# Patient Record
Sex: FEMALE | Race: WHITE | NOT HISPANIC OR LATINO | Employment: OTHER | URBAN - NONMETROPOLITAN AREA
[De-identification: names, ages, dates, MRNs, and addresses within clinical notes are randomized per-mention and may not be internally consistent; named-entity substitution may affect disease eponyms.]

---

## 2017-02-06 ENCOUNTER — OFFICE VISIT (OUTPATIENT)
Dept: FAMILY MEDICINE CLINIC | Facility: CLINIC | Age: 47
End: 2017-02-06

## 2017-02-06 VITALS
TEMPERATURE: 99.6 F | DIASTOLIC BLOOD PRESSURE: 85 MMHG | HEIGHT: 71 IN | BODY MASS INDEX: 41.02 KG/M2 | HEART RATE: 93 BPM | OXYGEN SATURATION: 96 % | WEIGHT: 293 LBS | SYSTOLIC BLOOD PRESSURE: 140 MMHG

## 2017-02-06 DIAGNOSIS — R53.83 OTHER FATIGUE: ICD-10-CM

## 2017-02-06 DIAGNOSIS — R60.1 GENERALIZED EDEMA: ICD-10-CM

## 2017-02-06 DIAGNOSIS — M19.90 ARTHRITIS: ICD-10-CM

## 2017-02-06 DIAGNOSIS — Z72.0 TOBACCO ABUSE DISORDER: Primary | ICD-10-CM

## 2017-02-06 DIAGNOSIS — E53.8 VITAMIN B 12 DEFICIENCY: ICD-10-CM

## 2017-02-06 DIAGNOSIS — J06.9 ACUTE URI: ICD-10-CM

## 2017-02-06 DIAGNOSIS — J30.9 CHRONIC ALLERGIC RHINITIS: ICD-10-CM

## 2017-02-06 DIAGNOSIS — E55.9 VITAMIN D DEFICIENCY: ICD-10-CM

## 2017-02-06 DIAGNOSIS — IMO0002 DM (DIABETES MELLITUS) TYPE II UNCONTROLLED, PERIPH VASCULAR DISORDER: ICD-10-CM

## 2017-02-06 DIAGNOSIS — E78.2 MIXED HYPERLIPIDEMIA: ICD-10-CM

## 2017-02-06 DIAGNOSIS — I10 ESSENTIAL HYPERTENSION: ICD-10-CM

## 2017-02-06 DIAGNOSIS — K21.9 GASTROESOPHAGEAL REFLUX DISEASE WITHOUT ESOPHAGITIS: ICD-10-CM

## 2017-02-06 LAB
25(OH)D3 SERPL-MCNC: 6 NG/ML
ALBUMIN SERPL-MCNC: 4.1 G/DL (ref 3.5–5)
ALBUMIN/GLOB SERPL: 1.3 G/DL (ref 1.5–2.5)
ALP SERPL-CCNC: 72 U/L (ref 46–116)
ALT SERPL W P-5'-P-CCNC: 12 U/L (ref 10–36)
ANION GAP SERPL CALCULATED.3IONS-SCNC: 1.7 MMOL/L (ref 3.6–11.2)
AST SERPL-CCNC: 14 U/L (ref 10–30)
BASOPHILS # BLD AUTO: 0.03 10*3/MM3 (ref 0–0.3)
BASOPHILS NFR BLD AUTO: 0.3 % (ref 0–2)
BILIRUB SERPL-MCNC: 0.3 MG/DL (ref 0.2–1.8)
BUN BLD-MCNC: 9 MG/DL (ref 7–21)
BUN/CREAT SERPL: 14.3 (ref 7–25)
CALCIUM SPEC-SCNC: 9.7 MG/DL (ref 7.7–10)
CHLORIDE SERPL-SCNC: 109 MMOL/L (ref 99–112)
CHOLEST SERPL-MCNC: 164 MG/DL (ref 0–200)
CO2 SERPL-SCNC: 28.3 MMOL/L (ref 24.3–31.9)
CREAT BLD-MCNC: 0.63 MG/DL (ref 0.43–1.29)
DEPRECATED RDW RBC AUTO: 43.5 FL (ref 37–54)
EOSINOPHIL # BLD AUTO: 0.45 10*3/MM3 (ref 0–0.7)
EOSINOPHIL NFR BLD AUTO: 4.8 % (ref 0–5)
ERYTHROCYTE [DISTWIDTH] IN BLOOD BY AUTOMATED COUNT: 14.3 % (ref 11.5–14.5)
GFR SERPL CREATININE-BSD FRML MDRD: 102 ML/MIN/1.73
GLOBULIN UR ELPH-MCNC: 3.1 GM/DL
GLUCOSE BLD-MCNC: 196 MG/DL (ref 70–110)
HBA1C MFR BLD: 9.5 % (ref 4.5–5.7)
HCT VFR BLD AUTO: 40.6 % (ref 37–47)
HDLC SERPL-MCNC: 42 MG/DL (ref 60–100)
HGB BLD-MCNC: 13.1 G/DL (ref 12–16)
IMM GRANULOCYTES # BLD: 0.01 10*3/MM3 (ref 0–0.03)
IMM GRANULOCYTES NFR BLD: 0.1 % (ref 0–0.5)
LDLC SERPL CALC-MCNC: 50 MG/DL (ref 0–100)
LDLC/HDLC SERPL: 1.2 {RATIO}
LYMPHOCYTES # BLD AUTO: 2.81 10*3/MM3 (ref 1–3)
LYMPHOCYTES NFR BLD AUTO: 30.2 % (ref 21–51)
MCH RBC QN AUTO: 27.4 PG (ref 27–33)
MCHC RBC AUTO-ENTMCNC: 32.3 G/DL (ref 33–37)
MCV RBC AUTO: 84.9 FL (ref 80–94)
MONOCYTES # BLD AUTO: 0.55 10*3/MM3 (ref 0.1–0.9)
MONOCYTES NFR BLD AUTO: 5.9 % (ref 0–10)
NEUTROPHILS # BLD AUTO: 5.44 10*3/MM3 (ref 1.4–6.5)
NEUTROPHILS NFR BLD AUTO: 58.7 % (ref 30–70)
OSMOLALITY SERPL CALC.SUM OF ELEC: 281.6 MOSM/KG (ref 273–305)
PLATELET # BLD AUTO: 171 10*3/MM3 (ref 130–400)
PMV BLD AUTO: 11.9 FL (ref 6–10)
POTASSIUM BLD-SCNC: 4.3 MMOL/L (ref 3.5–5.3)
PROT SERPL-MCNC: 7.2 G/DL (ref 6–8)
RBC # BLD AUTO: 4.78 10*6/MM3 (ref 4.2–5.4)
SODIUM BLD-SCNC: 139 MMOL/L (ref 135–153)
T4 FREE SERPL-MCNC: 1.13 NG/DL (ref 0.89–1.76)
TRIGL SERPL-MCNC: 358 MG/DL (ref 0–150)
TSH SERPL DL<=0.05 MIU/L-ACNC: 0.4 MIU/ML (ref 0.55–4.78)
VIT B12 BLD-MCNC: 237 PG/ML (ref 211–911)
VLDLC SERPL-MCNC: 71.6 MG/DL
WBC NRBC COR # BLD: 9.29 10*3/MM3 (ref 4.5–12.5)

## 2017-02-06 PROCEDURE — 80053 COMPREHEN METABOLIC PANEL: CPT | Performed by: NURSE PRACTITIONER

## 2017-02-06 PROCEDURE — 82306 VITAMIN D 25 HYDROXY: CPT | Performed by: NURSE PRACTITIONER

## 2017-02-06 PROCEDURE — 84439 ASSAY OF FREE THYROXINE: CPT | Performed by: NURSE PRACTITIONER

## 2017-02-06 PROCEDURE — 82607 VITAMIN B-12: CPT | Performed by: NURSE PRACTITIONER

## 2017-02-06 PROCEDURE — 84443 ASSAY THYROID STIM HORMONE: CPT | Performed by: NURSE PRACTITIONER

## 2017-02-06 PROCEDURE — 80061 LIPID PANEL: CPT | Performed by: NURSE PRACTITIONER

## 2017-02-06 PROCEDURE — 83036 HEMOGLOBIN GLYCOSYLATED A1C: CPT | Performed by: NURSE PRACTITIONER

## 2017-02-06 PROCEDURE — 99205 OFFICE O/P NEW HI 60 MIN: CPT | Performed by: NURSE PRACTITIONER

## 2017-02-06 PROCEDURE — 85025 COMPLETE CBC W/AUTO DIFF WBC: CPT | Performed by: NURSE PRACTITIONER

## 2017-02-06 PROCEDURE — 99406 BEHAV CHNG SMOKING 3-10 MIN: CPT | Performed by: NURSE PRACTITIONER

## 2017-02-06 PROCEDURE — 36415 COLL VENOUS BLD VENIPUNCTURE: CPT

## 2017-02-06 RX ORDER — ESOMEPRAZOLE MAGNESIUM 40 MG/1
40 CAPSULE, DELAYED RELEASE ORAL
COMMUNITY
End: 2017-02-06 | Stop reason: SDUPTHER

## 2017-02-06 RX ORDER — FUROSEMIDE 40 MG/1
40 TABLET ORAL DAILY
COMMUNITY
End: 2017-02-06

## 2017-02-06 RX ORDER — BUMETANIDE 2 MG/1
2 TABLET ORAL DAILY
Qty: 30 TABLET | Refills: 5 | Status: SHIPPED | OUTPATIENT
Start: 2017-02-06 | End: 2017-06-05 | Stop reason: SDUPTHER

## 2017-02-06 RX ORDER — GUAIFENESIN DEXTROMETHORPHAN HYDROBROMIDE ORAL SOLUTION 10; 100 MG/5ML; MG/5ML
5 SOLUTION ORAL 4 TIMES DAILY PRN
Qty: 180 ML | Refills: 1 | Status: SHIPPED | OUTPATIENT
Start: 2017-02-06 | End: 2017-06-05

## 2017-02-06 RX ORDER — GABAPENTIN 600 MG/1
600 TABLET ORAL 3 TIMES DAILY
COMMUNITY
End: 2017-02-06 | Stop reason: SDUPTHER

## 2017-02-06 RX ORDER — GLIPIZIDE AND METFORMIN HCL 2.5; 25 MG/1; MG/1
1 TABLET, FILM COATED ORAL
Qty: 60 TABLET | Refills: 0 | Status: SHIPPED | OUTPATIENT
Start: 2017-02-06 | End: 2017-03-06 | Stop reason: SDUPTHER

## 2017-02-06 RX ORDER — ENALAPRIL MALEATE 5 MG/1
5 TABLET ORAL DAILY
Qty: 30 TABLET | Refills: 5 | Status: SHIPPED | OUTPATIENT
Start: 2017-02-06 | End: 2017-08-07 | Stop reason: SDUPTHER

## 2017-02-06 RX ORDER — EZETIMIBE 10 MG/1
10 TABLET ORAL NIGHTLY
COMMUNITY
End: 2017-02-06 | Stop reason: SDUPTHER

## 2017-02-06 RX ORDER — GABAPENTIN 600 MG/1
600 TABLET ORAL 3 TIMES DAILY
Qty: 90 TABLET | Refills: 3 | Status: SHIPPED | OUTPATIENT
Start: 2017-02-06 | End: 2017-06-05 | Stop reason: SDUPTHER

## 2017-02-06 RX ORDER — FENOFIBRATE 145 MG/1
145 TABLET, COATED ORAL DAILY
Qty: 30 TABLET | Refills: 5 | Status: SHIPPED | OUTPATIENT
Start: 2017-02-06 | End: 2017-08-07 | Stop reason: SDUPTHER

## 2017-02-06 RX ORDER — LEVOCETIRIZINE DIHYDROCHLORIDE 5 MG/1
5 TABLET, FILM COATED ORAL EVERY EVENING
Qty: 30 TABLET | Refills: 5 | Status: SHIPPED | OUTPATIENT
Start: 2017-02-06 | End: 2017-08-07 | Stop reason: SDUPTHER

## 2017-02-06 RX ORDER — ESOMEPRAZOLE MAGNESIUM 40 MG/1
40 CAPSULE, DELAYED RELEASE ORAL
Qty: 30 CAPSULE | Refills: 5 | Status: SHIPPED | OUTPATIENT
Start: 2017-02-06 | End: 2017-08-07 | Stop reason: SDUPTHER

## 2017-02-06 RX ORDER — FENOFIBRATE 145 MG/1
145 TABLET, COATED ORAL DAILY
COMMUNITY
End: 2017-02-06 | Stop reason: SDUPTHER

## 2017-02-06 RX ORDER — POTASSIUM CHLORIDE 750 MG/1
10 TABLET, EXTENDED RELEASE ORAL 2 TIMES DAILY
COMMUNITY
End: 2017-02-06 | Stop reason: SDUPTHER

## 2017-02-06 RX ORDER — MONTELUKAST SODIUM 10 MG/1
10 TABLET ORAL NIGHTLY
Qty: 30 TABLET | Refills: 5 | Status: SHIPPED | OUTPATIENT
Start: 2017-02-06 | End: 2017-08-05 | Stop reason: SDUPTHER

## 2017-02-06 RX ORDER — ROSUVASTATIN CALCIUM 40 MG/1
40 TABLET, COATED ORAL DAILY
COMMUNITY
End: 2017-02-06 | Stop reason: SDUPTHER

## 2017-02-06 RX ORDER — DOXYCYCLINE 100 MG/1
100 CAPSULE ORAL 2 TIMES DAILY
Qty: 20 CAPSULE | Refills: 0 | Status: SHIPPED | OUTPATIENT
Start: 2017-02-06 | End: 2017-03-06

## 2017-02-06 RX ORDER — MONTELUKAST SODIUM 10 MG/1
10 TABLET ORAL NIGHTLY
COMMUNITY
End: 2017-02-06 | Stop reason: SDUPTHER

## 2017-02-06 RX ORDER — LEVOCETIRIZINE DIHYDROCHLORIDE 5 MG/1
5 TABLET, FILM COATED ORAL EVERY EVENING
COMMUNITY
End: 2017-02-06 | Stop reason: SDUPTHER

## 2017-02-06 RX ORDER — ENALAPRIL MALEATE 5 MG/1
5 TABLET ORAL DAILY
COMMUNITY
End: 2017-02-06 | Stop reason: SDUPTHER

## 2017-02-06 RX ORDER — EZETIMIBE 10 MG/1
10 TABLET ORAL NIGHTLY
Qty: 30 TABLET | Refills: 5 | Status: SHIPPED | OUTPATIENT
Start: 2017-02-06 | End: 2017-08-07 | Stop reason: SDUPTHER

## 2017-02-06 RX ORDER — POTASSIUM CHLORIDE 750 MG/1
10 TABLET, EXTENDED RELEASE ORAL 2 TIMES DAILY
Qty: 60 TABLET | Refills: 5 | Status: SHIPPED | OUTPATIENT
Start: 2017-02-06 | End: 2017-08-07 | Stop reason: SDUPTHER

## 2017-02-06 RX ORDER — ROSUVASTATIN CALCIUM 40 MG/1
40 TABLET, COATED ORAL DAILY
Qty: 30 TABLET | Refills: 5 | Status: SHIPPED | OUTPATIENT
Start: 2017-02-06 | End: 2017-06-30 | Stop reason: SDUPTHER

## 2017-02-06 NOTE — PROGRESS NOTES
"Subjective   Soheila Brewster is a 46 y.o. female.     Chief Complaint   Patient presents with   • Establish Care       History of Present Illness     Establish care-Here today to establish care.  Former patient of JOAQUÍN Gibson.  Last seen at that office approx Nov/Dec.    HTN-Reports she has been on BP meds approx 10 years.  She reports she is out of her meds currently.  She reports some elevations at times on her medication. Not at goal.   DM II-Has been on Metformin for approx 2 years and does not take it when she goes anywhere due to cramping and diarrhea.  She reports she does not take it often.  She reports elevations due to inadequate metformin use.  Could not tolerated Invokanna due to persistent yeast infection.  Not at goal.   Arthritis-Normally on Humaria and Celebrex.  Has not had Humaria in approx 8 months due to intermittent infections.  She reports history of arthritis approx 20 years.  Has been seen by Rubén Edward in [place] since that time.  She reports Dr Edward feels she has some Fibromyalgia symptoms and some possible chron's disease but she has not had positive diagnosis to date.  Sinus complaint-approx 1 week.  Sore throat with irritation and PND.  Some low grade fever.  Sinus pressure in face.  Ear pain with fullness.  Eyes watery.  Has taken tylenol sinus that does help her HA but has not changed any of her other symptoms.  Not at goal.   BLE edema-has been on Lasix approx 10 years.  Reports she feels the lasix is not as effective as previous.  She reports edema when she awakens in the AM.  She feels she urinates \"2 good times\" after taking the lasix.  She reports more coffee intake daily than water except for the last week and she has been \"craving water\".  Not at goal.   Sleep apnea-on BiPap with O2 bleed at 2 LPM nightly.  Has had for approx 1 year.  Reports she does sleep better since has adjusted to her machine.  She reports she wears a full face mask.  She does not use O2 at any " "other time.   Tobacco use-for approx 10 years this time.  Quit for approx 13 years.  Generally smokes 1PPD.  She reports she would be interested in stopping and \"to loose weight\".  She reports she did \"Cold turkey\" last time.  Not at goal.     The following portions of the patient's history were reviewed and updated as appropriate: allergies, current medications, past family history, past medical history, past social history, past surgical history and problem list.    Review of Systems   Constitutional: Positive for fever. Negative for appetite change and unexpected weight change.   HENT: Positive for congestion, ear pain, postnasal drip, rhinorrhea (with blood tinge) and sore throat. Negative for nosebleeds and trouble swallowing.    Eyes: Positive for pain (occasional.  has been evaluated by Dr Mireles) and discharge. Negative for visual disturbance.        Last eye exam is past due.  She reports she was due in November.  Usually sees Dr Mireles.  Does not wear corrective lens   Respiratory: Positive for cough (with yellow production), chest tightness, shortness of breath and wheezing.    Cardiovascular: Positive for chest pain (at times but \"none in the last month\".  Is not followed by Cardio) and leg swelling. Negative for palpitations.   Gastrointestinal: Positive for abdominal pain (generally periumbilical.  Reports hernia in her abd area) and diarrhea (\"a lot\"). Negative for blood in stool, constipation, nausea and vomiting.   Endocrine: Positive for cold intolerance (increases her joint pain) and polydipsia. Negative for heat intolerance, polyphagia and polyuria.   Genitourinary: Negative for difficulty urinating, dysuria, hematuria, vaginal discharge and vaginal pain.        Reports painful menses.  She reports they are regular and at times are heavy as she has gotten older.  Last pap approx 9 months ago at Ceci's office.  No history of abnormal pap.    Due for Mammo.  Usually has done in Feb.  Does have " "history of abnormal.   Musculoskeletal: Positive for arthralgias (mulitple joints), back pain (history of pinched nerve high in back and in neck.  Disc bulge in low back.  last MRI was approx 2 years ago.), joint swelling, myalgias, neck pain and neck stiffness.   Skin: Negative for color change.        Reports redness at times around her knuckles \"from arthritis\"   Allergic/Immunologic: Negative for environmental allergies and food allergies.   Neurological: Positive for dizziness, numbness (BLE in feet and ankles from DM and some in bilateral legs related to back) and headaches (due to acute illness). Negative for seizures and syncope.   Hematological: Positive for adenopathy (neck due to acute illness). Does not bruise/bleed easily.   Psychiatric/Behavioral: Positive for sleep disturbance (problem going to sleep and staying asleep.  Reports average of 4 hours per night.  She has not been on any meds for sleep). Negative for dysphoric mood and suicidal ideas. The patient is not nervous/anxious.    All other systems reviewed and are negative.      Objective     Visit Vitals   • /85 (BP Location: Left arm, Patient Position: Sitting)   • Pulse 93   • Temp 99.6 °F (37.6 °C) (Tympanic)   • Ht 71\" (180.3 cm)   • Wt 298 lb (135 kg)   • LMP 01/22/2017   • SpO2 96%   • BMI 41.56 kg/m2       Physical Exam   Constitutional: She is oriented to person, place, and time. She appears well-developed and well-nourished. No distress.   Friendly, talkative adult female.  obese   HENT:   Head: Normocephalic and atraumatic.   Right Ear: External ear normal.   Left Ear: External ear normal.   Nose: Nose normal.   Mouth/Throat: Oropharynx is clear and moist.   Bilateral TM's dull, poor cone of light.  Injected and retracted.  EAC clear.   Bilateral nares with erythematous turbinates.  Poor patency.  Rhinorrhea present with boggy mucosa.   Bilateral sinus areas tender, boggy.  Tender to palpation.   Throat moderately injected with " thick, white PND present      Eyes: Conjunctivae, EOM and lids are normal. Pupils are equal, round, and reactive to light. No scleral icterus.   Neck: Normal range of motion. Neck supple. No JVD present. No tracheal tenderness present. No thyromegaly present.   Cardiovascular: Normal rate, regular rhythm, normal heart sounds and intact distal pulses.    No murmur heard.  Pulmonary/Chest: Effort normal and breath sounds normal. She exhibits no tenderness.   Abdominal: Soft. Bowel sounds are normal. She exhibits no mass. There is no hepatosplenomegaly. There is no tenderness.   Musculoskeletal: She exhibits no edema.        Cervical back: She exhibits decreased range of motion, tenderness, pain and spasm.   Gait slow, deliberate.   equal bilaterally. No muscular atrophy or flaccidity.  Generalized joint stiffness and tenderness due to RA.  BLE edema Mild, non pitting   Lymphadenopathy:     She has cervical adenopathy.   Firm, mobile, shotty anterior cervical nodes.  Non tender to palpation.     Neurological: She is alert and oriented to person, place, and time. She has normal strength and normal reflexes. No cranial nerve deficit. She exhibits normal muscle tone. Coordination normal.   Skin: Skin is warm and dry.   Psychiatric: She has a normal mood and affect. Her behavior is normal. Judgment and thought content normal.   Vitals reviewed.      Assessment/Plan     Soheila was seen today for establish care.    Diagnoses and all orders for this visit:    Tobacco abuse disorder  -     varenicline (CHANTIX IESHA) 0.5 MG X 11 & 1 MG X 42 tablet; Take 0.5 mg one daily on days 1-2 and 0.5 mg twice daily on days 4-7.    DM (diabetes mellitus) type II uncontrolled, periph vascular disorder  -     Albiglutide (TANZEUM) 50 MG pen-injector; Inject 1 each under the skin 1 (One) Time Per Week.  -     glipiZIDE-metFORMIN (METAGLIP) 2.5-250 MG per tablet; Take 1 tablet by mouth 2 (Two) Times a Day Before Meals.  -     Insulin  Glargine 300 UNIT/ML solution pen-injector; Inject 68 Units under the skin every night at bedtime.  -     Insulin Pen Needle (EASY TOUCH PEN NEEDLES) 31G X 8 MM misc; 1 each Daily.  -     CBC & Differential; Future  -     Comprehensive Metabolic Panel; Future  -     Hemoglobin A1c; Future  -     CBC & Differential  -     Comprehensive Metabolic Panel  -     Hemoglobin A1c  -     CBC Auto Differential  -     Osmolality, Calculated; Future  -     Osmolality, Calculated    Essential hypertension  -     enalapril (VASOTEC) 5 MG tablet; Take 1 tablet by mouth Daily.  -     CBC & Differential; Future  -     Comprehensive Metabolic Panel; Future  -     CBC & Differential  -     Comprehensive Metabolic Panel  -     CBC Auto Differential  -     Osmolality, Calculated; Future  -     Osmolality, Calculated    Arthritis  -     gabapentin (NEURONTIN) 600 MG tablet; Take 1 tablet by mouth 3 (Three) Times a Day.    Mixed hyperlipidemia  -     ezetimibe (ZETIA) 10 MG tablet; Take 1 tablet by mouth Every Night.  -     fenofibrate (TRICOR) 145 MG tablet; Take 1 tablet by mouth Daily.  -     rosuvastatin (CRESTOR) 40 MG tablet; Take 1 tablet by mouth Daily.  -     Lipid Panel; Future  -     Lipid Panel    Chronic allergic rhinitis  -     levocetirizine (XYZAL) 5 MG tablet; Take 1 tablet by mouth Every Evening.  -     montelukast (SINGULAIR) 10 MG tablet; Take 1 tablet by mouth Every Night.    Gastroesophageal reflux disease without esophagitis  -     esomeprazole (nexIUM) 40 MG capsule; Take 1 capsule by mouth Every Morning Before Breakfast.    Generalized edema  -     potassium chloride (K-DUR,KLOR-CON) 10 MEQ CR tablet; Take 1 tablet by mouth 2 (Two) Times a Day.  -     bumetanide (BUMEX) 2 MG tablet; Take 1 tablet by mouth Daily.    Acute URI  -     doxycycline (MONODOX) 100 MG capsule; Take 1 capsule by mouth 2 (Two) Times a Day.  -     dextromethorphan-guaifenesin (ROBITUSSIN-DM)  MG/5ML liquid; Take 5 mL by mouth 4 (Four)  Times a Day As Needed for cough.    Vitamin D deficiency  -     Vitamin D 25 Hydroxy; Future  -     Vitamin D 25 Hydroxy    Vitamin B 12 deficiency  -     Vitamin B12; Future  -     Vitamin B12    Other fatigue  -     TSH; Future  -     T4, Free; Future  -     TSH  -     T4, Free    60 minutes spent face to face with patient  At least 30 minutes spent counseling patient on PREMA of medications, tobacco abuse, acute illness prevention and treatement  Refill on routine maintenance meds today.   Fasting labs ordered.  Will call patient with results and make any meds changes PRN  Instructed to complete all of antibiotics for acute illness.  Increase PO fluids, avoid caffeine.  Do not save meds for later use.  Rest PRN  3-5 minutes counseling on smoking cessation. Keep self occupied. Find alternative activities when desire to smoke occurs. Understands risk of continued use and benefits of cessation.  Risk of chantix use discussed.  Patient understands reasons to discontinue and call provider.  Understands disease processes and need for medications.  Understands reasons for urgent and emergent care.  Patient (& family) verbalized agreement for treatment plan.   RTC 1 month, sooner if needed.

## 2017-03-01 RX ORDER — ERGOCALCIFEROL 1.25 MG/1
50000 CAPSULE ORAL WEEKLY
Qty: 4 CAPSULE | Refills: 5 | Status: SHIPPED | OUTPATIENT
Start: 2017-03-01 | End: 2017-09-11 | Stop reason: SDUPTHER

## 2017-03-06 ENCOUNTER — OFFICE VISIT (OUTPATIENT)
Dept: FAMILY MEDICINE CLINIC | Facility: CLINIC | Age: 47
End: 2017-03-06

## 2017-03-06 VITALS
OXYGEN SATURATION: 97 % | DIASTOLIC BLOOD PRESSURE: 82 MMHG | HEART RATE: 89 BPM | SYSTOLIC BLOOD PRESSURE: 120 MMHG | HEIGHT: 71 IN | WEIGHT: 293 LBS | BODY MASS INDEX: 41.02 KG/M2 | TEMPERATURE: 99.2 F

## 2017-03-06 DIAGNOSIS — G89.29 CHRONIC MIDLINE THORACIC BACK PAIN: ICD-10-CM

## 2017-03-06 DIAGNOSIS — IMO0002 DM (DIABETES MELLITUS) TYPE II UNCONTROLLED, PERIPH VASCULAR DISORDER: Primary | ICD-10-CM

## 2017-03-06 DIAGNOSIS — M54.6 CHRONIC MIDLINE THORACIC BACK PAIN: ICD-10-CM

## 2017-03-06 DIAGNOSIS — M54.2 CERVICALGIA: ICD-10-CM

## 2017-03-06 DIAGNOSIS — N61.1 LEFT BREAST ABSCESS: ICD-10-CM

## 2017-03-06 PROCEDURE — 99214 OFFICE O/P EST MOD 30 MIN: CPT | Performed by: NURSE PRACTITIONER

## 2017-03-06 RX ORDER — GLIPIZIDE AND METFORMIN HCL 2.5; 25 MG/1; MG/1
1 TABLET, FILM COATED ORAL
Qty: 60 TABLET | Refills: 2 | Status: SHIPPED | OUTPATIENT
Start: 2017-03-06 | End: 2017-06-05 | Stop reason: SDUPTHER

## 2017-03-06 RX ORDER — SULFAMETHOXAZOLE AND TRIMETHOPRIM 800; 160 MG/1; MG/1
1 TABLET ORAL 2 TIMES DAILY
Qty: 20 TABLET | Refills: 0 | Status: SHIPPED | OUTPATIENT
Start: 2017-03-06 | End: 2017-03-16

## 2017-03-06 NOTE — PROGRESS NOTES
Subjective   Soheila Brewster is a 46 y.o. female.     Chief Complaint   Patient presents with   • Follow up on medication       History of Present Illness     Smoking cessation-prescribed chantix at last visit for smoking cessation.  She did not begin chantix due to multiple stressors.  She does continue to smoke.   Diabetes-reports her Tanzeum is not covered instead of Toujeo.  She previously reports it was her Toujeo and that medication had been discontinued.  Will resume today.    Back pain-reports increase in her back pain since her last visit at area of bulging disc in her back.  She reports she has been unable to stand more than 5 minutes at a time due to the numbness she is having in her legs with tingling sensation.  She reports some sensation of weakness.  She reports last work up was approx 2 years ago with an MRI.  She was followed by Dr armenta in the past.   Not at goal.   Area under left breast-draining and red.  Recurrent in nature.  She reports this area began approx 5 days ago. She reports she has done warm compresses at home due to some sharp shooting pain.  Not at goal.     The following portions of the patient's history were reviewed and updated as appropriate: allergies, current medications, past family history, past medical history, past social history, past surgical history and problem list.    Review of Systems   Constitutional: Positive for fever. Negative for appetite change and unexpected weight change.   HENT: Positive for congestion, ear pain (left ear is worse), postnasal drip, rhinorrhea (with blood tinge) and sore throat. Negative for nosebleeds and trouble swallowing.    Eyes: Positive for pain (occasional.  has been evaluated by Dr Mireles) and discharge. Negative for visual disturbance.        Last eye exam is past due.  She reports she was due in November.  Usually sees Dr Mireles.  Does not wear corrective lens   Respiratory: Positive for cough (with yellow production), chest  "tightness, shortness of breath and wheezing.    Cardiovascular: Positive for leg swelling. Negative for chest pain (at times but \"none in the last month\".  Is not followed by Cardio) and palpitations.   Gastrointestinal: Positive for abdominal pain (generally periumbilical.  Reports hernia in her abd area) and diarrhea (\"a lot\"). Negative for blood in stool, constipation, nausea and vomiting.   Endocrine: Positive for cold intolerance (increases her joint pain) and polydipsia. Negative for heat intolerance, polyphagia and polyuria.   Genitourinary: Negative for difficulty urinating, dysuria, hematuria, vaginal discharge and vaginal pain.        Reports painful menses.  She reports they are regular and at times are heavy as she has gotten older.  Last pap approx 9 months ago at Ceci's office.  No history of abnormal pap.    Due for Mammo.  Usually has done in Feb.  Does have history of abnormal.   Musculoskeletal: Positive for arthralgias (mulitple joints), back pain (history of pinched nerve high in back and in neck.  Disc bulge in low back.  last MRI was approx 2 years ago.), joint swelling, myalgias (muscle spasm in back), neck pain and neck stiffness.   Skin: Negative for color change.        Reports redness at times around her knuckles \"from arthritis\"   Allergic/Immunologic: Negative for environmental allergies and food allergies.   Neurological: Positive for dizziness, numbness (BLE in feet and ankles from DM and some in bilateral legs related to back) and headaches (due to acute illness). Negative for seizures and syncope.   Hematological: Positive for adenopathy (neck due to acute illness). Does not bruise/bleed easily.   Psychiatric/Behavioral: Positive for sleep disturbance (problem going to sleep and staying asleep.  Reports average of 4 hours per night.  She has not been on any meds for sleep). Negative for dysphoric mood and suicidal ideas. The patient is not nervous/anxious.    All other systems " "reviewed and are negative.      Objective     Visit Vitals   • /82 (BP Location: Left arm, Patient Position: Sitting)   • Pulse 89   • Temp 99.2 °F (37.3 °C) (Tympanic)   • Ht 71\" (180.3 cm)   • Wt 294 lb (133 kg)   • LMP 02/22/2017   • SpO2 97%   • BMI 41 kg/m2       Physical Exam   Constitutional: She is oriented to person, place, and time. She appears well-developed and well-nourished. No distress.   Friendly, talkative adult female.  obese   HENT:   Head: Normocephalic and atraumatic.   Right Ear: External ear normal.   Left Ear: External ear normal.   Nose: Nose normal.   Mouth/Throat: Oropharynx is clear and moist.   Bilateral TM's dull, poor cone of light.  Injected and retracted.  EAC clear.   Bilateral nares with erythematous turbinates.  Poor patency.  Rhinorrhea present with boggy mucosa.   Bilateral sinus areas tender, boggy.  Tender to palpation.   Throat moderately injected with thick, white PND present      Eyes: Conjunctivae, EOM and lids are normal. Pupils are equal, round, and reactive to light. No scleral icterus.   Neck: Normal range of motion. Neck supple. No JVD present. No tracheal tenderness present. No thyromegaly present.   Cardiovascular: Normal rate, regular rhythm, normal heart sounds and intact distal pulses.    No murmur heard.  Pulmonary/Chest: Effort normal and breath sounds normal. She exhibits no tenderness.       Abdominal: Soft. Bowel sounds are normal. She exhibits no mass. There is no hepatosplenomegaly. There is no tenderness.   Musculoskeletal: She exhibits no edema.        Cervical back: She exhibits decreased range of motion, tenderness, pain and spasm.   Gait slow, deliberate.   equal bilaterally. No muscular atrophy or flaccidity.  Generalized joint stiffness and tenderness due to RA.  BLE edema Mild, non pitting   Lymphadenopathy:     She has cervical adenopathy.   Firm, mobile, shotty anterior cervical nodes.  Non tender to palpation.     Neurological: She is " alert and oriented to person, place, and time. She has normal strength and normal reflexes. No cranial nerve deficit. She exhibits normal muscle tone. Coordination normal.   Skin: Skin is warm and dry.   Psychiatric: She has a normal mood and affect. Her behavior is normal. Judgment and thought content normal.   Vitals reviewed.      Assessment/Plan     Soheila was seen today for follow up on medication.    Diagnoses and all orders for this visit:    DM (diabetes mellitus) type II uncontrolled, periph vascular disorder  -     Insulin Glargine (TOUJEO SOLOSTAR) 300 UNIT/ML solution pen-injector; Inject 68 Units under the skin Daily.  -     Exenatide ER 2 MG pen-injector; Inject 2 mg under the skin 1 (One) Time Per Week.  -     glipiZIDE-metFORMIN (METAGLIP) 2.5-250 MG per tablet; Take 1 tablet by mouth 2 (Two) Times a Day Before Meals.    Chronic midline thoracic back pain  -     XR Spine Cervical 2 or 3 View; Future  -     XR spine thoracic 2 vw; Future  -     XR Spine Lumbar 2 or 3 View; Future    Cervicalgia  -     XR Spine Cervical 2 or 3 View; Future  -     XR spine thoracic 2 vw; Future    Left breast abscess  -     sulfamethoxazole-trimethoprim (BACTRIM DS,SEPTRA DS) 800-160 MG per tablet; Take 1 tablet by mouth 2 (Two) Times a Day for 10 days.  -     mupirocin (BACTROBAN) 2 % ointment; Apply  topically 3 (Three) Times a Day.      Reviewed labs with patients  Patient provided lab order for [facility] for work up of back.   Advised warm soaks Q 3-4 hours until area begins to drain.  If increase in warmth, edema, fever develops report to office.  IF unchanged in 2 days, report to office for referral for I & D.    Understands disease processes and need for medications.  Understands reasons for urgent and emergent care.  Patient (& family) verbalized agreement for treatment plan.   Instructed to complete all of antibiotics for acute illness.  Increase PO fluids, avoid caffeine.  Do not save meds for later use.  Rest  PRN  Correction of insulin ordered due to miscommunication with patient and office.   Trial of Bydureon.  Patient provided 2 sample pens with demonstration of use per provider and return demonstration x's 2 per patient.  RTC 3 months, sooner if needed.

## 2017-05-30 DIAGNOSIS — M54.6 CHRONIC MIDLINE THORACIC BACK PAIN: Primary | ICD-10-CM

## 2017-05-30 DIAGNOSIS — M51.36 NARROWING OF LUMBAR INTERVERTEBRAL DISC SPACE: ICD-10-CM

## 2017-05-30 DIAGNOSIS — G89.29 CHRONIC MIDLINE THORACIC BACK PAIN: Primary | ICD-10-CM

## 2017-06-05 ENCOUNTER — OFFICE VISIT (OUTPATIENT)
Dept: FAMILY MEDICINE CLINIC | Facility: CLINIC | Age: 47
End: 2017-06-05

## 2017-06-05 VITALS
TEMPERATURE: 98.6 F | OXYGEN SATURATION: 97 % | SYSTOLIC BLOOD PRESSURE: 115 MMHG | HEIGHT: 71 IN | HEART RATE: 93 BPM | WEIGHT: 293 LBS | DIASTOLIC BLOOD PRESSURE: 70 MMHG | BODY MASS INDEX: 41.02 KG/M2

## 2017-06-05 DIAGNOSIS — M19.90 ARTHRITIS: ICD-10-CM

## 2017-06-05 DIAGNOSIS — R60.1 GENERALIZED EDEMA: ICD-10-CM

## 2017-06-05 DIAGNOSIS — IMO0002 DM (DIABETES MELLITUS) TYPE II UNCONTROLLED, PERIPH VASCULAR DISORDER: Primary | ICD-10-CM

## 2017-06-05 DIAGNOSIS — M51.36 NARROWING OF LUMBAR INTERVERTEBRAL DISC SPACE: ICD-10-CM

## 2017-06-05 DIAGNOSIS — J06.9 ACUTE URI: ICD-10-CM

## 2017-06-05 DIAGNOSIS — Z12.31 ENCOUNTER FOR SCREENING MAMMOGRAM FOR MALIGNANT NEOPLASM OF BREAST: ICD-10-CM

## 2017-06-05 DIAGNOSIS — G89.29 CHRONIC MIDLINE THORACIC BACK PAIN: ICD-10-CM

## 2017-06-05 DIAGNOSIS — M54.6 CHRONIC MIDLINE THORACIC BACK PAIN: ICD-10-CM

## 2017-06-05 PROCEDURE — 96372 THER/PROPH/DIAG INJ SC/IM: CPT | Performed by: NURSE PRACTITIONER

## 2017-06-05 PROCEDURE — 99214 OFFICE O/P EST MOD 30 MIN: CPT | Performed by: NURSE PRACTITIONER

## 2017-06-05 RX ORDER — LEVOFLOXACIN 500 MG/1
500 TABLET, FILM COATED ORAL DAILY
Qty: 10 TABLET | Refills: 0 | Status: SHIPPED | OUTPATIENT
Start: 2017-06-05 | End: 2017-06-15

## 2017-06-05 RX ORDER — KETOROLAC TROMETHAMINE 30 MG/ML
60 INJECTION, SOLUTION INTRAMUSCULAR; INTRAVENOUS ONCE
Status: COMPLETED | OUTPATIENT
Start: 2017-06-05 | End: 2017-06-05

## 2017-06-05 RX ORDER — OFLOXACIN 3 MG/ML
5 SOLUTION AURICULAR (OTIC) 2 TIMES DAILY
Qty: 10 ML | Refills: 0 | Status: SHIPPED | OUTPATIENT
Start: 2017-06-05 | End: 2017-06-12

## 2017-06-05 RX ORDER — METHOCARBAMOL 750 MG/1
750 TABLET, FILM COATED ORAL 3 TIMES DAILY
Qty: 90 TABLET | Refills: 5 | Status: SHIPPED | OUTPATIENT
Start: 2017-06-05 | End: 2017-12-08 | Stop reason: SDUPTHER

## 2017-06-05 RX ORDER — BUMETANIDE 2 MG/1
2 TABLET ORAL DAILY
Qty: 45 TABLET | Refills: 5 | Status: SHIPPED | OUTPATIENT
Start: 2017-06-05 | End: 2017-12-11 | Stop reason: SDUPTHER

## 2017-06-05 RX ORDER — GABAPENTIN 600 MG/1
600 TABLET ORAL 3 TIMES DAILY
Qty: 90 TABLET | Refills: 3 | Status: SHIPPED | OUTPATIENT
Start: 2017-06-05 | End: 2017-09-11 | Stop reason: SDUPTHER

## 2017-06-05 RX ORDER — GLIPIZIDE AND METFORMIN HCL 2.5; 25 MG/1; MG/1
1 TABLET, FILM COATED ORAL
Qty: 60 TABLET | Refills: 2 | Status: SHIPPED | OUTPATIENT
Start: 2017-06-05 | End: 2017-12-11 | Stop reason: SDUPTHER

## 2017-06-05 RX ADMIN — KETOROLAC TROMETHAMINE 60 MG: 30 INJECTION, SOLUTION INTRAMUSCULAR; INTRAVENOUS at 11:27

## 2017-06-05 NOTE — PROGRESS NOTES
"Subjective   Soheila Brewster is a 47 y.o. female.     Chief Complaint   Patient presents with   • Diabetes   • Follow up Radiology   • Osteoarthritis       History of Present Illness     Diabetes-reports she is using approx 4 pens per month.  At times it is 5 pens.  She reports blood sugars are doing 120's up to 150's. She is taking her meds as ordered.  Stable.   Back pain-reports increase in her back pain since her last visit at area of bulging disc in her back. She reports she has been unable to stand more than 5 minutes at a time due to the numbness she is having in her legs with tingling sensation. She reports some sensation of weakness. She reports last work up was approx 2 years ago with an MRI. She was followed by Dr armenta in the past. Most recent imaging with noted degenerative changes, arthritis, and bone spurs.  She reports problems in her left leg that is numbness if she has to stand for any period of time.  She reports the longer she is weight bearing \"the worse it gets\".  She reports this sensation does occur in her right leg at times but not as frequently.  She reports the numbness is present on her lateral and anterior thigh.  Not at goal.   Left breast abscess and pain-has improved but reports she is having some left breast pain near the nipple.  She reports the pain is of newer onset.  She reports she had been having pain due to the abscess but this is a different type of pain.  No discharge or unusual warmth.  No skin changes.  Not at goal.   Abd hernia-She reports some generalized soreness and difficulty leaning over due to pressure and tenderness.  She has never had an eval for repair as it had not bothered her in the past.  Not at goal.     The following portions of the patient's history were reviewed and updated as appropriate: allergies, current medications, past family history, past medical history, past social history, past surgical history and problem list.    Review of Systems " "  Constitutional: Negative for appetite change, fever and unexpected weight change.   HENT: Positive for congestion, ear pain (left ear is worse), postnasal drip, rhinorrhea (with blood tinge) and sore throat. Negative for nosebleeds and trouble swallowing.    Eyes: Positive for pain (occasional.  has been evaluated by Dr Mireles). Negative for discharge and visual disturbance.        Last eye exam is past due.  She reports she was due in November.  Usually sees Dr Mireles.  Does not wear corrective lens   Respiratory: Positive for cough (with yellow production), chest tightness, shortness of breath and wheezing.    Cardiovascular: Positive for leg swelling. Negative for chest pain and palpitations.   Gastrointestinal: Positive for abdominal pain (generally periumbilical.  Reports hernia in her abd area) and diarrhea (at times but is improving). Negative for blood in stool, constipation, nausea and vomiting.   Endocrine: Positive for cold intolerance (increases her joint pain) and polydipsia. Negative for heat intolerance, polyphagia and polyuria.   Genitourinary: Negative for difficulty urinating, dysuria, hematuria, vaginal discharge and vaginal pain.        Reports painful menses.  She reports they are regular and at times are heavy as she has gotten older.  Last pap approx 9 months ago at Ceci's office.  No history of abnormal pap.    Due for Mammo.  Usually has done in Feb.  Does have history of abnormal.   Musculoskeletal: Positive for arthralgias (mulitple joints), back pain (history of pinched nerve high in back and in neck.  Disc bulge in low back.  last MRI was approx 2 years ago.), joint swelling, myalgias (muscle spasm in back), neck pain and neck stiffness.   Skin: Negative for color change.        Reports redness at times around her knuckles \"from arthritis\"   Allergic/Immunologic: Negative for environmental allergies and food allergies.   Neurological: Positive for dizziness, numbness (BLE in feet " "and ankles from DM and some in bilateral legs related to back) and headaches (due to acute illness with sinus). Negative for seizures and syncope.   Hematological: Positive for adenopathy. Does not bruise/bleed easily.   Psychiatric/Behavioral: Positive for sleep disturbance (problem going to sleep and staying asleep.  Reports average of 4 hours per night.  She has not been on any meds for sleep). Negative for dysphoric mood and suicidal ideas. The patient is not nervous/anxious.    All other systems reviewed and are negative.      Objective     /70 (BP Location: Left arm, Patient Position: Sitting)  Pulse 93  Temp 98.6 °F (37 °C) (Oral)   Ht 71\" (180.3 cm)  Wt 294 lb (133 kg)  LMP 05/15/2017  SpO2 97%  BMI 41 kg/m2    Physical Exam   Constitutional: She is oriented to person, place, and time. She appears well-developed and well-nourished. No distress.   Friendly, talkative adult female.  obese   HENT:   Head: Normocephalic and atraumatic.   Right Ear: External ear normal. Tympanic membrane is injected.   Left Ear: External ear normal. Tympanic membrane is injected.   Nose: Mucosal edema and rhinorrhea present. Right sinus exhibits no maxillary sinus tenderness and no frontal sinus tenderness. Left sinus exhibits no maxillary sinus tenderness and no frontal sinus tenderness.   Mouth/Throat: Oropharyngeal exudate and posterior oropharyngeal erythema present.   Bilateral TM's dull, poor cone of light.  Injected and retracted.  EAC clear.   Bilateral nares with erythematous turbinates.  Poor patency.  Rhinorrhea present with boggy mucosa.   Bilateral sinus areas tender, boggy.  Tender to palpation.   Throat moderately injected with thick, white PND present      Eyes: Conjunctivae, EOM and lids are normal. Pupils are equal, round, and reactive to light. No scleral icterus.   Neck: Normal range of motion. Neck supple. No JVD present. No tracheal tenderness present. No thyromegaly present.   Cardiovascular: " Normal rate, regular rhythm, normal heart sounds and intact distal pulses.    No murmur heard.  Pulmonary/Chest: Effort normal and breath sounds normal. She exhibits no tenderness.   Abdominal: Soft. Bowel sounds are normal. She exhibits no mass. There is no hepatosplenomegaly. There is no tenderness.   Musculoskeletal: She exhibits no edema.        Cervical back: She exhibits decreased range of motion, tenderness, pain and spasm.   Gait slow, deliberate.   equal bilaterally. No muscular atrophy or flaccidity.  Generalized joint stiffness and tenderness due to RA.  BLE edema Mild, non pitting   Lymphadenopathy:     She has cervical adenopathy.   Firm, mobile, shotty anterior cervical nodes.  Non tender to palpation.     Neurological: She is alert and oriented to person, place, and time. She has normal strength and normal reflexes. No cranial nerve deficit. She exhibits normal muscle tone. Coordination normal.   Skin: Skin is warm and dry.   Psychiatric: She has a normal mood and affect. Her behavior is normal. Judgment and thought content normal.   Vitals reviewed.        Assessment/Plan     Problem List Items Addressed This Visit        Cardiovascular and Mediastinum    DM (diabetes mellitus) type II uncontrolled, periph vascular disorder - Primary    Current Assessment & Plan     Samples of Toujeo provided today x's 3         Relevant Medications    glipiZIDE-metFORMIN (METAGLIP) 2.5-250 MG per tablet       Musculoskeletal and Integument    Arthritis    Relevant Medications    gabapentin (NEURONTIN) 600 MG tablet    methocarbamol (ROBAXIN) 750 MG tablet       Other    Chronic midline thoracic back pain    Relevant Medications    methocarbamol (ROBAXIN) 750 MG tablet    ketorolac (TORADOL) injection 60 mg (Completed)      Other Visit Diagnoses     Generalized edema        Relevant Medications    bumetanide (BUMEX) 2 MG tablet    Acute URI        Relevant Medications    levoFLOXacin (LEVAQUIN) 500 MG tablet     ofloxacin (FLOXIN OTIC) 0.3 % otic solution    Encounter for screening mammogram for malignant neoplasm of breast         Relevant Orders    Mammo Screening Bilateral With CAD      Refill on routine maintenance meds today.   Instructed to complete all of antibiotics for acute illness.  Increase PO fluids, avoid caffeine.  Do not save meds for later use.  Rest PRN  Injections today for acute symptom relief.   Trial of Robaxin for muscular spasm in back.  Understands disease processes and need for medications.  Understands reasons for urgent and emergent care.  Patient (& family) verbalized agreement for treatment plan.   Advised glycemic control.  Understands risk of prolonged elevated levels including vision changes, kidney damage, and small vessel disease including extremities.  Reviewed most recent labs and imaging of back with patient.  Questions answered.   RTC 3 months, sooner if needed.   Further imaging ordered for back and referral to pain management made.        This document has been electronically signed by:  JOAQUÍN Ross, EMY-C

## 2017-06-30 DIAGNOSIS — E78.2 MIXED HYPERLIPIDEMIA: ICD-10-CM

## 2017-06-30 RX ORDER — ROSUVASTATIN CALCIUM 40 MG/1
40 TABLET, COATED ORAL DAILY
Qty: 90 TABLET | Refills: 3 | Status: SHIPPED | OUTPATIENT
Start: 2017-06-30 | End: 2017-09-11 | Stop reason: SDUPTHER

## 2017-07-05 ENCOUNTER — TELEPHONE (OUTPATIENT)
Dept: FAMILY MEDICINE CLINIC | Facility: CLINIC | Age: 47
End: 2017-07-05

## 2017-07-05 RX ORDER — SULFAMETHOXAZOLE AND TRIMETHOPRIM 800; 160 MG/1; MG/1
1 TABLET ORAL 2 TIMES DAILY
Qty: 20 TABLET | Refills: 0 | Status: SHIPPED | OUTPATIENT
Start: 2017-07-05 | End: 2017-07-15

## 2017-07-05 NOTE — TELEPHONE ENCOUNTER
Sharon called wanting to be seen by Donna Mcknight.  Donna agreed to send medications to [facility]

## 2017-08-05 DIAGNOSIS — IMO0002 DM (DIABETES MELLITUS) TYPE II UNCONTROLLED, PERIPH VASCULAR DISORDER: ICD-10-CM

## 2017-08-05 DIAGNOSIS — J30.9 CHRONIC ALLERGIC RHINITIS: ICD-10-CM

## 2017-08-07 DIAGNOSIS — J30.9 CHRONIC ALLERGIC RHINITIS: ICD-10-CM

## 2017-08-07 DIAGNOSIS — K21.9 GASTROESOPHAGEAL REFLUX DISEASE WITHOUT ESOPHAGITIS: ICD-10-CM

## 2017-08-07 DIAGNOSIS — R60.1 GENERALIZED EDEMA: ICD-10-CM

## 2017-08-07 DIAGNOSIS — I10 ESSENTIAL HYPERTENSION: ICD-10-CM

## 2017-08-07 DIAGNOSIS — E78.2 MIXED HYPERLIPIDEMIA: ICD-10-CM

## 2017-08-07 RX ORDER — LEVOCETIRIZINE DIHYDROCHLORIDE 5 MG/1
TABLET, FILM COATED ORAL
Qty: 30 TABLET | Refills: 4 | Status: SHIPPED | OUTPATIENT
Start: 2017-08-07 | End: 2017-12-11 | Stop reason: SDUPTHER

## 2017-08-07 RX ORDER — PEN NEEDLE, DIABETIC 32GX 5/32"
NEEDLE, DISPOSABLE MISCELLANEOUS
Refills: 4 | OUTPATIENT
Start: 2017-08-07

## 2017-08-07 RX ORDER — ESOMEPRAZOLE MAGNESIUM 40 MG/1
CAPSULE, DELAYED RELEASE ORAL
Qty: 30 CAPSULE | Refills: 4 | Status: SHIPPED | OUTPATIENT
Start: 2017-08-07 | End: 2017-12-11

## 2017-08-07 RX ORDER — POTASSIUM CHLORIDE 750 MG/1
TABLET, FILM COATED, EXTENDED RELEASE ORAL
Qty: 60 TABLET | Refills: 4 | Status: SHIPPED | OUTPATIENT
Start: 2017-08-07 | End: 2017-12-11 | Stop reason: SDUPTHER

## 2017-08-07 RX ORDER — MONTELUKAST SODIUM 10 MG/1
TABLET ORAL
Qty: 30 TABLET | Refills: 4 | Status: SHIPPED | OUTPATIENT
Start: 2017-08-07 | End: 2017-12-11 | Stop reason: SDUPTHER

## 2017-08-07 RX ORDER — EZETIMIBE 10 MG/1
TABLET ORAL
Qty: 30 TABLET | Refills: 4 | Status: SHIPPED | OUTPATIENT
Start: 2017-08-07 | End: 2017-12-11 | Stop reason: SDUPTHER

## 2017-08-07 RX ORDER — ENALAPRIL MALEATE 5 MG/1
TABLET ORAL
Qty: 30 TABLET | Refills: 4 | Status: SHIPPED | OUTPATIENT
Start: 2017-08-07 | End: 2017-12-11 | Stop reason: SDUPTHER

## 2017-08-07 RX ORDER — FENOFIBRATE 145 MG/1
TABLET, COATED ORAL
Qty: 30 TABLET | Refills: 4 | Status: SHIPPED | OUTPATIENT
Start: 2017-08-07 | End: 2017-12-11 | Stop reason: SDUPTHER

## 2017-09-07 DIAGNOSIS — IMO0002 DM (DIABETES MELLITUS) TYPE II UNCONTROLLED, PERIPH VASCULAR DISORDER: ICD-10-CM

## 2017-09-08 RX ORDER — EXENATIDE 2 MG/.65ML
INJECTION, SUSPENSION, EXTENDED RELEASE SUBCUTANEOUS
Qty: 4 EACH | Refills: 4 | Status: SHIPPED | OUTPATIENT
Start: 2017-09-08 | End: 2017-12-11 | Stop reason: SDUPTHER

## 2017-09-08 RX ORDER — INSULIN GLARGINE 300 U/ML
INJECTION, SOLUTION SUBCUTANEOUS
Qty: 4.5 PEN | Refills: 4 | Status: SHIPPED | OUTPATIENT
Start: 2017-09-08 | End: 2017-12-11 | Stop reason: SDUPTHER

## 2017-09-08 RX ORDER — PEN NEEDLE, DIABETIC 32GX 5/32"
NEEDLE, DISPOSABLE MISCELLANEOUS
Qty: 100 EACH | Refills: 11 | Status: SHIPPED | OUTPATIENT
Start: 2017-09-08 | End: 2018-10-08 | Stop reason: SDUPTHER

## 2017-09-11 ENCOUNTER — OFFICE VISIT (OUTPATIENT)
Dept: FAMILY MEDICINE CLINIC | Facility: CLINIC | Age: 47
End: 2017-09-11

## 2017-09-11 VITALS
OXYGEN SATURATION: 97 % | TEMPERATURE: 98.6 F | BODY MASS INDEX: 41.02 KG/M2 | SYSTOLIC BLOOD PRESSURE: 130 MMHG | WEIGHT: 293 LBS | DIASTOLIC BLOOD PRESSURE: 72 MMHG | HEART RATE: 84 BPM | HEIGHT: 71 IN

## 2017-09-11 DIAGNOSIS — E53.8 VITAMIN B 12 DEFICIENCY: ICD-10-CM

## 2017-09-11 DIAGNOSIS — R16.0 HEPATOMEGALY: ICD-10-CM

## 2017-09-11 DIAGNOSIS — E78.2 MIXED HYPERLIPIDEMIA: ICD-10-CM

## 2017-09-11 DIAGNOSIS — E55.9 VITAMIN D DEFICIENCY: Primary | ICD-10-CM

## 2017-09-11 DIAGNOSIS — IMO0002 DM (DIABETES MELLITUS) TYPE II UNCONTROLLED, PERIPH VASCULAR DISORDER: ICD-10-CM

## 2017-09-11 DIAGNOSIS — M54.50 CHRONIC BILATERAL LOW BACK PAIN WITHOUT SCIATICA: ICD-10-CM

## 2017-09-11 DIAGNOSIS — I10 ESSENTIAL HYPERTENSION: ICD-10-CM

## 2017-09-11 DIAGNOSIS — M51.27 PROTRUSION OF INTERVERTEBRAL DISC OF LUMBOSACRAL REGION: ICD-10-CM

## 2017-09-11 DIAGNOSIS — M19.90 ARTHRITIS: ICD-10-CM

## 2017-09-11 DIAGNOSIS — M54.6 CHRONIC MIDLINE THORACIC BACK PAIN: ICD-10-CM

## 2017-09-11 DIAGNOSIS — G89.29 CHRONIC MIDLINE THORACIC BACK PAIN: ICD-10-CM

## 2017-09-11 DIAGNOSIS — N61.1 LEFT BREAST ABSCESS: ICD-10-CM

## 2017-09-11 DIAGNOSIS — G89.29 CHRONIC BILATERAL LOW BACK PAIN WITHOUT SCIATICA: ICD-10-CM

## 2017-09-11 DIAGNOSIS — M79.672 LEFT FOOT PAIN: ICD-10-CM

## 2017-09-11 PROCEDURE — 99214 OFFICE O/P EST MOD 30 MIN: CPT | Performed by: NURSE PRACTITIONER

## 2017-09-11 RX ORDER — ROSUVASTATIN CALCIUM 40 MG/1
40 TABLET, COATED ORAL DAILY
Qty: 90 TABLET | Refills: 3 | Status: SHIPPED | OUTPATIENT
Start: 2017-09-11 | End: 2018-07-09 | Stop reason: SDUPTHER

## 2017-09-11 RX ORDER — GABAPENTIN 600 MG/1
600 TABLET ORAL 3 TIMES DAILY
Qty: 90 TABLET | Refills: 2 | Status: SHIPPED | OUTPATIENT
Start: 2017-09-11 | End: 2017-10-11 | Stop reason: SDUPTHER

## 2017-09-11 RX ORDER — FLUCONAZOLE 150 MG/1
150 TABLET ORAL DAILY
Qty: 7 TABLET | Refills: 0 | Status: SHIPPED | OUTPATIENT
Start: 2017-09-11 | End: 2017-09-18

## 2017-09-11 RX ORDER — SULFAMETHOXAZOLE AND TRIMETHOPRIM 800; 160 MG/1; MG/1
1 TABLET ORAL 2 TIMES DAILY
Qty: 60 TABLET | Refills: 0 | Status: SHIPPED | OUTPATIENT
Start: 2017-09-11 | End: 2017-10-11

## 2017-09-11 RX ORDER — ERGOCALCIFEROL 1.25 MG/1
50000 CAPSULE ORAL WEEKLY
Qty: 4 CAPSULE | Refills: 5 | Status: SHIPPED | OUTPATIENT
Start: 2017-09-11 | End: 2017-12-11 | Stop reason: SDUPTHER

## 2017-09-11 NOTE — PROGRESS NOTES
Subjective   Soheila Brewster is a 47 y.o. female.     Chief Complaint   Patient presents with   • Diabetes   • Cough       History of Present Illness     Left breast cyst-has been draining approx 4 days ago.  She was unable to have her mammogram due to a cyst when her appt was due.  She reports the area is tender and swollen.  She has been using Bactroban ointment and does need a refill on that today.    Diabetes-ongoing.  She reports she has been out of meds due to no refills but does plan to pick them up today.    Left foot complaint-some soreness and mild edema around the inner ankle.  She reports tenderness radiates around her heel area.  She denies any injury.  She reports the pain has been present for approx1 month and varies in symptoms and duration.  She reports at times by the end of the day she has trouble with weight bearing.  She reports on the days when pain is increased she has more ankle edema.  Not at goal.   URI-sore throat that has increased since Saturday.  Some productive cough.  Nasal rhinorrhea.  Some watering of eyes.  SOA more than her usual.  She reports sinus pressure and has been using Tylenol sinus without much relief.  Not at goal.      The following portions of the patient's history were reviewed and updated as appropriate: allergies, current medications, past family history, past medical history, past social history, past surgical history and problem list.    Review of Systems   Constitutional: Negative for appetite change, fever and unexpected weight change.   HENT: Positive for ear pain (left ear is worse), postnasal drip, rhinorrhea, sinus pressure and sore throat. Negative for congestion, nosebleeds and trouble swallowing.    Eyes: Positive for pain (occasional.  has been evaluated by Dr Mireles). Negative for discharge and visual disturbance.        Last eye exam is past due.  She reports she was due in November.  Usually sees Dr Mirlees.  Does not wear corrective lens  "  Respiratory: Positive for cough (with yellow production), chest tightness, shortness of breath and wheezing.    Cardiovascular: Positive for leg swelling. Negative for chest pain and palpitations.   Gastrointestinal: Positive for abdominal pain (generally periumbilical.  Reports hernia in her abd area) and diarrhea (at times but is improving). Negative for blood in stool, constipation, nausea and vomiting.   Endocrine: Positive for cold intolerance (increases her joint pain) and polydipsia. Negative for heat intolerance, polyphagia and polyuria.   Genitourinary: Negative for difficulty urinating, dysuria, hematuria, vaginal discharge and vaginal pain.        Reports painful menses.  She reports they are regular and at times are heavy as she has gotten older.  Last pap approx 9 months ago at Ceci's office.  No history of abnormal pap.    Due for Mammo.  Usually has done in Feb.  Does have history of abnormal.   Musculoskeletal: Positive for arthralgias (mulitple joints), back pain (history of pinched nerve high in back and in neck.  Disc bulge in low back.  last MRI was approx 2 years ago.), joint swelling, myalgias (muscle spasm in back), neck pain and neck stiffness.   Skin: Negative for color change.        Reports redness at times around her knuckles \"from arthritis\"   Allergic/Immunologic: Negative for environmental allergies and food allergies.   Neurological: Positive for dizziness, numbness (BLE in feet and ankles from DM and some in bilateral legs related to back) and headaches (due to acute illness with sinus). Negative for seizures and syncope.   Hematological: Positive for adenopathy. Does not bruise/bleed easily.   Psychiatric/Behavioral: Positive for sleep disturbance (problem going to sleep and staying asleep.  Reports average of 4 hours per night.  She has not been on any meds for sleep). Negative for dysphoric mood and suicidal ideas. The patient is not nervous/anxious.    All other systems " "reviewed and are negative.      Objective     /72 (BP Location: Left arm, Patient Position: Sitting)  Pulse 84  Temp 98.6 °F (37 °C) (Tympanic)   Ht 71\" (180.3 cm)  Wt 295 lb (134 kg)  LMP 09/05/2017  SpO2 97%  BMI 41.14 kg/m2    Physical Exam   Constitutional: She is oriented to person, place, and time. She appears well-developed and well-nourished. No distress.   Friendly, talkative adult female.  obese   HENT:   Head: Normocephalic and atraumatic.   Right Ear: External ear normal. Tympanic membrane is injected.   Left Ear: External ear normal. Tympanic membrane is injected.   Nose: Mucosal edema and rhinorrhea present. Right sinus exhibits no maxillary sinus tenderness and no frontal sinus tenderness. Left sinus exhibits no maxillary sinus tenderness and no frontal sinus tenderness.   Mouth/Throat: Oropharyngeal exudate and posterior oropharyngeal erythema present.   Bilateral TM's dull, poor cone of light.  Injected and retracted.  EAC clear.   Bilateral nares with erythematous turbinates.  Poor patency.  Rhinorrhea present with boggy mucosa.   Bilateral sinus areas tender, boggy.  Tender to palpation.   Throat moderately injected with thick, white PND present      Eyes: Conjunctivae, EOM and lids are normal. Pupils are equal, round, and reactive to light. No scleral icterus.   Neck: Normal range of motion. Neck supple. No JVD present. No tracheal tenderness present. No thyromegaly present.   Cardiovascular: Normal rate, regular rhythm, normal heart sounds and intact distal pulses.    No murmur heard.  Pulmonary/Chest: Effort normal and breath sounds normal. She exhibits no tenderness.   Abdominal: Soft. Bowel sounds are normal. She exhibits no mass. There is no hepatosplenomegaly. There is no tenderness.   Musculoskeletal: She exhibits no edema.        Cervical back: She exhibits decreased range of motion, tenderness, pain and spasm.   Gait slow, deliberate.   equal bilaterally. No muscular " atrophy or flaccidity.  Generalized joint stiffness and tenderness due to RA.  BLE edema Mild, non pitting   Lymphadenopathy:     She has cervical adenopathy.   Firm, mobile, shotty anterior cervical nodes.  Non tender to palpation.     Neurological: She is alert and oriented to person, place, and time. She has normal strength and normal reflexes. No cranial nerve deficit. She exhibits normal muscle tone. Coordination normal.   Skin: Skin is warm and dry.   Psychiatric: She has a normal mood and affect. Her behavior is normal. Judgment and thought content normal.   Vitals reviewed.    Assessment/Plan     Problem List Items Addressed This Visit        Cardiovascular and Mediastinum    DM (diabetes mellitus) type II uncontrolled, periph vascular disorder    Relevant Orders    Ambulatory Referral to Ophthalmology (Completed)    Hemoglobin A1c    Essential hypertension    Relevant Orders    CBC & Differential    Comprehensive Metabolic Panel    Lipid Panel    TSH    T4, Free    Mixed hyperlipidemia    Relevant Medications    rosuvastatin (CRESTOR) 40 MG tablet    Other Relevant Orders    Lipid Panel       Digestive    Vitamin D deficiency - Primary    Relevant Medications    vitamin D (ERGOCALCIFEROL) 65433 units capsule capsule    Other Relevant Orders    Vitamin D 25 Hydroxy       Musculoskeletal and Integument    Arthritis    Relevant Medications    gabapentin (NEURONTIN) 600 MG tablet       Other    Chronic midline thoracic back pain    Left breast abscess    Relevant Medications    mupirocin (BACTROBAN) 2 % ointment    fluconazole (DIFLUCAN) 150 MG tablet    sulfamethoxazole-trimethoprim (BACTRIM DS,SEPTRA DS) 800-160 MG per tablet    Protrusion of intervertebral disc of lumbosacral region      Other Visit Diagnoses     Left foot pain        Relevant Orders    XR Foot 3+ View Left    XR Ankle 3+ View Left    Chronic bilateral low back pain without sciatica        Relevant Orders    Ambulatory Referral to  Neurosurgery    Vitamin B 12 deficiency        Relevant Orders    Vitamin B12        Fasting labs ordered.  Patient to RTC fasting to have done.    Understands disease processes and need for medications.  Understands reasons for urgent and emergent care.  Patient (& family) verbalized agreement for treatment plan.   Refill on routine maintenance meds today.   Referral as above to speciality.  Reviewed most recent MRI's with patient.  Discussed any abnormal findings.  Patient's questions answered.  Encouraged to check with office after October 1 for flu vaccine availability.   RTC 3 months, sooner if needed.          This document has been electronically signed by:  JOAQUÍN Ross, FNP-C

## 2017-09-25 ENCOUNTER — LAB (OUTPATIENT)
Dept: FAMILY MEDICINE CLINIC | Facility: CLINIC | Age: 47
End: 2017-09-25

## 2017-09-25 DIAGNOSIS — I10 ESSENTIAL HYPERTENSION: ICD-10-CM

## 2017-09-25 DIAGNOSIS — G89.29 CHRONIC MIDLINE THORACIC BACK PAIN: ICD-10-CM

## 2017-09-25 DIAGNOSIS — M19.90 ARTHRITIS: ICD-10-CM

## 2017-09-25 DIAGNOSIS — IMO0002 DM (DIABETES MELLITUS) TYPE II UNCONTROLLED, PERIPH VASCULAR DISORDER: ICD-10-CM

## 2017-09-25 DIAGNOSIS — J30.9 CHRONIC ALLERGIC RHINITIS: ICD-10-CM

## 2017-09-25 DIAGNOSIS — M54.6 CHRONIC MIDLINE THORACIC BACK PAIN: ICD-10-CM

## 2017-09-25 LAB
25(OH)D3 SERPL-MCNC: 56 NG/ML
ALBUMIN SERPL-MCNC: 4 G/DL (ref 3.5–5)
ALBUMIN/GLOB SERPL: 1.3 G/DL (ref 1.5–2.5)
ALP SERPL-CCNC: 57 U/L (ref 35–104)
ALT SERPL W P-5'-P-CCNC: 8 U/L (ref 10–36)
ANION GAP SERPL CALCULATED.3IONS-SCNC: 3 MMOL/L (ref 3.6–11.2)
AST SERPL-CCNC: 13 U/L (ref 10–30)
BASOPHILS # BLD AUTO: 0.04 10*3/MM3 (ref 0–0.3)
BASOPHILS NFR BLD AUTO: 0.4 % (ref 0–2)
BILIRUB SERPL-MCNC: 0.3 MG/DL (ref 0.2–1.8)
BUN BLD-MCNC: 12 MG/DL (ref 7–21)
BUN/CREAT SERPL: 15.8 (ref 7–25)
CALCIUM SPEC-SCNC: 9.7 MG/DL (ref 7.7–10)
CHLORIDE SERPL-SCNC: 108 MMOL/L (ref 99–112)
CHOLEST SERPL-MCNC: 126 MG/DL (ref 0–200)
CO2 SERPL-SCNC: 27 MMOL/L (ref 24.3–31.9)
CREAT BLD-MCNC: 0.76 MG/DL (ref 0.43–1.29)
DEPRECATED RDW RBC AUTO: 42.1 FL (ref 37–54)
EOSINOPHIL # BLD AUTO: 0.61 10*3/MM3 (ref 0–0.7)
EOSINOPHIL NFR BLD AUTO: 6.5 % (ref 0–5)
ERYTHROCYTE [DISTWIDTH] IN BLOOD BY AUTOMATED COUNT: 13.6 % (ref 11.5–14.5)
GFR SERPL CREATININE-BSD FRML MDRD: 82 ML/MIN/1.73
GLOBULIN UR ELPH-MCNC: 3 GM/DL
GLUCOSE BLD-MCNC: 212 MG/DL (ref 70–110)
HBA1C MFR BLD: 8.3 % (ref 4.5–5.7)
HCT VFR BLD AUTO: 38.2 % (ref 37–47)
HDLC SERPL-MCNC: 32 MG/DL (ref 60–100)
HGB BLD-MCNC: 12.3 G/DL (ref 12–16)
IMM GRANULOCYTES # BLD: 0.02 10*3/MM3 (ref 0–0.03)
IMM GRANULOCYTES NFR BLD: 0.2 % (ref 0–0.5)
LDLC SERPL CALC-MCNC: 56 MG/DL (ref 0–100)
LDLC/HDLC SERPL: 1.76 {RATIO}
LYMPHOCYTES # BLD AUTO: 2.26 10*3/MM3 (ref 1–3)
LYMPHOCYTES NFR BLD AUTO: 23.9 % (ref 21–51)
MCH RBC QN AUTO: 27.6 PG (ref 27–33)
MCHC RBC AUTO-ENTMCNC: 32.2 G/DL (ref 33–37)
MCV RBC AUTO: 85.8 FL (ref 80–94)
MONOCYTES # BLD AUTO: 0.65 10*3/MM3 (ref 0.1–0.9)
MONOCYTES NFR BLD AUTO: 6.9 % (ref 0–10)
NEUTROPHILS # BLD AUTO: 5.86 10*3/MM3 (ref 1.4–6.5)
NEUTROPHILS NFR BLD AUTO: 62.1 % (ref 30–70)
OSMOLALITY SERPL CALC.SUM OF ELEC: 281.7 MOSM/KG (ref 273–305)
PLATELET # BLD AUTO: 183 10*3/MM3 (ref 130–400)
PMV BLD AUTO: 12 FL (ref 6–10)
POTASSIUM BLD-SCNC: 4.1 MMOL/L (ref 3.5–5.3)
PROT SERPL-MCNC: 7 G/DL (ref 6–8)
RBC # BLD AUTO: 4.45 10*6/MM3 (ref 4.2–5.4)
SODIUM BLD-SCNC: 138 MMOL/L (ref 135–153)
T4 FREE SERPL-MCNC: 1.31 NG/DL (ref 0.89–1.76)
TRIGL SERPL-MCNC: 189 MG/DL (ref 0–150)
TSH SERPL DL<=0.05 MIU/L-ACNC: 0.23 MIU/ML (ref 0.55–4.78)
VIT B12 BLD-MCNC: 835 PG/ML (ref 211–911)
VLDLC SERPL-MCNC: 37.8 MG/DL
WBC NRBC COR # BLD: 9.44 10*3/MM3 (ref 4.5–12.5)

## 2017-09-25 PROCEDURE — 80053 COMPREHEN METABOLIC PANEL: CPT | Performed by: NURSE PRACTITIONER

## 2017-09-25 PROCEDURE — 83036 HEMOGLOBIN GLYCOSYLATED A1C: CPT | Performed by: NURSE PRACTITIONER

## 2017-09-25 PROCEDURE — 82607 VITAMIN B-12: CPT | Performed by: NURSE PRACTITIONER

## 2017-09-25 PROCEDURE — 85025 COMPLETE CBC W/AUTO DIFF WBC: CPT | Performed by: NURSE PRACTITIONER

## 2017-09-25 PROCEDURE — 82306 VITAMIN D 25 HYDROXY: CPT | Performed by: NURSE PRACTITIONER

## 2017-09-25 PROCEDURE — 84439 ASSAY OF FREE THYROXINE: CPT | Performed by: NURSE PRACTITIONER

## 2017-09-25 PROCEDURE — 84443 ASSAY THYROID STIM HORMONE: CPT | Performed by: NURSE PRACTITIONER

## 2017-09-25 PROCEDURE — 80061 LIPID PANEL: CPT | Performed by: NURSE PRACTITIONER

## 2017-09-25 PROCEDURE — 36415 COLL VENOUS BLD VENIPUNCTURE: CPT | Performed by: NURSE PRACTITIONER

## 2017-10-11 DIAGNOSIS — M19.90 ARTHRITIS: ICD-10-CM

## 2017-10-11 RX ORDER — GABAPENTIN 600 MG/1
600 TABLET ORAL 3 TIMES DAILY
Qty: 90 TABLET | Refills: 2 | Status: SHIPPED | OUTPATIENT
Start: 2017-10-11 | End: 2017-12-11 | Stop reason: SDUPTHER

## 2017-10-31 ENCOUNTER — OFFICE VISIT (OUTPATIENT)
Dept: FAMILY MEDICINE CLINIC | Facility: CLINIC | Age: 47
End: 2017-10-31

## 2017-10-31 VITALS
TEMPERATURE: 99.7 F | OXYGEN SATURATION: 98 % | DIASTOLIC BLOOD PRESSURE: 85 MMHG | HEART RATE: 113 BPM | HEIGHT: 71 IN | WEIGHT: 285 LBS | SYSTOLIC BLOOD PRESSURE: 135 MMHG | BODY MASS INDEX: 39.9 KG/M2

## 2017-10-31 DIAGNOSIS — J30.89 CHRONIC NONSEASONAL ALLERGIC RHINITIS DUE TO POLLEN: Primary | ICD-10-CM

## 2017-10-31 DIAGNOSIS — M54.42 ACUTE LEFT-SIDED LOW BACK PAIN WITH LEFT-SIDED SCIATICA: ICD-10-CM

## 2017-10-31 DIAGNOSIS — IMO0002 DM (DIABETES MELLITUS) TYPE II UNCONTROLLED, PERIPH VASCULAR DISORDER: ICD-10-CM

## 2017-10-31 PROCEDURE — 99214 OFFICE O/P EST MOD 30 MIN: CPT | Performed by: NURSE PRACTITIONER

## 2017-10-31 PROCEDURE — 96372 THER/PROPH/DIAG INJ SC/IM: CPT | Performed by: NURSE PRACTITIONER

## 2017-10-31 RX ORDER — HYDROCODONE BITARTRATE AND ACETAMINOPHEN 5; 325 MG/1; MG/1
1 TABLET ORAL EVERY 8 HOURS PRN
Qty: 9 TABLET | Refills: 0 | Status: SHIPPED | OUTPATIENT
Start: 2017-10-31 | End: 2017-12-11

## 2017-10-31 RX ORDER — KETOROLAC TROMETHAMINE 30 MG/ML
60 INJECTION, SOLUTION INTRAMUSCULAR; INTRAVENOUS DAILY
Status: COMPLETED | OUTPATIENT
Start: 2017-10-31 | End: 2017-10-31

## 2017-10-31 RX ORDER — AZELASTINE 1 MG/ML
SPRAY, METERED NASAL
Qty: 1 EACH | Refills: 5 | Status: SHIPPED | OUTPATIENT
Start: 2017-10-31 | End: 2018-01-30 | Stop reason: SDUPTHER

## 2017-10-31 RX ORDER — METHYLPREDNISOLONE ACETATE 80 MG/ML
80 INJECTION, SUSPENSION INTRA-ARTICULAR; INTRALESIONAL; INTRAMUSCULAR; SOFT TISSUE ONCE
Status: COMPLETED | OUTPATIENT
Start: 2017-10-31 | End: 2017-10-31

## 2017-10-31 RX ADMIN — KETOROLAC TROMETHAMINE 60 MG: 30 INJECTION, SOLUTION INTRAMUSCULAR; INTRAVENOUS at 17:01

## 2017-10-31 RX ADMIN — KETOROLAC TROMETHAMINE 60 MG: 30 INJECTION, SOLUTION INTRAMUSCULAR; INTRAVENOUS at 16:59

## 2017-10-31 RX ADMIN — METHYLPREDNISOLONE ACETATE 80 MG: 80 INJECTION, SUSPENSION INTRA-ARTICULAR; INTRALESIONAL; INTRAMUSCULAR; SOFT TISSUE at 17:04

## 2017-11-01 NOTE — ASSESSMENT & PLAN NOTE
Stop Flonase, Trial of Astelin.  Avoid known allergy and respiratory triggers.  May use Saline spray PRN as desired  Understands smoking may exacerbate allergy symptoms.

## 2017-11-02 ENCOUNTER — CLINICAL SUPPORT (OUTPATIENT)
Dept: FAMILY MEDICINE CLINIC | Facility: CLINIC | Age: 47
End: 2017-11-02

## 2017-11-02 DIAGNOSIS — M54.6 CHRONIC MIDLINE THORACIC BACK PAIN: Primary | ICD-10-CM

## 2017-11-02 DIAGNOSIS — G89.29 CHRONIC MIDLINE THORACIC BACK PAIN: Primary | ICD-10-CM

## 2017-11-02 PROCEDURE — 96372 THER/PROPH/DIAG INJ SC/IM: CPT | Performed by: NURSE PRACTITIONER

## 2017-11-02 RX ORDER — KETOROLAC TROMETHAMINE 30 MG/ML
60 INJECTION, SOLUTION INTRAMUSCULAR; INTRAVENOUS ONCE
Status: COMPLETED | OUTPATIENT
Start: 2017-11-02 | End: 2017-11-02

## 2017-11-02 RX ADMIN — KETOROLAC TROMETHAMINE 60 MG: 30 INJECTION, SOLUTION INTRAMUSCULAR; INTRAVENOUS at 12:41

## 2017-11-03 RX ORDER — PEN NEEDLE, DIABETIC 32GX 5/32"
NEEDLE, DISPOSABLE MISCELLANEOUS
Refills: 5 | OUTPATIENT
Start: 2017-11-03

## 2017-11-03 RX ORDER — LANOLIN ALCOHOL/MO/W.PET/CERES
CREAM (GRAM) TOPICAL
Qty: 30 TABLET | Refills: 5 | Status: SHIPPED | OUTPATIENT
Start: 2017-11-03 | End: 2018-07-09 | Stop reason: SDUPTHER

## 2017-11-14 DIAGNOSIS — M51.27 PROTRUSION OF INTERVERTEBRAL DISC OF LUMBOSACRAL REGION: Primary | ICD-10-CM

## 2017-12-08 DIAGNOSIS — M19.90 ARTHRITIS: ICD-10-CM

## 2017-12-08 DIAGNOSIS — G89.29 CHRONIC MIDLINE THORACIC BACK PAIN: ICD-10-CM

## 2017-12-08 DIAGNOSIS — M54.6 CHRONIC MIDLINE THORACIC BACK PAIN: ICD-10-CM

## 2017-12-08 RX ORDER — METHOCARBAMOL 750 MG/1
TABLET, FILM COATED ORAL
Qty: 90 TABLET | Refills: 5 | Status: SHIPPED | OUTPATIENT
Start: 2017-12-08 | End: 2018-07-06 | Stop reason: SDUPTHER

## 2017-12-11 ENCOUNTER — OFFICE VISIT (OUTPATIENT)
Dept: FAMILY MEDICINE CLINIC | Facility: CLINIC | Age: 47
End: 2017-12-11

## 2017-12-11 VITALS
TEMPERATURE: 98.8 F | DIASTOLIC BLOOD PRESSURE: 80 MMHG | BODY MASS INDEX: 41.02 KG/M2 | HEART RATE: 100 BPM | SYSTOLIC BLOOD PRESSURE: 120 MMHG | OXYGEN SATURATION: 97 % | HEIGHT: 71 IN | WEIGHT: 293 LBS

## 2017-12-11 DIAGNOSIS — K21.9 GASTROESOPHAGEAL REFLUX DISEASE WITHOUT ESOPHAGITIS: Primary | ICD-10-CM

## 2017-12-11 DIAGNOSIS — J32.9 CHRONIC SINUSITIS, UNSPECIFIED LOCATION: ICD-10-CM

## 2017-12-11 DIAGNOSIS — J30.1 CHRONIC SEASONAL ALLERGIC RHINITIS DUE TO POLLEN: ICD-10-CM

## 2017-12-11 DIAGNOSIS — E78.2 MIXED HYPERLIPIDEMIA: ICD-10-CM

## 2017-12-11 DIAGNOSIS — I10 ESSENTIAL HYPERTENSION: ICD-10-CM

## 2017-12-11 DIAGNOSIS — R60.1 GENERALIZED EDEMA: ICD-10-CM

## 2017-12-11 DIAGNOSIS — IMO0002 DM (DIABETES MELLITUS) TYPE II UNCONTROLLED, PERIPH VASCULAR DISORDER: ICD-10-CM

## 2017-12-11 DIAGNOSIS — M19.90 ARTHRITIS: ICD-10-CM

## 2017-12-11 DIAGNOSIS — E55.9 VITAMIN D DEFICIENCY: ICD-10-CM

## 2017-12-11 PROCEDURE — 99214 OFFICE O/P EST MOD 30 MIN: CPT | Performed by: NURSE PRACTITIONER

## 2017-12-11 RX ORDER — OFLOXACIN 3 MG/ML
5 SOLUTION AURICULAR (OTIC) 2 TIMES DAILY
Qty: 5 ML | Refills: 0 | Status: SHIPPED | OUTPATIENT
Start: 2017-12-11 | End: 2017-12-18

## 2017-12-11 RX ORDER — EZETIMIBE 10 MG/1
10 TABLET ORAL DAILY
Qty: 30 TABLET | Refills: 5 | Status: SHIPPED | OUTPATIENT
Start: 2017-12-11 | End: 2018-02-08 | Stop reason: SDUPTHER

## 2017-12-11 RX ORDER — CLARITHROMYCIN 500 MG/1
500 TABLET, COATED ORAL 2 TIMES DAILY
Qty: 20 TABLET | Refills: 0 | Status: SHIPPED | OUTPATIENT
Start: 2017-12-11 | End: 2017-12-21

## 2017-12-11 RX ORDER — LEVOCETIRIZINE DIHYDROCHLORIDE 5 MG/1
5 TABLET, FILM COATED ORAL EVERY EVENING
Qty: 30 TABLET | Refills: 5 | Status: SHIPPED | OUTPATIENT
Start: 2017-12-11 | End: 2018-06-08

## 2017-12-11 RX ORDER — ERGOCALCIFEROL 1.25 MG/1
50000 CAPSULE ORAL WEEKLY
Qty: 4 CAPSULE | Refills: 5 | Status: SHIPPED | OUTPATIENT
Start: 2017-12-11 | End: 2018-03-23 | Stop reason: SDUPTHER

## 2017-12-11 RX ORDER — POTASSIUM CHLORIDE 750 MG/1
10 TABLET, FILM COATED, EXTENDED RELEASE ORAL 2 TIMES DAILY
Qty: 60 TABLET | Refills: 5 | Status: SHIPPED | OUTPATIENT
Start: 2017-12-11 | End: 2018-02-08 | Stop reason: SDUPTHER

## 2017-12-11 RX ORDER — BUMETANIDE 2 MG/1
2 TABLET ORAL DAILY
Qty: 45 TABLET | Refills: 5 | Status: SHIPPED | OUTPATIENT
Start: 2017-12-11 | End: 2018-07-09 | Stop reason: SDUPTHER

## 2017-12-11 RX ORDER — FENOFIBRATE 145 MG/1
145 TABLET, COATED ORAL DAILY
Qty: 30 TABLET | Refills: 5 | Status: SHIPPED | OUTPATIENT
Start: 2017-12-11 | End: 2018-02-08 | Stop reason: SDUPTHER

## 2017-12-11 RX ORDER — GLIPIZIDE AND METFORMIN HCL 2.5; 25 MG/1; MG/1
1 TABLET, FILM COATED ORAL
Qty: 60 TABLET | Refills: 2 | Status: SHIPPED | OUTPATIENT
Start: 2017-12-11 | End: 2018-04-09 | Stop reason: SDUPTHER

## 2017-12-11 RX ORDER — GABAPENTIN 600 MG/1
600 TABLET ORAL 3 TIMES DAILY
Qty: 90 TABLET | Refills: 2 | Status: SHIPPED | OUTPATIENT
Start: 2017-12-11 | End: 2018-03-09 | Stop reason: SDUPTHER

## 2017-12-11 RX ORDER — ENALAPRIL MALEATE 5 MG/1
5 TABLET ORAL DAILY
Qty: 30 TABLET | Refills: 5 | Status: SHIPPED | OUTPATIENT
Start: 2017-12-11 | End: 2018-02-08 | Stop reason: SDUPTHER

## 2017-12-11 RX ORDER — MONTELUKAST SODIUM 10 MG/1
10 TABLET ORAL NIGHTLY
Qty: 30 TABLET | Refills: 5 | Status: SHIPPED | OUTPATIENT
Start: 2017-12-11 | End: 2018-07-09 | Stop reason: SDUPTHER

## 2017-12-11 NOTE — PROGRESS NOTES
Subjective   Soheila Brewster is a 47 y.o. female.     Chief Complaint   Patient presents with   • Back Pain   • Diabetes       History of Present Illness     Back pain with left sided numbness and tingling.  Has been present since Friday morning.  She reports progressed over the course of the day.  She reports lying down is difficult as well as sit to stand positions.  She reports pain is across her mid back radiating into her buttock.  Burning and stinging sensation in her leg.  No falls or injuries.  Numbness is present in her lateral thigh and anterior thigh.  It does not occur below her knee.  No urine or bowel incontinence.  She reports burning and stinging in her buttock when she makes sit to stand changes.  No precipitating factors.  She does have a pain management appt until January.  Not at goal.   Sinus complaint-continued and ongoing.  Sinus pressure and headache.  Eyes watering.  Some blood tinged drainage when she blows.  She has had a couple rounds of antibiotics but cannot tell any difference in her symptoms.  She reports PND is present.  She does report dampness near her home from a nearby [place].  She has not checked her home for mold.  Significant other has similar symptoms as she does on daily basis.  Not at goal.   DM-reports blood sugars are doing good.  Is seeing 140's on routine checks.  Last A1C was improving.    GERD-reports her current stomach medication is not helping.  She is getting generic Nexium and it is not helping her symptoms.  She request a order for the brand.  Not at goal    The following portions of the patient's history were reviewed and updated as appropriate: allergies, current medications, past family history, past medical history, past social history, past surgical history and problem list.    Review of Systems   Constitutional: Negative for appetite change, fever and unexpected weight change.   HENT: Positive for ear pain (left ear is worse), postnasal drip, rhinorrhea,  sinus pressure and sore throat. Negative for congestion, nosebleeds and trouble swallowing.    Eyes: Positive for pain (occasional.  has been evaluated by Dr Mireles). Negative for discharge and visual disturbance.        Last eye exam is past due.  She reports she was due in November.  Usually sees Dr Mireles.  Does not wear corrective lens   Respiratory: Positive for cough (with yellow production ), chest tightness, shortness of breath and wheezing.    Cardiovascular: Positive for leg swelling. Negative for chest pain and palpitations.   Gastrointestinal: Positive for abdominal pain (generally periumbilical.  Reports hernia in her abd area) and diarrhea (at times but is improving). Negative for blood in stool, constipation, nausea and vomiting.   Endocrine: Positive for cold intolerance (increases her joint pain) and polydipsia. Negative for heat intolerance, polyphagia and polyuria.   Genitourinary: Positive for frequency. Negative for difficulty urinating, dysuria, hematuria, vaginal discharge and vaginal pain.        Reports painful menses.  She reports they are regular and at times are heavy as she has gotten older.    Musculoskeletal: Positive for arthralgias (mulitple joints), back pain (history of pinched nerve high in back and in neck.  Disc bulge in low back.  last MRI was approx 2 years ago.), joint swelling, myalgias (muscle spasm in back), neck pain and neck stiffness.   Skin: Negative for color change.   Allergic/Immunologic: Negative for environmental allergies and food allergies.   Neurological: Positive for dizziness, numbness (BLE in feet and ankles from DM and some in bilateral legs related to back) and headaches (due to acute illness with sinus). Negative for tremors, seizures, syncope and weakness.   Hematological: Positive for adenopathy. Does not bruise/bleed easily.   Psychiatric/Behavioral: Positive for sleep disturbance (problem going to sleep and staying asleep.  Reports average of 4  "hours per night.  She has not been on any meds for sleep). Negative for dysphoric mood and suicidal ideas. The patient is not nervous/anxious.    All other systems reviewed and are negative.    Objective     /80 (BP Location: Right arm, Patient Position: Sitting, Cuff Size: Adult)  Pulse 100  Temp 98.8 °F (37.1 °C) (Tympanic)   Ht 180.3 cm (70.98\")  Wt 133 kg (293 lb)  LMP 11/28/2017  SpO2 97%  BMI 40.88 kg/m2    Physical Exam   Constitutional: She is oriented to person, place, and time. She appears well-developed and well-nourished. No distress.   Friendly, talkative adult female.  obese   HENT:   Head: Normocephalic and atraumatic.   Right Ear: External ear normal. Tympanic membrane is injected.   Left Ear: External ear normal. Tympanic membrane is injected.   Nose: Mucosal edema and rhinorrhea present. Right sinus exhibits no maxillary sinus tenderness and no frontal sinus tenderness. Left sinus exhibits no maxillary sinus tenderness and no frontal sinus tenderness.   Mouth/Throat: Oropharyngeal exudate and posterior oropharyngeal erythema present.   Bilateral TM's dull, poor cone of light.  Injected and retracted.  EAC clear.   Bilateral nares with erythematous turbinates.  Poor patency.  Rhinorrhea present with boggy mucosa.   Bilateral sinus areas tender, boggy.  Tender to palpation.   Throat moderately injected with thick, white PND present      Eyes: Conjunctivae, EOM and lids are normal. Pupils are equal, round, and reactive to light. No scleral icterus.   Neck: Normal range of motion. Neck supple. No JVD present. No tracheal tenderness present. No thyromegaly present.   Cardiovascular: Normal rate, regular rhythm, normal heart sounds and intact distal pulses.    No murmur heard.  Pulmonary/Chest: Effort normal and breath sounds normal. She exhibits no tenderness.   Abdominal: Soft. Bowel sounds are normal. She exhibits no mass. There is no hepatosplenomegaly. There is no tenderness. "   Musculoskeletal: She exhibits no edema.        Cervical back: She exhibits decreased range of motion, tenderness, pain and spasm.   Gait slow, deliberate.   equal bilaterally. No muscular atrophy or flaccidity.  Generalized joint stiffness and tenderness due to RA.  BLE edema Mild, non pitting   Lymphadenopathy:     She has cervical adenopathy.   Firm, mobile, shotty anterior cervical nodes.  Non tender to palpation.     Neurological: She is alert and oriented to person, place, and time. She has normal strength and normal reflexes. No cranial nerve deficit. She exhibits normal muscle tone. Coordination normal.   Skin: Skin is warm and dry.   Psychiatric: She has a normal mood and affect. Her behavior is normal. Judgment and thought content normal.   Vitals reviewed.    Assessment/Plan     Problem List Items Addressed This Visit        Cardiovascular and Mediastinum    DM (diabetes mellitus) type II uncontrolled, periph vascular disorder    Relevant Medications    Insulin Glargine (TOUJEO SOLOSTAR) 300 UNIT/ML solution pen-injector    Exenatide ER (BYDUREON) 2 MG pen-injector    glipiZIDE-metFORMIN (METAGLIP) 2.5-250 MG per tablet    Other Relevant Orders    Urine Drug Screen - Urine, Clean Catch    Essential hypertension    Relevant Medications    enalapril (VASOTEC) 5 MG tablet    bumetanide (BUMEX) 2 MG tablet    Mixed hyperlipidemia    Relevant Medications    fenofibrate (TRICOR) 145 MG tablet    ezetimibe (ZETIA) 10 MG tablet       Respiratory    Chronic allergic rhinitis    Relevant Medications    montelukast (SINGULAIR) 10 MG tablet    levocetirizine (XYZAL) 5 MG tablet    Other Relevant Orders    Ambulatory Referral to Allergy       Digestive    Vitamin D deficiency    Relevant Medications    vitamin D (ERGOCALCIFEROL) 39092 units capsule capsule       Musculoskeletal and Integument    Arthritis    Relevant Medications    gabapentin (NEURONTIN) 600 MG tablet      Other Visit Diagnoses      Gastroesophageal reflux disease without esophagitis    -  Primary    Relevant Medications    NEXIUM 40 MG capsule    Generalized edema        Relevant Medications    bumetanide (BUMEX) 2 MG tablet    potassium chloride (K-DUR) 10 MEQ CR tablet    Chronic sinusitis, unspecified location        Relevant Medications    clarithromycin (BIAXIN) 500 MG tablet    ofloxacin (FLOXIN OTIC) 0.3 % otic solution        IVETTE reviewed today and consistent.  Will refill prescribed controlled medication today.  Patient is aware they cannot receive narcotics from any other provider.  Risk and benefits of medication use has been reviewed.  History and physical exam exhibit continued safe and appropriate use of controlled substances.  Referral as above to speciality.  UDS requested per [facility] while patient in office today.  Refill on routine maintenance meds today.   Instructed to complete all of antibiotics for acute illness.  Increase PO fluids, avoid caffeine.  Do not save meds for later use.  Rest PRN  RTC 3 months, sooner if needed.         This document has been electronically signed by:  JOAQUÍN Ross, EMY-C

## 2018-01-19 DIAGNOSIS — IMO0002 DM (DIABETES MELLITUS) TYPE II UNCONTROLLED, PERIPH VASCULAR DISORDER: ICD-10-CM

## 2018-01-19 RX ORDER — INSULIN GLARGINE 300 U/ML
INJECTION, SOLUTION SUBCUTANEOUS
Qty: 4.5 ML | Refills: 5 | Status: SHIPPED | OUTPATIENT
Start: 2018-01-19 | End: 2018-02-19 | Stop reason: CLARIF

## 2018-01-30 ENCOUNTER — OFFICE VISIT (OUTPATIENT)
Dept: FAMILY MEDICINE CLINIC | Facility: CLINIC | Age: 48
End: 2018-01-30

## 2018-01-30 VITALS
HEIGHT: 71 IN | HEART RATE: 94 BPM | SYSTOLIC BLOOD PRESSURE: 143 MMHG | BODY MASS INDEX: 41.02 KG/M2 | DIASTOLIC BLOOD PRESSURE: 74 MMHG | OXYGEN SATURATION: 97 % | WEIGHT: 293 LBS | TEMPERATURE: 98.3 F

## 2018-01-30 DIAGNOSIS — J32.0 CHRONIC MAXILLARY SINUSITIS: ICD-10-CM

## 2018-01-30 DIAGNOSIS — R68.89 FLU-LIKE SYMPTOMS: Primary | ICD-10-CM

## 2018-01-30 DIAGNOSIS — J30.89 CHRONIC NONSEASONAL ALLERGIC RHINITIS DUE TO POLLEN: ICD-10-CM

## 2018-01-30 LAB
EXPIRATION DATE: NORMAL
EXPIRATION DATE: NORMAL
FLUAV AG NPH QL: NORMAL
FLUBV AG NPH QL: NORMAL
INTERNAL CONTROL: NORMAL
INTERNAL CONTROL: NORMAL
Lab: NORMAL
Lab: NORMAL
S PYO AG THROAT QL: NEGATIVE

## 2018-01-30 PROCEDURE — 99213 OFFICE O/P EST LOW 20 MIN: CPT | Performed by: NURSE PRACTITIONER

## 2018-01-30 PROCEDURE — 87804 INFLUENZA ASSAY W/OPTIC: CPT | Performed by: NURSE PRACTITIONER

## 2018-01-30 PROCEDURE — 87880 STREP A ASSAY W/OPTIC: CPT | Performed by: NURSE PRACTITIONER

## 2018-01-30 RX ORDER — LEVOFLOXACIN 750 MG/1
750 TABLET ORAL DAILY
Qty: 10 TABLET | Refills: 0 | Status: SHIPPED | OUTPATIENT
Start: 2018-01-30 | End: 2018-02-09

## 2018-01-30 RX ORDER — HYDROCODONE BITARTRATE AND ACETAMINOPHEN 7.5; 325 MG/1; MG/1
1 TABLET ORAL EVERY 8 HOURS PRN
Refills: 0 | COMMUNITY
Start: 2018-01-08 | End: 2019-05-21 | Stop reason: ALTCHOICE

## 2018-01-30 RX ORDER — PREDNISONE 20 MG/1
TABLET ORAL
Qty: 20 TABLET | Refills: 0 | Status: SHIPPED | OUTPATIENT
Start: 2018-01-30 | End: 2018-02-09

## 2018-01-30 RX ORDER — OFLOXACIN 3 MG/ML
SOLUTION/ DROPS OPHTHALMIC
Refills: 0 | COMMUNITY
Start: 2017-12-11 | End: 2018-06-08

## 2018-01-30 RX ORDER — AZELASTINE 1 MG/ML
SPRAY, METERED NASAL
Qty: 1 EACH | Refills: 5 | Status: SHIPPED | OUTPATIENT
Start: 2018-01-30 | End: 2018-07-09 | Stop reason: SDUPTHER

## 2018-01-30 RX ORDER — ALBUTEROL SULFATE 90 UG/1
2 AEROSOL, METERED RESPIRATORY (INHALATION) EVERY 4 HOURS PRN
Qty: 1 INHALER | Refills: 5 | Status: SHIPPED | OUTPATIENT
Start: 2018-01-30 | End: 2018-08-07 | Stop reason: SDUPTHER

## 2018-02-08 DIAGNOSIS — I10 ESSENTIAL HYPERTENSION: ICD-10-CM

## 2018-02-08 DIAGNOSIS — E78.2 MIXED HYPERLIPIDEMIA: ICD-10-CM

## 2018-02-08 DIAGNOSIS — K21.9 GASTROESOPHAGEAL REFLUX DISEASE WITHOUT ESOPHAGITIS: ICD-10-CM

## 2018-02-08 DIAGNOSIS — R60.1 GENERALIZED EDEMA: ICD-10-CM

## 2018-02-09 RX ORDER — ESOMEPRAZOLE MAGNESIUM 40 MG/1
CAPSULE, DELAYED RELEASE ORAL
Qty: 30 CAPSULE | Refills: 5 | Status: SHIPPED | OUTPATIENT
Start: 2018-02-09 | End: 2018-08-07 | Stop reason: SDUPTHER

## 2018-02-09 RX ORDER — FENOFIBRATE 145 MG/1
TABLET, COATED ORAL
Qty: 30 TABLET | Refills: 5 | Status: SHIPPED | OUTPATIENT
Start: 2018-02-09 | End: 2018-04-10

## 2018-02-09 RX ORDER — ENALAPRIL MALEATE 5 MG/1
TABLET ORAL
Qty: 30 TABLET | Refills: 5 | Status: SHIPPED | OUTPATIENT
Start: 2018-02-09 | End: 2018-08-07 | Stop reason: SDUPTHER

## 2018-02-09 RX ORDER — EZETIMIBE 10 MG/1
TABLET ORAL
Qty: 30 TABLET | Refills: 5 | Status: SHIPPED | OUTPATIENT
Start: 2018-02-09 | End: 2018-08-07 | Stop reason: SDUPTHER

## 2018-02-09 RX ORDER — POTASSIUM CHLORIDE 750 MG/1
TABLET, FILM COATED, EXTENDED RELEASE ORAL
Qty: 60 TABLET | Refills: 5 | Status: SHIPPED | OUTPATIENT
Start: 2018-02-09 | End: 2018-08-07 | Stop reason: SDUPTHER

## 2018-02-19 ENCOUNTER — TELEPHONE (OUTPATIENT)
Dept: FAMILY MEDICINE CLINIC | Facility: CLINIC | Age: 48
End: 2018-02-19

## 2018-02-19 DIAGNOSIS — IMO0002 DM (DIABETES MELLITUS) TYPE II UNCONTROLLED, PERIPH VASCULAR DISORDER: Primary | ICD-10-CM

## 2018-02-19 NOTE — TELEPHONE ENCOUNTER
----- Message from JOAQUÍN oRss sent at 2/19/2018  1:48 PM EST -----  Changed  New RX sent  ----- Message -----     From: Radha Estrada MA     Sent: 2/19/2018  11:46 AM       To: JOAQUÍN Ross    tresiba is covered on patient insurance.   ----- Message -----     From: JOAQUÍN Ross     Sent: 2/19/2018  11:34 AM       To: Radha Estrada MA    Will you call the pharmacy and see what is preferred?   ----- Message -----     From: Radha Estrada MA     Sent: 2/19/2018  10:48 AM       To: JOAQUÍN Ross    Patient stated that her insurance company informed her that they will no longer be covering her TOUJEO SOLOSTAR pen. She wants to know if you can send her in something different.

## 2018-03-09 ENCOUNTER — OFFICE VISIT (OUTPATIENT)
Dept: FAMILY MEDICINE CLINIC | Facility: CLINIC | Age: 48
End: 2018-03-09

## 2018-03-09 VITALS
DIASTOLIC BLOOD PRESSURE: 82 MMHG | HEART RATE: 84 BPM | HEIGHT: 71 IN | SYSTOLIC BLOOD PRESSURE: 130 MMHG | BODY MASS INDEX: 41.02 KG/M2 | OXYGEN SATURATION: 97 % | WEIGHT: 293 LBS | TEMPERATURE: 99.1 F

## 2018-03-09 DIAGNOSIS — R60.0 BILATERAL EDEMA OF LOWER EXTREMITY: Primary | ICD-10-CM

## 2018-03-09 DIAGNOSIS — IMO0001 CLASS 3 OBESITY DUE TO EXCESS CALORIES WITH SERIOUS COMORBIDITY AND BODY MASS INDEX (BMI) OF 40.0 TO 44.9 IN ADULT: ICD-10-CM

## 2018-03-09 DIAGNOSIS — IMO0002 DM (DIABETES MELLITUS) TYPE II UNCONTROLLED, PERIPH VASCULAR DISORDER: ICD-10-CM

## 2018-03-09 DIAGNOSIS — M54.6 CHRONIC MIDLINE THORACIC BACK PAIN: ICD-10-CM

## 2018-03-09 DIAGNOSIS — R79.89 LOW VITAMIN D LEVEL: ICD-10-CM

## 2018-03-09 DIAGNOSIS — M19.90 ARTHRITIS: ICD-10-CM

## 2018-03-09 DIAGNOSIS — E53.8 LOW SERUM VITAMIN B12: ICD-10-CM

## 2018-03-09 DIAGNOSIS — G89.29 CHRONIC MIDLINE THORACIC BACK PAIN: ICD-10-CM

## 2018-03-09 PROCEDURE — 96372 THER/PROPH/DIAG INJ SC/IM: CPT | Performed by: NURSE PRACTITIONER

## 2018-03-09 PROCEDURE — 99214 OFFICE O/P EST MOD 30 MIN: CPT | Performed by: NURSE PRACTITIONER

## 2018-03-09 RX ORDER — KETOROLAC TROMETHAMINE 30 MG/ML
60 INJECTION, SOLUTION INTRAMUSCULAR; INTRAVENOUS ONCE
Status: COMPLETED | OUTPATIENT
Start: 2018-03-09 | End: 2018-03-09

## 2018-03-09 RX ORDER — GABAPENTIN 600 MG/1
600 TABLET ORAL 3 TIMES DAILY
Qty: 90 TABLET | Refills: 2 | Status: SHIPPED | OUTPATIENT
Start: 2018-03-09 | End: 2018-04-10 | Stop reason: SDUPTHER

## 2018-03-09 RX ORDER — FUROSEMIDE 40 MG/1
40 TABLET ORAL EVERY MORNING
Qty: 30 TABLET | Refills: 5 | Status: SHIPPED | OUTPATIENT
Start: 2018-03-09 | End: 2018-09-07 | Stop reason: SDUPTHER

## 2018-03-09 RX ORDER — DEXAMETHASONE SODIUM PHOSPHATE 4 MG/ML
8 INJECTION, SOLUTION INTRA-ARTICULAR; INTRALESIONAL; INTRAMUSCULAR; INTRAVENOUS; SOFT TISSUE ONCE
Status: COMPLETED | OUTPATIENT
Start: 2018-03-09 | End: 2018-03-09

## 2018-03-09 RX ADMIN — KETOROLAC TROMETHAMINE 60 MG: 30 INJECTION, SOLUTION INTRAMUSCULAR; INTRAVENOUS at 12:15

## 2018-03-09 RX ADMIN — DEXAMETHASONE SODIUM PHOSPHATE 8 MG: 4 INJECTION, SOLUTION INTRA-ARTICULAR; INTRALESIONAL; INTRAMUSCULAR; INTRAVENOUS; SOFT TISSUE at 12:15

## 2018-03-09 NOTE — PROGRESS NOTES
Subjective   Soheila Brewster is a 47 y.o. female.     Chief Complaint   Patient presents with   • Diabetes   • Heartburn       History of Present Illness     Sinus complaint-chronic and ongoing.  She has not had any allergy testing as she missed it due to illness.  She does plan to resched that appt.  She has continued complaints of rhinorrhea, cough, pressure.  Not at goal.   Back pain-acute on chronic today with left sided numbness and tingling.  She reports she has been having increase in symptoms since multiple weather changes.  She reports lying down is difficult as well as sit to stand positions.  She reports pain is across her mid back radiating into her buttock.  Burning and stinging sensation in her leg.  No falls or injuries.  Numbness is present in her lateral thigh and anterior thigh.  It does not occur below her knee.  No urine or bowel incontinence.  She reports burning and stinging in her buttock when she makes sit to stand changes.  No precipitating factors.  She is under care of pain management and is receiving Norco 7.5 mg.  Not at goal.   Diabetes-mild elevations since start Tresiba.  She reports 240's.  She has been on Less than 2 weeks.  Tolerating well.  No negative side effects.    BLE edema-despite Bumex use.  Reports both feet are swollen today.  She reports she is not urinating as frequently as she did with Bumex initially.  Not at goal.  Obesity-is interested in bariatric surgery due to her diabetes and back pain.      The following portions of the patient's history were reviewed and updated as appropriate: allergies, current medications, past family history, past medical history, past social history, past surgical history and problem list.    Review of Systems   Constitutional: Negative for appetite change, fever and unexpected weight change.   HENT: Positive for ear pain (left ear is worse), postnasal drip, rhinorrhea and sinus pressure. Negative for congestion, nosebleeds, sore throat and  trouble swallowing.    Eyes: Positive for pain (chronic.  followed by Dr Mireles). Negative for discharge and visual disturbance.        Does not wear corrective lens.  Recent exam with some early stage cataract   Respiratory: Positive for cough (with yellow production ), shortness of breath and wheezing. Negative for chest tightness.    Cardiovascular: Positive for leg swelling. Negative for chest pain and palpitations.   Gastrointestinal: Positive for abdominal pain (generally periumbilical.  Reports hernia in her abd area) and constipation. Negative for blood in stool, diarrhea (at times but is improving), nausea and vomiting.   Endocrine: Positive for cold intolerance (increases her joint pain) and polydipsia. Negative for heat intolerance, polyphagia and polyuria.   Genitourinary: Positive for frequency. Negative for difficulty urinating, dysuria, hematuria, vaginal discharge and vaginal pain.        Reports painful menses.  She reports they are regular and at times are heavy as she has gotten older.    Musculoskeletal: Positive for arthralgias (mulitple joints), back pain (history of pinched nerve high in back and in neck.  Disc bulge in low back.  last MRI was approx 2 years ago.), joint swelling, myalgias (muscle spasm in back), neck pain and neck stiffness.   Skin: Negative for color change.   Allergic/Immunologic: Negative for environmental allergies and food allergies.   Neurological: Positive for numbness (BLE in feet and ankles from DM and some in bilateral legs related to back) and headaches (due to acute illness with sinus). Negative for dizziness, tremors, seizures, syncope and weakness.   Hematological: Positive for adenopathy. Does not bruise/bleed easily.   Psychiatric/Behavioral: Positive for sleep disturbance (problem going to sleep and staying asleep.  Reports average of 4 hours per night.  She has not been on any meds for sleep). Negative for dysphoric mood and suicidal ideas. The patient is  "not nervous/anxious.    All other systems reviewed and are negative.      Objective     /82   Pulse 84   Temp 99.1 °F (37.3 °C) (Tympanic)   Ht 180.3 cm (71\")   Wt 135 kg (298 lb)   LMP 02/22/2018   SpO2 97%   BMI 41.56 kg/m²     Physical Exam   Constitutional: She appears well-developed and well-nourished. No distress.   HENT:   Head: Normocephalic and atraumatic.   Right Ear: Ear canal normal. Tympanic membrane is injected. Tympanic membrane is not scarred, not retracted and not bulging.   Left Ear: Ear canal normal. Tympanic membrane is injected. Tympanic membrane is not scarred, not retracted and not bulging.   Nose: Mucosal edema and rhinorrhea present. No epistaxis. Right sinus exhibits no maxillary sinus tenderness and no frontal sinus tenderness. Left sinus exhibits no maxillary sinus tenderness and no frontal sinus tenderness.   Mouth/Throat: Uvula is midline and mucous membranes are normal. No uvula swelling. Oropharyngeal exudate and posterior oropharyngeal erythema (mild to moderately injected) present. No posterior oropharyngeal edema.   Eyes: EOM are normal. Pupils are equal, round, and reactive to light. No scleral icterus.   Neck: Normal range of motion. No thyromegaly present.   Cardiovascular: Normal rate, regular rhythm, normal heart sounds and intact distal pulses.    Pulmonary/Chest: Effort normal. She has decreased breath sounds (mildly over bronchial region of anterior chest). She has wheezes (scattered mild).   Abdominal: Soft. Bowel sounds are normal. There is no tenderness.   Musculoskeletal:        Thoracic back: She exhibits tenderness and spasm.        Lumbar back: She exhibits decreased range of motion, tenderness, pain and spasm.   Lymphadenopathy:     She has cervical adenopathy.        Right cervical: Superficial cervical adenopathy present.        Left cervical: Superficial cervical adenopathy present.   Neurological: She is alert. She displays normal reflexes. No " cranial nerve deficit.   Skin: Skin is warm and dry.   Psychiatric: She has a normal mood and affect. Her behavior is normal. Judgment and thought content normal.   Vitals reviewed.        Assessment/Plan     Problem List Items Addressed This Visit        Cardiovascular and Mediastinum    DM (diabetes mellitus) type II uncontrolled, periph vascular disorder    Relevant Medications    Insulin Degludec (TRESIBA FLEXTOUCH) 200 UNIT/ML solution pen-injector    Other Relevant Orders    CBC & Differential    Comprehensive Metabolic Panel    TSH    Hemoglobin A1c       Nervous and Auditory    Chronic midline thoracic back pain    Relevant Medications    dexamethasone (DECADRON) injection 8 mg (Completed)    ketorolac (TORADOL) injection 60 mg (Completed)       Musculoskeletal and Integument    Arthritis    Relevant Medications    gabapentin (NEURONTIN) 600 MG tablet       Other    Class 3 obesity due to excess calories with serious comorbidity and body mass index (BMI) of 40.0 to 44.9 in adult    Relevant Orders    Lipid Panel    TSH      Other Visit Diagnoses     Bilateral edema of lower extremity    -  Primary    Relevant Medications    furosemide (LASIX) 40 MG tablet    Low vitamin D level        Relevant Orders    Vitamin D 25 Hydroxy    Low serum vitamin B12        Relevant Orders    Vitamin B12        Injections today while in the office for acute symptom relief.   Understands disease processes and need for medications.  Understands reasons for urgent and emergent care.  Patient (& family) verbalized agreement for treatment plan.   Continue  Under care of pain management  IVETTE reviewed today and consistent.  Will refill prescribed controlled medication today.  Patient is aware they cannot receive narcotics from any other provider except if under care of pain management or speciality clinic.  Risk and benefits of medication use has been reviewed.  History and physical exam exhibit continued safe and appropriate use  of controlled substances.  RTC 3 months, sooner if needed.   Will plan for fasting labs prior to next appt.              This document has been electronically signed by:  JOAQUÍN Ross, ELIDA

## 2018-03-23 DIAGNOSIS — E55.9 VITAMIN D DEFICIENCY: ICD-10-CM

## 2018-03-23 RX ORDER — ERGOCALCIFEROL 1.25 MG/1
CAPSULE ORAL
Qty: 4 CAPSULE | Refills: 5 | Status: SHIPPED | OUTPATIENT
Start: 2018-03-23 | End: 2018-04-10 | Stop reason: SDUPTHER

## 2018-04-06 LAB
25(OH)D3 SERPL-MCNC: 54 NG/ML
ALBUMIN SERPL-MCNC: 4 G/DL (ref 3.5–5)
ALBUMIN/GLOB SERPL: 1.4 G/DL (ref 1.5–2.5)
ALP SERPL-CCNC: 52 U/L (ref 35–104)
ALT SERPL W P-5'-P-CCNC: 16 U/L (ref 10–36)
ANION GAP SERPL CALCULATED.3IONS-SCNC: 4.2 MMOL/L (ref 3.6–11.2)
AST SERPL-CCNC: 15 U/L (ref 10–30)
BASOPHILS # BLD AUTO: 0.04 10*3/MM3 (ref 0–0.3)
BASOPHILS NFR BLD AUTO: 0.4 % (ref 0–2)
BILIRUB SERPL-MCNC: 0.3 MG/DL (ref 0.2–1.8)
BUN BLD-MCNC: 14 MG/DL (ref 7–21)
BUN/CREAT SERPL: 19.2 (ref 7–25)
CALCIUM SPEC-SCNC: 9.9 MG/DL (ref 7.7–10)
CHLORIDE SERPL-SCNC: 109 MMOL/L (ref 99–112)
CHOLEST SERPL-MCNC: 124 MG/DL (ref 0–200)
CO2 SERPL-SCNC: 27.8 MMOL/L (ref 24.3–31.9)
CREAT BLD-MCNC: 0.73 MG/DL (ref 0.43–1.29)
DEPRECATED RDW RBC AUTO: 41.1 FL (ref 37–54)
EOSINOPHIL # BLD AUTO: 0.42 10*3/MM3 (ref 0–0.7)
EOSINOPHIL NFR BLD AUTO: 4.7 % (ref 0–5)
ERYTHROCYTE [DISTWIDTH] IN BLOOD BY AUTOMATED COUNT: 13.6 % (ref 11.5–14.5)
GFR SERPL CREATININE-BSD FRML MDRD: 86 ML/MIN/1.73
GLOBULIN UR ELPH-MCNC: 2.8 GM/DL
GLUCOSE BLD-MCNC: 136 MG/DL (ref 70–110)
HBA1C MFR BLD: 9 % (ref 4.5–5.7)
HCT VFR BLD AUTO: 38 % (ref 37–47)
HDLC SERPL-MCNC: 31 MG/DL (ref 60–100)
HGB BLD-MCNC: 12.7 G/DL (ref 12–16)
IMM GRANULOCYTES # BLD: 0.02 10*3/MM3 (ref 0–0.03)
IMM GRANULOCYTES NFR BLD: 0.2 % (ref 0–0.5)
LDLC SERPL CALC-MCNC: 60 MG/DL (ref 0–100)
LDLC/HDLC SERPL: 1.94 {RATIO}
LYMPHOCYTES # BLD AUTO: 2.54 10*3/MM3 (ref 1–3)
LYMPHOCYTES NFR BLD AUTO: 28.2 % (ref 21–51)
MCH RBC QN AUTO: 28.2 PG (ref 27–33)
MCHC RBC AUTO-ENTMCNC: 33.4 G/DL (ref 33–37)
MCV RBC AUTO: 84.3 FL (ref 80–94)
MONOCYTES # BLD AUTO: 0.78 10*3/MM3 (ref 0.1–0.9)
MONOCYTES NFR BLD AUTO: 8.7 % (ref 0–10)
NEUTROPHILS # BLD AUTO: 5.21 10*3/MM3 (ref 1.4–6.5)
NEUTROPHILS NFR BLD AUTO: 57.8 % (ref 30–70)
OSMOLALITY SERPL CALC.SUM OF ELEC: 283.8 MOSM/KG (ref 273–305)
PLATELET # BLD AUTO: 212 10*3/MM3 (ref 130–400)
PMV BLD AUTO: 12.1 FL (ref 6–10)
POTASSIUM BLD-SCNC: 3.8 MMOL/L (ref 3.5–5.3)
PROT SERPL-MCNC: 6.8 G/DL (ref 6–8)
RBC # BLD AUTO: 4.51 10*6/MM3 (ref 4.2–5.4)
SODIUM BLD-SCNC: 141 MMOL/L (ref 135–153)
TRIGL SERPL-MCNC: 164 MG/DL (ref 0–150)
TSH SERPL DL<=0.05 MIU/L-ACNC: 0.11 MIU/ML (ref 0.55–4.78)
VIT B12 BLD-MCNC: 1331 PG/ML (ref 211–911)
VLDLC SERPL-MCNC: 32.8 MG/DL
WBC NRBC COR # BLD: 9.01 10*3/MM3 (ref 4.5–12.5)

## 2018-04-06 PROCEDURE — 80061 LIPID PANEL: CPT | Performed by: NURSE PRACTITIONER

## 2018-04-06 PROCEDURE — 85025 COMPLETE CBC W/AUTO DIFF WBC: CPT | Performed by: NURSE PRACTITIONER

## 2018-04-06 PROCEDURE — 84443 ASSAY THYROID STIM HORMONE: CPT | Performed by: NURSE PRACTITIONER

## 2018-04-06 PROCEDURE — 82306 VITAMIN D 25 HYDROXY: CPT | Performed by: NURSE PRACTITIONER

## 2018-04-06 PROCEDURE — 82607 VITAMIN B-12: CPT | Performed by: NURSE PRACTITIONER

## 2018-04-06 PROCEDURE — 80053 COMPREHEN METABOLIC PANEL: CPT | Performed by: NURSE PRACTITIONER

## 2018-04-06 PROCEDURE — 83036 HEMOGLOBIN GLYCOSYLATED A1C: CPT | Performed by: NURSE PRACTITIONER

## 2018-04-06 PROCEDURE — 36415 COLL VENOUS BLD VENIPUNCTURE: CPT | Performed by: NURSE PRACTITIONER

## 2018-04-09 DIAGNOSIS — IMO0002 DM (DIABETES MELLITUS) TYPE II UNCONTROLLED, PERIPH VASCULAR DISORDER: ICD-10-CM

## 2018-04-09 DIAGNOSIS — M19.90 ARTHRITIS: ICD-10-CM

## 2018-04-09 RX ORDER — GABAPENTIN 600 MG/1
TABLET ORAL
Qty: 90 TABLET | Refills: 2 | Status: CANCELLED | OUTPATIENT
Start: 2018-04-09

## 2018-04-09 RX ORDER — GLIPIZIDE AND METFORMIN HCL 2.5; 25 MG/1; MG/1
TABLET, FILM COATED ORAL
Qty: 60 TABLET | Refills: 2 | Status: SHIPPED | OUTPATIENT
Start: 2018-04-09 | End: 2018-07-06 | Stop reason: SDUPTHER

## 2018-04-09 NOTE — TELEPHONE ENCOUNTER
----- Message from JOAQUÍN Ross sent at 4/8/2018  3:50 PM EDT -----  A1C is  back up to 9 from 8.3.  Advise to watch her diet, limit CHO.  Cholesterol is good.  TRigs are improved.  Vit D and b12 are good.  She is on Zetia and Fenofibrate.  She may choose one and stop the other.  She does not need to be on both now.    I spoke with adile and she has appt  tomorrow  With narda and she said she will let you decide which med she needs to continue

## 2018-04-10 ENCOUNTER — OFFICE VISIT (OUTPATIENT)
Dept: FAMILY MEDICINE CLINIC | Facility: CLINIC | Age: 48
End: 2018-04-10

## 2018-04-10 VITALS
OXYGEN SATURATION: 97 % | HEART RATE: 103 BPM | TEMPERATURE: 99.1 F | HEIGHT: 71 IN | BODY MASS INDEX: 41.02 KG/M2 | DIASTOLIC BLOOD PRESSURE: 82 MMHG | SYSTOLIC BLOOD PRESSURE: 120 MMHG | WEIGHT: 293 LBS

## 2018-04-10 DIAGNOSIS — M19.90 ARTHRITIS: ICD-10-CM

## 2018-04-10 DIAGNOSIS — IMO0002 DM (DIABETES MELLITUS) TYPE II UNCONTROLLED, PERIPH VASCULAR DISORDER: ICD-10-CM

## 2018-04-10 DIAGNOSIS — R21 RASH: Primary | ICD-10-CM

## 2018-04-10 DIAGNOSIS — E55.9 VITAMIN D DEFICIENCY: ICD-10-CM

## 2018-04-10 PROCEDURE — 99214 OFFICE O/P EST MOD 30 MIN: CPT | Performed by: NURSE PRACTITIONER

## 2018-04-10 RX ORDER — TRIAMCINOLONE ACETONIDE 5 MG/G
OINTMENT TOPICAL 3 TIMES DAILY
Qty: 30 G | Refills: 1 | Status: SHIPPED | OUTPATIENT
Start: 2018-04-10 | End: 2019-03-19

## 2018-04-10 RX ORDER — ERGOCALCIFEROL 1.25 MG/1
50000 CAPSULE ORAL
Qty: 4 CAPSULE | Refills: 5 | Status: SHIPPED | OUTPATIENT
Start: 2018-04-10 | End: 2018-07-09 | Stop reason: SDUPTHER

## 2018-04-10 RX ORDER — GABAPENTIN 600 MG/1
600 TABLET ORAL 3 TIMES DAILY
Qty: 90 TABLET | Refills: 2 | Status: SHIPPED | OUTPATIENT
Start: 2018-04-10 | End: 2018-06-08 | Stop reason: SDUPTHER

## 2018-04-10 NOTE — PROGRESS NOTES
Subjective   Soheila Brewster is a 47 y.o. female.     Chief Complaint   Patient presents with   • Diabetes   • Follow up lab results        History of Present Illness     Rash-on arm.  Reports she was working outside.  Present on her arms, waistline, and legs.  Reports she has used OTC hydrocortisone and calomine but it has not helped.  She reports has been present since Sunday.  She is unsure what she had exposure to.   She reports itching.  No drainage. Ongoing.    Diabetes-ongoing.  Reports continued elevations since changing her basal insulin to Tresiba.  She would like a different med trial in the same class.  Is not checking daily and not always PP.  Not at goal.   Labs-here to follow up on labs.      The following portions of the patient's history were reviewed and updated as appropriate: allergies, current medications, past family history, past medical history, past social history, past surgical history and problem list.    Review of Systems   Constitutional: Negative for appetite change, fever and unexpected weight change.   HENT: Negative for congestion, ear pain, nosebleeds, sinus pressure, sore throat and trouble swallowing.    Eyes: Positive for pain (chronic.  followed by Dr Mireles). Negative for discharge and visual disturbance.        Does not wear corrective lens.  Recent exam with some early stage cataract   Respiratory: Positive for cough (with yellow production ), shortness of breath (but is improving) and wheezing. Negative for chest tightness.    Cardiovascular: Positive for leg swelling. Negative for chest pain and palpitations.   Gastrointestinal: Positive for abdominal pain (generally periumbilical.  Reports hernia in her abd area) and constipation. Negative for blood in stool, diarrhea (at times but is improving), nausea and vomiting.   Endocrine: Positive for cold intolerance (increases her joint pain) and polydipsia. Negative for heat intolerance, polyphagia and polyuria.   Genitourinary:  "Positive for frequency. Negative for difficulty urinating, dysuria, hematuria, vaginal discharge and vaginal pain.        Reports painful menses.  She reports they are regular and at times are heavy as she has gotten older.    Musculoskeletal: Positive for arthralgias (mulitple joints), back pain (history of pinched nerve high in back and in neck.  Disc bulge in low back.  last MRI was approx 2 years ago.), joint swelling, myalgias (muscle spasm in back), neck pain and neck stiffness.   Skin: Negative for color change.   Allergic/Immunologic: Negative for environmental allergies and food allergies.   Neurological: Positive for numbness (BLE in feet and ankles from DM and some in bilateral legs related to back) and headaches (intermittently). Negative for dizziness, tremors, seizures, syncope and weakness.   Hematological: Positive for adenopathy. Does not bruise/bleed easily.   Psychiatric/Behavioral: Positive for sleep disturbance (problem going to sleep and staying asleep.  Reports average of 4 hours per night.  She has not been on any meds for sleep). Negative for dysphoric mood and suicidal ideas. The patient is not nervous/anxious.    All other systems reviewed and are negative.      Objective     /82   Pulse 103   Temp 99.1 °F (37.3 °C) (Tympanic)   Ht 180.3 cm (71\")   Wt 133 kg (294 lb)   LMP 04/08/2018   SpO2 97%   BMI 41.00 kg/m²     Physical Exam   Constitutional: She appears well-developed and well-nourished. No distress.   HENT:   Head: Normocephalic and atraumatic.   Right Ear: Ear canal normal. Tympanic membrane is injected. Tympanic membrane is not scarred, not retracted and not bulging.   Left Ear: Ear canal normal. Tympanic membrane is injected. Tympanic membrane is not scarred, not retracted and not bulging.   Nose: Mucosal edema and rhinorrhea present. No epistaxis. Right sinus exhibits no maxillary sinus tenderness and no frontal sinus tenderness. Left sinus exhibits no maxillary " sinus tenderness and no frontal sinus tenderness.   Mouth/Throat: Uvula is midline and mucous membranes are normal. No uvula swelling. No oropharyngeal exudate or posterior oropharyngeal erythema.   Eyes: EOM are normal. Pupils are equal, round, and reactive to light. No scleral icterus.   Neck: Normal range of motion. No thyromegaly present.   Cardiovascular: Normal rate, regular rhythm, normal heart sounds and intact distal pulses.    Pulmonary/Chest: Effort normal. She has decreased breath sounds (mildly over bronchial region of anterior chest). She has wheezes (scattered mild).   Abdominal: Soft. Bowel sounds are normal. There is no tenderness.   Musculoskeletal:        Thoracic back: She exhibits tenderness and spasm.        Lumbar back: She exhibits decreased range of motion, tenderness, pain and spasm.   Lymphadenopathy:     She has cervical adenopathy.        Right cervical: Superficial cervical adenopathy present.        Left cervical: Superficial cervical adenopathy present.   Neurological: She is alert. She displays normal reflexes. No cranial nerve deficit.   Skin: Skin is warm and dry.   Psychiatric: She has a normal mood and affect. Her behavior is normal. Judgment and thought content normal.   Vitals reviewed.    Assessment/Plan     Problem List Items Addressed This Visit        Cardiovascular and Mediastinum    DM (diabetes mellitus) type II uncontrolled, periph vascular disorder    Relevant Orders    Footwear Diabetic       Digestive    Vitamin D deficiency    Relevant Medications    vitamin D (ERGOCALCIFEROL) 27668 units capsule capsule       Musculoskeletal and Integument    Arthritis    Relevant Medications    gabapentin (NEURONTIN) 600 MG tablet      Other Visit Diagnoses     Rash    -  Primary    Relevant Medications    triamcinolone (KENALOG) 0.5 % ointment      Reviewed most recent labs with patient.  Discussed any abnormal findings.  Patient's questions answered.  IVETTE reviewed today and  consistent.  Will refill prescribed controlled medication today.  Patient is aware they cannot receive narcotics from any other provider except if under care of pain management or speciality clinic.  Risk and benefits of medication use has been reviewed.  History and physical exam exhibit continued safe and appropriate use of controlled substances.  Understands disease processes and need for medications.  Understands reasons for urgent and emergent care.  Patient (& family) verbalized agreement for treatment plan.   Refill on routine maintenance meds today.   accuchecks TID to QID For next 3-4 days.  Sample of Baslagar given for patient to try.  Continue Bydureon.  RTC 2 months, sooner if needed.           This document has been electronically signed by:  JOAQUÍN Ross, FNP-C

## 2018-04-25 DIAGNOSIS — IMO0002 DM (DIABETES MELLITUS) TYPE II UNCONTROLLED, PERIPH VASCULAR DISORDER: Primary | ICD-10-CM

## 2018-04-25 DIAGNOSIS — R79.89 LOW TSH LEVEL: ICD-10-CM

## 2018-06-08 ENCOUNTER — OFFICE VISIT (OUTPATIENT)
Dept: FAMILY MEDICINE CLINIC | Facility: CLINIC | Age: 48
End: 2018-06-08

## 2018-06-08 VITALS
BODY MASS INDEX: 41.02 KG/M2 | WEIGHT: 293 LBS | SYSTOLIC BLOOD PRESSURE: 120 MMHG | HEIGHT: 71 IN | HEART RATE: 74 BPM | TEMPERATURE: 97.4 F | OXYGEN SATURATION: 97 % | DIASTOLIC BLOOD PRESSURE: 82 MMHG

## 2018-06-08 DIAGNOSIS — IMO0002 DM (DIABETES MELLITUS) TYPE II UNCONTROLLED, PERIPH VASCULAR DISORDER: ICD-10-CM

## 2018-06-08 DIAGNOSIS — M54.6 CHRONIC MIDLINE THORACIC BACK PAIN: ICD-10-CM

## 2018-06-08 DIAGNOSIS — M19.90 ARTHRITIS: ICD-10-CM

## 2018-06-08 DIAGNOSIS — J06.9 ACUTE URI: ICD-10-CM

## 2018-06-08 DIAGNOSIS — G89.29 CHRONIC MIDLINE THORACIC BACK PAIN: ICD-10-CM

## 2018-06-08 DIAGNOSIS — J30.89 CHRONIC NONSEASONAL ALLERGIC RHINITIS DUE TO POLLEN: Primary | ICD-10-CM

## 2018-06-08 PROCEDURE — 96372 THER/PROPH/DIAG INJ SC/IM: CPT | Performed by: NURSE PRACTITIONER

## 2018-06-08 PROCEDURE — 99214 OFFICE O/P EST MOD 30 MIN: CPT | Performed by: NURSE PRACTITIONER

## 2018-06-08 RX ORDER — GABAPENTIN 600 MG/1
600 TABLET ORAL 3 TIMES DAILY
Qty: 90 TABLET | Refills: 2 | Status: SHIPPED | OUTPATIENT
Start: 2018-06-08 | End: 2018-07-06 | Stop reason: SDUPTHER

## 2018-06-08 RX ORDER — LEVOFLOXACIN 500 MG/1
500 TABLET, FILM COATED ORAL DAILY
Qty: 10 TABLET | Refills: 0 | Status: SHIPPED | OUTPATIENT
Start: 2018-06-08 | End: 2018-07-09

## 2018-06-08 RX ORDER — FEXOFENADINE HCL 180 MG/1
180 TABLET ORAL DAILY
Qty: 30 TABLET | Refills: 5 | Status: SHIPPED | OUTPATIENT
Start: 2018-06-08 | End: 2018-12-10

## 2018-06-08 RX ORDER — METAXALONE 800 MG/1
800 TABLET ORAL 3 TIMES DAILY
Qty: 90 TABLET | Refills: 5 | Status: SHIPPED | OUTPATIENT
Start: 2018-06-08 | End: 2018-06-18 | Stop reason: SDUPTHER

## 2018-06-08 RX ORDER — KETOROLAC TROMETHAMINE 30 MG/ML
60 INJECTION, SOLUTION INTRAMUSCULAR; INTRAVENOUS ONCE
Status: COMPLETED | OUTPATIENT
Start: 2018-06-08 | End: 2018-06-08

## 2018-06-08 RX ADMIN — KETOROLAC TROMETHAMINE 60 MG: 30 INJECTION, SOLUTION INTRAMUSCULAR; INTRAVENOUS at 12:58

## 2018-06-08 NOTE — PROGRESS NOTES
"Subjective   Soheila Brewster is a 48 y.o. female.     Chief Complaint   Patient presents with   • Diabetes   • Arthritis       History of Present Illness     Allergic rhinitis-continues to be present.  Is presently on Xyzal and Astelin spray.  Is also taking Singulair.  Increase in PND and throat irritation.  Cough with \"milkly colored\" production.  Questionable Low grade Temp on Friday.  Soma itching and watering of eyes.  Not at goal.   Back Pain-under care of pain management.  Did receive 4 injections at her last appt.  Reports some sciatic nerve discomfort today.  Ongoing.    DM-continues to be elevated.  Reports continued elevations despite byduren and Tresiba use.    The following portions of the patient's history were reviewed and updated as appropriate: allergies, current medications, past family history, past medical history, past social history, past surgical history and problem list.    Review of Systems   Constitutional: Negative for appetite change, fever and unexpected weight change.   HENT: Positive for congestion, postnasal drip, rhinorrhea, sinus pressure and sore throat. Negative for ear pain (left ear is worse), nosebleeds and trouble swallowing.    Eyes: Positive for pain (chronic.  followed by Dr Mireles) and discharge. Negative for visual disturbance.        Does not wear corrective lens.  Recent exam with some early stage cataract   Respiratory: Positive for cough, shortness of breath and wheezing. Negative for chest tightness.    Cardiovascular: Positive for leg swelling. Negative for chest pain and palpitations.   Gastrointestinal: Positive for abdominal pain (generally periumbilical.  Reports hernia in her abd area), constipation, diarrhea (at times but is improving) and nausea. Negative for blood in stool and vomiting.   Endocrine: Positive for cold intolerance (increases her joint pain) and polydipsia. Negative for heat intolerance, polyphagia and polyuria.   Genitourinary: Positive for " "frequency. Negative for difficulty urinating, dysuria, hematuria, vaginal discharge and vaginal pain.        Reports painful menses.  She reports they are regular and at times are heavy as she has gotten older.    Musculoskeletal: Positive for arthralgias (mulitple joints), back pain (history of pinched nerve high in back and in neck.  Disc bulge in low back.  last MRI was approx 2 years ago.), joint swelling, myalgias (muscle spasm in back), neck pain and neck stiffness.   Skin: Negative for color change.   Allergic/Immunologic: Negative for environmental allergies and food allergies.   Neurological: Positive for dizziness, weakness, numbness (BLE in feet and ankles from DM and some in bilateral legs related to back) and headaches (due to acute illness with sinus). Negative for tremors, seizures and syncope.   Hematological: Positive for adenopathy. Does not bruise/bleed easily.   Psychiatric/Behavioral: Positive for sleep disturbance (problem going to sleep and staying asleep.  Reports average of 4 hours per night.  She has not been on any meds for sleep). Negative for dysphoric mood and suicidal ideas. The patient is not nervous/anxious.    All other systems reviewed and are negative.      Objective     /82   Pulse 74   Temp 97.4 °F (36.3 °C) (Temporal Artery )   Ht 180.3 cm (71\")   Wt 136 kg (300 lb)   LMP 05/30/2018   SpO2 97%   BMI 41.84 kg/m²     Physical Exam   Constitutional: She appears well-developed and well-nourished. No distress.   HENT:   Head: Normocephalic and atraumatic.   Right Ear: Ear canal normal. Tympanic membrane is injected. Tympanic membrane is not scarred, not retracted and not bulging.   Left Ear: Ear canal normal. Tympanic membrane is injected. Tympanic membrane is not scarred, not retracted and not bulging.   Nose: Mucosal edema and rhinorrhea present. No epistaxis. Right sinus exhibits no maxillary sinus tenderness and no frontal sinus tenderness. Left sinus exhibits no " maxillary sinus tenderness and no frontal sinus tenderness.   Mouth/Throat: Uvula is midline and mucous membranes are normal. No uvula swelling. No oropharyngeal exudate or posterior oropharyngeal erythema.   Eyes: EOM are normal. Pupils are equal, round, and reactive to light. No scleral icterus.   Neck: Normal range of motion. No thyromegaly present.   Cardiovascular: Normal rate, regular rhythm, normal heart sounds and intact distal pulses.    Pulmonary/Chest: Effort normal. She has decreased breath sounds (mildly over bronchial region of anterior chest). She has wheezes (scattered mild).   Abdominal: Soft. Bowel sounds are normal. There is no tenderness.   Musculoskeletal:        Thoracic back: She exhibits tenderness and spasm.        Lumbar back: She exhibits decreased range of motion, tenderness, pain and spasm.   Lymphadenopathy:     She has cervical adenopathy.        Right cervical: Superficial cervical adenopathy present.        Left cervical: Superficial cervical adenopathy present.   Neurological: She is alert. She displays normal reflexes. No cranial nerve deficit.   Skin: Skin is warm and dry.   Psychiatric: She has a normal mood and affect. Her behavior is normal. Judgment and thought content normal.   Vitals reviewed.    Assessment/Plan     Problem List Items Addressed This Visit        Cardiovascular and Mediastinum    DM (diabetes mellitus) type II uncontrolled, periph vascular disorder    Relevant Medications    gabapentin (NEURONTIN) 600 MG tablet    ketorolac (TORADOL) injection 60 mg (Completed)       Respiratory    Chronic allergic rhinitis - Primary    Relevant Medications    fexofenadine (ALLEGRA) 180 MG tablet       Nervous and Auditory    Chronic midline thoracic back pain    Relevant Medications    metaxalone (SKELAXIN) 800 MG tablet       Musculoskeletal and Integument    Arthritis    Relevant Medications    gabapentin (NEURONTIN) 600 MG tablet      Other Visit Diagnoses     Acute URI         Relevant Medications    levoFLOXacin (LEVAQUIN) 500 MG tablet        Hold Tresiba.  Sample of Soliqua provided with dosing instructions.  Discussed SE of Nausea  Change in allergy meds today.   Change in Muscle relaxant today.    IVETTE reviewed today and consistent.  Will refill prescribed controlled medication today.  Patient is aware they cannot receive narcotics from any other provider except if under care of pain management or speciality clinic.  Risk and benefits of medication use has been reviewed.  History and physical exam exhibit continued safe and appropriate use of controlled substances.  Instructed to complete all of antibiotics for acute illness.  Increase PO fluids, avoid/limit caffeine.  Do not save any of the meds for later use.  Rest PRN  Avoid known allergy and respiratory triggers.  May use Saline spray PRN as desired  RTC 2-3 weeks for re-eval, sooner for worsening of symptoms.         This document has been electronically signed by:  JOAQUÍN Ross, FNP-C

## 2018-06-18 ENCOUNTER — OFFICE VISIT (OUTPATIENT)
Dept: FAMILY MEDICINE CLINIC | Facility: CLINIC | Age: 48
End: 2018-06-18

## 2018-06-18 VITALS
TEMPERATURE: 98.9 F | HEART RATE: 90 BPM | BODY MASS INDEX: 41.02 KG/M2 | SYSTOLIC BLOOD PRESSURE: 112 MMHG | WEIGHT: 293 LBS | HEIGHT: 71 IN | OXYGEN SATURATION: 97 % | DIASTOLIC BLOOD PRESSURE: 70 MMHG

## 2018-06-18 DIAGNOSIS — M54.6 CHRONIC MIDLINE THORACIC BACK PAIN: ICD-10-CM

## 2018-06-18 DIAGNOSIS — R05.9 COUGH: Primary | ICD-10-CM

## 2018-06-18 DIAGNOSIS — M79.672 LEFT FOOT PAIN: ICD-10-CM

## 2018-06-18 DIAGNOSIS — K59.03 DRUG-INDUCED CONSTIPATION: ICD-10-CM

## 2018-06-18 DIAGNOSIS — G89.29 CHRONIC MIDLINE THORACIC BACK PAIN: ICD-10-CM

## 2018-06-18 PROCEDURE — 99214 OFFICE O/P EST MOD 30 MIN: CPT | Performed by: NURSE PRACTITIONER

## 2018-06-18 RX ORDER — METAXALONE 800 MG/1
800 TABLET ORAL 3 TIMES DAILY
Qty: 90 TABLET | Refills: 5 | Status: SHIPPED | OUTPATIENT
Start: 2018-06-18 | End: 2018-06-18 | Stop reason: SDUPTHER

## 2018-06-18 RX ORDER — METAXALONE 800 MG/1
800 TABLET ORAL 3 TIMES DAILY
Qty: 90 TABLET | Refills: 5 | Status: SHIPPED | OUTPATIENT
Start: 2018-06-18 | End: 2018-07-09 | Stop reason: RX

## 2018-06-18 NOTE — PROGRESS NOTES
"Subjective   Soheila Brewster is a 48 y.o. female.     Chief Complaint   Patient presents with   • Diabetes       History of Present Illness     Diabetes follow up-Sample of Soliqua.  She did start on 15 units then titrated up by 4 units as directed.  Her dose of Soliqua this morning was 41 units.  She reports increase in nausea but is uncertain if it is antibiotics, new med, or PND.   She has had up to 301 on Tresiba and bydureon.  Tresiba has been on hold.    URI symptoms continue despite antibiotics and meds.  She reports some \"pain in right lung\" and continued productive cough.    Constipation-reports she has not had good BM for several days.  She reports some blood this morning with first BM and she did have to strain.  Blood was present in commode.  She reports other BM's today did not produce any blood.  She has had increase in symptoms since being consistent on pain meds.  She reports years of alternating bowel pattern.  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  Her last scope was per Dr Brewer.  Ongoing.        The following portions of the patient's history were reviewed and updated as appropriate: allergies, current medications, past family history, past medical history, past social history, past surgical history and problem list.    Review of Systems   Constitutional: Positive for appetite change. Negative for fever and unexpected weight change.   HENT: Positive for congestion, postnasal drip, rhinorrhea, sinus pressure and sore throat. Negative for ear pain (left ear is worse), nosebleeds and trouble swallowing.    Eyes: Positive for pain (chronic.  followed by Dr Mireles) and discharge. Negative for visual disturbance.        Does not wear corrective lens.  Recent exam with some early stage cataract   Respiratory: Positive for cough, shortness of breath and wheezing. Negative for chest tightness.    Cardiovascular: Positive for " "leg swelling. Negative for chest pain and palpitations.   Gastrointestinal: Positive for abdominal pain (generally periumbilical.  Reports hernia in her abd area), blood in stool, constipation and nausea. Negative for diarrhea (at times but is improving) and vomiting.   Endocrine: Positive for cold intolerance (increases her joint pain) and polydipsia. Negative for heat intolerance, polyphagia and polyuria.   Genitourinary: Positive for frequency. Negative for difficulty urinating, dysuria, hematuria, vaginal discharge and vaginal pain.        Reports painful menses.  She reports they are regular and at times are heavy as she has gotten older.    Musculoskeletal: Positive for arthralgias (mulitple joints), back pain (history of pinched nerve high in back and in neck.  Disc bulge in low back.  last MRI was approx 2 years ago.), joint swelling, myalgias (muscle spasm in back), neck pain and neck stiffness.   Skin: Negative for color change.   Allergic/Immunologic: Negative for environmental allergies and food allergies.   Neurological: Positive for dizziness, weakness, numbness (BLE in feet and ankles from DM and some in bilateral legs related to back) and headaches (due to acute illness with sinus). Negative for tremors, seizures and syncope.   Hematological: Positive for adenopathy. Does not bruise/bleed easily.   Psychiatric/Behavioral: Positive for sleep disturbance (problem going to sleep and staying asleep.  Reports average of 4 hours per night.  She has not been on any meds for sleep). Negative for dysphoric mood and suicidal ideas. The patient is not nervous/anxious.    All other systems reviewed and are negative.      Objective     /70   Pulse 90   Temp 98.9 °F (37.2 °C) (Oral)   Ht 180.3 cm (70.98\")   Wt 134 kg (296 lb)   LMP 05/30/2018   SpO2 97%   BMI 41.30 kg/m²     Physical Exam   Constitutional: She appears well-developed and well-nourished. No distress.   HENT:   Head: Normocephalic and " atraumatic.   Right Ear: Ear canal normal. Tympanic membrane is injected. Tympanic membrane is not scarred, not retracted and not bulging.   Left Ear: Ear canal normal. Tympanic membrane is injected. Tympanic membrane is not scarred, not retracted and not bulging.   Nose: Mucosal edema and rhinorrhea present. No epistaxis. Right sinus exhibits no maxillary sinus tenderness and no frontal sinus tenderness. Left sinus exhibits no maxillary sinus tenderness and no frontal sinus tenderness.   Mouth/Throat: Uvula is midline and mucous membranes are normal. No uvula swelling. No oropharyngeal exudate or posterior oropharyngeal erythema.   Eyes: EOM are normal. Pupils are equal, round, and reactive to light. No scleral icterus.   Neck: Normal range of motion. No thyromegaly present.   Cardiovascular: Normal rate, regular rhythm, normal heart sounds and intact distal pulses.    Pulmonary/Chest: Effort normal. She has decreased breath sounds (mildly over bronchial region of anterior chest). She has wheezes (scattered mild).   Abdominal: Soft. Bowel sounds are normal. There is no tenderness.   Musculoskeletal:        Thoracic back: She exhibits tenderness and spasm.        Lumbar back: She exhibits decreased range of motion, tenderness, pain and spasm.   Lymphadenopathy:     She has cervical adenopathy.        Right cervical: Superficial cervical adenopathy present.        Left cervical: Superficial cervical adenopathy present.   Neurological: She is alert. She displays normal reflexes. No cranial nerve deficit.   Skin: Skin is warm and dry.   Psychiatric: She has a normal mood and affect. Her behavior is normal. Judgment and thought content normal.   Vitals reviewed.    Assessment/Plan     Problem List Items Addressed This Visit        Nervous and Auditory    Chronic midline thoracic back pain    Relevant Medications    metaxalone (SKELAXIN) 800 MG tablet      Other Visit Diagnoses     Cough    -  Primary    Relevant Orders     XR Chest 2 View    Drug-induced constipation        Relevant Medications    linaclotide (LINZESS) 145 MCG capsule capsule        Resend of muscle relaxant.  Understands disease processes and need for medications.  Understands reasons for urgent and emergent care.  Patient (& family) verbalized agreement for treatment plan.   CXR to rule out pneumonia.  Bowel care advised:  Increase PO fluids for hydration, exercise regularly, Increase fiber.  Stool softeners PRN  Continue antibiotics.  Dietary control of CHO and protein intake to regulate blood sugars.  Avoid known allergy and respiratory triggers.  May use Saline spray PRN as desired  RTC 1 month, sooner if needed.           This document has been electronically signed by:  JOAQUÍN Ross, LORYP-C

## 2018-06-21 ENCOUNTER — TELEPHONE (OUTPATIENT)
Dept: FAMILY MEDICINE CLINIC | Facility: CLINIC | Age: 48
End: 2018-06-21

## 2018-07-06 DIAGNOSIS — M54.6 CHRONIC MIDLINE THORACIC BACK PAIN: ICD-10-CM

## 2018-07-06 DIAGNOSIS — M19.90 ARTHRITIS: ICD-10-CM

## 2018-07-06 DIAGNOSIS — IMO0002 DM (DIABETES MELLITUS) TYPE II UNCONTROLLED, PERIPH VASCULAR DISORDER: ICD-10-CM

## 2018-07-06 DIAGNOSIS — J30.1 CHRONIC SEASONAL ALLERGIC RHINITIS DUE TO POLLEN: ICD-10-CM

## 2018-07-06 DIAGNOSIS — G89.29 CHRONIC MIDLINE THORACIC BACK PAIN: ICD-10-CM

## 2018-07-08 RX ORDER — GABAPENTIN 600 MG/1
TABLET ORAL
Qty: 90 TABLET | Refills: 2 | Status: SHIPPED | OUTPATIENT
Start: 2018-07-08 | End: 2018-09-12 | Stop reason: SDUPTHER

## 2018-07-08 RX ORDER — METHOCARBAMOL 750 MG/1
TABLET, FILM COATED ORAL
Qty: 90 TABLET | Refills: 5 | Status: SHIPPED | OUTPATIENT
Start: 2018-07-08 | End: 2018-09-12 | Stop reason: SDUPTHER

## 2018-07-08 RX ORDER — LEVOCETIRIZINE DIHYDROCHLORIDE 5 MG/1
5 TABLET, FILM COATED ORAL EVERY EVENING
Qty: 30 TABLET | Refills: 5 | Status: SHIPPED | OUTPATIENT
Start: 2018-07-08 | End: 2018-08-07

## 2018-07-08 RX ORDER — GLIPIZIDE AND METFORMIN HCL 2.5; 25 MG/1; MG/1
TABLET, FILM COATED ORAL
Qty: 60 TABLET | Refills: 2 | Status: SHIPPED | OUTPATIENT
Start: 2018-07-08 | End: 2018-10-08 | Stop reason: SDUPTHER

## 2018-07-09 ENCOUNTER — OFFICE VISIT (OUTPATIENT)
Dept: FAMILY MEDICINE CLINIC | Facility: CLINIC | Age: 48
End: 2018-07-09

## 2018-07-09 VITALS
DIASTOLIC BLOOD PRESSURE: 80 MMHG | WEIGHT: 293 LBS | OXYGEN SATURATION: 98 % | HEIGHT: 71 IN | BODY MASS INDEX: 41.02 KG/M2 | TEMPERATURE: 97.8 F | SYSTOLIC BLOOD PRESSURE: 130 MMHG | HEART RATE: 116 BPM

## 2018-07-09 DIAGNOSIS — J30.89 CHRONIC NONSEASONAL ALLERGIC RHINITIS DUE TO POLLEN: ICD-10-CM

## 2018-07-09 DIAGNOSIS — J30.1 CHRONIC SEASONAL ALLERGIC RHINITIS DUE TO POLLEN: ICD-10-CM

## 2018-07-09 DIAGNOSIS — IMO0002 DM (DIABETES MELLITUS) TYPE II UNCONTROLLED, PERIPH VASCULAR DISORDER: ICD-10-CM

## 2018-07-09 DIAGNOSIS — E53.8 VITAMIN B12 DEFICIENCY: Primary | ICD-10-CM

## 2018-07-09 DIAGNOSIS — E55.9 VITAMIN D DEFICIENCY: ICD-10-CM

## 2018-07-09 DIAGNOSIS — R60.1 GENERALIZED EDEMA: ICD-10-CM

## 2018-07-09 DIAGNOSIS — E78.2 MIXED HYPERLIPIDEMIA: ICD-10-CM

## 2018-07-09 PROCEDURE — 99214 OFFICE O/P EST MOD 30 MIN: CPT | Performed by: NURSE PRACTITIONER

## 2018-07-09 RX ORDER — BACLOFEN 20 MG/1
20 TABLET ORAL 2 TIMES DAILY
Qty: 60 TABLET | Refills: 5 | Status: SHIPPED | OUTPATIENT
Start: 2018-07-09 | End: 2018-08-07 | Stop reason: SDUPTHER

## 2018-07-09 RX ORDER — MONTELUKAST SODIUM 10 MG/1
10 TABLET ORAL NIGHTLY
Qty: 30 TABLET | Refills: 5 | Status: SHIPPED | OUTPATIENT
Start: 2018-07-09 | End: 2019-01-18 | Stop reason: SDUPTHER

## 2018-07-09 RX ORDER — BUMETANIDE 2 MG/1
2 TABLET ORAL DAILY
Qty: 45 TABLET | Refills: 5 | Status: SHIPPED | OUTPATIENT
Start: 2018-07-09 | End: 2019-02-18 | Stop reason: SDUPTHER

## 2018-07-09 RX ORDER — ERGOCALCIFEROL 1.25 MG/1
50000 CAPSULE ORAL
Qty: 4 CAPSULE | Refills: 5 | Status: SHIPPED | OUTPATIENT
Start: 2018-07-09 | End: 2019-01-07 | Stop reason: SDUPTHER

## 2018-07-09 RX ORDER — LANOLIN ALCOHOL/MO/W.PET/CERES
1000 CREAM (GRAM) TOPICAL DAILY
Qty: 30 TABLET | Refills: 5 | Status: SHIPPED | OUTPATIENT
Start: 2018-07-09 | End: 2019-01-07 | Stop reason: SDUPTHER

## 2018-07-09 RX ORDER — AZELASTINE 1 MG/ML
SPRAY, METERED NASAL
Qty: 1 EACH | Refills: 5 | Status: SHIPPED | OUTPATIENT
Start: 2018-07-09 | End: 2018-11-12 | Stop reason: SDUPTHER

## 2018-07-09 RX ORDER — ROSUVASTATIN CALCIUM 40 MG/1
40 TABLET, COATED ORAL DAILY
Qty: 90 TABLET | Refills: 5 | Status: SHIPPED | OUTPATIENT
Start: 2018-07-09 | End: 2018-10-08 | Stop reason: SDUPTHER

## 2018-07-09 NOTE — PROGRESS NOTES
Subjective   Soheila Brewster is a 48 y.o. female.     Chief Complaint   Patient presents with   • Diabetes   • Cough       History of Present Illness     Diabetes-here for follow up of DM.  She has been on titration of Soliqua.  She is at 60 units at present.  Reports 187 this morning.   Seeing up to 207 but reports was >250 at times on Tresiba.  She reports nausea has improved gradually.  Has not been using Bydureon since starting new medication.  Has been on Tanzeum in the past but it did not help her blood sugars.  Ongoing.   Cough-ongoing.  Reports symptoms continue.  She reports continued thick congestion and cough.  PND is present with Sinus pressure and headache.  She would like a referral for allergy testing.  She reports she is using her albuterol as directed but is having to use more in the afternoons.  She is not on a maintenance inhaler.  Ongoing.   Refills-needs some refills on her routine meds.  She reports she did not receive some of her meds when she phoned in her refills.      The following portions of the patient's history were reviewed and updated as appropriate: allergies, current medications, past family history, past medical history, past social history, past surgical history and problem list.    Review of Systems   Constitutional: Positive for appetite change. Negative for fever and unexpected weight change.   HENT: Positive for congestion, postnasal drip, rhinorrhea, sinus pressure and sore throat. Negative for ear pain (left ear is worse), nosebleeds and trouble swallowing.    Eyes: Positive for pain (chronic.  followed by Dr Mireles) and discharge. Negative for visual disturbance.        Does not wear corrective lens.  Recent exam with some early stage cataract   Respiratory: Positive for cough, shortness of breath and wheezing. Negative for chest tightness.    Cardiovascular: Positive for leg swelling. Negative for chest pain and palpitations.   Gastrointestinal: Positive for abdominal pain  "(generally periumbilical.  Reports hernia in her abd area), blood in stool, constipation and nausea. Negative for diarrhea (at times but is improving) and vomiting.   Endocrine: Positive for cold intolerance (increases her joint pain) and polydipsia. Negative for heat intolerance, polyphagia and polyuria.   Genitourinary: Positive for frequency. Negative for difficulty urinating, dysuria, hematuria, vaginal discharge and vaginal pain.        Reports painful menses.  She reports they are regular and at times are heavy as she has gotten older.    Musculoskeletal: Positive for arthralgias (mulitple joints), back pain (history of pinched nerve high in back and in neck.  Disc bulge in low back.  last MRI was approx 2 years ago.), joint swelling, myalgias (muscle spasm in back), neck pain and neck stiffness.   Skin: Negative for color change.   Allergic/Immunologic: Negative for environmental allergies and food allergies.   Neurological: Positive for dizziness, weakness, numbness (BLE in feet and ankles from DM and some in bilateral legs related to back) and headaches (due to acute illness with sinus). Negative for tremors, seizures and syncope.   Hematological: Positive for adenopathy. Does not bruise/bleed easily.   Psychiatric/Behavioral: Positive for sleep disturbance (problem going to sleep and staying asleep.  Reports average of 4 hours per night.  She has not been on any meds for sleep). Negative for dysphoric mood and suicidal ideas. The patient is not nervous/anxious.    All other systems reviewed and are negative.      Objective     /80   Pulse 116   Temp 97.8 °F (36.6 °C) (Temporal Artery )   Ht 180.3 cm (71\")   Wt 133 kg (293 lb)   LMP 05/30/2018   SpO2 98%   BMI 40.87 kg/m²     Physical Exam   Constitutional: She appears well-developed and well-nourished. No distress.   HENT:   Head: Normocephalic and atraumatic.   Right Ear: Ear canal normal. Tympanic membrane is injected. Tympanic membrane is " not scarred, not retracted and not bulging.   Left Ear: Ear canal normal. Tympanic membrane is injected. Tympanic membrane is not scarred, not retracted and not bulging.   Nose: Mucosal edema and rhinorrhea present. No epistaxis. Right sinus exhibits no maxillary sinus tenderness and no frontal sinus tenderness. Left sinus exhibits no maxillary sinus tenderness and no frontal sinus tenderness.   Mouth/Throat: Uvula is midline and mucous membranes are normal. No uvula swelling. No oropharyngeal exudate or posterior oropharyngeal erythema.   Eyes: EOM are normal. Pupils are equal, round, and reactive to light. No scleral icterus.   Neck: Normal range of motion. No thyromegaly present.   Cardiovascular: Normal rate, regular rhythm, normal heart sounds and intact distal pulses.    Pulmonary/Chest: Effort normal. She has decreased breath sounds (mildly over bronchial region of anterior chest). She has wheezes (scattered mild).   Abdominal: Soft. Bowel sounds are normal. There is no tenderness.   Musculoskeletal:        Thoracic back: She exhibits tenderness and spasm.        Lumbar back: She exhibits decreased range of motion, tenderness, pain and spasm.   Lymphadenopathy:     She has cervical adenopathy.        Right cervical: Superficial cervical adenopathy present.        Left cervical: Superficial cervical adenopathy present.   Neurological: She is alert. She displays normal reflexes. No cranial nerve deficit.   Skin: Skin is warm and dry.   Psychiatric: She has a normal mood and affect. Her behavior is normal. Judgment and thought content normal.   Vitals reviewed.      Assessment/Plan     Problem List Items Addressed This Visit        Cardiovascular and Mediastinum    DM (diabetes mellitus) type II uncontrolled, periph vascular disorder (CMS/HCC)    Relevant Medications    Insulin Glargine-Lixisenatide (SOLIQUA) 100-33 UNT-MCG/ML solution pen-injector    baclofen (LIORESAL) 20 MG tablet    Mixed hyperlipidemia     Relevant Medications    rosuvastatin (CRESTOR) 40 MG tablet       Respiratory    Chronic allergic rhinitis    Relevant Medications    montelukast (SINGULAIR) 10 MG tablet    azelastine (ASTELIN) 0.1 % nasal spray    Other Relevant Orders    Ambulatory Referral to Allergy (Completed)       Digestive    Vitamin D deficiency    Relevant Medications    vitamin D (ERGOCALCIFEROL) 26298 units capsule capsule      Other Visit Diagnoses     Vitamin B12 deficiency    -  Primary    Relevant Medications    vitamin B-12 (CYANOCOBALAMIN) 1000 MCG tablet    Generalized edema        Relevant Medications    bumetanide (BUMEX) 2 MG tablet        Sample of Trulicity 0.75 mg given for trial.  Will adjust as needed.   Understands disease processes and need for medications.  Understands reasons for urgent and emergent care.  Patient (& family) verbalized agreement for treatment plan.   Emotional support and active listening provided.  Patient provided time to verbalize feelings.  Refill on routine maintenance meds today.   Continue to work on diet and blood sugar management.    Trial of baclofen for muscle spasm.  RTC 1 month, sooner if needed.           This document has been electronically signed by:  JOAQUÍN Ross, LORYP-C

## 2018-08-07 ENCOUNTER — OFFICE VISIT (OUTPATIENT)
Dept: FAMILY MEDICINE CLINIC | Facility: CLINIC | Age: 48
End: 2018-08-07

## 2018-08-07 VITALS
DIASTOLIC BLOOD PRESSURE: 82 MMHG | SYSTOLIC BLOOD PRESSURE: 130 MMHG | OXYGEN SATURATION: 97 % | BODY MASS INDEX: 41.02 KG/M2 | HEART RATE: 93 BPM | TEMPERATURE: 99.2 F | WEIGHT: 293 LBS | HEIGHT: 71 IN

## 2018-08-07 DIAGNOSIS — R60.1 GENERALIZED EDEMA: ICD-10-CM

## 2018-08-07 DIAGNOSIS — K21.9 GASTROESOPHAGEAL REFLUX DISEASE WITHOUT ESOPHAGITIS: ICD-10-CM

## 2018-08-07 DIAGNOSIS — IMO0002 DM (DIABETES MELLITUS) TYPE II UNCONTROLLED, PERIPH VASCULAR DISORDER: ICD-10-CM

## 2018-08-07 DIAGNOSIS — K45.8 OTHER SPECIFIED ABDOMINAL HERNIA WITHOUT OBSTRUCTION OR GANGRENE: ICD-10-CM

## 2018-08-07 DIAGNOSIS — E78.2 MIXED HYPERLIPIDEMIA: ICD-10-CM

## 2018-08-07 DIAGNOSIS — J32.0 CHRONIC MAXILLARY SINUSITIS: ICD-10-CM

## 2018-08-07 DIAGNOSIS — I10 ESSENTIAL HYPERTENSION: ICD-10-CM

## 2018-08-07 DIAGNOSIS — Z87.448 HISTORY OF CHANGES URINE OUTPUT CHANGES: Primary | ICD-10-CM

## 2018-08-07 PROCEDURE — 99214 OFFICE O/P EST MOD 30 MIN: CPT | Performed by: NURSE PRACTITIONER

## 2018-08-07 RX ORDER — BACLOFEN 20 MG/1
20 TABLET ORAL 2 TIMES DAILY
Qty: 60 TABLET | Refills: 5 | Status: SHIPPED | OUTPATIENT
Start: 2018-08-07 | End: 2018-09-12

## 2018-08-07 RX ORDER — BACLOFEN 20 MG/1
20 TABLET ORAL 2 TIMES DAILY
Qty: 60 TABLET | Refills: 5 | Status: SHIPPED | OUTPATIENT
Start: 2018-08-07 | End: 2018-08-07 | Stop reason: SDUPTHER

## 2018-08-07 RX ORDER — ENALAPRIL MALEATE 5 MG/1
5 TABLET ORAL DAILY
Qty: 30 TABLET | Refills: 5 | Status: ON HOLD | OUTPATIENT
Start: 2018-08-07 | End: 2019-04-01

## 2018-08-07 RX ORDER — POTASSIUM CHLORIDE 750 MG/1
10 TABLET, FILM COATED, EXTENDED RELEASE ORAL 2 TIMES DAILY
Qty: 60 TABLET | Refills: 5 | Status: SHIPPED | OUTPATIENT
Start: 2018-08-07 | End: 2019-03-19 | Stop reason: SDUPTHER

## 2018-08-07 RX ORDER — EZETIMIBE 10 MG/1
10 TABLET ORAL NIGHTLY
Qty: 30 TABLET | Refills: 5 | Status: SHIPPED | OUTPATIENT
Start: 2018-08-07 | End: 2019-02-06 | Stop reason: SDUPTHER

## 2018-08-07 RX ORDER — ESOMEPRAZOLE MAGNESIUM 40 MG/1
40 CAPSULE, DELAYED RELEASE ORAL
Qty: 30 CAPSULE | Refills: 5 | Status: SHIPPED | OUTPATIENT
Start: 2018-08-07 | End: 2019-02-06 | Stop reason: SDUPTHER

## 2018-08-07 RX ORDER — ALBUTEROL SULFATE 90 UG/1
2 AEROSOL, METERED RESPIRATORY (INHALATION) EVERY 4 HOURS PRN
Qty: 1 INHALER | Refills: 5 | Status: SHIPPED | OUTPATIENT
Start: 2018-08-07 | End: 2019-02-06 | Stop reason: SDUPTHER

## 2018-08-07 NOTE — PROGRESS NOTES
"Subjective   Soheila Brewster is a 48 y.o. female.     Chief Complaint   Patient presents with   • Diabetes       History of Present Illness     Diabetes follow up-here for a follow up after several insulin dose changes.  She is presently on Soliqua 60 units and Trulicity 0.75 mg.  She reports blood sugars of 130's-140's.  At times in the afternoons \"after supper\" 190-low 200's.    Muscle spasm-reports she did not get a new muscle relaxor so she continues to take Methocarbamol.    Abdominal concern-reports she feels she has had increase in size of her abdominal hernia.  She reports she has never had it checked.  She reports some generalized soreness of the area that is occurring more frequently.  She reports at times it is uncomfortable at times when she makes the sit to stand position.  BLE edema-she reports she continues to swelling in her feet.  She reports she is taking Bumex 2 mg and lasix 40 mg daily in the AM.  She reports she has good increase in her urine output for 2-3 urination but after that seems to be more dribbling in nature.  She reports at times she does have LADARIUS if she coughs or sneezes.  Not at goal.   Left ankle concern-medial ankle.  Reports she is noting a sensation of cold over her ankle area.  Skin will be warm to touch.  She feels the sensation does radiate down her ankle area an into her arm.  Lasts a few seconds then resolves.  She does have several areas of broken \"spider\" veins in that area.      The following portions of the patient's history were reviewed and updated as appropriate: allergies, current medications, past family history, past medical history, past social history, past surgical history and problem list.    Review of Systems   Constitutional: Positive for appetite change. Negative for fever and unexpected weight change.   HENT: Positive for congestion, postnasal drip and rhinorrhea. Negative for ear pain (left ear is worse), nosebleeds, sinus pressure, sore throat and trouble " swallowing.    Eyes: Positive for pain (chronic.  followed by Dr Mireles) and discharge. Negative for visual disturbance.        Does not wear corrective lens.  Recent exam with some early stage cataract   Respiratory: Positive for cough, shortness of breath and wheezing. Negative for chest tightness.    Cardiovascular: Positive for leg swelling. Negative for chest pain and palpitations.   Gastrointestinal: Positive for abdominal pain (generally periumbilical.  Reports hernia in her abd area), blood in stool, constipation and nausea. Negative for diarrhea (at times but is improving) and vomiting.   Endocrine: Positive for cold intolerance (increases her joint pain) and polydipsia. Negative for heat intolerance, polyphagia and polyuria.   Genitourinary: Positive for frequency. Negative for difficulty urinating, dysuria, hematuria, vaginal discharge and vaginal pain.        Reports painful menses.  She reports they are regular and at times are heavy as she has gotten older.    Musculoskeletal: Positive for arthralgias (mulitple joints), back pain (history of pinched nerve high in back and in neck.  Disc bulge in low back.  last MRI was approx 2 years ago.), joint swelling, myalgias (muscle spasm in back), neck pain and neck stiffness.   Skin: Negative for color change.   Allergic/Immunologic: Negative for environmental allergies and food allergies.   Neurological: Positive for dizziness, weakness, numbness (BLE in feet and ankles from DM and some in bilateral legs related to back) and headaches (due to acute illness with sinus). Negative for tremors, seizures and syncope.   Hematological: Positive for adenopathy. Does not bruise/bleed easily.   Psychiatric/Behavioral: Positive for sleep disturbance (problem going to sleep and staying asleep.  Reports average of 4 hours per night.  She has not been on any meds for sleep). Negative for dysphoric mood and suicidal ideas. The patient is not nervous/anxious.    All other  "systems reviewed and are negative.      Objective     /82   Pulse 93   Temp 99.2 °F (37.3 °C) (Tympanic)   Ht 180.3 cm (71\")   Wt 135 kg (298 lb)   LMP 05/30/2018   SpO2 97%   BMI 41.56 kg/m²     Physical Exam   Constitutional: She appears well-developed and well-nourished. No distress.   HENT:   Head: Normocephalic and atraumatic.   Right Ear: Ear canal normal. Tympanic membrane is injected. Tympanic membrane is not scarred, not retracted and not bulging.   Left Ear: Ear canal normal. Tympanic membrane is injected. Tympanic membrane is not scarred, not retracted and not bulging.   Nose: Mucosal edema and rhinorrhea present. No epistaxis. Right sinus exhibits no maxillary sinus tenderness and no frontal sinus tenderness. Left sinus exhibits no maxillary sinus tenderness and no frontal sinus tenderness.   Mouth/Throat: Uvula is midline and mucous membranes are normal. No uvula swelling. No oropharyngeal exudate or posterior oropharyngeal erythema.   Eyes: Pupils are equal, round, and reactive to light. EOM are normal. No scleral icterus.   Neck: Normal range of motion. No thyromegaly present.   Cardiovascular: Normal rate, regular rhythm, normal heart sounds and intact distal pulses.    Pulmonary/Chest: Effort normal. She has decreased breath sounds (mildly over bronchial region of anterior chest). She has wheezes (scattered mild).   Abdominal: Soft. Bowel sounds are normal. There is no hepatosplenomegaly. There is no tenderness. A hernia is present.       Protuberant abdomen   Musculoskeletal:        Thoracic back: She exhibits tenderness and spasm.        Lumbar back: She exhibits decreased range of motion, tenderness, pain and spasm.   Lymphadenopathy:     She has cervical adenopathy.        Right cervical: Superficial cervical adenopathy present.        Left cervical: Superficial cervical adenopathy present.   Neurological: She is alert. She displays normal reflexes. No cranial nerve deficit.   Skin: " Skin is warm and dry. Capillary refill takes less than 2 seconds.   Psychiatric: She has a normal mood and affect. Her behavior is normal. Judgment and thought content normal.   Vitals reviewed.      Assessment/Plan     Problem List Items Addressed This Visit        Cardiovascular and Mediastinum    DM (diabetes mellitus) type II uncontrolled, periph vascular disorder (CMS/HCC)    Relevant Medications    baclofen (LIORESAL) 20 MG tablet    Insulin Glargine-Lixisenatide (SOLIQUA) 100-33 UNT-MCG/ML solution pen-injector    Dulaglutide (TRULICITY) 0.75 MG/0.5ML solution pen-injector    Other Relevant Orders    US Renal Bilateral    Essential hypertension    Relevant Medications    enalapril (VASOTEC) 5 MG tablet    Mixed hyperlipidemia    Relevant Medications    ezetimibe (ZETIA) 10 MG tablet      Other Visit Diagnoses     History of changes urine output changes    -  Primary    Relevant Orders    US Renal Bilateral    Chronic maxillary sinusitis        Relevant Medications    albuterol (PROVENTIL HFA;VENTOLIN HFA) 108 (90 Base) MCG/ACT inhaler    Generalized edema        Relevant Medications    potassium chloride (K-DUR) 10 MEQ CR tablet    Gastroesophageal reflux disease without esophagitis        Relevant Medications    esomeprazole (nexIUM) 40 MG capsule    Other specified abdominal hernia without obstruction or gangrene        Relevant Orders    Ambulatory Referral to General Surgery (Completed)        Understands disease processes and need for medications.  Understands reasons for urgent and emergent care.  Patient (& family) verbalized agreement for treatment plan.   Emotional support and active listening provided.  Patient provided time to verbalize feelings.  Refill on routine maintenance meds today.   Continue diabetes meds as directed.  Referral as above to speciality for eval of abdominal hernia  US of renal.  If WNL will consider treatment for OAB based on symptoms.   Continue insulin as directed.    Keep  sched appt with allergy speciality.    RTC approx 1 month.            This document has been electronically signed by:  JOAQUÍN Ross, KEISHAC

## 2018-08-14 ENCOUNTER — TELEPHONE (OUTPATIENT)
Dept: FAMILY MEDICINE CLINIC | Facility: CLINIC | Age: 48
End: 2018-08-14

## 2018-08-14 NOTE — TELEPHONE ENCOUNTER
I called adiel informed her kidney US is normal                  ----- Message from JOAQUÍN Ross sent at 8/13/2018  5:30 PM EDT -----  Kidney US is normal

## 2018-09-07 DIAGNOSIS — R60.0 BILATERAL EDEMA OF LOWER EXTREMITY: ICD-10-CM

## 2018-09-07 DIAGNOSIS — E78.2 MIXED HYPERLIPIDEMIA: ICD-10-CM

## 2018-09-09 RX ORDER — FUROSEMIDE 40 MG/1
40 TABLET ORAL EVERY MORNING
Qty: 30 TABLET | Refills: 5 | Status: SHIPPED | OUTPATIENT
Start: 2018-09-09 | End: 2018-09-12 | Stop reason: SDUPTHER

## 2018-09-09 RX ORDER — FENOFIBRATE 145 MG/1
145 TABLET, COATED ORAL DAILY
Qty: 30 TABLET | Refills: 5 | Status: SHIPPED | OUTPATIENT
Start: 2018-09-09 | End: 2019-05-21 | Stop reason: SDUPTHER

## 2018-09-12 ENCOUNTER — OFFICE VISIT (OUTPATIENT)
Dept: FAMILY MEDICINE CLINIC | Facility: CLINIC | Age: 48
End: 2018-09-12

## 2018-09-12 VITALS
DIASTOLIC BLOOD PRESSURE: 88 MMHG | SYSTOLIC BLOOD PRESSURE: 138 MMHG | BODY MASS INDEX: 41.02 KG/M2 | WEIGHT: 293 LBS | TEMPERATURE: 98.3 F | HEIGHT: 71 IN | OXYGEN SATURATION: 97 % | HEART RATE: 92 BPM

## 2018-09-12 DIAGNOSIS — M54.6 CHRONIC MIDLINE THORACIC BACK PAIN: ICD-10-CM

## 2018-09-12 DIAGNOSIS — M19.90 ARTHRITIS: ICD-10-CM

## 2018-09-12 DIAGNOSIS — R60.0 BILATERAL EDEMA OF LOWER EXTREMITY: ICD-10-CM

## 2018-09-12 DIAGNOSIS — G89.29 CHRONIC MIDLINE THORACIC BACK PAIN: ICD-10-CM

## 2018-09-12 DIAGNOSIS — IMO0002 DM (DIABETES MELLITUS) TYPE II UNCONTROLLED, PERIPH VASCULAR DISORDER: Primary | ICD-10-CM

## 2018-09-12 DIAGNOSIS — J30.89 CHRONIC NONSEASONAL ALLERGIC RHINITIS DUE TO POLLEN: ICD-10-CM

## 2018-09-12 PROCEDURE — 99214 OFFICE O/P EST MOD 30 MIN: CPT | Performed by: NURSE PRACTITIONER

## 2018-09-12 RX ORDER — GABAPENTIN 600 MG/1
600 TABLET ORAL 3 TIMES DAILY
Qty: 90 TABLET | Refills: 2 | Status: SHIPPED | OUTPATIENT
Start: 2018-09-12 | End: 2018-10-08 | Stop reason: SDUPTHER

## 2018-09-12 RX ORDER — LEVOFLOXACIN 500 MG/1
500 TABLET, FILM COATED ORAL DAILY
Qty: 10 TABLET | Refills: 0 | Status: SHIPPED | OUTPATIENT
Start: 2018-09-12 | End: 2018-12-10

## 2018-09-12 RX ORDER — METHOCARBAMOL 750 MG/1
750 TABLET, FILM COATED ORAL 3 TIMES DAILY
Qty: 90 TABLET | Refills: 5 | Status: SHIPPED | OUTPATIENT
Start: 2018-09-12 | End: 2020-01-02

## 2018-09-12 RX ORDER — FUROSEMIDE 40 MG/1
40 TABLET ORAL EVERY MORNING
Qty: 30 TABLET | Refills: 5 | Status: SHIPPED | OUTPATIENT
Start: 2018-09-12 | End: 2018-12-10 | Stop reason: SDUPTHER

## 2018-09-12 NOTE — PATIENT INSTRUCTIONS
Steps to Quit Smoking  Smoking tobacco can be harmful to your health and can affect almost every organ in your body. Smoking puts you, and those around you, at risk for developing many serious chronic diseases. Quitting smoking is difficult, but it is one of the best things that you can do for your health. It is never too late to quit.  What are the benefits of quitting smoking?  When you quit smoking, you lower your risk of developing serious diseases and conditions, such as:  · Lung cancer or lung disease, such as COPD.  · Heart disease.  · Stroke.  · Heart attack.  · Infertility.  · Osteoporosis and bone fractures.    Additionally, symptoms such as coughing, wheezing, and shortness of breath may get better when you quit. You may also find that you get sick less often because your body is stronger at fighting off colds and infections. If you are pregnant, quitting smoking can help to reduce your chances of having a baby of low birth weight.  How do I get ready to quit?  When you decide to quit smoking, create a plan to make sure that you are successful. Before you quit:  · Pick a date to quit. Set a date within the next two weeks to give you time to prepare.  · Write down the reasons why you are quitting. Keep this list in places where you will see it often, such as on your bathroom mirror or in your car or wallet.  · Identify the people, places, things, and activities that make you want to smoke (triggers) and avoid them. Make sure to take these actions:  ? Throw away all cigarettes at home, at work, and in your car.  ? Throw away smoking accessories, such as ashtrays and lighters.  ? Clean your car and make sure to empty the ashtray.  ? Clean your home, including curtains and carpets.  · Tell your family, friends, and coworkers that you are quitting. Support from your loved ones can make quitting easier.  · Talk with your health care provider about your options for quitting smoking.  · Find out what treatment  options are covered by your health insurance.    What strategies can I use to quit smoking?  Talk with your healthcare provider about different strategies to quit smoking. Some strategies include:  · Quitting smoking altogether instead of gradually lessening how much you smoke over a period of time. Research shows that quitting “cold turkey” is more successful than gradually quitting.  · Attending in-person counseling to help you build problem-solving skills. You are more likely to have success in quitting if you attend several counseling sessions. Even short sessions of 10 minutes can be effective.  · Finding resources and support systems that can help you to quit smoking and remain smoke-free after you quit. These resources are most helpful when you use them often. They can include:  ? Online chats with a counselor.  ? Telephone quitlines.  ? Printed self-help materials.  ? Support groups or group counseling.  ? Text messaging programs.  ? Mobile phone applications.  · Taking medicines to help you quit smoking. (If you are pregnant or breastfeeding, talk with your health care provider first.) Some medicines contain nicotine and some do not. Both types of medicines help with cravings, but the medicines that include nicotine help to relieve withdrawal symptoms. Your health care provider may recommend:  ? Nicotine patches, gum, or lozenges.  ? Nicotine inhalers or sprays.  ? Non-nicotine medicine that is taken by mouth.    Talk with your health care provider about combining strategies, such as taking medicines while you are also receiving in-person counseling. Using these two strategies together makes you more likely to succeed in quitting than if you used either strategy on its own.  If you are pregnant or breastfeeding, talk with your health care provider about finding counseling or other support strategies to quit smoking. Do not take medicine to help you quit smoking unless told to do so by your health care  provider.  What things can I do to make it easier to quit?  Quitting smoking might feel overwhelming at first, but there is a lot that you can do to make it easier. Take these important actions:  · Reach out to your family and friends and ask that they support and encourage you during this time. Call telephone quitlines, reach out to support groups, or work with a counselor for support.  · Ask people who smoke to avoid smoking around you.  · Avoid places that trigger you to smoke, such as bars, parties, or smoke-break areas at work.  · Spend time around people who do not smoke.  · Lessen stress in your life, because stress can be a smoking trigger for some people. To lessen stress, try:  ? Exercising regularly.  ? Deep-breathing exercises.  ? Yoga.  ? Meditating.  ? Performing a body scan. This involves closing your eyes, scanning your body from head to toe, and noticing which parts of your body are particularly tense. Purposefully relax the muscles in those areas.  · Download or purchase mobile phone or tablet apps (applications) that can help you stick to your quit plan by providing reminders, tips, and encouragement. There are many free apps, such as QuitGuide from the CDC (Centers for Disease Control and Prevention). You can find other support for quitting smoking (smoking cessation) through smokefree.gov and other websites.    How will I feel when I quit smoking?  Within the first 24 hours of quitting smoking, you may start to feel some withdrawal symptoms. These symptoms are usually most noticeable 2-3 days after quitting, but they usually do not last beyond 2-3 weeks. Changes or symptoms that you might experience include:  · Mood swings.  · Restlessness, anxiety, or irritation.  · Difficulty concentrating.  · Dizziness.  · Strong cravings for sugary foods in addition to nicotine.  · Mild weight gain.  · Constipation.  · Nausea.  · Coughing or a sore throat.  · Changes in how your medicines work in your  body.  · A depressed mood.  · Difficulty sleeping (insomnia).    After the first 2-3 weeks of quitting, you may start to notice more positive results, such as:  · Improved sense of smell and taste.  · Decreased coughing and sore throat.  · Slower heart rate.  · Lower blood pressure.  · Clearer skin.  · The ability to breathe more easily.  · Fewer sick days.    Quitting smoking is very challenging for most people. Do not get discouraged if you are not successful the first time. Some people need to make many attempts to quit before they achieve long-term success. Do your best to stick to your quit plan, and talk with your health care provider if you have any questions or concerns.  This information is not intended to replace advice given to you by your health care provider. Make sure you discuss any questions you have with your health care provider.  Document Released: 12/12/2002 Document Revised: 08/15/2017 Document Reviewed: 05/03/2016  [employer] Interactive Patient Education © 2018 [employer]  Serving Sizes  A serving size is a measured amount of food or drink, such as one slice of bread, that has an associated nutrient content. Knowing the serving size of a food or drink can help you determine how much of that food you should consume.  What is the size of one serving?  The size of one healthy serving depends on the food or drink. To determine a serving size, read the food label. If the food or drink does not have a food label, try to find serving size information online. Or, use the following to estimate the size of one adult serving:  Grain  1 slice bread. ½ bagel. ½ cup pasta.  Vegetable  ½ cup cooked or canned vegetables. 1 cup raw, leafy greens.  Fruit  ½ cup canned fruit. 1 medium fruit. ¼ cup dried fruit.  Meat and Other Protein Sources  1 oz meat, poultry, or fish. ¼ cup cooked beans. 1 egg. ¼ cup nuts or seeds. 1 Tbsp nut butter. ¼ cup tofu or tempeh. 2 Tbsp hummus.  Dairy  An individual container of  yogurt (6-8 oz). 1 piece of cheese the size of your thumb (1 oz). 1 cup (8 oz) milk or milk alternative.  Fat  A piece the size of one dice. 1 tsp soft margarine. 1 Tbsp mayonnaise. 1 tsp vegetable oil. 1 Tbsp regular salad dressing. 2 Tbsp low-fat salad dressing.  How many servings should I eat from each food group each day?  The following are the suggested number of servings to try and have every day from each food group. You can also look at your eating throughout the week and aim for meeting these requirements on most days for overall healthy eating.  Grain  6-8 servings. Try to have half of your grains from whole grains, such as whole wheat bread, corn tortillas, oatmeal, brown rice, whole wheat pasta, and bulgur.  Vegetable  At least 2½-3 servings.  Fruit  2 servings.  Meat and Other Protein Foods  5-6 servings. Aim to have lean proteins, such as chicken, turkey, fish, beans, or tofu.  Dairy  3 servings. Choose low-fat or nonfat if you are trying to control your weight.  Fat  2-3 servings.  Is a serving the same thing as a portion?  No. A portion is the actual amount you eat, which may be more than one serving. Knowing the specific serving size of a food and the nutritional information that goes with it can help you make a healthy decision on what size portion to eat.  What are some tips to help me learn healthy serving sizes?  · Check food labels for serving sizes. Many foods that come as a single portion actually contain multiple servings.  · Determine the serving size of foods you commonly eat and figure out how large a portion you usually eat.  · Measure the number of servings that can be held by the bowls, glasses, cups, and plates you typically use. For example, pour your breakfast cereal into your regular bowl and then pour it into a measuring cup.  · For 1-2 days, measure the serving sizes of all the foods you eat.  · Practice estimating serving sizes and determining how big your portions should  be.  This information is not intended to replace advice given to you by your health care provider. Make sure you discuss any questions you have with your health care provider.  Document Released: 09/15/2004 Document Revised: 08/12/2017 Document Reviewed: 03/17/2015  [employer] Interactive Patient Education © 2018 [employer]

## 2018-09-12 NOTE — PROGRESS NOTES
Subjective   Soheila Brewster is a 48 y.o. female.     Chief Complaint   Patient presents with   • Diabetes   • Back Pain       History of Present Illness     Diabetes-Reports highest 160.  She reports her lows 112.  Presently on Trulicity 0.75 weekly and on Soliqua 10/33 60 units Q AM.  She reports the initial nausea has improved.  Some generalized fatigue.  Ongoing.    Hernia-has been to Dr Foster.  She has had a US of abdomen that was normal.  She was due to have CT yesterday with contrast but it got resched by the hospital.  She will follow up with Dr Foster after the scan.  She reports he would like to verify that she does not have a concern with her colon.  Ongoing.    Muscle spasm-reports Baclofen made her very sleepy but she could not tell that it helped her symptoms any better than the Methocarbamol.  She would like to stop the Baclofen.    Allergy follow up-has been to [facility] for evaluation.  She was advised to continue her Singulair and allegra.  Continue her Breo.  She was advised to consider immunotherapy.  She reports sinus symptoms today that is her usual.  HA, congestion, productive cough.  Ongoing.      The following portions of the patient's history were reviewed and updated as appropriate: allergies, current medications, past family history, past medical history, past social history, past surgical history and problem list.    Review of Systems   Constitutional: Positive for appetite change. Negative for fever and unexpected weight change.   HENT: Positive for congestion, postnasal drip and rhinorrhea. Negative for ear pain (left ear is worse), nosebleeds, sinus pressure, sore throat and trouble swallowing.    Eyes: Positive for pain (chronic.  followed by Dr Mireles) and discharge. Negative for visual disturbance.        Does not wear corrective lens.  Recent exam with some early stage cataract   Respiratory: Positive for cough, shortness of breath (more than her usual) and wheezing. Negative for  "chest tightness.    Cardiovascular: Positive for leg swelling. Negative for chest pain and palpitations.   Gastrointestinal: Positive for abdominal pain (generally periumbilical.  Reports hernia in her abd area), blood in stool, constipation and nausea. Negative for diarrhea (at times but is improving) and vomiting.   Endocrine: Positive for cold intolerance (increases her joint pain) and polydipsia. Negative for heat intolerance, polyphagia and polyuria.   Genitourinary: Positive for frequency and menstrual problem (first menses this month since May). Negative for difficulty urinating, dysuria, hematuria, vaginal discharge and vaginal pain.        Reports painful menses.  She reports they are regular and at times are heavy as she has gotten older.    Musculoskeletal: Positive for arthralgias (mulitple joints-worse in hands, wrist and knees today), back pain (history of pinched nerve high in back and in neck.  Disc bulge in low back.  last MRI was approx 2 years ago.), joint swelling, myalgias (muscle spasm in back), neck pain and neck stiffness.   Skin: Negative for color change.   Allergic/Immunologic: Negative for environmental allergies and food allergies.   Neurological: Positive for dizziness, weakness, numbness (BLE in feet and ankles from DM and some in bilateral legs related to back) and headaches (due to acute illness with sinus). Negative for tremors, seizures and syncope.   Hematological: Positive for adenopathy. Does not bruise/bleed easily.   Psychiatric/Behavioral: Positive for sleep disturbance (problem going to sleep and staying asleep.  Reports average of 4 hours per night.  She has not been on any meds for sleep). Negative for dysphoric mood and suicidal ideas. The patient is not nervous/anxious.    All other systems reviewed and are negative.      Objective     /88   Pulse 92   Temp 98.3 °F (36.8 °C) (Temporal Artery )   Ht 180.3 cm (71\")   Wt 133 kg (294 lb)   LMP 09/12/2018   SpO2 " 97%   BMI 41.00 kg/m²     Physical Exam   Constitutional: She appears well-developed and well-nourished. No distress.   HENT:   Head: Normocephalic and atraumatic.   Right Ear: Ear canal normal. Tympanic membrane is injected. Tympanic membrane is not scarred, not retracted and not bulging.   Left Ear: Ear canal normal. Tympanic membrane is injected. Tympanic membrane is not scarred, not retracted and not bulging.   Nose: Mucosal edema and rhinorrhea present. No epistaxis. Right sinus exhibits no maxillary sinus tenderness and no frontal sinus tenderness. Left sinus exhibits no maxillary sinus tenderness and no frontal sinus tenderness.   Mouth/Throat: Uvula is midline and mucous membranes are normal. No uvula swelling. No oropharyngeal exudate or posterior oropharyngeal erythema.   Eyes: Pupils are equal, round, and reactive to light. EOM are normal. No scleral icterus.   Neck: Normal range of motion. No thyromegaly present.   Cardiovascular: Normal rate, regular rhythm, normal heart sounds and intact distal pulses.    Pulmonary/Chest: Effort normal. She has decreased breath sounds (mildly over bronchial region of anterior chest). She has wheezes (scattered mild).   Abdominal: Soft. Bowel sounds are normal. There is no hepatosplenomegaly. There is no tenderness. A hernia is present.       Protuberant abdomen   Musculoskeletal:        Thoracic back: She exhibits tenderness and spasm.        Lumbar back: She exhibits decreased range of motion, tenderness, pain and spasm.   Lymphadenopathy:     She has cervical adenopathy.        Right cervical: Superficial cervical adenopathy present.        Left cervical: Superficial cervical adenopathy present.   Neurological: She is alert. She displays normal reflexes. No cranial nerve deficit.   Skin: Skin is warm and dry. Capillary refill takes less than 2 seconds.   Psychiatric: She has a normal mood and affect. Her behavior is normal. Judgment and thought content normal.    Vitals reviewed.      Assessment/Plan     Problem List Items Addressed This Visit        Cardiovascular and Mediastinum    DM (diabetes mellitus) type II uncontrolled, periph vascular disorder (CMS/HCC) - Primary    Relevant Medications    gabapentin (NEURONTIN) 600 MG tablet       Respiratory    Chronic allergic rhinitis    Relevant Medications    levoFLOXacin (LEVAQUIN) 500 MG tablet       Nervous and Auditory    Chronic midline thoracic back pain    Relevant Medications    methocarbamol (ROBAXIN) 750 MG tablet       Musculoskeletal and Integument    Arthritis    Relevant Medications    methocarbamol (ROBAXIN) 750 MG tablet    gabapentin (NEURONTIN) 600 MG tablet      Other Visit Diagnoses     Bilateral edema of lower extremity        Relevant Medications    furosemide (LASIX) 40 MG tablet          Patient's Body mass index is 41 kg/m². BMI is above normal parameters. Recommendations include: nutrition counseling.   (Normal BMI:  18.5-24.9, OW 25-29.9, Obesity 30 or greater)  Encouraged smoking cessation.   Understands disease processes and need for medications.  Understands reasons for urgent and emergent care.  Patient (& family) verbalized agreement for treatment plan.   Emotional support and active listening provided.  Patient provided time to verbalize feelings.  Reviewed Asthma and allergy notes.  patient to follow up for any injections.   Advised to  Astelin Nasal spray.    IVETTE reviewed today and consistent.  Will refill prescribed controlled medication today.  Patient is aware they cannot receive narcotics from any other provider except if under care of pain management or speciality clinic.  Risk and benefits of medication use has been reviewed.  History and physical exam exhibit continued safe and appropriate use of controlled substances.  Reviewed most recent US of abdomen with patient.  Discussed any abnormal findings.  Patient's questions answered.  RTC 2 months, sooner if needed.            This document has been electronically signed by:  JOAQUÍN Ross, FNP-C

## 2018-10-08 DIAGNOSIS — M19.90 ARTHRITIS: ICD-10-CM

## 2018-10-08 DIAGNOSIS — E78.2 MIXED HYPERLIPIDEMIA: ICD-10-CM

## 2018-10-08 DIAGNOSIS — IMO0002 DM (DIABETES MELLITUS) TYPE II UNCONTROLLED, PERIPH VASCULAR DISORDER: ICD-10-CM

## 2018-10-08 RX ORDER — ROSUVASTATIN CALCIUM 40 MG/1
TABLET, COATED ORAL
Qty: 90 TABLET | Refills: 3 | Status: SHIPPED | OUTPATIENT
Start: 2018-10-08 | End: 2019-11-15 | Stop reason: SDUPTHER

## 2018-10-08 RX ORDER — PEN NEEDLE, DIABETIC 32GX 5/32"
NEEDLE, DISPOSABLE MISCELLANEOUS
Qty: 100 EACH | Refills: 11 | Status: ON HOLD | OUTPATIENT
Start: 2018-10-08 | End: 2019-04-01

## 2018-10-08 RX ORDER — GABAPENTIN 600 MG/1
TABLET ORAL
Qty: 90 TABLET | Refills: 2 | Status: SHIPPED | OUTPATIENT
Start: 2018-10-08 | End: 2019-01-18 | Stop reason: SDUPTHER

## 2018-10-08 RX ORDER — GLIPIZIDE AND METFORMIN HCL 2.5; 25 MG/1; MG/1
TABLET, FILM COATED ORAL
Qty: 60 TABLET | Refills: 2 | Status: SHIPPED | OUTPATIENT
Start: 2018-10-08 | End: 2019-01-07 | Stop reason: SDUPTHER

## 2018-11-12 ENCOUNTER — OFFICE VISIT (OUTPATIENT)
Dept: FAMILY MEDICINE CLINIC | Facility: CLINIC | Age: 48
End: 2018-11-12

## 2018-11-12 VITALS
SYSTOLIC BLOOD PRESSURE: 128 MMHG | DIASTOLIC BLOOD PRESSURE: 82 MMHG | TEMPERATURE: 98.6 F | OXYGEN SATURATION: 98 % | HEART RATE: 84 BPM | HEIGHT: 71 IN | BODY MASS INDEX: 40.04 KG/M2 | WEIGHT: 286 LBS

## 2018-11-12 DIAGNOSIS — IMO0002 DM (DIABETES MELLITUS) TYPE II UNCONTROLLED, PERIPH VASCULAR DISORDER: ICD-10-CM

## 2018-11-12 DIAGNOSIS — E55.9 VITAMIN D DEFICIENCY: ICD-10-CM

## 2018-11-12 DIAGNOSIS — E53.8 LOW VITAMIN B12 LEVEL: ICD-10-CM

## 2018-11-12 DIAGNOSIS — R59.1 LYMPHADENOPATHY: ICD-10-CM

## 2018-11-12 DIAGNOSIS — E78.2 MIXED HYPERLIPIDEMIA: ICD-10-CM

## 2018-11-12 DIAGNOSIS — I10 ESSENTIAL HYPERTENSION: ICD-10-CM

## 2018-11-12 DIAGNOSIS — K59.03 DRUG-INDUCED CONSTIPATION: Primary | ICD-10-CM

## 2018-11-12 DIAGNOSIS — R53.83 OTHER FATIGUE: ICD-10-CM

## 2018-11-12 DIAGNOSIS — J30.89 CHRONIC NONSEASONAL ALLERGIC RHINITIS DUE TO POLLEN: ICD-10-CM

## 2018-11-12 PROCEDURE — 99214 OFFICE O/P EST MOD 30 MIN: CPT | Performed by: NURSE PRACTITIONER

## 2018-11-12 RX ORDER — AZELASTINE 1 MG/ML
SPRAY, METERED NASAL
Qty: 1 EACH | Refills: 5 | Status: SHIPPED | OUTPATIENT
Start: 2018-11-12 | End: 2019-05-21 | Stop reason: SDUPTHER

## 2018-11-12 RX ORDER — DOXYCYCLINE 100 MG/1
100 CAPSULE ORAL EVERY 12 HOURS SCHEDULED
Qty: 20 CAPSULE | Refills: 0 | Status: SHIPPED | OUTPATIENT
Start: 2018-11-12 | End: 2018-11-26

## 2018-11-12 NOTE — PROGRESS NOTES
"Subjective   Soheila Brewster is a 48 y.o. female.     Chief Complaint   Patient presents with   • Diabetes   • Abdominal Pain       History of Present Illness     Diabetes-\"a lot better\".  Reports 140-150's at highest.  She is presently on Glipizide-Metformin 2.5/250 mg BID, Soliquia 60 units daily and Trulicity 0.75 weekly.  She is checking intermittently.  No acute concerns today.    Abdomen Pain-has increased over last 3 weeks.  Occurs mostly when eating.  She reports some bowel changes including constipation.  She is not presently on any bowel medications.  She reports she can have 3-4 days without any BM.  She has never received Linzess at her pharmacy.  She is concerned she may have a bowel obstruction.  She has had a colonscopy in the past per Dr Brewer with polyp removal.  She has a hernia that has been evaluated per Dr Foster.  Not at goal.    Rhinitis-chronic.  Has been to allergy provider.  (+) mold allergy.  Is doing a trial of meds before considering immunotherapy.  Reports ear concerns today with cough.  She reports sneezing and rhinorrhea.  Not at goal.     The following portions of the patient's history were reviewed and updated as appropriate: allergies, current medications, past family history, past medical history, past social history, past surgical history and problem list.    Review of Systems   Constitutional: Positive for appetite change. Negative for fever and unexpected weight change.   HENT: Positive for congestion, postnasal drip and rhinorrhea. Negative for ear pain (left ear is worse), nosebleeds, sinus pressure, sore throat and trouble swallowing.    Eyes: Positive for pain (chronic.  followed by Dr Mireles) and discharge. Negative for visual disturbance.        Does not wear corrective lens.  Recent exam with some early stage cataract   Respiratory: Positive for cough, shortness of breath (more than her usual) and wheezing. Negative for chest tightness.    Cardiovascular: Positive for leg " "swelling. Negative for chest pain and palpitations.   Gastrointestinal: Positive for abdominal pain (generally periumbilical.  Reports hernia in her abd area), blood in stool, constipation and nausea. Negative for diarrhea (at times but is improving) and vomiting.   Endocrine: Positive for cold intolerance (increases her joint pain) and polydipsia. Negative for heat intolerance, polyphagia and polyuria.   Genitourinary: Positive for frequency and menstrual problem (first menses this month since May). Negative for difficulty urinating, dysuria, hematuria, vaginal discharge and vaginal pain.        Reports painful menses.  She reports they are regular and at times are heavy as she has gotten older.    Musculoskeletal: Positive for arthralgias (mulitple joints-worse in hands, wrist and knees today), back pain (history of pinched nerve high in back and in neck.  Disc bulge in low back.  last MRI was approx 2 years ago.), joint swelling, myalgias (muscle spasm in back), neck pain and neck stiffness.   Skin: Negative for color change.   Allergic/Immunologic: Negative for environmental allergies and food allergies.   Neurological: Positive for dizziness, weakness, numbness (BLE in feet and ankles from DM and some in bilateral legs related to back) and headaches (due to acute illness with sinus). Negative for tremors, seizures and syncope.   Hematological: Positive for adenopathy. Does not bruise/bleed easily.   Psychiatric/Behavioral: Positive for sleep disturbance (problem going to sleep and staying asleep.  Reports average of 4 hours per night.  She has not been on any meds for sleep). Negative for dysphoric mood and suicidal ideas. The patient is not nervous/anxious.    All other systems reviewed and are negative.      Objective     /82   Pulse 84   Temp 98.6 °F (37 °C) (Tympanic)   Ht 180.3 cm (71\")   Wt 130 kg (286 lb)   LMP 06/28/2018   SpO2 98%   BMI 39.89 kg/m²     Physical Exam   Constitutional: She " appears well-developed and well-nourished. No distress.   HENT:   Head: Normocephalic and atraumatic.   Right Ear: Ear canal normal. Tympanic membrane is injected. Tympanic membrane is not scarred, not retracted and not bulging.   Left Ear: Ear canal normal. Tympanic membrane is injected. Tympanic membrane is not scarred, not retracted and not bulging.   Nose: Mucosal edema and rhinorrhea present. No epistaxis. Right sinus exhibits no maxillary sinus tenderness and no frontal sinus tenderness. Left sinus exhibits no maxillary sinus tenderness and no frontal sinus tenderness.   Mouth/Throat: Uvula is midline and mucous membranes are normal. No uvula swelling. No oropharyngeal exudate or posterior oropharyngeal erythema.   Eyes: EOM are normal. Pupils are equal, round, and reactive to light. No scleral icterus.   Neck: Normal range of motion. No thyromegaly present.   Cardiovascular: Normal rate, regular rhythm, normal heart sounds and intact distal pulses.   Pulmonary/Chest: Effort normal. She has decreased breath sounds (mildly over bronchial region of anterior chest). She has wheezes (scattered mild).   Abdominal: Soft. Bowel sounds are normal. There is no hepatosplenomegaly. There is no tenderness. A hernia is present.       Protuberant abdomen   Musculoskeletal:        Thoracic back: She exhibits tenderness and spasm.        Lumbar back: She exhibits decreased range of motion, tenderness, pain and spasm.   Lymphadenopathy:     She has cervical adenopathy.        Right cervical: Superficial cervical adenopathy present.        Left cervical: Superficial cervical adenopathy present.        Right: Supraclavicular (firm mobile node.  Mild tenderness) adenopathy present.   Neurological: She is alert. She displays normal reflexes. No cranial nerve deficit.   Skin: Skin is warm and dry. Capillary refill takes less than 2 seconds.   Psychiatric: She has a normal mood and affect. Her behavior is normal. Judgment and  thought content normal.   Vitals reviewed.      Assessment/Plan     Problem List Items Addressed This Visit        Cardiovascular and Mediastinum    DM (diabetes mellitus) type II uncontrolled, periph vascular disorder (CMS/HCC)    Relevant Orders    Comprehensive Metabolic Panel    Hemoglobin A1c    Essential hypertension    Relevant Orders    Comprehensive Metabolic Panel    Lipid Panel    Hemoglobin A1c    Mixed hyperlipidemia    Relevant Orders    Hemoglobin A1c       Digestive    Vitamin D deficiency    Relevant Orders    Vitamin D 25 Hydroxy      Other Visit Diagnoses     Drug-induced constipation    -  Primary    Relevant Orders    XR abdomen 3 vw    Chronic nonseasonal allergic rhinitis due to pollen        Relevant Medications    doxycycline (MONODOX) 100 MG capsule    azelastine (ASTELIN) 0.1 % nasal spray    Other Relevant Orders    CBC & Differential    Comprehensive Metabolic Panel    Other fatigue        Relevant Orders    TSH    T4, Free    Low vitamin B12 level        Relevant Orders    Vitamin B12    Lymphadenopathy        base of right neck near thyroid.  will re-eval after ABX treatment          Patient's Body mass index is 39.89 kg/m². BMI is above normal parameters. Recommendations include: no follow-up required.   (Normal BMI:  18.5-24.9, OW 25-29.9, Obesity 30 or greater)    Understands disease processes and need for medications.  Understands reasons for urgent and emergent care.  Patient (& family) verbalized agreement for treatment plan.   Emotional support and active listening provided.  Patient provided time to verbalize feelings.  Reviewed allergy provider notes.  Advised she may follow up with Dr Foster regarding hernia and repair concerns.  Abd series today.    Continue insulin as directed  Instructed to complete all of antibiotics for acute illness.  Increase PO fluids, avoid/limit caffeine.  Do not save any of the meds for later use.  Rest PRN  Refill on routine maintenance meds today.    Labs ordered.  Patient to RTC fasting to have done  RTC 2-3 weeks for re-eval, sooner for worsening of symptoms.             This document has been electronically signed by:  JOAQUÍN Ross, ELIDA

## 2018-11-21 DIAGNOSIS — R10.9 ABDOMINAL PAIN, UNSPECIFIED ABDOMINAL LOCATION: Primary | ICD-10-CM

## 2018-11-26 ENCOUNTER — OFFICE VISIT (OUTPATIENT)
Dept: FAMILY MEDICINE CLINIC | Facility: CLINIC | Age: 48
End: 2018-11-26

## 2018-11-26 VITALS
TEMPERATURE: 97.8 F | DIASTOLIC BLOOD PRESSURE: 82 MMHG | OXYGEN SATURATION: 97 % | HEIGHT: 71 IN | SYSTOLIC BLOOD PRESSURE: 122 MMHG | WEIGHT: 289 LBS | BODY MASS INDEX: 40.46 KG/M2 | HEART RATE: 83 BPM

## 2018-11-26 DIAGNOSIS — I10 ESSENTIAL HYPERTENSION: ICD-10-CM

## 2018-11-26 DIAGNOSIS — IMO0002 DM (DIABETES MELLITUS) TYPE II UNCONTROLLED, PERIPH VASCULAR DISORDER: ICD-10-CM

## 2018-11-26 DIAGNOSIS — R16.0 HEPATOMEGALY: Primary | ICD-10-CM

## 2018-11-26 DIAGNOSIS — E78.2 MIXED HYPERLIPIDEMIA: ICD-10-CM

## 2018-11-26 DIAGNOSIS — R59.1 LYMPHADENOPATHY: ICD-10-CM

## 2018-11-26 DIAGNOSIS — E53.8 LOW VITAMIN B12 LEVEL: ICD-10-CM

## 2018-11-26 DIAGNOSIS — E55.9 VITAMIN D DEFICIENCY: ICD-10-CM

## 2018-11-26 DIAGNOSIS — J30.89 CHRONIC NONSEASONAL ALLERGIC RHINITIS DUE TO POLLEN: ICD-10-CM

## 2018-11-26 DIAGNOSIS — R53.83 OTHER FATIGUE: ICD-10-CM

## 2018-11-26 LAB
25(OH)D3 SERPL-MCNC: 66 NG/ML
ALBUMIN SERPL-MCNC: 3.9 G/DL (ref 3.5–5)
ALBUMIN/GLOB SERPL: 1.4 G/DL (ref 1.5–2.5)
ALP SERPL-CCNC: 67 U/L (ref 35–104)
ALT SERPL W P-5'-P-CCNC: 16 U/L (ref 10–36)
ANION GAP SERPL CALCULATED.3IONS-SCNC: 3.4 MMOL/L (ref 3.6–11.2)
AST SERPL-CCNC: 21 U/L (ref 10–30)
BASOPHILS # BLD AUTO: 0.03 10*3/MM3 (ref 0–0.3)
BASOPHILS NFR BLD AUTO: 0.4 % (ref 0–2)
BILIRUB SERPL-MCNC: 0.3 MG/DL (ref 0.2–1.8)
BUN BLD-MCNC: 12 MG/DL (ref 7–21)
BUN/CREAT SERPL: 19.4 (ref 7–25)
CALCIUM SPEC-SCNC: 9.4 MG/DL (ref 7.7–10)
CHLORIDE SERPL-SCNC: 108 MMOL/L (ref 99–112)
CHOLEST SERPL-MCNC: 89 MG/DL (ref 0–200)
CO2 SERPL-SCNC: 26.6 MMOL/L (ref 24.3–31.9)
CREAT BLD-MCNC: 0.62 MG/DL (ref 0.43–1.29)
DEPRECATED RDW RBC AUTO: 41.9 FL (ref 37–54)
EOSINOPHIL # BLD AUTO: 0.28 10*3/MM3 (ref 0–0.7)
EOSINOPHIL NFR BLD AUTO: 3.9 % (ref 0–5)
ERYTHROCYTE [DISTWIDTH] IN BLOOD BY AUTOMATED COUNT: 14 % (ref 11.5–14.5)
GFR SERPL CREATININE-BSD FRML MDRD: 103 ML/MIN/1.73
GLOBULIN UR ELPH-MCNC: 2.8 GM/DL
GLUCOSE BLD-MCNC: 173 MG/DL (ref 70–110)
HBA1C MFR BLD: 10 % (ref 4.5–5.7)
HCT VFR BLD AUTO: 39.9 % (ref 37–47)
HDLC SERPL-MCNC: 30 MG/DL (ref 60–100)
HGB BLD-MCNC: 13.4 G/DL (ref 12–16)
IMM GRANULOCYTES # BLD: 0.01 10*3/MM3 (ref 0–0.03)
IMM GRANULOCYTES NFR BLD: 0.1 % (ref 0–0.5)
LDLC SERPL CALC-MCNC: 0 MG/DL (ref 0–100)
LDLC/HDLC SERPL: 0 {RATIO}
LYMPHOCYTES # BLD AUTO: 2.21 10*3/MM3 (ref 1–3)
LYMPHOCYTES NFR BLD AUTO: 30.7 % (ref 21–51)
MCH RBC QN AUTO: 27.9 PG (ref 27–33)
MCHC RBC AUTO-ENTMCNC: 33.6 G/DL (ref 33–37)
MCV RBC AUTO: 83 FL (ref 80–94)
MONOCYTES # BLD AUTO: 0.48 10*3/MM3 (ref 0.1–0.9)
MONOCYTES NFR BLD AUTO: 6.7 % (ref 0–10)
NEUTROPHILS # BLD AUTO: 4.19 10*3/MM3 (ref 1.4–6.5)
NEUTROPHILS NFR BLD AUTO: 58.2 % (ref 30–70)
OSMOLALITY SERPL CALC.SUM OF ELEC: 279.6 MOSM/KG (ref 273–305)
PLATELET # BLD AUTO: 160 10*3/MM3 (ref 130–400)
PMV BLD AUTO: 12.3 FL (ref 6–10)
POTASSIUM BLD-SCNC: 4.4 MMOL/L (ref 3.5–5.3)
PROT SERPL-MCNC: 6.7 G/DL (ref 6–8)
RBC # BLD AUTO: 4.81 10*6/MM3 (ref 4.2–5.4)
SODIUM BLD-SCNC: 138 MMOL/L (ref 135–153)
T4 FREE SERPL-MCNC: 1.09 NG/DL (ref 0.89–1.76)
TRIGL SERPL-MCNC: 295 MG/DL (ref 0–150)
TSH SERPL DL<=0.05 MIU/L-ACNC: 0.17 MIU/ML (ref 0.55–4.78)
VIT B12 BLD-MCNC: 889 PG/ML (ref 211–911)
VLDLC SERPL-MCNC: 59 MG/DL
WBC NRBC COR # BLD: 7.2 10*3/MM3 (ref 4.5–12.5)

## 2018-11-26 PROCEDURE — 84443 ASSAY THYROID STIM HORMONE: CPT | Performed by: NURSE PRACTITIONER

## 2018-11-26 PROCEDURE — 82607 VITAMIN B-12: CPT | Performed by: NURSE PRACTITIONER

## 2018-11-26 PROCEDURE — 99213 OFFICE O/P EST LOW 20 MIN: CPT | Performed by: NURSE PRACTITIONER

## 2018-11-26 PROCEDURE — 84439 ASSAY OF FREE THYROXINE: CPT | Performed by: NURSE PRACTITIONER

## 2018-11-26 PROCEDURE — 82306 VITAMIN D 25 HYDROXY: CPT | Performed by: NURSE PRACTITIONER

## 2018-11-26 PROCEDURE — 83036 HEMOGLOBIN GLYCOSYLATED A1C: CPT | Performed by: NURSE PRACTITIONER

## 2018-11-26 PROCEDURE — 80053 COMPREHEN METABOLIC PANEL: CPT | Performed by: NURSE PRACTITIONER

## 2018-11-26 PROCEDURE — 80061 LIPID PANEL: CPT | Performed by: NURSE PRACTITIONER

## 2018-11-26 PROCEDURE — 36415 COLL VENOUS BLD VENIPUNCTURE: CPT | Performed by: NURSE PRACTITIONER

## 2018-11-26 PROCEDURE — 85025 COMPLETE CBC W/AUTO DIFF WBC: CPT | Performed by: NURSE PRACTITIONER

## 2018-11-26 NOTE — PROGRESS NOTES
Subjective   Soheila Brewster is a 48 y.o. female.     Chief Complaint   Patient presents with   • Follow up imaging       History of Present Illness     Follow up for abd series-for constipation.  No noted blockage.  Normal bowel gas pattern.    Hepatomegaly-noted on xray of abdomen.  No past history of enlarged liver.  She has never had an evaluation before.  Ongoing.    Enlarged lymph node on right side of neck-finished antibiotics but reports she feels the area is bigger.  She has noted a spastic pulling sensation in her neck.  She reports this has been present for approx 3 days.  Sensation is intermittent.    Ear complaint-ongoing.  Reports present bilaterally with ringing.  She has been to allergy specialist.  She does reports chronic problems with sinus issues.      The following portions of the patient's history were reviewed and updated as appropriate: allergies, current medications, past family history, past medical history, past social history, past surgical history and problem list.    Review of Systems   Constitutional: Positive for appetite change. Negative for diaphoresis, fever and unexpected weight change.   HENT: Positive for congestion, ear pain, postnasal drip and rhinorrhea. Negative for nosebleeds, sinus pressure, sore throat and trouble swallowing.    Eyes: Positive for pain (chronic.  followed by Dr Mireles). Negative for discharge and visual disturbance.        Does not wear corrective lens.  Recent exam with some early stage cataract   Respiratory: Positive for cough, shortness of breath (more than her usual) and wheezing. Negative for chest tightness.    Cardiovascular: Positive for leg swelling. Negative for chest pain and palpitations.   Gastrointestinal: Positive for abdominal pain (generally periumbilical.  Reports hernia in her abd area) and constipation. Negative for blood in stool, diarrhea (at times but is improving), nausea and vomiting.   Endocrine: Positive for cold intolerance  "(increases her joint pain). Negative for heat intolerance, polydipsia, polyphagia and polyuria.   Genitourinary: Positive for menstrual problem. Negative for difficulty urinating, dysuria, frequency, hematuria, vaginal discharge and vaginal pain.        Reports painful menses.  She reports they are regular and at times are heavy as she has gotten older.   LADARIUS    Musculoskeletal: Positive for arthralgias (mulitple joints-worse in hands, wrist and knees today), back pain (history of pinched nerve high in back and in neck.  Disc bulge in low back.  last MRI was approx 2 years ago.), joint swelling, myalgias (muscle spasm in back), neck pain and neck stiffness.   Skin: Negative for color change.   Allergic/Immunologic: Negative for environmental allergies and food allergies.   Neurological: Positive for dizziness, weakness, numbness (BLE in feet and ankles from DM and some in bilateral legs related to back) and headaches (due to acute illness with sinus). Negative for tremors, seizures and syncope.   Hematological: Positive for adenopathy. Does not bruise/bleed easily.   Psychiatric/Behavioral: Positive for sleep disturbance (problem going to sleep and staying asleep.  Reports average of 4 hours per night.  She has not been on any meds for sleep). Negative for decreased concentration, dysphoric mood and suicidal ideas. The patient is not nervous/anxious.    All other systems reviewed and are negative.      Objective     /82   Pulse 83   Temp 97.8 °F (36.6 °C) (Temporal)   Ht 180.3 cm (71\")   Wt 131 kg (289 lb)   SpO2 97%   BMI 40.31 kg/m²     Physical Exam   Constitutional: She appears well-developed and well-nourished. No distress.   HENT:   Head: Normocephalic and atraumatic.   Right Ear: Ear canal normal. Tympanic membrane is injected. Tympanic membrane is not scarred, not retracted and not bulging.   Left Ear: Ear canal normal. Tympanic membrane is injected. Tympanic membrane is not scarred, not " retracted and not bulging.   Nose: Mucosal edema and rhinorrhea present. No epistaxis. Right sinus exhibits no maxillary sinus tenderness and no frontal sinus tenderness. Left sinus exhibits no maxillary sinus tenderness and no frontal sinus tenderness.   Mouth/Throat: Uvula is midline and mucous membranes are normal. No uvula swelling. No oropharyngeal exudate or posterior oropharyngeal erythema.   Eyes: EOM are normal. Pupils are equal, round, and reactive to light. No scleral icterus.   Neck: Normal range of motion. No thyromegaly present.   Cardiovascular: Normal rate, regular rhythm, normal heart sounds and intact distal pulses.   Pulmonary/Chest: Effort normal. She has decreased breath sounds (mildly over bronchial region of anterior chest). She has wheezes (scattered mild).   Abdominal: Soft. Bowel sounds are normal. There is no hepatosplenomegaly. There is no tenderness. A hernia is present.       Protuberant abdomen   Musculoskeletal:        Thoracic back: She exhibits tenderness and spasm.        Lumbar back: She exhibits decreased range of motion, tenderness, pain and spasm.   Lymphadenopathy:     She has cervical adenopathy.        Right cervical: Superficial cervical adenopathy present.        Left cervical: Superficial cervical adenopathy present.        Right: Supraclavicular (firm mobile node.  Mild tenderness) adenopathy present.   Neurological: She is alert. She displays normal reflexes. No cranial nerve deficit.   Skin: Skin is warm and dry. Capillary refill takes less than 2 seconds.   Psychiatric: She has a normal mood and affect. Her behavior is normal. Judgment and thought content normal.   Vitals reviewed.      Assessment/Plan     Problem List Items Addressed This Visit        Digestive    Hepatomegaly - Primary    Relevant Orders    US Abdomen Complete       Immune and Lymphatic    Lymphadenopathy    Relevant Orders    US Head Neck Soft Tissue          Patient's Body mass index is 40.31  kg/m². BMI is above normal parameters. Recommendations include: nutrition counseling.   (Normal BMI:  18.5-24.9, OW 25-29.9, Obesity 30 or greater)  Encouraged smoking cessation.     Understands disease processes and need for medications.  Understands reasons for urgent and emergent care.  Patient (& family) verbalized agreement for treatment plan.   Emotional support and active listening provided.  Patient provided time to verbalize feelings.  Further imaging on neck and abdomen.  RTC 1-2 weeks, sooner if needed.  Report any worsening symptoms.            This document has been electronically signed by:  JOAQUÍN Ross, FNP-C

## 2018-12-06 DIAGNOSIS — E04.1 THYROID NODULE: Primary | ICD-10-CM

## 2018-12-10 ENCOUNTER — OFFICE VISIT (OUTPATIENT)
Dept: FAMILY MEDICINE CLINIC | Facility: CLINIC | Age: 48
End: 2018-12-10

## 2018-12-10 VITALS
DIASTOLIC BLOOD PRESSURE: 80 MMHG | OXYGEN SATURATION: 97 % | SYSTOLIC BLOOD PRESSURE: 120 MMHG | BODY MASS INDEX: 39.9 KG/M2 | HEIGHT: 71 IN | HEART RATE: 106 BPM | TEMPERATURE: 98.8 F | WEIGHT: 285 LBS

## 2018-12-10 DIAGNOSIS — J06.9 ACUTE URI: ICD-10-CM

## 2018-12-10 DIAGNOSIS — R60.0 BILATERAL EDEMA OF LOWER EXTREMITY: ICD-10-CM

## 2018-12-10 DIAGNOSIS — IMO0002 DM (DIABETES MELLITUS) TYPE II UNCONTROLLED, PERIPH VASCULAR DISORDER: Primary | ICD-10-CM

## 2018-12-10 PROCEDURE — 99214 OFFICE O/P EST MOD 30 MIN: CPT | Performed by: NURSE PRACTITIONER

## 2018-12-10 RX ORDER — LEVOCETIRIZINE DIHYDROCHLORIDE 5 MG/1
TABLET, FILM COATED ORAL
Refills: 5 | COMMUNITY
Start: 2018-12-07 | End: 2019-01-18 | Stop reason: SDUPTHER

## 2018-12-10 RX ORDER — SULFAMETHOXAZOLE AND TRIMETHOPRIM 800; 160 MG/1; MG/1
1 TABLET ORAL EVERY 12 HOURS
Qty: 20 TABLET | Refills: 0 | Status: SHIPPED | OUTPATIENT
Start: 2018-12-10 | End: 2018-12-20

## 2018-12-10 RX ORDER — FUROSEMIDE 40 MG/1
40 TABLET ORAL 2 TIMES DAILY
Qty: 60 TABLET | Refills: 2 | Status: ON HOLD | OUTPATIENT
Start: 2018-12-10 | End: 2019-04-01

## 2018-12-10 NOTE — PROGRESS NOTES
"Subjective   Soheila Brewster is a 48 y.o. female.     Chief Complaint   Patient presents with   • Imaging Only     follow up       History of Present Illness     Imaging follow up-here to follow up for imaging of abdomen and for US of soft tissue.    Thyroid nodule-noted on soft tissue US.  She will have an appt on 12/19/2018 with Dr Cruz for surgical eval.    Generalized Malaise-reports she feels tired and weak.  \"no energy\".  Has been present for 4-5 days.  She also reports intermittent throat irritation with soreness.  She is having productive cough with ringing in her left ear.  She reports some chills and feeling of being flushed.  She has taken Tylenol.  Rhinorrhea is present.  Sneezing \"more than usual\".  She is taking xyzal 5 mg and using Azelastine spray.  She is using Singulair at [facility].   She has used a sample of Trelegy but felt it did not improve her respiratory symptoms.  Not at goal.     The following portions of the patient's history were reviewed and updated as appropriate: allergies, current medications, past family history, past medical history, past social history, past surgical history and problem list.    Review of Systems   Constitutional: Positive for appetite change. Negative for diaphoresis, fever and unexpected weight change.   HENT: Positive for congestion, ear pain, postnasal drip and rhinorrhea. Negative for nosebleeds, sinus pressure, sore throat and trouble swallowing.    Eyes: Positive for pain (chronic.  followed by Dr Mirlees). Negative for discharge and visual disturbance.        Does not wear corrective lens.  Recent exam with some early stage cataract   Respiratory: Positive for cough, shortness of breath (more than her usual) and wheezing. Negative for chest tightness.    Cardiovascular: Positive for leg swelling. Negative for chest pain and palpitations.   Gastrointestinal: Positive for abdominal pain (generally periumbilical.  Reports hernia in her abd area) and constipation. " "Negative for blood in stool, diarrhea (at times but is improving), nausea and vomiting.   Endocrine: Positive for cold intolerance (increases her joint pain). Negative for heat intolerance, polydipsia, polyphagia and polyuria.   Genitourinary: Positive for menstrual problem. Negative for difficulty urinating, dysuria, frequency, hematuria, vaginal discharge and vaginal pain.        Reports painful menses.  She reports they are regular and at times are heavy as she has gotten older.   LADARIUS    Musculoskeletal: Positive for arthralgias (mulitple joints-worse in hands, wrist and knees today), back pain (history of pinched nerve high in back and in neck.  Disc bulge in low back.  last MRI was approx 2 years ago.), joint swelling, myalgias (muscle spasm in back), neck pain and neck stiffness.   Skin: Negative for color change.   Allergic/Immunologic: Negative for environmental allergies and food allergies.   Neurological: Positive for dizziness, weakness, numbness (BLE in feet and ankles from DM and some in bilateral legs related to back) and headaches (due to acute illness with sinus). Negative for tremors, seizures and syncope.   Hematological: Positive for adenopathy. Does not bruise/bleed easily.   Psychiatric/Behavioral: Positive for sleep disturbance (problem going to sleep and staying asleep.  Reports average of 4 hours per night.  She has not been on any meds for sleep). Negative for decreased concentration, dysphoric mood and suicidal ideas. The patient is not nervous/anxious.    All other systems reviewed and are negative.      Objective     /80   Pulse 106   Temp 98.8 °F (37.1 °C) (Temporal)   Ht 180.3 cm (71\")   Wt 129 kg (285 lb)   SpO2 97%   BMI 39.75 kg/m²     Physical Exam   Constitutional: She appears well-developed and well-nourished. No distress.   HENT:   Head: Normocephalic and atraumatic.   Right Ear: Ear canal normal. Tympanic membrane is injected. Tympanic membrane is not scarred, not " retracted and not bulging.   Left Ear: Ear canal normal. Tympanic membrane is injected. Tympanic membrane is not scarred, not retracted and not bulging.   Nose: Mucosal edema and rhinorrhea present. No epistaxis. Right sinus exhibits no maxillary sinus tenderness and no frontal sinus tenderness. Left sinus exhibits no maxillary sinus tenderness and no frontal sinus tenderness.   Mouth/Throat: Uvula is midline and mucous membranes are normal. No uvula swelling. No oropharyngeal exudate or posterior oropharyngeal erythema.   Eyes: EOM are normal. Pupils are equal, round, and reactive to light. No scleral icterus.   Neck: Normal range of motion. No thyromegaly present.   Cardiovascular: Normal rate, regular rhythm, normal heart sounds and intact distal pulses.   Pulmonary/Chest: Effort normal. She has decreased breath sounds (mildly over bronchial region of anterior chest). She has wheezes (scattered mild).   Abdominal: Soft. Bowel sounds are normal. There is no hepatosplenomegaly. There is no tenderness. A hernia is present.       Protuberant abdomen   Musculoskeletal:        Thoracic back: She exhibits tenderness and spasm.        Lumbar back: She exhibits decreased range of motion, tenderness, pain and spasm.   Lymphadenopathy:     She has cervical adenopathy.        Right cervical: Superficial cervical adenopathy present.        Left cervical: Superficial cervical adenopathy present.        Right: Supraclavicular (firm mobile node.  Mild tenderness) adenopathy present.   Neurological: She is alert. She displays normal reflexes. No cranial nerve deficit.   Skin: Skin is warm and dry. Capillary refill takes less than 2 seconds.   Psychiatric: She has a normal mood and affect. Her behavior is normal. Judgment and thought content normal.   Vitals reviewed.      Assessment/Plan     Problem List Items Addressed This Visit        Cardiovascular and Mediastinum    DM (diabetes mellitus) type II uncontrolled, periph  vascular disorder (CMS/HCC) - Primary      Other Visit Diagnoses     Acute URI        Relevant Medications    sulfamethoxazole-trimethoprim (BACTRIM DS,SEPTRA DS) 800-160 MG per tablet    Bilateral edema of lower extremity        Relevant Medications    furosemide (LASIX) 40 MG tablet          Patient's Body mass index is 39.75 kg/m². BMI is above normal parameters. Recommendations include: no follow-up required.   (Normal BMI:  18.5-24.9, OW 25-29.9, Obesity 30 or greater)    Understands disease processes and need for medications.  Understands reasons for urgent and emergent care.  Patient (& family) verbalized agreement for treatment plan.   Emotional support and active listening provided.  Patient provided time to verbalize feelings.    Reviewed imaging of neck and abdomen.  Patient has appt with Dr camejo for thyroid eval.  Instructed to complete all of antibiotics for acute illness.  Increase PO fluids, avoid/limit caffeine.  Do not save any of the meds for later use.  Rest PRN  Steam expectoration, warm compress to sinus, Saline PRN for moisture    Increase in Lasix to BID for edema  Will consider cardiac ECHO for eval of EF if not improved.      RTC 1 month, sooner if needed.           This document has been electronically signed by:  JOAQUÍN Ross, ELIDA

## 2018-12-19 ENCOUNTER — OFFICE VISIT (OUTPATIENT)
Dept: SURGERY | Facility: CLINIC | Age: 48
End: 2018-12-19

## 2018-12-19 VITALS
DIASTOLIC BLOOD PRESSURE: 91 MMHG | BODY MASS INDEX: 39.9 KG/M2 | WEIGHT: 285 LBS | SYSTOLIC BLOOD PRESSURE: 123 MMHG | HEART RATE: 86 BPM | HEIGHT: 71 IN

## 2018-12-19 DIAGNOSIS — E04.1 THYROID NODULE: Primary | ICD-10-CM

## 2018-12-19 PROCEDURE — 99203 OFFICE O/P NEW LOW 30 MIN: CPT | Performed by: SURGERY

## 2018-12-20 DIAGNOSIS — E04.1 THYROID NODULE: Primary | ICD-10-CM

## 2018-12-21 PROBLEM — E04.1 THYROID NODULE: Status: ACTIVE | Noted: 2018-12-21

## 2018-12-21 NOTE — PROGRESS NOTES
Subjective   Soheila Brewster is a 48 y.o. female is being seen for consultation today at the request of Mariana Mcknight APRN    Soheila Brewster is a 48 y.o. female With recent evidence of a nodule of the right thyroid.  This is confirmed on imaging and measures up to 3-4 cm in diameter.  She has a 1.5 cm lesion on the left thyroid lobe of the is not palpable.  No history of thyroid problems or endocrine malignancies.  Some swallowing difficulty reported.        Past Medical History:   Diagnosis Date   • Allergic rhinitis    • Arthritis    • Diabetes mellitus (CMS/HCC)    • GERD (gastroesophageal reflux disease)    • Hyperlipidemia    • Hypertension        Family History   Problem Relation Age of Onset   • Arthritis Mother    • Asthma Mother    • Cancer Mother    • COPD Mother    • Diabetes Mother    • Heart disease Mother    • Hypertension Mother    • Hyperlipidemia Mother    • Kidney disease Mother    • Arthritis Father    • COPD Father    • Diabetes Father    • Hypertension Father    • Hyperlipidemia Father    • Arthritis Sister    • COPD Sister    • Diabetes Sister        Social History     Socioeconomic History   • Marital status:      Spouse name: Not on file   • Number of children: 0   • Years of education: Not on file   • Highest education level: GED or equivalent   Social Needs   • Financial resource strain: Somewhat hard   • Food insecurity - worry: Sometimes true   • Food insecurity - inability: Sometimes true   • Transportation needs - medical: No   • Transportation needs - non-medical: No   Occupational History   • Occupation: Disability   Tobacco Use   • Smoking status: Current Every Day Smoker     Packs/day: 1.00     Types: Cigarettes   • Smokeless tobacco: Never Used   Substance and Sexual Activity   • Alcohol use: No   • Drug use: No   • Sexual activity: Defer   Other Topics Concern   • Not on file   Social History Narrative   • Not on file       Past Surgical History:   Procedure Laterality Date  "  • BREAST SURGERY     • CHOLECYSTECTOMY     • DILATATION AND CURETTAGE         Review of Systems   Constitutional: Negative for activity change, appetite change, chills and fever.   HENT: Negative for sore throat and trouble swallowing.    Eyes: Negative for visual disturbance.   Respiratory: Negative for cough and shortness of breath.    Cardiovascular: Negative for chest pain and palpitations.   Gastrointestinal: Negative for abdominal distention, abdominal pain, blood in stool, constipation, diarrhea, nausea and vomiting.   Endocrine: Negative for cold intolerance and heat intolerance.   Genitourinary: Negative for dysuria.   Musculoskeletal: Negative for joint swelling.   Skin: Negative for color change, rash and wound.   Allergic/Immunologic: Negative for immunocompromised state.   Neurological: Negative for dizziness, seizures, weakness and headaches.   Hematological: Negative for adenopathy. Does not bruise/bleed easily.   Psychiatric/Behavioral: Negative for agitation and confusion.         /91   Pulse 86   Ht 180.3 cm (71\")   Wt 129 kg (285 lb)   BMI 39.75 kg/m²   Objective   Physical Exam   Constitutional: She is oriented to person, place, and time. She appears well-developed.   HENT:   Head: Normocephalic and atraumatic.   Mouth/Throat: Mucous membranes are normal.   Eyes: Conjunctivae are normal. Pupils are equal, round, and reactive to light.   Neck: Neck supple. No JVD present. No tracheal deviation present. Thyroid mass (palpable nodule right thyroid lobe) present. No thyromegaly present.   Cardiovascular: Normal rate and regular rhythm. Exam reveals no gallop and no friction rub.   No murmur heard.  Pulmonary/Chest: Effort normal and breath sounds normal.   Abdominal: Soft. She exhibits no distension. There is no splenomegaly or hepatomegaly. There is no tenderness. No hernia.   Musculoskeletal: Normal range of motion. She exhibits no deformity.   Neurological: She is alert and oriented to " person, place, and time.   Skin: Skin is warm and dry.   Psychiatric: She has a normal mood and affect.             Assessment   Soheila was seen today for thyroid nodule.    Diagnoses and all orders for this visit:    Thyroid nodule      Soheila Brewster is a 48 y.o. female with bilateral thyroid nodules greater than 1 cm.  This is confirmed with ultrasound from an outside facility.  She will be scheduled for ultrasound-guided fine-needle aspiration in follow-up after imaging and pathology to discuss further management.    Patient's Body mass index is 39.75 kg/m². BMI is above normal parameters. Recommendations include: educational material

## 2019-01-07 DIAGNOSIS — E55.9 VITAMIN D DEFICIENCY: ICD-10-CM

## 2019-01-07 DIAGNOSIS — IMO0002 DM (DIABETES MELLITUS) TYPE II UNCONTROLLED, PERIPH VASCULAR DISORDER: ICD-10-CM

## 2019-01-07 DIAGNOSIS — E53.8 VITAMIN B12 DEFICIENCY: ICD-10-CM

## 2019-01-07 DIAGNOSIS — J30.1 CHRONIC SEASONAL ALLERGIC RHINITIS DUE TO POLLEN: ICD-10-CM

## 2019-01-07 RX ORDER — GLIPIZIDE AND METFORMIN HCL 2.5; 25 MG/1; MG/1
TABLET, FILM COATED ORAL
Qty: 60 TABLET | Refills: 2 | Status: SHIPPED | OUTPATIENT
Start: 2019-01-07 | End: 2019-04-08 | Stop reason: SDUPTHER

## 2019-01-07 RX ORDER — ERGOCALCIFEROL 1.25 MG/1
CAPSULE ORAL
Qty: 4 CAPSULE | Refills: 5 | Status: SHIPPED | OUTPATIENT
Start: 2019-01-07 | End: 2019-05-21 | Stop reason: SDUPTHER

## 2019-01-07 RX ORDER — CYANOCOBALAMIN (VITAMIN B-12) 1000 MCG
TABLET, EXTENDED RELEASE ORAL
Qty: 30 EACH | Refills: 5 | Status: SHIPPED | OUTPATIENT
Start: 2019-01-07 | End: 2019-05-21 | Stop reason: SDUPTHER

## 2019-01-07 RX ORDER — INSULIN GLARGINE AND LIXISENATIDE 100; 33 U/ML; UG/ML
INJECTION, SOLUTION SUBCUTANEOUS
Qty: 15 ML | Refills: 5 | Status: SHIPPED | OUTPATIENT
Start: 2019-01-07 | End: 2019-05-21 | Stop reason: SDUPTHER

## 2019-01-07 RX ORDER — LEVOCETIRIZINE DIHYDROCHLORIDE 5 MG/1
5 TABLET, FILM COATED ORAL EVERY EVENING
Qty: 30 TABLET | Refills: 5 | Status: SHIPPED | OUTPATIENT
Start: 2019-01-07 | End: 2019-05-21 | Stop reason: SDUPTHER

## 2019-01-09 ENCOUNTER — HOSPITAL ENCOUNTER (OUTPATIENT)
Dept: ULTRASOUND IMAGING | Facility: HOSPITAL | Age: 49
Discharge: HOME | End: 2019-01-09
Attending: SURGERY | Admitting: SURGERY

## 2019-01-09 VITALS
WEIGHT: 285 LBS | SYSTOLIC BLOOD PRESSURE: 127 MMHG | BODY MASS INDEX: 39.9 KG/M2 | HEIGHT: 71 IN | TEMPERATURE: 98.7 F | OXYGEN SATURATION: 98 % | HEART RATE: 74 BPM | RESPIRATION RATE: 16 BRPM | DIASTOLIC BLOOD PRESSURE: 77 MMHG

## 2019-01-09 DIAGNOSIS — E04.1 THYROID NODULE: ICD-10-CM

## 2019-01-09 PROCEDURE — 88173 CYTOPATH EVAL FNA REPORT: CPT | Performed by: SURGERY

## 2019-01-09 PROCEDURE — 10005 FNA BX W/US GDN 1ST LES: CPT | Performed by: RADIOLOGY

## 2019-01-09 PROCEDURE — 76942 ECHO GUIDE FOR BIOPSY: CPT

## 2019-01-09 PROCEDURE — 88172 CYTP DX EVAL FNA 1ST EA SITE: CPT | Performed by: SURGERY

## 2019-01-09 NOTE — NURSING NOTE
Procedure ended. Pt. Tolerated well. No bleeding noted. Sterile dsg applied to site. Specimens sent to pathology.

## 2019-01-10 LAB — LAB AP CASE REPORT: NORMAL

## 2019-01-18 ENCOUNTER — OFFICE VISIT (OUTPATIENT)
Dept: FAMILY MEDICINE CLINIC | Facility: CLINIC | Age: 49
End: 2019-01-18

## 2019-01-18 VITALS
SYSTOLIC BLOOD PRESSURE: 140 MMHG | BODY MASS INDEX: 40.46 KG/M2 | HEART RATE: 109 BPM | WEIGHT: 289 LBS | OXYGEN SATURATION: 98 % | HEIGHT: 71 IN | DIASTOLIC BLOOD PRESSURE: 80 MMHG | TEMPERATURE: 98.5 F

## 2019-01-18 DIAGNOSIS — IMO0002 DM (DIABETES MELLITUS) TYPE II UNCONTROLLED, PERIPH VASCULAR DISORDER: ICD-10-CM

## 2019-01-18 DIAGNOSIS — J30.1 CHRONIC SEASONAL ALLERGIC RHINITIS DUE TO POLLEN: ICD-10-CM

## 2019-01-18 DIAGNOSIS — M51.27 PROTRUSION OF INTERVERTEBRAL DISC OF LUMBOSACRAL REGION: Primary | ICD-10-CM

## 2019-01-18 DIAGNOSIS — Z51.81 ENCOUNTER FOR THERAPEUTIC DRUG LEVEL MONITORING: ICD-10-CM

## 2019-01-18 DIAGNOSIS — M19.90 ARTHRITIS: ICD-10-CM

## 2019-01-18 PROCEDURE — 96372 THER/PROPH/DIAG INJ SC/IM: CPT | Performed by: NURSE PRACTITIONER

## 2019-01-18 PROCEDURE — 99214 OFFICE O/P EST MOD 30 MIN: CPT | Performed by: NURSE PRACTITIONER

## 2019-01-18 RX ORDER — GUAIFENESIN 600 MG/1
600 TABLET, EXTENDED RELEASE ORAL 2 TIMES DAILY
Qty: 20 TABLET | Refills: 0 | Status: SHIPPED | OUTPATIENT
Start: 2019-01-18 | End: 2019-03-19

## 2019-01-18 RX ORDER — GABAPENTIN 600 MG/1
600 TABLET ORAL 3 TIMES DAILY
Qty: 90 TABLET | Refills: 2 | Status: SHIPPED | OUTPATIENT
Start: 2019-01-18 | End: 2019-04-15 | Stop reason: SDUPTHER

## 2019-01-18 RX ORDER — ORPHENADRINE CITRATE 30 MG/ML
60 INJECTION INTRAMUSCULAR; INTRAVENOUS ONCE
Status: COMPLETED | OUTPATIENT
Start: 2019-01-18 | End: 2019-01-18

## 2019-01-18 RX ORDER — SULFAMETHOXAZOLE AND TRIMETHOPRIM 800; 160 MG/1; MG/1
1 TABLET ORAL EVERY 12 HOURS
Qty: 20 TABLET | Refills: 0 | Status: SHIPPED | OUTPATIENT
Start: 2019-01-18 | End: 2019-01-28

## 2019-01-18 RX ORDER — MONTELUKAST SODIUM 10 MG/1
10 TABLET ORAL NIGHTLY
Qty: 30 TABLET | Refills: 5 | Status: SHIPPED | OUTPATIENT
Start: 2019-01-18 | End: 2019-05-21 | Stop reason: SDUPTHER

## 2019-01-18 RX ORDER — KETOROLAC TROMETHAMINE 30 MG/ML
60 INJECTION, SOLUTION INTRAMUSCULAR; INTRAVENOUS ONCE
Status: COMPLETED | OUTPATIENT
Start: 2019-01-18 | End: 2019-01-18

## 2019-01-18 RX ADMIN — ORPHENADRINE CITRATE 60 MG: 30 INJECTION INTRAMUSCULAR; INTRAVENOUS at 17:31

## 2019-01-18 RX ADMIN — KETOROLAC TROMETHAMINE 60 MG: 30 INJECTION, SOLUTION INTRAMUSCULAR; INTRAVENOUS at 17:31

## 2019-01-18 NOTE — PROGRESS NOTES
"Subjective   Soheila Brewster is a 48 y.o. female.     Chief Complaint   Patient presents with   • Diabetes   • Thyroid Nodule       History of Present Illness     Thyroid Biopsy-done per Dr Mohr January 9th.  Findings consistent with benign biopsy.    Back Pain & Sciatic nerve-reports has been flared since Wednesday morning.  Reports \"whole spine is sore\".   Her left leg is numb in sensation.  She reports the worse of her back pain is in the right hip.  She denies any specific injury to cause a flare.  No falls.  Has mostly been doing house work.  Not at goal.   Abdominal complaint-reports increase in gas like sensation in her abdomen.  She is taking Nexium 40 mg as directed but feels she cannot get rid of the gas feeling.  She reports belching relieves minimally then bloating sensation returns.  Having a BM does also seem to relieve the sensation for a short period.    Sinus complaint-chronic and ongoing.  She continues to smoke.  Has had minimal relief of symptoms despite multiple medication uses.  PND, thick sputum with cough.  Reports yellow and green in color.  HA and sinus pressure at times.  Not at goal.   Blood sugars-\"150's\" she reports.  She is doing well with Soliqua.  No negative side effects.  Is doing Trulicity weekly as well.      The following portions of the patient's history were reviewed and updated as appropriate: allergies, current medications, past family history, past medical history, past social history, past surgical history and problem list.    Review of Systems   Constitutional: Positive for appetite change. Negative for diaphoresis, fever and unexpected weight change.   HENT: Positive for congestion, postnasal drip and rhinorrhea. Negative for ear pain, nosebleeds, sinus pressure, sore throat and trouble swallowing.         Blood tinged mucus, thick and difficult to blow out   Eyes: Positive for pain (chronic.  followed by Dr Mireles). Negative for discharge and visual disturbance. " "  Respiratory: Positive for cough (with white thick production), shortness of breath (more than her usual) and wheezing.    Cardiovascular: Positive for leg swelling (more increased with acute back pain). Negative for chest pain and palpitations.   Gastrointestinal: Positive for abdominal pain (generally periumbilical.  Reports hernia in her abd area) and constipation. Negative for blood in stool, diarrhea, nausea and vomiting.   Endocrine: Positive for cold intolerance (increases her joint pain). Negative for heat intolerance, polydipsia, polyphagia and polyuria.   Genitourinary: Positive for menstrual problem. Negative for difficulty urinating, dysuria, frequency, hematuria, vaginal discharge and vaginal pain.        LADARIUS   Musculoskeletal: Positive for arthralgias (mulitple joints-worse in hands, wrist and knees today), back pain (history of pinched nerve high in back and in neck.  Disc bulge in low back.  last MRI was approx 2 years ago.), joint swelling, myalgias (muscle spasm in back), neck pain and neck stiffness.   Skin: Negative for color change.   Allergic/Immunologic: Negative for environmental allergies and food allergies.   Neurological: Positive for dizziness, weakness, numbness (BLE in feet and ankles from DM and some in bilateral legs related to back) and headaches (due to acute illness with sinus). Negative for tremors, seizures and syncope.   Hematological: Positive for adenopathy. Does not bruise/bleed easily.   Psychiatric/Behavioral: Positive for sleep disturbance (problem going to sleep and staying asleep.  Reports average of 4 hours per night.  She has not been on any meds for sleep). Negative for decreased concentration, dysphoric mood and suicidal ideas. The patient is not nervous/anxious.    All other systems reviewed and are negative.      Objective     /80   Pulse 109   Temp 98.5 °F (36.9 °C) (Temporal)   Ht 180.3 cm (71\")   Wt 131 kg (289 lb)   LMP 01/04/2019   SpO2 98%   BMI " 40.31 kg/m²     Physical Exam   Constitutional: She appears well-developed and well-nourished. No distress.   HENT:   Head: Normocephalic and atraumatic.   Right Ear: Ear canal normal. Tympanic membrane is injected. Tympanic membrane is not scarred, not retracted and not bulging.   Left Ear: Ear canal normal. Tympanic membrane is injected. Tympanic membrane is not scarred, not retracted and not bulging.   Nose: Mucosal edema and rhinorrhea present. No epistaxis. Right sinus exhibits no maxillary sinus tenderness and no frontal sinus tenderness. Left sinus exhibits no maxillary sinus tenderness and no frontal sinus tenderness.   Mouth/Throat: Uvula is midline and mucous membranes are normal. No uvula swelling. No oropharyngeal exudate or posterior oropharyngeal erythema.   Eyes: EOM are normal. Pupils are equal, round, and reactive to light. No scleral icterus.   Neck: Normal range of motion. No thyromegaly present.   Cardiovascular: Normal rate, regular rhythm, normal heart sounds and intact distal pulses.   Pulmonary/Chest: Effort normal. She has decreased breath sounds (mildly over bronchial region of anterior chest). She has wheezes (scattered mild).   Abdominal: Soft. Bowel sounds are normal. There is no hepatosplenomegaly. There is no tenderness. A hernia is present.       Protuberant abdomen   Musculoskeletal: She exhibits tenderness.        Thoracic back: She exhibits tenderness and spasm.        Lumbar back: She exhibits decreased range of motion, tenderness, pain and spasm.   Pain with palpation over sciatic bilaterally    Lymphadenopathy:     She has cervical adenopathy.        Right cervical: Superficial cervical adenopathy present.        Left cervical: Superficial cervical adenopathy present.        Right: Supraclavicular (firm mobile node.  Mild tenderness) adenopathy present.   Neurological: She is alert. She displays normal reflexes. No cranial nerve deficit.   Skin: Skin is warm and dry. Capillary  refill takes less than 2 seconds.   Psychiatric: She has a normal mood and affect. Her behavior is normal. Judgment and thought content normal.   Vitals reviewed.      Assessment/Plan     Problem List Items Addressed This Visit        Cardiovascular and Mediastinum    DM (diabetes mellitus) type II uncontrolled, periph vascular disorder (CMS/HCC)    Relevant Medications    gabapentin (NEURONTIN) 600 MG tablet       Musculoskeletal and Integument    Arthritis    Relevant Medications    gabapentin (NEURONTIN) 600 MG tablet    ketorolac (TORADOL) injection 60 mg (Completed)    orphenadrine (NORFLEX) injection 60 mg (Completed)    Protrusion of intervertebral disc of lumbosacral region - Primary    Current Assessment & Plan     Acute on chronic pain today           Other Visit Diagnoses     Chronic seasonal allergic rhinitis due to pollen        Relevant Medications    montelukast (SINGULAIR) 10 MG tablet    sulfamethoxazole-trimethoprim (BACTRIM DS,SEPTRA DS) 800-160 MG per tablet    guaiFENesin (MUCINEX) 600 MG 12 hr tablet          Patient's Body mass index is 40.31 kg/m². BMI is above normal parameters. Recommendations include: nutrition counseling.   (Normal BMI:  18.5-24.9, OW 25-29.9, Obesity 30 or greater)    Understands disease processes and need for medications.  Understands reasons for urgent and emergent care.  Patient (& family) verbalized agreement for treatment plan.   Emotional support and active listening provided.  Patient provided time to verbalize feelings.    IVETTE reviewed today and consistent.  Will refill prescribed controlled medication today.  Patient is aware they cannot receive narcotics from any other provider except if under care of pain management or speciality clinic.  Risk and benefits of medication use has been reviewed.  History and physical exam exhibit continued safe and appropriate use of controlled substances.  The patient is aware of the potential for addiction and dependence.   This patient has been made aware of the appropriate use of such medications, including potential risk of somnolence, limited ability to drive and / or work safely, and potential for overdose.    It has also been made clear that these medications are for use by this patient only, without concomitant use of alcohol or other substances unless prescribed/advised by medical provider.  Patient understands they may be subject to UDS and pill counts at random.      Instructed to complete all of antibiotics for acute illness.  Increase PO fluids, avoid/limit caffeine.  Do not save any of the meds for later use.  Rest PRN  Frequent changing of furnace filters at home to decrease allergens  Steam expectoration, warm compress to sinus, Saline PRN for moisture  Meds as directed for rhinitis.      Injections today while in the office for acute symptom relief.   RTC 1 month, sooner if needed.             This document has been electronically signed by:  JOAQUÍN Ross, EMY-C

## 2019-02-06 DIAGNOSIS — J32.0 CHRONIC MAXILLARY SINUSITIS: ICD-10-CM

## 2019-02-06 DIAGNOSIS — E78.2 MIXED HYPERLIPIDEMIA: ICD-10-CM

## 2019-02-06 DIAGNOSIS — R60.1 GENERALIZED EDEMA: ICD-10-CM

## 2019-02-06 DIAGNOSIS — I10 ESSENTIAL HYPERTENSION: ICD-10-CM

## 2019-02-06 DIAGNOSIS — K21.9 GASTROESOPHAGEAL REFLUX DISEASE WITHOUT ESOPHAGITIS: ICD-10-CM

## 2019-02-06 DIAGNOSIS — IMO0002 DM (DIABETES MELLITUS) TYPE II UNCONTROLLED, PERIPH VASCULAR DISORDER: ICD-10-CM

## 2019-02-06 RX ORDER — ESOMEPRAZOLE MAGNESIUM 40 MG/1
40 CAPSULE, DELAYED RELEASE ORAL
Qty: 30 CAPSULE | Refills: 5 | Status: SHIPPED | OUTPATIENT
Start: 2019-02-06 | End: 2019-02-18 | Stop reason: SDUPTHER

## 2019-02-06 RX ORDER — ENALAPRIL MALEATE 5 MG/1
TABLET ORAL
Qty: 30 TABLET | Refills: 5 | Status: SHIPPED | OUTPATIENT
Start: 2019-02-06 | End: 2019-04-19 | Stop reason: DRUGHIGH

## 2019-02-06 RX ORDER — POTASSIUM CHLORIDE 750 MG/1
TABLET, FILM COATED, EXTENDED RELEASE ORAL
Qty: 60 TABLET | Refills: 5 | Status: SHIPPED | OUTPATIENT
Start: 2019-02-06 | End: 2019-08-06 | Stop reason: SDUPTHER

## 2019-02-06 RX ORDER — ALBUTEROL SULFATE 90 UG/1
AEROSOL, METERED RESPIRATORY (INHALATION)
Qty: 18 G | Refills: 5 | Status: SHIPPED | OUTPATIENT
Start: 2019-02-06 | End: 2019-08-06 | Stop reason: SDUPTHER

## 2019-02-06 RX ORDER — EZETIMIBE 10 MG/1
10 TABLET ORAL NIGHTLY
Qty: 30 TABLET | Refills: 5 | Status: SHIPPED | OUTPATIENT
Start: 2019-02-06 | End: 2019-08-06 | Stop reason: SDUPTHER

## 2019-02-06 RX ORDER — DULAGLUTIDE 0.75 MG/.5ML
INJECTION, SOLUTION SUBCUTANEOUS
Qty: 2 ML | Refills: 5 | Status: SHIPPED | OUTPATIENT
Start: 2019-02-06 | End: 2019-08-06 | Stop reason: SDUPTHER

## 2019-02-18 ENCOUNTER — OFFICE VISIT (OUTPATIENT)
Dept: FAMILY MEDICINE CLINIC | Facility: CLINIC | Age: 49
End: 2019-02-18

## 2019-02-18 VITALS
HEIGHT: 71 IN | BODY MASS INDEX: 39.48 KG/M2 | SYSTOLIC BLOOD PRESSURE: 140 MMHG | DIASTOLIC BLOOD PRESSURE: 88 MMHG | WEIGHT: 282 LBS | TEMPERATURE: 98.7 F | HEART RATE: 81 BPM | OXYGEN SATURATION: 99 %

## 2019-02-18 DIAGNOSIS — Z72.0 TOBACCO ABUSE DISORDER: ICD-10-CM

## 2019-02-18 DIAGNOSIS — M51.27 PROTRUSION OF INTERVERTEBRAL DISC OF LUMBOSACRAL REGION: ICD-10-CM

## 2019-02-18 DIAGNOSIS — M25.78 DEGENERATION OF INTERVERTEBRAL DISC OF THORACIC REGION WITH OSTEOPHYTE: ICD-10-CM

## 2019-02-18 DIAGNOSIS — IMO0002 DM (DIABETES MELLITUS) TYPE II UNCONTROLLED, PERIPH VASCULAR DISORDER: ICD-10-CM

## 2019-02-18 DIAGNOSIS — K21.9 GASTROESOPHAGEAL REFLUX DISEASE WITHOUT ESOPHAGITIS: ICD-10-CM

## 2019-02-18 DIAGNOSIS — R60.1 GENERALIZED EDEMA: ICD-10-CM

## 2019-02-18 DIAGNOSIS — M51.34 DEGENERATION OF INTERVERTEBRAL DISC OF THORACIC REGION WITH OSTEOPHYTE: ICD-10-CM

## 2019-02-18 DIAGNOSIS — Z00.00 MEDICARE ANNUAL WELLNESS VISIT, SUBSEQUENT: Primary | ICD-10-CM

## 2019-02-18 PROCEDURE — G0439 PPPS, SUBSEQ VISIT: HCPCS | Performed by: NURSE PRACTITIONER

## 2019-02-18 RX ORDER — ESOMEPRAZOLE MAGNESIUM 40 MG/1
40 CAPSULE, DELAYED RELEASE ORAL
Qty: 30 CAPSULE | Refills: 5 | Status: SHIPPED | OUTPATIENT
Start: 2019-02-18 | End: 2019-11-22

## 2019-02-18 RX ORDER — BUMETANIDE 2 MG/1
2 TABLET ORAL DAILY
Qty: 45 TABLET | Refills: 5 | Status: SHIPPED | OUTPATIENT
Start: 2019-02-18 | End: 2019-11-22 | Stop reason: SDUPTHER

## 2019-02-18 RX ORDER — SIMETHICONE 80 MG
160 TABLET,CHEWABLE ORAL EVERY 6 HOURS PRN
Qty: 120 TABLET | Refills: 5 | Status: ON HOLD | OUTPATIENT
Start: 2019-02-18 | End: 2019-04-01

## 2019-02-18 NOTE — PROGRESS NOTES
"Subjective   Soheila Brewster is a 48 y.o. female.     Chief Complaint   Patient presents with   • Medicare Wellness-Initial Visit       History of Present Illness     The following portions of the patient's history were reviewed and updated as appropriate: allergies, current medications, past family history, past medical history, past social history, past surgical history and problem list.    Review of Systems   Constitutional: Negative for chills, fatigue and fever.   HENT: Positive for congestion, sinus pressure and sneezing.    Eyes: Negative for pain.   Respiratory: Positive for cough, chest tightness and shortness of breath.    Cardiovascular: Positive for leg swelling. Negative for chest pain and palpitations.   Gastrointestinal: Positive for abdominal pain, nausea and vomiting.   Genitourinary: Positive for frequency. Negative for decreased urine volume, difficulty urinating and urgency.   Musculoskeletal: Positive for back pain and neck pain.   Skin: Negative for rash.   Neurological: Positive for dizziness and headaches.   Hematological: Does not bruise/bleed easily.       Objective     /88 (BP Location: Right arm, Patient Position: Sitting, Cuff Size: Adult)   Pulse 81   Temp 98.7 °F (37.1 °C) (Oral)   Ht 180.3 cm (71\")   Wt 128 kg (282 lb)   SpO2 99%   BMI 39.33 kg/m²     Physical Exam    Assessment/Plan     Problem List Items Addressed This Visit     None          Patient's Body mass index is 39.33 kg/m². BMI is {BMI range:21115}.   (Normal BMI:  18.5-24.9, OW 25-29.9, Obesity 30 or greater)  I advised Soheila of the risks of continuing to use tobacco, and I provided her with tobacco cessation educational materials in the After Visit Summary.     During this visit, I spent *** minutes counseling the patient regarding tobacco cessation.      Understands disease processes and need for medications.  Understands reasons for urgent and emergent care.  Patient (& family) verbalized agreement for " treatment plan.   Emotional support and active listening provided.  Patient provided time to verbalize feelings.            This document has been electronically signed by:  JOAQUÍN Ross, EMY-C

## 2019-02-18 NOTE — PATIENT INSTRUCTIONS
Advance Directive  Advance directives are legal documents that let you make choices ahead of time about your health care and medical treatment in case you become unable to communicate for yourself. Advance directives are a way for you to communicate your wishes to family, friends, and health care providers. This can help convey your decisions about end-of-life care if you become unable to communicate.  Discussing and writing advance directives should happen over time rather than all at once. Advance directives can be changed depending on your situation and what you want, even after you have signed the advance directives.  If you do not have an advance directive, some states assign family decision makers to act on your behalf based on how closely you are related to them. Each state has its own laws regarding advance directives. You may want to check with your health care provider, , or state representative about the laws in your state. There are different types of advance directives, such as:  · Medical power of .  · Living will.  · Do not resuscitate (DNR) or do not attempt resuscitation (DNAR) order.    Health care proxy and medical power of   A health care proxy, also called a health care agent, is a person who is appointed to make medical decisions for you in cases in which you are unable to make the decisions yourself. Generally, people choose someone they know well and trust to represent their preferences. Make sure to ask this person for an agreement to act as your proxy. A proxy may have to exercise judgment in the event of a medical decision for which your wishes are not known.  A medical power of  is a legal document that names your health care proxy. Depending on the laws in your state, after the document is written, it may also need to be:  · Signed.  · Notarized.  · Dated.  · Copied.  · Witnessed.  · Incorporated into your medical record.    You may also want to appoint  someone to manage your financial affairs in a situation in which you are unable to do so. This is called a durable power of  for finances. It is a separate legal document from the durable power of  for health care. You may choose the same person or someone different from your health care proxy to act as your agent in financial matters.  If you do not appoint a proxy, or if there is a concern that the proxy is not acting in your best interests, a court-appointed guardian may be designated to act on your behalf.  Living will  A living will is a set of instructions documenting your wishes about medical care when you cannot express them yourself. Health care providers should keep a copy of your living will in your medical record. You may want to give a copy to family members or friends. To alert caregivers in case of an emergency, you can place a card in your wallet to let them know that you have a living will and where they can find it. A living will is used if you become:  · Terminally ill.  · Incapacitated.  · Unable to communicate or make decisions.    Items to consider in your living will include:  · The use or non-use of life-sustaining equipment, such as dialysis machines and breathing machines (ventilators).  · A DNR or DNAR order, which is the instruction not to use cardiopulmonary resuscitation (CPR) if breathing or heartbeat stops.  · The use or non-use of tube feeding.  · Withholding of food and fluids.  · Comfort (palliative) care when the goal becomes comfort rather than a cure.  · Organ and tissue donation.    A living will does not give instructions for distributing your money and property if you should pass away. It is recommended that you seek the advice of a  when writing a will. Decisions about taxes, beneficiaries, and asset distribution will be legally binding. This process can relieve your family and friends of any concerns surrounding disputes or questions that may come up  about the distribution of your assets.  DNR or DNAR  A DNR or DNAR order is a request not to have CPR in the event that your heart stops beating or you stop breathing. If a DNR or DNAR order has not been made and shared, a health care provider will try to help any patient whose heart has stopped or who has stopped breathing. If you plan to have surgery, talk with your health care provider about how your DNR or DNAR order will be followed if problems occur.  Summary  · Advance directives are the legal documents that allow you to make choices ahead of time about your health care and medical treatment in case you become unable to communicate for yourself.  · The process of discussing and writing advance directives should happen over time. You can change the advance directives, even after you have signed them.  · Advance directives include DNR or DNAR orders, living hoffman, and designating an agent as your medical power of .  This information is not intended to replace advice given to you by your health care provider. Make sure you discuss any questions you have with your health care provider.  Document Released: 03/26/2009 Document Revised: 11/06/2017 Document Reviewed: 11/06/2017  [employer] Interactive Patient Education © 2017 [employer]  Steps to Quit Smoking  Smoking tobacco can be harmful to your health and can affect almost every organ in your body. Smoking puts you, and those around you, at risk for developing many serious chronic diseases. Quitting smoking is difficult, but it is one of the best things that you can do for your health. It is never too late to quit.  What are the benefits of quitting smoking?  When you quit smoking, you lower your risk of developing serious diseases and conditions, such as:  · Lung cancer or lung disease, such as COPD.  · Heart disease.  · Stroke.  · Heart attack.  · Infertility.  · Osteoporosis and bone fractures.    Additionally, symptoms such as coughing, wheezing, and  shortness of breath may get better when you quit. You may also find that you get sick less often because your body is stronger at fighting off colds and infections. If you are pregnant, quitting smoking can help to reduce your chances of having a baby of low birth weight.  How do I get ready to quit?  When you decide to quit smoking, create a plan to make sure that you are successful. Before you quit:  · Pick a date to quit. Set a date within the next two weeks to give you time to prepare.  · Write down the reasons why you are quitting. Keep this list in places where you will see it often, such as on your bathroom mirror or in your car or wallet.  · Identify the people, places, things, and activities that make you want to smoke (triggers) and avoid them. Make sure to take these actions:  ? Throw away all cigarettes at home, at work, and in your car.  ? Throw away smoking accessories, such as ashtrays and lighters.  ? Clean your car and make sure to empty the ashtray.  ? Clean your home, including curtains and carpets.  · Tell your family, friends, and coworkers that you are quitting. Support from your loved ones can make quitting easier.  · Talk with your health care provider about your options for quitting smoking.  · Find out what treatment options are covered by your health insurance.    What strategies can I use to quit smoking?  Talk with your healthcare provider about different strategies to quit smoking. Some strategies include:  · Quitting smoking altogether instead of gradually lessening how much you smoke over a period of time. Research shows that quitting “cold turkey” is more successful than gradually quitting.  · Attending in-person counseling to help you build problem-solving skills. You are more likely to have success in quitting if you attend several counseling sessions. Even short sessions of 10 minutes can be effective.  · Finding resources and support systems that can help you to quit smoking and  remain smoke-free after you quit. These resources are most helpful when you use them often. They can include:  ? Online chats with a counselor.  ? Telephone quitlines.  ? Printed self-help materials.  ? Support groups or group counseling.  ? Text messaging programs.  ? Mobile phone applications.  · Taking medicines to help you quit smoking. (If you are pregnant or breastfeeding, talk with your health care provider first.) Some medicines contain nicotine and some do not. Both types of medicines help with cravings, but the medicines that include nicotine help to relieve withdrawal symptoms. Your health care provider may recommend:  ? Nicotine patches, gum, or lozenges.  ? Nicotine inhalers or sprays.  ? Non-nicotine medicine that is taken by mouth.    Talk with your health care provider about combining strategies, such as taking medicines while you are also receiving in-person counseling. Using these two strategies together makes you more likely to succeed in quitting than if you used either strategy on its own.  If you are pregnant or breastfeeding, talk with your health care provider about finding counseling or other support strategies to quit smoking. Do not take medicine to help you quit smoking unless told to do so by your health care provider.  What things can I do to make it easier to quit?  Quitting smoking might feel overwhelming at first, but there is a lot that you can do to make it easier. Take these important actions:  · Reach out to your family and friends and ask that they support and encourage you during this time. Call telephone quitlines, reach out to support groups, or work with a counselor for support.  · Ask people who smoke to avoid smoking around you.  · Avoid places that trigger you to smoke, such as bars, parties, or smoke-break areas at work.  · Spend time around people who do not smoke.  · Lessen stress in your life, because stress can be a smoking trigger for some people. To lessen stress,  try:  ? Exercising regularly.  ? Deep-breathing exercises.  ? Yoga.  ? Meditating.  ? Performing a body scan. This involves closing your eyes, scanning your body from head to toe, and noticing which parts of your body are particularly tense. Purposefully relax the muscles in those areas.  · Download or purchase mobile phone or tablet apps (applications) that can help you stick to your quit plan by providing reminders, tips, and encouragement. There are many free apps, such as QuitGuide from the CDC (Centers for Disease Control and Prevention). You can find other support for quitting smoking (smoking cessation) through smokefree.gov and other websites.    How will I feel when I quit smoking?  Within the first 24 hours of quitting smoking, you may start to feel some withdrawal symptoms. These symptoms are usually most noticeable 2-3 days after quitting, but they usually do not last beyond 2-3 weeks. Changes or symptoms that you might experience include:  · Mood swings.  · Restlessness, anxiety, or irritation.  · Difficulty concentrating.  · Dizziness.  · Strong cravings for sugary foods in addition to nicotine.  · Mild weight gain.  · Constipation.  · Nausea.  · Coughing or a sore throat.  · Changes in how your medicines work in your body.  · A depressed mood.  · Difficulty sleeping (insomnia).    After the first 2-3 weeks of quitting, you may start to notice more positive results, such as:  · Improved sense of smell and taste.  · Decreased coughing and sore throat.  · Slower heart rate.  · Lower blood pressure.  · Clearer skin.  · The ability to breathe more easily.  · Fewer sick days.    Quitting smoking is very challenging for most people. Do not get discouraged if you are not successful the first time. Some people need to make many attempts to quit before they achieve long-term success. Do your best to stick to your quit plan, and talk with your health care provider if you have any questions or concerns.  This  information is not intended to replace advice given to you by your health care provider. Make sure you discuss any questions you have with your health care provider.  Document Released: 12/12/2002 Document Revised: 08/15/2017 Document Reviewed: 05/03/2016  [employer] Interactive Patient Education © 2018 [employer]  Protein Content in Foods  Generally, most healthy people need around 50 grams of protein each day. Depending on your overall health, you may need more or less protein in your diet. Talk to your health care provider or dietitian about how much protein you need.  See the following list for the protein content of some common foods.  High-protein foods  High-protein foods contain 4 grams (4 g) or more of protein per serving. They include:  · Beef, ground sirloin (cooked) -- 3 oz have 24 g of protein.  · Cheese (hard) -- 1 oz has 7 g of protein.  · Chicken breast, boneless and skinless (cooked) -- 3 oz have 13.4 g of protein.  · Cottage cheese -- 1/2 cup has 13.4 g of protein.  · Egg -- 1 egg has 6 g of protein.  · Fish, filet (cooked) -- 1 oz has 6-7 g of protein.  · Garbanzo beans (canned or cooked) -- 1/2 cup has 6-7 g of protein.  · Kidney beans (canned or cooked) -- 1/2 cup has 6-7 g of protein.  · Lamb (cooked) -- 3 oz has 24 g of protein.  · Milk -- 1 cup (8 oz) has 8 g of protein.  · Nuts (peanuts, pistachios, almonds) -- 1 oz has 6 g of protein.  · Peanut butter -- 1 oz has 7-8 g of protein.  · Pork tenderloin (cooked) -- 3 oz has 18.4 g of protein.  · Pumpkin seeds -- 1 oz has 8.5 g of protein.  · Soybeans (roasted) -- 1 oz has 8 g of protein.  · Soybeans (cooked) -- 1/2 cup has 11 g of protein.  · Soy milk -- 1 cup (8 oz) has 5-10 g of protein.  · Soy or vegetable gaudencio -- 1 gaudencio has 11 g of protein.  · Sunflower seeds -- 1 oz has 5.5 g of protein.  · Tofu (firm) -- 1/2 cup has 20 g of protein.  · Tuna (canned in water) -- 3 oz has 20 g of protein.  · Yogurt -- 6 oz has 8 g of  protein.    Low-protein foods  Low-protein foods contain 3 grams (3 g) or less of protein per serving. They include:  · Beets (raw or cooked) -- 1/2 cup has 1.5 g of protein.  · Bran cereal -- 1/2 cup has 2-3 g of protein.  · Bread -- 1 slice has 2.5 g of protein.  · Broccoli (raw or cooked) -- 1/2 cup has 2 g of protein.  · [place]ilis greens (raw or cooked) -- 1/2 cup has 2 g of protein.  · Corn (fresh or cooked) -- 1/2 cup has 2 g of protein.  · Cream cheese -- 1 oz has 2 g of protein.  · Creamer (half-and-half) -- 1 oz has 1 g of protein.  · Flour tortilla -- 1 tortilla has 2.5 g of protein  · Frozen yogurt -- 1/2 cup has 3 g of protein.  · Fruit or vegetable juice -- 1/2 cup has 1 g of protein.  · Green beans (raw or cooked) -- 1/2 cup has 1 g of protein.  · Green peas (canned) -- 1/2 cup has 3.5 g of protein.  · Muffins -- 1 small muffin (2 oz) has 3 g of protein.  · Oatmeal (cooked) -- 1/2 cup has 3 g of protein.  · Potato (baked with skin) -- 1 medium potato has 3 g of protein.  · Rice (cooked) -- 1/2 cup has 2.5-3.5 g of protein.  · Sour cream -- 1/2 cup has 2.5 g of protein.  · Spinach (cooked) -- 1/2 cup has 3 g of protein.  · Squash (cooked) -- 1/2 cup has 1.5 g of protein.    Actual amounts of protein may be different depending on processing. Talk with your health care provider or dietitian about what foods are recommended for you.  This information is not intended to replace advice given to you by your health care provider. Make sure you discuss any questions you have with your health care provider.  Document Released: 03/18/2017 Document Revised: 08/28/2017 Document Reviewed: 08/28/2017  [employer] Interactive Patient Education © 2018 [employer]  Fall Prevention in the Home  Falls can cause injuries and can affect people from all age groups. There are many simple things that you can do to make your home safe and to help prevent falls.  What can I do on the outside of my home?  · Regularly repair the  edges of walkways and driveways and fix any cracks.  · Remove high doorway thresholds.  · Trim any shrubbery on the main path into your home.  · Use bright outdoor lighting.  · Clear walkways of debris and clutter, including tools and rocks.  · Regularly check that handrails are securely fastened and in good repair. Both sides of any steps should have handrails.  · Install guardrails along the edges of any raised decks or porches.  · Have leaves, snow, and ice cleared regularly.  · Use sand or salt on walkways during winter months.  · In the garage, clean up any spills right away, including grease or oil spills.  What can I do in the bathroom?  · Use night lights.  · Install grab bars by the toilet and in the tub and shower. Do not use towel bars as grab bars.  · Use non-skid mats or decals on the floor of the tub or shower.  · If you need to sit down while you are in the shower, use a plastic, non-slip stool.  · Keep the floor dry. Immediately clean up any water that spills on the floor.  · Remove soap buildup in the tub or shower on a regular basis.  · Attach bath mats securely with double-sided non-slip rug tape.  · Remove throw rugs and other tripping hazards from the floor.  What can I do in the bedroom?  · Use night lights.  · Make sure that a bedside light is easy to reach.  · Do not use oversized bedding that drapes onto the floor.  · Have a firm chair that has side arms to use for getting dressed.  · Remove throw rugs and other tripping hazards from the floor.  What can I do in the kitchen?  · Clean up any spills right away.  · Avoid walking on wet floors.  · Place frequently used items in easy-to-reach places.  · If you need to reach for something above you, use a sturdy step stool that has a grab bar.  · Keep electrical cables out of the way.  · Do not use floor polish or wax that makes floors slippery. If you have to use wax, make sure that it is non-skid floor wax.  · Remove throw rugs and other  tripping hazards from the floor.  What can I do in the stairways?  · Do not leave any items on the stairs.  · Make sure that there are handrails on both sides of the stairs. Fix handrails that are broken or loose. Make sure that handrails are as long as the stairways.  · Check any carpeting to make sure that it is firmly attached to the stairs. Fix any carpet that is loose or worn.  · Avoid having throw rugs at the top or bottom of stairways, or secure the rugs with carpet tape to prevent them from moving.  · Make sure that you have a light switch at the top of the stairs and the bottom of the stairs. If you do not have them, have them installed.  What are some other fall prevention tips?  · Wear closed-toe shoes that fit well and support your feet. Wear shoes that have rubber soles or low heels.  · When you use a stepladder, make sure that it is completely opened and that the sides are firmly locked. Have someone hold the ladder while you are using it. Do not climb a closed stepladder.  · Add color or contrast paint or tape to grab bars and handrails in your home. Place contrasting color strips on the first and last steps.  · Use mobility aids as needed, such as canes, walkers, scooters, and crutches.  · Turn on lights if it is dark. Replace any light bulbs that burn out.  · Set up furniture so that there are clear paths. Keep the furniture in the same spot.  · Fix any uneven floor surfaces.  · Choose a carpet design that does not hide the edge of steps of a stairway.  · Be aware of any and all pets.  · Review your medicines with your healthcare provider. Some medicines can cause dizziness or changes in blood pressure, which increase your risk of falling.  Talk with your health care provider about other ways that you can decrease your risk of falls. This may include working with a physical therapist or  to improve your strength, balance, and endurance.  This information is not intended to replace advice  "given to you by your health care provider. Make sure you discuss any questions you have with your health care provider.  Document Released: 12/08/2003 Document Revised: 05/16/2017 Document Reviewed: 01/22/2016  [employer] Interactive Patient Education © 2018 [employer]  Eating Healthy on a Budget  There are many ways to save money at the grocery store and continue to eat healthy. You can be successful if you plan your meals according to your budget, purchase according to your budget and grocery list, and prepare food yourself.  How can I buy more food on a limited budget?  Plan  · Plan meals and snacks according to a grocery list and budget you create.  · Look for recipes where you can cook once and make enough food for two meals.  · Include meals that will \"stretch\" more expensive foods such as stews, casseroles, and stir-mcknight dishes.  · Make a grocery list and make sure to bring it with you to the store. If you have a smart phone, you could use your phone to create your shopping list.  Purchase  · When grocery shopping, buy only the items on your grocery list and go only to the areas of the store that have the items on your list.  Prepare  · Some meal items can be prepared in advance. Pre-cook on days when you have extra time.  · Make extra food (such as by doubling recipes) and freeze the extras in meal-sized containers or in individual portions for fast meals and snacks.  · Use leftovers in your meal plan for the week.  · Try some meatless meals or try \"no cook\" meals like salads.  · When you come home from the grocery store, wash and prepare your fruits and vegetables so they are ready to use and eat. This will help reduce food waste.  How can I buy more food on a limited budget?  Try these tips the next time you go shopping:  · Buy store brands or generic brands.  · Use coupons only for foods and brands you normally buy. Avoid buying items you wouldn't normally buy simply because they are on sale.  · Check " "online and in newspapers for weekly deals.  · Buy healthy items from the bulk bins when available, such as herbs, spices, flours, pastas, nuts, and dried fruit.  · Buy fruits and vegetables that are in season. Prices are usually lower on in-season produce.  · Compare and contrast different items. You can do this by looking at the unit price on the price tag. Use it to compare different brands and sizes to find out which item is the best deal.  · Choose naturally low-cost healthy items, such as carrots, potatoes, apples, bananas, and oranges. Dried or canned beans are a low-cost protein source.  · Buy in bulk and freeze extra food. Items you can buy in bulk include meats, fish, poultry, frozen fruits, and frozen vegetables.  · Limit the purchase of prepared or \"ready-to-eat\" foods, such as pre-cut fruits and vegetables and pre-made salads.  · If possible, shop around to discover which grocery store offers the best prices. Some stores charge much more than other stores for the same items.  · Do not shop when you are hungry. If you shop while hungry, It may be hard to stick to your list and budget.  · Stick to your list and resist impulse buys. Treat your list as your official plan for the week.  · Buy a variety of vegetables and fruit by purchasing fresh, frozen, and canned items.  · Look beyond eye level. Foods at eye level (adult or child eye level) are more expensive. Look at the top and bottom shelves for deals.  · Be efficient with your time when shopping. The more time you spend at the store, the more money you are likely to spend.  · Consider other retailers such as dollar stores, larger wholesale stores, local fruit and vegetable stands, and farmers markets.    What are some tips for less expensive food substitutions?  When choosing more expensive foods like meats and dairy, try these tips to save money:  · Choose cheaper cuts of meat, such as bone-in chicken thighs and drumsticks instead skinless and boneless " "chicken. When you are ready to prepare the chicken, you can remove the skin yourself to make it healthier.  · Choose lean meats like chicken or turkey. When choosing ground beef, make sure it is lean ground beef (92% lean, 8% fat). If you do buy a fattier ground beef, drain the fat before eating.  · Buy dried beans and peas, such as lentils, split peas, or kidney beans.  · For seafood, choose canned tuna, salmon, or sardines.  · Eggs are a low-cost source of protein.  · Buy the larger tubs of yogurt instead of individual-sized containers.  · Choose water instead of sodas and other sweetened beverages.  · Skip buying chips, cookies, and other \"junk food\". These items are usually expensive, high in calories, and low in nutritional value.    How can I prepare the foods I buy in the healthiest way?  Practice these tips for cooking foods in the healthiest way to reduce excess fat and calorie intake:  · Steam, saute, grill, or bake foods instead of frying them.  · Make sure half your plate is filled with fruits or vegetables. Choose from fresh, frozen, or canned fruits and vegetables. If eating canned, remember to rinse them before eating. This will remove any excess salt added for packaging.  · Trim all fat from meat before cooking. Remove the skin from chicken or turkey.  · Spoon off fat from meat dishes once they have been chilled in the refrigerator and the fat has hardened on the top.  · Use skim milk, low-fat milk, or evaporated skim milk when making cream sauces, soups, or puddings.  · Substitute low-fat yogurt, sour cream, or cottage cheese for sour cream and mayonnaise in dips and dressings.  · Try lemon juice, herbs, or spices to season food instead of salt, butter, or margarine.    This information is not intended to replace advice given to you by your health care provider. Make sure you discuss any questions you have with your health care provider.  Document Released: 08/21/2015 Document Revised: 07/07/2017 " Document Reviewed: 07/21/2015  [employer] Interactive Patient Education © 2018 [employer]

## 2019-03-19 ENCOUNTER — OFFICE VISIT (OUTPATIENT)
Dept: FAMILY MEDICINE CLINIC | Facility: CLINIC | Age: 49
End: 2019-03-19

## 2019-03-19 VITALS
BODY MASS INDEX: 40.54 KG/M2 | OXYGEN SATURATION: 98 % | HEIGHT: 71 IN | DIASTOLIC BLOOD PRESSURE: 79 MMHG | WEIGHT: 289.6 LBS | SYSTOLIC BLOOD PRESSURE: 136 MMHG | HEART RATE: 88 BPM | TEMPERATURE: 99.8 F

## 2019-03-19 DIAGNOSIS — M51.27 PROTRUSION OF INTERVERTEBRAL DISC OF LUMBOSACRAL REGION: ICD-10-CM

## 2019-03-19 DIAGNOSIS — M25.78 DEGENERATION OF INTERVERTEBRAL DISC OF THORACIC REGION WITH OSTEOPHYTE: ICD-10-CM

## 2019-03-19 DIAGNOSIS — M51.24 PROTRUSION OF THORACIC INTERVERTEBRAL DISC: ICD-10-CM

## 2019-03-19 DIAGNOSIS — R68.89 FLU-LIKE SYMPTOMS: Primary | ICD-10-CM

## 2019-03-19 DIAGNOSIS — M51.34 DEGENERATION OF INTERVERTEBRAL DISC OF THORACIC REGION WITH OSTEOPHYTE: ICD-10-CM

## 2019-03-19 DIAGNOSIS — T40.2X5A CONSTIPATION DUE TO OPIOID THERAPY: ICD-10-CM

## 2019-03-19 DIAGNOSIS — K59.03 CONSTIPATION DUE TO OPIOID THERAPY: ICD-10-CM

## 2019-03-19 DIAGNOSIS — J06.9 ACUTE URI: ICD-10-CM

## 2019-03-19 LAB
EXPIRATION DATE: NORMAL
FLUAV AG NPH QL: NEGATIVE
FLUBV AG NPH QL: NEGATIVE
INTERNAL CONTROL: NORMAL
Lab: NORMAL

## 2019-03-19 PROCEDURE — 99214 OFFICE O/P EST MOD 30 MIN: CPT | Performed by: NURSE PRACTITIONER

## 2019-03-19 PROCEDURE — 87804 INFLUENZA ASSAY W/OPTIC: CPT | Performed by: NURSE PRACTITIONER

## 2019-03-19 PROCEDURE — 96372 THER/PROPH/DIAG INJ SC/IM: CPT | Performed by: NURSE PRACTITIONER

## 2019-03-19 RX ORDER — GENTAMICIN SULFATE 40 MG/ML
80 INJECTION, SOLUTION INTRAMUSCULAR; INTRAVENOUS ONCE
Status: COMPLETED | OUTPATIENT
Start: 2019-03-19 | End: 2019-03-19

## 2019-03-19 RX ORDER — LUBIPROSTONE 24 UG/1
24 CAPSULE ORAL 2 TIMES DAILY WITH MEALS
Qty: 30 CAPSULE | Refills: 0 | COMMUNITY
Start: 2019-03-19 | End: 2020-07-13 | Stop reason: SDUPTHER

## 2019-03-19 RX ORDER — CLARITHROMYCIN 500 MG/1
500 TABLET, COATED ORAL EVERY 12 HOURS SCHEDULED
Qty: 20 TABLET | Refills: 0 | Status: SHIPPED | OUTPATIENT
Start: 2019-03-19 | End: 2019-03-29

## 2019-03-19 RX ADMIN — GENTAMICIN SULFATE 80 MG: 40 INJECTION, SOLUTION INTRAMUSCULAR; INTRAVENOUS at 14:08

## 2019-03-19 NOTE — PROGRESS NOTES
"Subjective   Soheila Brewster is a 48 y.o. female.     Chief Complaint   Patient presents with   • 1 mon follow up   • Flu Symptoms       History of Present Illness     MRI follow up for lumbar and thoracic spine-here to follow up for MRI of spine.  Multiple areas of protrusion of disc in lumbar and thoracic spine.   URI symptoms-for approx 2 days.  She reports generalized malaise and fever yesterday.  Has taken Tylenol x's 2 since yesterday for fever up to 102.  She reports cough with \"green production\".  She reports secretions are thick and difficult to \"blow and cough\".  Rhinorrhea is present.  She has had increase in eyes Watering.  She reports temporal headache with some radiation to the back of her head.  SOA is present with cough.  She has noted wheezing when she lays down to sleep.  She reports the sample of trelegy she had in the past seemed to help the most with her respiratory symptoms.    Diabetes-has seen some elevations since she has been acutely ill.  None over 200.  She is taking meds as directed.  She denies any negative side effects of her diabetes medication.  He does try to watch her diet but is not always dietary compliant.    The following portions of the patient's history were reviewed and updated as appropriate: allergies, current medications, past family history, past medical history, past social history, past surgical history and problem list.    Review of Systems   Constitutional: Positive for fatigue. Negative for appetite change and unexpected weight change.   HENT: Positive for congestion, ear pain, postnasal drip and sore throat. Negative for nosebleeds, rhinorrhea, trouble swallowing and voice change.    Eyes: Negative for pain and visual disturbance.   Respiratory: Positive for cough, shortness of breath and wheezing.    Cardiovascular: Negative for chest pain and palpitations.   Gastrointestinal: Positive for constipation and nausea. Negative for abdominal pain, blood in stool and " "diarrhea.   Endocrine: Negative for cold intolerance and polydipsia.   Genitourinary: Negative for difficulty urinating, flank pain and hematuria.   Musculoskeletal: Positive for arthralgias and myalgias. Negative for back pain, gait problem and joint swelling.   Skin: Negative for color change and rash.   Allergic/Immunologic: Negative.    Neurological: Positive for dizziness and headaches. Negative for syncope and numbness.   Hematological: Negative.    Psychiatric/Behavioral: Positive for sleep disturbance. Negative for suicidal ideas.   All other systems reviewed and are negative.      Objective     /79 (BP Location: Right arm, Patient Position: Sitting, Cuff Size: Adult)   Pulse 88   Temp 99.8 °F (37.7 °C) (Tympanic)   Ht 180.3 cm (71\")   Wt 131 kg (289 lb 9.6 oz)   SpO2 98%   Breastfeeding? No   BMI 40.39 kg/m²      Physical Exam   Constitutional: She appears well-developed and well-nourished. No distress.   HENT:   Head: Normocephalic and atraumatic.   Right Ear: Ear canal normal. Tympanic membrane is injected. Tympanic membrane is not scarred, not retracted and not bulging. A middle ear effusion is present.   Left Ear: Ear canal normal. Tympanic membrane is injected. Tympanic membrane is not scarred, not retracted and not bulging. A middle ear effusion is present.   Nose: Mucosal edema and rhinorrhea present. No epistaxis. Right sinus exhibits no maxillary sinus tenderness and no frontal sinus tenderness. Left sinus exhibits no maxillary sinus tenderness and no frontal sinus tenderness.   Mouth/Throat: Uvula is midline and mucous membranes are normal. No uvula swelling. Oropharyngeal exudate and posterior oropharyngeal erythema present.   Eyes: EOM are normal. Pupils are equal, round, and reactive to light. No scleral icterus.   Neck: Normal range of motion. No thyromegaly present.   Cardiovascular: Normal rate, regular rhythm, normal heart sounds and intact distal pulses.   Pulmonary/Chest: " Effort normal. She has decreased breath sounds (mildly over bronchial region of anterior chest). She has wheezes (scattered mild).   Abdominal: Soft. Bowel sounds are normal. There is no hepatosplenomegaly. There is no tenderness. A hernia is present.       Protuberant abdomen   Musculoskeletal: She exhibits tenderness.        Thoracic back: She exhibits tenderness and spasm.        Lumbar back: She exhibits decreased range of motion, tenderness, pain and spasm.   Pain with palpation over sciatic bilaterally    Lymphadenopathy:     She has cervical adenopathy.        Right cervical: Superficial cervical adenopathy present.        Left cervical: Superficial cervical adenopathy present.        Right: Supraclavicular (firm mobile node.  Mild tenderness) adenopathy present.   Neurological: She is alert. She displays normal reflexes. No cranial nerve deficit.   Skin: Skin is warm and dry. Capillary refill takes less than 2 seconds.   Psychiatric: She has a normal mood and affect. Her behavior is normal. Judgment and thought content normal.   Vitals reviewed.      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 BMI is above normal  parameters. Recommendations include: nutrition counseling.   (Normal BMI:  18.5-24.9, OW 25-29.9, Obesity 30 or greater)    Understands disease processes and need for medications.  Understands reasons for urgent and emergent care.  Patient (& family) verbalized agreement for treatment plan.   Emotional support and active listening provided.  Patient provided time to verbalize feelings.  Request order for prescription for her BiPAP supplies.  We will send that order to [facility] per her request.  Refill on her routine medications today.  Instructed to complete all of antibiotics for acute illness.  Increase PO fluids, avoid/limit caffeine.  Do not save any of the meds for later use.  Rest PRN  Injection of gentamicin today.  Steam expectoration, warm compress to sinus, Saline PRN for moisture    Reviewed most recent MRI's with patient.  Discussed any abnormal findings.  Patient's questions answered.  Referral today to neurology for further evaluation of her MRIs.    Return to the clinic in 1 month, sooner if needed for problems or concerns.          This document has been electronically signed by:  JOAQUÍN Ross, ELIDA

## 2019-03-20 ENCOUNTER — OFFICE VISIT (OUTPATIENT)
Dept: SURGERY | Facility: CLINIC | Age: 49
End: 2019-03-20

## 2019-03-20 VITALS — WEIGHT: 289 LBS | BODY MASS INDEX: 40.46 KG/M2 | HEIGHT: 71 IN

## 2019-03-20 DIAGNOSIS — E04.1 THYROID NODULE: Primary | ICD-10-CM

## 2019-03-20 PROCEDURE — 99212 OFFICE O/P EST SF 10 MIN: CPT | Performed by: SURGERY

## 2019-03-22 NOTE — PROGRESS NOTES
Subjective   Soheila Brewster is a 48 y.o. female  is here today for follow-up.         Soheila Brewster is a 48 y.o. female here for follow up after fine-needle aspiration of a right thyroid nodule greater than 2 cm.  This was consistent with benign goiter but she has developed increasing size of the lesion with swallowing difficulty.  She also has a palpable left thyroid lobe nodule.  No family history of thyroid malignancy or other abnormality.            Assessment     Soheila was seen today for thyroid nodule.    Diagnoses and all orders for this visit:    Thyroid nodule      Soheila Brewster is a 48 y.o. female with bilateral thyroid nodules FNA showed to be negative for malignancy but she has had an increase in size and symptomatology related to mass-effect.  We will transfer her care to my partner Dr. Shoemaker and he will evaluate for possible partial or complete thyroidectomy for continued surveillance.

## 2019-03-26 ENCOUNTER — OFFICE VISIT (OUTPATIENT)
Dept: SURGERY | Facility: CLINIC | Age: 49
End: 2019-03-26

## 2019-03-26 VITALS — WEIGHT: 289 LBS | BODY MASS INDEX: 40.46 KG/M2 | HEIGHT: 71 IN

## 2019-03-26 DIAGNOSIS — E04.1 THYROID NODULE: Primary | ICD-10-CM

## 2019-03-26 PROCEDURE — 99213 OFFICE O/P EST LOW 20 MIN: CPT | Performed by: SURGERY

## 2019-03-26 NOTE — PROGRESS NOTES
Subjective   Soheila Brewster is a 48 y.o. female.     History of Present Illness She has had a 2 cm nodule in her right thyroid lobe biopsied and is likely a benign goiter. However it is growing and she also has a nodule in the left lobe. She has had some difficulty swallowing recently as it has been getting larger. She also had some nodules on the left that were not as large.     The following portions of the patient's history were reviewed and updated as appropriate: current medications, past family history, past medical history, past social history, past surgical history and problem list.    Review of Systems   Constitutional: Negative for activity change, appetite change, chills, fever and unexpected weight change.   HENT: Positive for trouble swallowing. Negative for congestion, facial swelling and sore throat.    Eyes: Negative for photophobia and visual disturbance.   Respiratory: Negative for chest tightness, shortness of breath and wheezing.    Cardiovascular: Negative for chest pain, palpitations and leg swelling.   Gastrointestinal: Negative for abdominal distention, abdominal pain, anal bleeding, blood in stool, constipation, diarrhea, nausea, rectal pain and vomiting.   Endocrine: Negative for cold intolerance, heat intolerance, polydipsia and polyuria.   Genitourinary: Negative for difficulty urinating, dysuria, flank pain and urgency.   Musculoskeletal: Negative for back pain and myalgias.   Skin: Negative for rash and wound.   Allergic/Immunologic: Negative for immunocompromised state.   Neurological: Negative for dizziness, seizures, syncope, light-headedness, numbness and headaches.   Hematological: Negative for adenopathy. Does not bruise/bleed easily.   Psychiatric/Behavioral: Negative for behavioral problems and confusion. The patient is not nervous/anxious.        Objective   Physical Exam   Constitutional: She is oriented to person, place, and time. She appears well-developed and well-nourished.  She does not appear ill. No distress.       HENT:   Head: Normocephalic. Head is without laceration. Hair is normal.   Right Ear: Hearing and ear canal normal.   Left Ear: Hearing and ear canal normal.   Nose: Nose normal. No sinus tenderness. No epistaxis. Right sinus exhibits no maxillary sinus tenderness and no frontal sinus tenderness. Left sinus exhibits no maxillary sinus tenderness and no frontal sinus tenderness.   Eyes: Conjunctivae and lids are normal. Pupils are equal, round, and reactive to light.   Neck: Normal range of motion. No JVD present. No tracheal tenderness present. No tracheal deviation present. Thyromegaly present. No thyroid mass present.   Cardiovascular: Normal rate and regular rhythm. Exam reveals no gallop.   No murmur heard.  Pulmonary/Chest: Effort normal and breath sounds normal. No stridor. She has no wheezes. She exhibits no tenderness.   Abdominal: Soft. Bowel sounds are normal. She exhibits no distension, no ascites and no mass. There is no tenderness. There is no rebound and no guarding. No hernia.   Musculoskeletal: She exhibits no edema or deformity.   Lymphadenopathy:     She has no cervical adenopathy.     She has no axillary adenopathy.        Right: No inguinal and no supraclavicular adenopathy present.        Left: No inguinal and no supraclavicular adenopathy present.   Neurological: She is alert and oriented to person, place, and time. She exhibits normal muscle tone.   Skin: Skin is warm, dry and intact. No rash noted. No erythema. No pallor.   Psychiatric: She has a normal mood and affect. Her behavior is normal. Thought content normal.   Vitals reviewed.      Assessment/Plan   Soheila was seen today for hyperthyroidism.    Diagnoses and all orders for this visit:    Thyroid nodule    right thyroid lobectomy and possible subtotal thyroidectomy    I did discuss the importance of stopping smoking and weight loss.

## 2019-04-01 ENCOUNTER — ANESTHESIA (OUTPATIENT)
Dept: PERIOP | Facility: HOSPITAL | Age: 49
End: 2019-04-01

## 2019-04-01 ENCOUNTER — HOSPITAL ENCOUNTER (OUTPATIENT)
Facility: HOSPITAL | Age: 49
Discharge: HOME | End: 2019-04-02
Attending: SURGERY | Admitting: ANESTHESIOLOGY

## 2019-04-01 ENCOUNTER — ANESTHESIA EVENT (OUTPATIENT)
Dept: PERIOP | Facility: HOSPITAL | Age: 49
End: 2019-04-01

## 2019-04-01 ENCOUNTER — APPOINTMENT (OUTPATIENT)
Dept: PREADMISSION TESTING | Facility: HOSPITAL | Age: 49
End: 2019-04-01

## 2019-04-01 DIAGNOSIS — E04.1 THYROID NODULE: ICD-10-CM

## 2019-04-01 LAB
ANION GAP SERPL CALCULATED.3IONS-SCNC: 10.2 MMOL/L
B-HCG UR QL: NEGATIVE
BUN BLD-MCNC: 10 MG/DL (ref 6–20)
BUN/CREAT SERPL: 14.9 (ref 7–25)
CALCIUM SPEC-SCNC: 9.6 MG/DL (ref 8.6–10.5)
CHLORIDE SERPL-SCNC: 100 MMOL/L (ref 98–107)
CO2 SERPL-SCNC: 26.8 MMOL/L (ref 22–29)
CREAT BLD-MCNC: 0.67 MG/DL (ref 0.57–1)
DEPRECATED RDW RBC AUTO: 41.1 FL (ref 37–54)
ERYTHROCYTE [DISTWIDTH] IN BLOOD BY AUTOMATED COUNT: 14 % (ref 12.3–15.4)
GFR SERPL CREATININE-BSD FRML MDRD: 94 ML/MIN/1.73
GLUCOSE BLD-MCNC: 276 MG/DL (ref 65–99)
GLUCOSE BLDC GLUCOMTR-MCNC: 231 MG/DL (ref 70–130)
GLUCOSE BLDC GLUCOMTR-MCNC: 261 MG/DL (ref 70–130)
GLUCOSE BLDC GLUCOMTR-MCNC: 295 MG/DL (ref 70–130)
HCT VFR BLD AUTO: 41.5 % (ref 34–46.6)
HGB BLD-MCNC: 13.9 G/DL (ref 12–15.9)
INTERNAL NEGATIVE CONTROL: NEGATIVE
INTERNAL POSITIVE CONTROL: POSITIVE
Lab: NORMAL
MCH RBC QN AUTO: 27.7 PG (ref 26.6–33)
MCHC RBC AUTO-ENTMCNC: 33.5 G/DL (ref 31.5–35.7)
MCV RBC AUTO: 82.8 FL (ref 79–97)
PLATELET # BLD AUTO: 173 10*3/MM3 (ref 140–450)
PMV BLD AUTO: 11.6 FL (ref 6–12)
POTASSIUM BLD-SCNC: 4.5 MMOL/L (ref 3.5–5.2)
RBC # BLD AUTO: 5.01 10*6/MM3 (ref 3.77–5.28)
SODIUM BLD-SCNC: 137 MMOL/L (ref 136–145)
WBC NRBC COR # BLD: 8.36 10*3/MM3 (ref 3.4–10.8)

## 2019-04-01 PROCEDURE — 63710000001 ENALAPRIL 5 MG TABLET: Performed by: SURGERY

## 2019-04-01 PROCEDURE — 63710000001 ROSUVASTATIN 20 MG TABLET: Performed by: SURGERY

## 2019-04-01 PROCEDURE — 63710000001 CETIRIZINE 10 MG TABLET: Performed by: SURGERY

## 2019-04-01 PROCEDURE — 25010000002 ONDANSETRON PER 1 MG: Performed by: NURSE ANESTHETIST, CERTIFIED REGISTERED

## 2019-04-01 PROCEDURE — 82962 GLUCOSE BLOOD TEST: CPT

## 2019-04-01 PROCEDURE — 63710000001 POTASSIUM CHLORIDE 10 MEQ TABLET CONTROLLED-RELEASE: Performed by: SURGERY

## 2019-04-01 PROCEDURE — A9270 NON-COVERED ITEM OR SERVICE: HCPCS | Performed by: SURGERY

## 2019-04-01 PROCEDURE — 63710000001 BUMETANIDE 1 MG TABLET: Performed by: SURGERY

## 2019-04-01 PROCEDURE — 63710000001 MONTELUKAST 10 MG TABLET: Performed by: SURGERY

## 2019-04-01 PROCEDURE — 93005 ELECTROCARDIOGRAM TRACING: CPT

## 2019-04-01 PROCEDURE — 25010000002 MIDAZOLAM PER 1 MG: Performed by: ANESTHESIOLOGY

## 2019-04-01 PROCEDURE — 81025 URINE PREGNANCY TEST: CPT | Performed by: ANESTHESIOLOGY

## 2019-04-01 PROCEDURE — 60220 PARTIAL REMOVAL OF THYROID: CPT | Performed by: SURGERY

## 2019-04-01 PROCEDURE — 36415 COLL VENOUS BLD VENIPUNCTURE: CPT

## 2019-04-01 PROCEDURE — 63710000001 INSULIN ASPART PER 5 UNITS: Performed by: SURGERY

## 2019-04-01 PROCEDURE — 63710000001 LUBIPROSTONE 24 MCG CAPSULE: Performed by: SURGERY

## 2019-04-01 PROCEDURE — 80048 BASIC METABOLIC PNL TOTAL CA: CPT | Performed by: ANESTHESIOLOGY

## 2019-04-01 PROCEDURE — 25010000002 NEOSTIGMINE 10 MG/10ML SOLUTION: Performed by: NURSE ANESTHETIST, CERTIFIED REGISTERED

## 2019-04-01 PROCEDURE — 25010000002 PROPOFOL 10 MG/ML EMULSION: Performed by: NURSE ANESTHETIST, CERTIFIED REGISTERED

## 2019-04-01 PROCEDURE — 63710000001 METHOCARBAMOL 750 MG TABLET: Performed by: SURGERY

## 2019-04-01 PROCEDURE — 94799 UNLISTED PULMONARY SVC/PX: CPT

## 2019-04-01 PROCEDURE — 85027 COMPLETE CBC AUTOMATED: CPT | Performed by: ANESTHESIOLOGY

## 2019-04-01 PROCEDURE — 25010000002 HYDROMORPHONE PER 4 MG: Performed by: SURGERY

## 2019-04-01 PROCEDURE — 25010000002 FENTANYL CITRATE (PF) 100 MCG/2ML SOLUTION: Performed by: NURSE ANESTHETIST, CERTIFIED REGISTERED

## 2019-04-01 PROCEDURE — 88307 TISSUE EXAM BY PATHOLOGIST: CPT | Performed by: SURGERY

## 2019-04-01 PROCEDURE — 88331 PATH CONSLTJ SURG 1 BLK 1SPC: CPT | Performed by: PATHOLOGY

## 2019-04-01 PROCEDURE — G0378 HOSPITAL OBSERVATION PER HR: HCPCS

## 2019-04-01 PROCEDURE — 93010 ELECTROCARDIOGRAM REPORT: CPT | Performed by: INTERNAL MEDICINE

## 2019-04-01 RX ORDER — CETIRIZINE HYDROCHLORIDE 10 MG/1
10 TABLET ORAL DAILY
Status: CANCELLED | OUTPATIENT
Start: 2019-04-01

## 2019-04-01 RX ORDER — FUROSEMIDE 40 MG/1
40 TABLET ORAL DAILY
Status: DISCONTINUED | OUTPATIENT
Start: 2019-04-02 | End: 2019-04-02 | Stop reason: HOSPADM

## 2019-04-01 RX ORDER — MIDAZOLAM HYDROCHLORIDE 1 MG/ML
2 INJECTION INTRAMUSCULAR; INTRAVENOUS
Status: COMPLETED | OUTPATIENT
Start: 2019-04-01 | End: 2019-04-01

## 2019-04-01 RX ORDER — BUPIVACAINE HYDROCHLORIDE AND EPINEPHRINE 5; 5 MG/ML; UG/ML
INJECTION, SOLUTION EPIDURAL; INTRACAUDAL; PERINEURAL AS NEEDED
Status: DISCONTINUED | OUTPATIENT
Start: 2019-04-01 | End: 2019-04-01 | Stop reason: HOSPADM

## 2019-04-01 RX ORDER — ENALAPRIL MALEATE 5 MG/1
5 TABLET ORAL DAILY
Status: CANCELLED | OUTPATIENT
Start: 2019-04-01

## 2019-04-01 RX ORDER — LUBIPROSTONE 24 UG/1
24 CAPSULE ORAL 2 TIMES DAILY WITH MEALS
Status: CANCELLED | OUTPATIENT
Start: 2019-04-01

## 2019-04-01 RX ORDER — NICOTINE POLACRILEX 4 MG
15 LOZENGE BUCCAL
Status: DISCONTINUED | OUTPATIENT
Start: 2019-04-01 | End: 2019-04-02 | Stop reason: HOSPADM

## 2019-04-01 RX ORDER — BUMETANIDE 1 MG/1
2 TABLET ORAL DAILY
Status: CANCELLED | OUTPATIENT
Start: 2019-04-01

## 2019-04-01 RX ORDER — FUROSEMIDE 40 MG/1
40 TABLET ORAL DAILY
COMMUNITY
End: 2019-11-22 | Stop reason: SDUPTHER

## 2019-04-01 RX ORDER — FENTANYL CITRATE 50 UG/ML
50 INJECTION, SOLUTION INTRAMUSCULAR; INTRAVENOUS
Status: DISCONTINUED | OUTPATIENT
Start: 2019-04-01 | End: 2019-04-01 | Stop reason: SDUPTHER

## 2019-04-01 RX ORDER — SODIUM CHLORIDE 0.9 % (FLUSH) 0.9 %
3 SYRINGE (ML) INJECTION EVERY 12 HOURS SCHEDULED
Status: DISCONTINUED | OUTPATIENT
Start: 2019-04-01 | End: 2019-04-02 | Stop reason: HOSPADM

## 2019-04-01 RX ORDER — ROSUVASTATIN CALCIUM 20 MG/1
40 TABLET, COATED ORAL DAILY
Status: CANCELLED | OUTPATIENT
Start: 2019-04-01

## 2019-04-01 RX ORDER — SODIUM CHLORIDE 0.9 % (FLUSH) 0.9 %
3-10 SYRINGE (ML) INJECTION AS NEEDED
Status: DISCONTINUED | OUTPATIENT
Start: 2019-04-01 | End: 2019-04-02 | Stop reason: HOSPADM

## 2019-04-01 RX ORDER — LIDOCAINE HYDROCHLORIDE 10 MG/ML
INJECTION, SOLUTION INFILTRATION; PERINEURAL AS NEEDED
Status: DISCONTINUED | OUTPATIENT
Start: 2019-04-01 | End: 2019-04-01 | Stop reason: SURG

## 2019-04-01 RX ORDER — DEXTROSE MONOHYDRATE 25 G/50ML
25 INJECTION, SOLUTION INTRAVENOUS
Status: DISCONTINUED | OUTPATIENT
Start: 2019-04-01 | End: 2019-04-02 | Stop reason: HOSPADM

## 2019-04-01 RX ORDER — ROSUVASTATIN CALCIUM 20 MG/1
40 TABLET, COATED ORAL NIGHTLY
Status: DISCONTINUED | OUTPATIENT
Start: 2019-04-01 | End: 2019-04-02 | Stop reason: HOSPADM

## 2019-04-01 RX ORDER — SODIUM CHLORIDE 0.9 % (FLUSH) 0.9 %
3-10 SYRINGE (ML) INJECTION AS NEEDED
Status: DISCONTINUED | OUTPATIENT
Start: 2019-04-01 | End: 2019-04-01 | Stop reason: HOSPADM

## 2019-04-01 RX ORDER — LUBIPROSTONE 24 UG/1
24 CAPSULE ORAL 2 TIMES DAILY WITH MEALS
Status: DISCONTINUED | OUTPATIENT
Start: 2019-04-01 | End: 2019-04-02 | Stop reason: HOSPADM

## 2019-04-01 RX ORDER — POTASSIUM CHLORIDE 750 MG/1
10 TABLET, FILM COATED, EXTENDED RELEASE ORAL 2 TIMES DAILY
Status: DISCONTINUED | OUTPATIENT
Start: 2019-04-01 | End: 2019-04-02 | Stop reason: HOSPADM

## 2019-04-01 RX ORDER — MIDAZOLAM HYDROCHLORIDE 1 MG/ML
1 INJECTION INTRAMUSCULAR; INTRAVENOUS
Status: COMPLETED | OUTPATIENT
Start: 2019-04-01 | End: 2019-04-01

## 2019-04-01 RX ORDER — PANTOPRAZOLE SODIUM 40 MG/1
40 TABLET, DELAYED RELEASE ORAL EVERY MORNING
Status: CANCELLED | OUTPATIENT
Start: 2019-04-02

## 2019-04-01 RX ORDER — ONDANSETRON 2 MG/ML
INJECTION INTRAMUSCULAR; INTRAVENOUS AS NEEDED
Status: DISCONTINUED | OUTPATIENT
Start: 2019-04-01 | End: 2019-04-01 | Stop reason: SURG

## 2019-04-01 RX ORDER — HYDROCODONE BITARTRATE AND ACETAMINOPHEN 7.5; 325 MG/1; MG/1
1 TABLET ORAL EVERY 4 HOURS PRN
Status: DISCONTINUED | OUTPATIENT
Start: 2019-04-01 | End: 2019-04-02 | Stop reason: HOSPADM

## 2019-04-01 RX ORDER — SODIUM CHLORIDE, SODIUM LACTATE, POTASSIUM CHLORIDE, CALCIUM CHLORIDE 600; 310; 30; 20 MG/100ML; MG/100ML; MG/100ML; MG/100ML
125 INJECTION, SOLUTION INTRAVENOUS CONTINUOUS
Status: DISCONTINUED | OUTPATIENT
Start: 2019-04-01 | End: 2019-04-01

## 2019-04-01 RX ORDER — ALBUTEROL SULFATE 2.5 MG/3ML
2.5 SOLUTION RESPIRATORY (INHALATION) EVERY 6 HOURS PRN
Status: DISCONTINUED | OUTPATIENT
Start: 2019-04-01 | End: 2019-04-02 | Stop reason: HOSPADM

## 2019-04-01 RX ORDER — AZELASTINE 1 MG/ML
2 SPRAY, METERED NASAL 2 TIMES DAILY
Status: CANCELLED | OUTPATIENT
Start: 2019-04-01

## 2019-04-01 RX ORDER — ALBUTEROL SULFATE 2.5 MG/3ML
2.5 SOLUTION RESPIRATORY (INHALATION) EVERY 6 HOURS PRN
Status: CANCELLED | OUTPATIENT
Start: 2019-04-01

## 2019-04-01 RX ORDER — NALOXONE HCL 0.4 MG/ML
0.4 VIAL (ML) INJECTION
Status: DISCONTINUED | OUTPATIENT
Start: 2019-04-01 | End: 2019-04-02 | Stop reason: HOSPADM

## 2019-04-01 RX ORDER — MONTELUKAST SODIUM 10 MG/1
10 TABLET ORAL NIGHTLY
Status: CANCELLED | OUTPATIENT
Start: 2019-04-01

## 2019-04-01 RX ORDER — PANTOPRAZOLE SODIUM 40 MG/1
40 TABLET, DELAYED RELEASE ORAL EVERY MORNING
Status: DISCONTINUED | OUTPATIENT
Start: 2019-04-02 | End: 2019-04-02 | Stop reason: HOSPADM

## 2019-04-01 RX ORDER — PROPOFOL 10 MG/ML
VIAL (ML) INTRAVENOUS AS NEEDED
Status: DISCONTINUED | OUTPATIENT
Start: 2019-04-01 | End: 2019-04-01 | Stop reason: SURG

## 2019-04-01 RX ORDER — METHOCARBAMOL 750 MG/1
750 TABLET, FILM COATED ORAL 3 TIMES DAILY
Status: CANCELLED | OUTPATIENT
Start: 2019-04-01

## 2019-04-01 RX ORDER — LANOLIN ALCOHOL/MO/W.PET/CERES
1000 CREAM (GRAM) TOPICAL DAILY
Status: DISCONTINUED | OUTPATIENT
Start: 2019-04-02 | End: 2019-04-02 | Stop reason: HOSPADM

## 2019-04-01 RX ORDER — FENTANYL CITRATE 50 UG/ML
25 INJECTION, SOLUTION INTRAMUSCULAR; INTRAVENOUS
Status: DISCONTINUED | OUTPATIENT
Start: 2019-04-01 | End: 2019-04-01 | Stop reason: HOSPADM

## 2019-04-01 RX ORDER — ROCURONIUM BROMIDE 10 MG/ML
INJECTION, SOLUTION INTRAVENOUS AS NEEDED
Status: DISCONTINUED | OUTPATIENT
Start: 2019-04-01 | End: 2019-04-01 | Stop reason: SURG

## 2019-04-01 RX ORDER — METHOCARBAMOL 750 MG/1
750 TABLET, FILM COATED ORAL 3 TIMES DAILY
Status: DISCONTINUED | OUTPATIENT
Start: 2019-04-01 | End: 2019-04-02 | Stop reason: HOSPADM

## 2019-04-01 RX ORDER — BUMETANIDE 1 MG/1
2 TABLET ORAL DAILY
Status: DISCONTINUED | OUTPATIENT
Start: 2019-04-01 | End: 2019-04-02 | Stop reason: HOSPADM

## 2019-04-01 RX ORDER — GLYCOPYRROLATE 0.2 MG/ML
INJECTION INTRAMUSCULAR; INTRAVENOUS AS NEEDED
Status: DISCONTINUED | OUTPATIENT
Start: 2019-04-01 | End: 2019-04-01 | Stop reason: SURG

## 2019-04-01 RX ORDER — POTASSIUM CHLORIDE 750 MG/1
10 TABLET, FILM COATED, EXTENDED RELEASE ORAL 2 TIMES DAILY
Status: CANCELLED | OUTPATIENT
Start: 2019-04-01

## 2019-04-01 RX ORDER — SODIUM CHLORIDE 0.9 % (FLUSH) 0.9 %
3 SYRINGE (ML) INJECTION EVERY 12 HOURS SCHEDULED
Status: DISCONTINUED | OUTPATIENT
Start: 2019-04-01 | End: 2019-04-01 | Stop reason: HOSPADM

## 2019-04-01 RX ORDER — MAGNESIUM HYDROXIDE 1200 MG/15ML
LIQUID ORAL AS NEEDED
Status: DISCONTINUED | OUTPATIENT
Start: 2019-04-01 | End: 2019-04-01 | Stop reason: HOSPADM

## 2019-04-01 RX ORDER — HYDROMORPHONE HYDROCHLORIDE 1 MG/ML
0.5 INJECTION, SOLUTION INTRAMUSCULAR; INTRAVENOUS; SUBCUTANEOUS
Status: DISCONTINUED | OUTPATIENT
Start: 2019-04-01 | End: 2019-04-02 | Stop reason: HOSPADM

## 2019-04-01 RX ORDER — LANOLIN ALCOHOL/MO/W.PET/CERES
1000 CREAM (GRAM) TOPICAL DAILY
Status: CANCELLED | OUTPATIENT
Start: 2019-04-01

## 2019-04-01 RX ORDER — FAMOTIDINE 10 MG/ML
INJECTION, SOLUTION INTRAVENOUS AS NEEDED
Status: DISCONTINUED | OUTPATIENT
Start: 2019-04-01 | End: 2019-04-01 | Stop reason: SURG

## 2019-04-01 RX ORDER — CETIRIZINE HYDROCHLORIDE 10 MG/1
10 TABLET ORAL NIGHTLY
Status: DISCONTINUED | OUTPATIENT
Start: 2019-04-01 | End: 2019-04-02 | Stop reason: HOSPADM

## 2019-04-01 RX ORDER — MEPERIDINE HYDROCHLORIDE 25 MG/ML
12.5 INJECTION INTRAMUSCULAR; INTRAVENOUS; SUBCUTANEOUS
Status: DISCONTINUED | OUTPATIENT
Start: 2019-04-01 | End: 2019-04-01 | Stop reason: HOSPADM

## 2019-04-01 RX ORDER — GABAPENTIN 300 MG/1
600 CAPSULE ORAL EVERY 8 HOURS SCHEDULED
Status: CANCELLED | OUTPATIENT
Start: 2019-04-01

## 2019-04-01 RX ORDER — IPRATROPIUM BROMIDE AND ALBUTEROL SULFATE 2.5; .5 MG/3ML; MG/3ML
3 SOLUTION RESPIRATORY (INHALATION) ONCE AS NEEDED
Status: DISCONTINUED | OUTPATIENT
Start: 2019-04-01 | End: 2019-04-01 | Stop reason: HOSPADM

## 2019-04-01 RX ORDER — MONTELUKAST SODIUM 10 MG/1
10 TABLET ORAL NIGHTLY
Status: DISCONTINUED | OUTPATIENT
Start: 2019-04-01 | End: 2019-04-02 | Stop reason: HOSPADM

## 2019-04-01 RX ORDER — FUROSEMIDE 40 MG/1
40 TABLET ORAL DAILY
Status: CANCELLED | OUTPATIENT
Start: 2019-04-01

## 2019-04-01 RX ORDER — FENTANYL CITRATE 50 UG/ML
INJECTION, SOLUTION INTRAMUSCULAR; INTRAVENOUS AS NEEDED
Status: DISCONTINUED | OUTPATIENT
Start: 2019-04-01 | End: 2019-04-01 | Stop reason: SURG

## 2019-04-01 RX ORDER — ENALAPRIL MALEATE 5 MG/1
5 TABLET ORAL DAILY
Status: DISCONTINUED | OUTPATIENT
Start: 2019-04-01 | End: 2019-04-02 | Stop reason: HOSPADM

## 2019-04-01 RX ORDER — HYDROCODONE BITARTRATE AND ACETAMINOPHEN 7.5; 325 MG/1; MG/1
1 TABLET ORAL EVERY 8 HOURS
Status: CANCELLED | OUTPATIENT
Start: 2019-04-01

## 2019-04-01 RX ORDER — GLIPIZIDE AND METFORMIN HCL 2.5; 25 MG/1; MG/1
1 TABLET, FILM COATED ORAL
Status: CANCELLED | OUTPATIENT
Start: 2019-04-01

## 2019-04-01 RX ORDER — NEOSTIGMINE METHYLSULFATE 1 MG/ML
INJECTION, SOLUTION INTRAVENOUS AS NEEDED
Status: DISCONTINUED | OUTPATIENT
Start: 2019-04-01 | End: 2019-04-01 | Stop reason: SURG

## 2019-04-01 RX ORDER — GABAPENTIN 300 MG/1
600 CAPSULE ORAL EVERY 8 HOURS SCHEDULED
Status: DISCONTINUED | OUTPATIENT
Start: 2019-04-01 | End: 2019-04-02 | Stop reason: HOSPADM

## 2019-04-01 RX ORDER — ONDANSETRON 2 MG/ML
4 INJECTION INTRAMUSCULAR; INTRAVENOUS ONCE AS NEEDED
Status: DISCONTINUED | OUTPATIENT
Start: 2019-04-01 | End: 2019-04-01 | Stop reason: HOSPADM

## 2019-04-01 RX ADMIN — FENTANYL CITRATE 50 MCG: 50 INJECTION INTRAMUSCULAR; INTRAVENOUS at 13:51

## 2019-04-01 RX ADMIN — FENTANYL CITRATE 50 MCG: 50 INJECTION INTRAMUSCULAR; INTRAVENOUS at 13:26

## 2019-04-01 RX ADMIN — HYDROMORPHONE HYDROCHLORIDE 0.5 MG: 1 INJECTION, SOLUTION INTRAMUSCULAR; INTRAVENOUS; SUBCUTANEOUS at 16:58

## 2019-04-01 RX ADMIN — POTASSIUM CHLORIDE 10 MEQ: 750 TABLET, FILM COATED, EXTENDED RELEASE ORAL at 20:05

## 2019-04-01 RX ADMIN — ONDANSETRON 4 MG: 2 INJECTION, SOLUTION INTRAMUSCULAR; INTRAVENOUS at 15:13

## 2019-04-01 RX ADMIN — FENTANYL CITRATE 50 MCG: 50 INJECTION INTRAMUSCULAR; INTRAVENOUS at 14:05

## 2019-04-01 RX ADMIN — SODIUM CHLORIDE, POTASSIUM CHLORIDE, SODIUM LACTATE AND CALCIUM CHLORIDE: 600; 310; 30; 20 INJECTION, SOLUTION INTRAVENOUS at 14:06

## 2019-04-01 RX ADMIN — MIDAZOLAM HYDROCHLORIDE 2 MG: 1 INJECTION, SOLUTION INTRAMUSCULAR; INTRAVENOUS at 13:21

## 2019-04-01 RX ADMIN — MIDAZOLAM HYDROCHLORIDE 2 MG: 1 INJECTION, SOLUTION INTRAMUSCULAR; INTRAVENOUS at 12:31

## 2019-04-01 RX ADMIN — LIDOCAINE HYDROCHLORIDE 80 MG: 10 INJECTION, SOLUTION INFILTRATION; PERINEURAL at 13:26

## 2019-04-01 RX ADMIN — BUMETANIDE 2 MG: 1 TABLET ORAL at 17:40

## 2019-04-01 RX ADMIN — ONDANSETRON 4 MG: 2 INJECTION, SOLUTION INTRAMUSCULAR; INTRAVENOUS at 14:30

## 2019-04-01 RX ADMIN — ROCURONIUM BROMIDE 30 MG: 10 INJECTION INTRAVENOUS at 13:26

## 2019-04-01 RX ADMIN — HYDROMORPHONE HYDROCHLORIDE 0.5 MG: 1 INJECTION, SOLUTION INTRAMUSCULAR; INTRAVENOUS; SUBCUTANEOUS at 20:05

## 2019-04-01 RX ADMIN — PROPOFOL 200 MG: 10 INJECTION, EMULSION INTRAVENOUS at 13:26

## 2019-04-01 RX ADMIN — ENALAPRIL MALEATE 5 MG: 5 TABLET ORAL at 17:40

## 2019-04-01 RX ADMIN — FAMOTIDINE 20 MG: 10 INJECTION, SOLUTION INTRAVENOUS at 13:21

## 2019-04-01 RX ADMIN — CETIRIZINE HCL 10 MG: 10 TABLET ORAL at 20:05

## 2019-04-01 RX ADMIN — NEOSTIGMINE METHYLSULFATE 3 MG: 1 INJECTION, SOLUTION INTRAVENOUS at 14:27

## 2019-04-01 RX ADMIN — MONTELUKAST SODIUM 10 MG: 10 TABLET, COATED ORAL at 20:05

## 2019-04-01 RX ADMIN — INSULIN ASPART 4 UNITS: 100 INJECTION, SOLUTION INTRAVENOUS; SUBCUTANEOUS at 20:05

## 2019-04-01 RX ADMIN — ROCURONIUM BROMIDE 10 MG: 10 INJECTION INTRAVENOUS at 13:40

## 2019-04-01 RX ADMIN — METHOCARBAMOL TABLETS 750 MG: 750 TABLET, COATED ORAL at 17:40

## 2019-04-01 RX ADMIN — LUBIPROSTONE 24 MCG: 24 CAPSULE, GELATIN COATED ORAL at 17:40

## 2019-04-01 RX ADMIN — FENTANYL CITRATE 50 MCG: 50 INJECTION INTRAMUSCULAR; INTRAVENOUS at 13:40

## 2019-04-01 RX ADMIN — SODIUM CHLORIDE, PRESERVATIVE FREE 3 ML: 5 INJECTION INTRAVENOUS at 20:06

## 2019-04-01 RX ADMIN — GLYCOPYRROLATE 0.4 MG: 0.2 INJECTION, SOLUTION INTRAMUSCULAR; INTRAVENOUS at 14:27

## 2019-04-01 RX ADMIN — SODIUM CHLORIDE, POTASSIUM CHLORIDE, SODIUM LACTATE AND CALCIUM CHLORIDE 125 ML/HR: 600; 310; 30; 20 INJECTION, SOLUTION INTRAVENOUS at 12:21

## 2019-04-01 RX ADMIN — ROSUVASTATIN CALCIUM 40 MG: 20 TABLET, FILM COATED ORAL at 20:05

## 2019-04-01 NOTE — ANESTHESIA PROCEDURE NOTES
Airway  Urgency: elective      General Information and Staff    Patient location during procedure: OR    Indications and Patient Condition  Indications for airway management: airway protection    Preoxygenated: yes  Mask difficulty assessment: 2 - vent by mask + OA or adjuvant +/- NMBA    Final Airway Details  Final airway type: endotracheal airway      Successful airway: ETT  Cuffed: yes   Successful intubation technique: direct laryngoscopy  Facilitating devices/methods: intubating stylet and cricoid pressure  Endotracheal tube insertion site: oral  Blade: Elo  Blade size: 3  ETT size (mm): 7.0  Cormack-Lehane Classification: grade IIa - partial view of glottis  Placement verified by: chest auscultation and capnometry   Measured from: lips  ETT to lips (cm): 21  Number of attempts at approach: 1    Additional Comments  Dentition as preop

## 2019-04-01 NOTE — ANESTHESIA PREPROCEDURE EVALUATION
Anesthesia Evaluation     no history of anesthetic complications:  NPO Solid Status: > 8 hours  NPO Liquid Status: > 8 hours           Airway   Mallampati: II  TM distance: <3 FB  Neck ROM: full  No difficulty expected  Dental    (+) poor dentition        Pulmonary - normal exam   (+) a smoker Current Smoked day of surgery, asthma, sleep apnea,   Cardiovascular - normal exam    (+) hypertension, PVD, hyperlipidemia,       Neuro/Psych  GI/Hepatic/Renal/Endo    (+) morbid obesity, GERD,  liver disease, diabetes mellitus,     Musculoskeletal     (+) back pain,   Abdominal  - normal exam   Substance History      OB/GYN          Other                        Anesthesia Plan    ASA 3     general     intravenous induction   Anesthetic plan, all risks, benefits, and alternatives have been provided, discussed and informed consent has been obtained with: patient.

## 2019-04-01 NOTE — PLAN OF CARE
Problem: Patient Care Overview  Goal: Plan of Care Review  Outcome: Ongoing (interventions implemented as appropriate)      Problem: Pain, Acute (Adult)  Goal: Identify Related Risk Factors and Signs and Symptoms  Outcome: Ongoing (interventions implemented as appropriate)      Problem: Hyperglycemia, Persistent (Adult)  Goal: Signs and Symptoms of Listed Potential Problems Will be Absent, Minimized or Managed (Hyperglycemia, Persistent)  Outcome: Ongoing (interventions implemented as appropriate)      Problem: Infection, Risk/Actual (Adult)  Goal: Identify Related Risk Factors and Signs and Symptoms  Outcome: Ongoing (interventions implemented as appropriate)

## 2019-04-01 NOTE — ANESTHESIA POSTPROCEDURE EVALUATION
Patient: Soheila Brewster    Procedure Summary     Date:  04/01/19 Room / Location:  [facility] OR 04 /  [facility] OR    Anesthesia Start:  1321 Anesthesia Stop:  1437    Procedure:  RIGHT THYROIDECTOMY LOBECTOMY (Right Neck) Diagnosis:       Thyroid nodule      (Thyroid nodule [E04.1])    Surgeon:  Anam Rodriguez MD Provider:  Cassius Pizano MD    Anesthesia Type:  general ASA Status:  3          Anesthesia Type: general  Last vitals  BP   133/49 (04/01/19 1548)   Temp   97.6 °F (36.4 °C) (04/01/19 1530)   Pulse   67 (04/01/19 1548)   Resp   16 (04/01/19 1530)     SpO2   96 % (04/01/19 1548)     Post Anesthesia Care and Evaluation    Patient location during evaluation: PHASE II  Patient participation: complete - patient participated  Level of consciousness: awake and alert  Pain score: 1  Pain management: adequate  Airway patency: patent  Anesthetic complications: No anesthetic complications  PONV Status: controlled  Cardiovascular status: acceptable  Respiratory status: acceptable  Hydration status: acceptable

## 2019-04-01 NOTE — OP NOTE
THYROIDECTOMY  Procedure Note    Soheila Brewster  4/1/2019    Pre-op Diagnosis:   Thyroid nodule [E04.1]    Post-op Diagnosis: same        Procedure(s):  RIGHT THYROIDECTOMY LOBECTOMY    Surgeon(s):  Anam Rodriguez MD    Anesthesia: General    Staff:   Circulator: Daja Rios RN  Scrub Person: Hamzah Forman; Adina Wiggins    Estimated Blood Loss: 100ml    Specimens:                  Order Name Source Comment Collection Info Order Time   TISSUE PATHOLOGY EXAM Thyroid  Collected By: Anam Rodriguez MD 4/1/2019  2:00 PM         Drains:      Procedure: The neck was prepped and draped. The incision was made with the scalpel and cautery was used to go down through the subcutaneous tissue and platisma. The strap muscles  and the right thyroid lobe the entire lobe was enlarged from the nodule and the harmonic scalpel and cautery used on vessels. The parathyroid was spared and once the lobe was out, it was sent to pathology. It appeared to be a benign follicular nodule so surgicel and thrombin placed and the subcutaneous tissue closed with vicryl after palpating and inspecting the left thyroid. It appeared normal. The skin was also closed with vicryl.     Findings: right thyroid nodule.     Complications: none   Grafts / Implants N/A    Anam Rodriguez MD     Date: 4/1/2019  Time: 2:41 PM

## 2019-04-02 VITALS
BODY MASS INDEX: 40.46 KG/M2 | TEMPERATURE: 97.7 F | WEIGHT: 289 LBS | RESPIRATION RATE: 20 BRPM | OXYGEN SATURATION: 97 % | DIASTOLIC BLOOD PRESSURE: 71 MMHG | HEIGHT: 71 IN | HEART RATE: 83 BPM | SYSTOLIC BLOOD PRESSURE: 131 MMHG

## 2019-04-02 LAB — GLUCOSE BLDC GLUCOMTR-MCNC: 229 MG/DL (ref 70–130)

## 2019-04-02 PROCEDURE — 94799 UNLISTED PULMONARY SVC/PX: CPT

## 2019-04-02 PROCEDURE — G0378 HOSPITAL OBSERVATION PER HR: HCPCS

## 2019-04-02 PROCEDURE — A9270 NON-COVERED ITEM OR SERVICE: HCPCS | Performed by: SURGERY

## 2019-04-02 PROCEDURE — 63710000001 VITAMIN B-12 1000 MCG TABLET: Performed by: SURGERY

## 2019-04-02 PROCEDURE — 63710000001 ENALAPRIL 5 MG TABLET: Performed by: SURGERY

## 2019-04-02 PROCEDURE — 63710000001 PANTOPRAZOLE 40 MG TABLET DELAYED-RELEASE: Performed by: SURGERY

## 2019-04-02 PROCEDURE — 82962 GLUCOSE BLOOD TEST: CPT

## 2019-04-02 PROCEDURE — 63710000001 LUBIPROSTONE 24 MCG CAPSULE: Performed by: SURGERY

## 2019-04-02 PROCEDURE — 63710000001 FUROSEMIDE 40 MG TABLET: Performed by: SURGERY

## 2019-04-02 PROCEDURE — 25010000002 HYDROMORPHONE PER 4 MG: Performed by: SURGERY

## 2019-04-02 PROCEDURE — 63710000001 POTASSIUM CHLORIDE 10 MEQ TABLET CONTROLLED-RELEASE: Performed by: SURGERY

## 2019-04-02 PROCEDURE — 63710000001 BUMETANIDE 1 MG TABLET: Performed by: SURGERY

## 2019-04-02 PROCEDURE — 63710000001 METHOCARBAMOL 750 MG TABLET: Performed by: SURGERY

## 2019-04-02 PROCEDURE — 99024 POSTOP FOLLOW-UP VISIT: CPT | Performed by: SURGERY

## 2019-04-02 RX ORDER — HYDROCODONE BITARTRATE AND ACETAMINOPHEN 7.5; 325 MG/1; MG/1
1 TABLET ORAL EVERY 4 HOURS PRN
Qty: 20 TABLET | Refills: 0 | Status: SHIPPED | OUTPATIENT
Start: 2019-04-02 | End: 2019-04-11

## 2019-04-02 RX ADMIN — METHOCARBAMOL TABLETS 750 MG: 750 TABLET, COATED ORAL at 08:08

## 2019-04-02 RX ADMIN — PANTOPRAZOLE SODIUM 40 MG: 40 TABLET, DELAYED RELEASE ORAL at 06:10

## 2019-04-02 RX ADMIN — BUMETANIDE 2 MG: 1 TABLET ORAL at 08:08

## 2019-04-02 RX ADMIN — HYDROMORPHONE HYDROCHLORIDE 0.5 MG: 1 INJECTION, SOLUTION INTRAMUSCULAR; INTRAVENOUS; SUBCUTANEOUS at 03:40

## 2019-04-02 RX ADMIN — LUBIPROSTONE 24 MCG: 24 CAPSULE, GELATIN COATED ORAL at 08:08

## 2019-04-02 RX ADMIN — SODIUM CHLORIDE, PRESERVATIVE FREE 3 ML: 5 INJECTION INTRAVENOUS at 08:10

## 2019-04-02 RX ADMIN — CYANOCOBALAMIN TAB 1000 MCG 1000 MCG: 1000 TAB at 08:09

## 2019-04-02 RX ADMIN — POTASSIUM CHLORIDE 10 MEQ: 750 TABLET, FILM COATED, EXTENDED RELEASE ORAL at 08:09

## 2019-04-02 RX ADMIN — ENALAPRIL MALEATE 5 MG: 5 TABLET ORAL at 08:09

## 2019-04-02 RX ADMIN — FUROSEMIDE 40 MG: 40 TABLET ORAL at 08:09

## 2019-04-02 NOTE — PLAN OF CARE
Problem: Patient Care Overview  Goal: Plan of Care Review  Outcome: Ongoing (interventions implemented as appropriate)   04/01/19 1509 04/01/19 2000   Coping/Psychosocial   Plan of Care Reviewed With --  patient   OTHER   Outcome Summary Goals met --      Goal: Discharge Needs Assessment  Outcome: Ongoing (interventions implemented as appropriate)   04/01/19 1542   Disability   Equipment Currently Used at Home none   Discharge Needs Assessment   Patient/Family Anticipates Transition to home   Patient/Family Anticipated Services at Transition none   Transportation Concerns car, none   Transportation Anticipated family or friend will provide     Goal: Interprofessional Rounds/Family Conf  Outcome: Ongoing (interventions implemented as appropriate)   04/01/19 2147   Interdisciplinary Rounds/Family Conf   Participants patient;nursing       Problem: Pain, Acute (Adult)  Goal: Identify Related Risk Factors and Signs and Symptoms  Outcome: Ongoing (interventions implemented as appropriate)   04/01/19 1922 04/01/19 2147   Pain, Acute (Adult)   Related Risk Factors (Acute Pain) surgery --    Signs and Symptoms (Acute Pain) --  verbalization of pain descriptors     Goal: Acceptable Pain Control/Comfort Level  Outcome: Ongoing (interventions implemented as appropriate)   04/01/19 2147   Pain, Acute (Adult)   Acceptable Pain Control/Comfort Level making progress toward outcome       Problem: Hyperglycemia, Persistent (Adult)  Goal: Signs and Symptoms of Listed Potential Problems Will be Absent, Minimized or Managed (Hyperglycemia, Persistent)  Outcome: Ongoing (interventions implemented as appropriate)   04/01/19 1922 04/01/19 2147   Goal/Outcome Evaluation   Problems Assessed (Hyperglycemia) hyperglycemia --    Problems Present (Hyperglycemia) --  none       Problem: Infection, Risk/Actual (Adult)  Goal: Identify Related Risk Factors and Signs and Symptoms  Outcome: Ongoing (interventions implemented as appropriate)   04/01/19  1922 04/01/19 2147   Infection, Risk/Actual (Adult)   Related Risk Factors (Infection, Risk/Actual) surgery/procedure  (Thyroidectemy of the right lobe of the thyroid) --    Signs and Symptoms (Infection, Risk/Actual) --  pain;weakness     Goal: Infection Prevention/Resolution  Outcome: Ongoing (interventions implemented as appropriate)   04/01/19 2147   Infection, Risk/Actual (Adult)   Infection Prevention/Resolution making progress toward outcome

## 2019-04-02 NOTE — DISCHARGE SUMMARY
Date of Discharge:  4/2/2019    Discharge Diagnosis: right thyroid nodule    Presenting Problem/History of Present Illness  Active Hospital Problems    Diagnosis  POA   • **Thyroid nodule [E04.1]  Unknown      Resolved Hospital Problems   No resolved problems to display.           Hospital Course  Patient is a 48 y.o. female presented with right thyroid nodule. She had a right thyroid lobectomy and her frozen section showed a most likely benign follicular nodule. The left side looked ok. She was observed overnight and discharged the following morning. .      Procedures Performed    Procedure(s):  RIGHT THYROIDECTOMY LOBECTOMY  -------------------       Consults:   Consults     No orders found for last 30 day(s).          Pertinent Test Results:      Condition on Discharge:   stable    Vital Signs  Temp:  [97.1 °F (36.2 °C)-98.6 °F (37 °C)] 97.7 °F (36.5 °C)  Heart Rate:  [62-78] 78  Resp:  [12-22] 20  BP: (130-171)/(49-85) 151/85    Physical Exam:     General Appearance:    Alert, cooperative, in no acute distress   Head:    Normocephalic, without obvious abnormality, atraumatic   Eyes:            Lids and lashes normal, conjunctivae and sclerae normal, no   icterus, no pallor, corneas clear, PERRLA   Ears:    Ears appear intact with no abnormalities noted   Throat:   No oral lesions, no thrush, oral mucosa moist   Neck:   No adenopathy, supple, trachea midline, no thyromegaly, no     carotid bruit, no JVD   Back:     No kyphosis present, no scoliosis present, no skin lesions,       erythema or scars, no tenderness to percussion or                   palpation,   range of motion normal   Lungs:     Clear to auscultation,respirations regular, even and                   unlabored    Heart:    Regular rhythm and normal rate, normal S1 and S2, no            murmur, no gallop, no rub, no click   Breast Exam:    Deferred   Abdomen:     Normal bowel sounds, no masses, no organomegaly, soft        non-tender,  non-distended, no guarding, no rebound                 tenderness   Genitalia:    Deferred   Extremities:   Moves all extremities well, no edema, no cyanosis, no              redness   Pulses:   Pulses palpable and equal bilaterally   Skin:   No bleeding, bruising or rash   Lymph nodes:   No palpable adenopathy   Neurologic:   Cranial nerves 2 - 12 grossly intact, sensation intact, DTR        present and equal bilaterally       Discharge Disposition      Discharge Medications     Discharge Medications      Continue These Medications      Instructions Start Date   azelastine 0.1 % nasal spray  Commonly known as:  ASTELIN   2 sprays both nostrils twice daily as needed      bumetanide 2 MG tablet  Commonly known as:  BUMEX   2 mg, Oral, Daily, May take additional 0.5 tabs daily PRN      enalapril 5 MG tablet  Commonly known as:  VASOTEC   TAKE 1 TABLET BY MOUTH ONCE DAILY      esomeprazole 40 MG capsule  Commonly known as:  nexIUM   40 mg, Oral, Every Morning Before Breakfast      ezetimibe 10 MG tablet  Commonly known as:  ZETIA   10 mg, Oral, Nightly      fenofibrate 145 MG tablet  Commonly known as:  TRICOR   145 mg, Oral, Daily      Fluticasone-Umeclidin-Vilant 100-62.5-25 MCG/INH aerosol powder   Commonly known as:  TRELEGY ELLIPTA   1 each, Inhalation, Daily      furosemide 40 MG tablet  Commonly known as:  LASIX   40 mg, Oral, Daily      gabapentin 600 MG tablet  Commonly known as:  NEURONTIN   600 mg, Oral, 3 Times Daily      glipiZIDE-metFORMIN 2.5-250 MG per tablet  Commonly known as:  METAGLIP   TAKE 1 TABLET BY MOUTH TWICE DAILY BEFORE MEALS      HYDROcodone-acetaminophen 7.5-325 MG per tablet  Commonly known as:  NORCO   1 tablet, Oral, Every 8 Hours PRN      levocetirizine 5 MG tablet  Commonly known as:  XYZAL   5 mg, Oral, Every Evening      lubiprostone 24 MCG capsule  Commonly known as:  AMITIZA   24 mcg, Oral, 2 Times Daily With Meals      methocarbamol 750 MG tablet  Commonly known as:  ROBAXIN    750 mg, Oral, 3 Times Daily      montelukast 10 MG tablet  Commonly known as:  SINGULAIR   10 mg, Oral, Nightly      potassium chloride 10 MEQ CR tablet  Commonly known as:  K-DUR   TAKE 1 TABLET BY MOUTH TWICE DAILY      rosuvastatin 40 MG tablet  Commonly known as:  CRESTOR   TAKE 1 TABLET BY MOUTH ONCE DAILY      SOLIQUA 100-33 UNT-MCG/ML solution pen-injector  Generic drug:  Insulin Glargine-Lixisenatide   INJECT 60 UNITS UNDER THE SKIN DAILY.      TRULICITY 0.75 MG/0.5ML solution pen-injector  Generic drug:  Dulaglutide   INJECT 0.75MG SUBQ INTO THE APPROPRIATE AREA AS DIRECTED ONCE PER WEEK      VENTOLIN  (90 Base) MCG/ACT inhaler  Generic drug:  albuterol sulfate HFA   INHALE 2 PUFFS EVERY 4 (FOUR) HOURS AS NEEDED FOR SHORTNESS OF AIR.      Vitamin B-12 ER 1000 MCG tablet controlled-release   TAKE 1 TABLET BY MOUTH DAILY.      vitamin D 77939 units capsule capsule  Commonly known as:  ERGOCALCIFEROL   TAKE 1 CAPSULE BY MOUTH EVERY 7 DAYS         Stop These Medications    SURE COMFORT PEN NEEDLES 32G X 4 MM misc  Generic drug:  Insulin Pen Needle            Discharge Diet:     Activity at Discharge:     Follow-up Appointments  Future Appointments   Date Time Provider Department Center   4/19/2019 12:30 PM Mariana Mcknight APRN [facility] None   5/16/2019  9:30 AM Chirag Wall MD [facility] None         Test Results Pending at Discharge   Order Current Status    Tissue Pathology Exam Preliminary result           Anam Rodriguez MD  04/02/19  8:57 AM    Time:

## 2019-04-04 LAB
LAB AP CASE REPORT: NORMAL
Lab: NORMAL
PATH REPORT.FINAL DX SPEC: NORMAL
PATH REPORT.GROSS SPEC: NORMAL

## 2019-04-08 DIAGNOSIS — IMO0002 DM (DIABETES MELLITUS) TYPE II UNCONTROLLED, PERIPH VASCULAR DISORDER: ICD-10-CM

## 2019-04-08 RX ORDER — GLIPIZIDE AND METFORMIN HCL 2.5; 25 MG/1; MG/1
TABLET, FILM COATED ORAL
Qty: 60 TABLET | Refills: 2 | Status: SHIPPED | OUTPATIENT
Start: 2019-04-08 | End: 2019-07-06 | Stop reason: SDUPTHER

## 2019-04-09 ENCOUNTER — LAB (OUTPATIENT)
Dept: LAB | Facility: HOSPITAL | Age: 49
End: 2019-04-09

## 2019-04-09 ENCOUNTER — OFFICE VISIT (OUTPATIENT)
Dept: SURGERY | Facility: CLINIC | Age: 49
End: 2019-04-09

## 2019-04-09 VITALS — WEIGHT: 289 LBS | BODY MASS INDEX: 40.46 KG/M2 | HEIGHT: 71 IN

## 2019-04-09 DIAGNOSIS — Z98.890 STATUS POST THYROID SURGERY: ICD-10-CM

## 2019-04-09 DIAGNOSIS — D34 BENIGN NEOPLASM OF THYROID GLAND: ICD-10-CM

## 2019-04-09 DIAGNOSIS — Z98.890 STATUS POST THYROID SURGERY: Primary | ICD-10-CM

## 2019-04-09 LAB — TSH SERPL DL<=0.05 MIU/L-ACNC: 1.71 MIU/ML (ref 0.27–4.2)

## 2019-04-09 PROCEDURE — 36415 COLL VENOUS BLD VENIPUNCTURE: CPT

## 2019-04-09 PROCEDURE — 84443 ASSAY THYROID STIM HORMONE: CPT

## 2019-04-09 PROCEDURE — 99024 POSTOP FOLLOW-UP VISIT: CPT | Performed by: SURGERY

## 2019-04-09 PROCEDURE — 84481 FREE ASSAY (FT-3): CPT

## 2019-04-09 NOTE — PROGRESS NOTES
Subjective   Soheila Brewster is a 48 y.o. female.     History of Present Illness She had only the one large nodule on the right lobe of the thyroid that was benign follicular neoplasm. The left side looked and felt ok. She has felt tired and has been coughing some. She is still smoking.     The following portions of the patient's history were reviewed and updated as appropriate: current medications, past family history, past medical history, past social history, past surgical history and problem list.    Review of Systems    Objective   Physical Exam her wound is healing fine. No bruising or swelling.     Assessment/Plan   Soheila was seen today for post-op follow-up.    Diagnoses and all orders for this visit:    Status post thyroid surgery      Work on stopping smoking and weight loss.   Check TSH and T3

## 2019-04-10 LAB — T3FREE SERPL-MCNC: 1.77 PG/ML (ref 2–4.4)

## 2019-04-12 RX ORDER — LEVOTHYROXINE SODIUM 0.1 MG/1
100 TABLET ORAL DAILY
Qty: 30 TABLET | Refills: 11 | Status: SHIPPED | OUTPATIENT
Start: 2019-04-12 | End: 2020-06-24 | Stop reason: SDUPTHER

## 2019-04-15 DIAGNOSIS — IMO0002 DM (DIABETES MELLITUS) TYPE II UNCONTROLLED, PERIPH VASCULAR DISORDER: ICD-10-CM

## 2019-04-15 DIAGNOSIS — M19.90 ARTHRITIS: ICD-10-CM

## 2019-04-15 RX ORDER — GABAPENTIN 600 MG/1
TABLET ORAL
Qty: 90 TABLET | Refills: 2 | Status: SHIPPED | OUTPATIENT
Start: 2019-04-15 | End: 2019-09-18 | Stop reason: SDUPTHER

## 2019-04-19 ENCOUNTER — OFFICE VISIT (OUTPATIENT)
Dept: FAMILY MEDICINE CLINIC | Facility: CLINIC | Age: 49
End: 2019-04-19

## 2019-04-19 VITALS
BODY MASS INDEX: 39.84 KG/M2 | WEIGHT: 284.6 LBS | HEIGHT: 71 IN | TEMPERATURE: 97.7 F | DIASTOLIC BLOOD PRESSURE: 76 MMHG | SYSTOLIC BLOOD PRESSURE: 168 MMHG | HEART RATE: 111 BPM | OXYGEN SATURATION: 98 %

## 2019-04-19 DIAGNOSIS — J06.9 ACUTE URI: ICD-10-CM

## 2019-04-19 DIAGNOSIS — I10 ESSENTIAL HYPERTENSION: ICD-10-CM

## 2019-04-19 DIAGNOSIS — IMO0002 DM (DIABETES MELLITUS) TYPE II UNCONTROLLED, PERIPH VASCULAR DISORDER: Primary | ICD-10-CM

## 2019-04-19 DIAGNOSIS — E04.1 THYROID NODULE: ICD-10-CM

## 2019-04-19 PROCEDURE — 99214 OFFICE O/P EST MOD 30 MIN: CPT | Performed by: NURSE PRACTITIONER

## 2019-04-19 RX ORDER — DOXYCYCLINE 100 MG/1
100 CAPSULE ORAL EVERY 12 HOURS SCHEDULED
Qty: 20 CAPSULE | Refills: 0 | Status: SHIPPED | OUTPATIENT
Start: 2019-04-19 | End: 2019-09-18

## 2019-04-19 RX ORDER — ENALAPRIL MALEATE 10 MG/1
10 TABLET ORAL DAILY
Qty: 30 TABLET | Refills: 5 | Status: SHIPPED | OUTPATIENT
Start: 2019-04-19 | End: 2019-10-21 | Stop reason: SDUPTHER

## 2019-04-19 NOTE — PROGRESS NOTES
"Subjective   Soheila Brewster is a 48 y.o. female.     Chief Complaint   Patient presents with   • Thyroid Problem     1 mon f/u       History of Present Illness     Thyroid concern-patient is here after right thyroid lobectomy.  Procedure was on April 1, 2019 per Dr. Rodriguez.  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  Patient did stay overnight in the hospital and was discharged home in stable condition.  Her most recent TSH was normal.  She reports she is having some swallowing difficulties with dry foods such as chicken, bread, and with liquids.  She has been limiting her bread intake.  Ongoing.    Vagal reaction-reports an episode today of shaking, dizziness, sweating, and feeling of near syncope.  She reports lasted approx 15 minutes.  She reports the episode occurred after she had not had a BM in approx 2 days.  She did not check her blood sugar.  She reports she laid down and the symptoms began to pass.    Back pain-upper back.  She reports more than her usual.    PND-with sore throat, voice hoarseness.  Tinnitus is present.  Cough more than usual.  She reports increase in pollen and allergens have increased her symptoms.  She is taking SIngulair 10 mg, xyzal 5 mg and Astelin nasal spray.    HTN-she is presently on Enalapril 5 mg daily.  Noted to be elevated today.  She has not been checking at home.  She has been on med for \"years\".  She reports she did have SBP elevations while in the hospital up to 170's.      The following portions of the patient's history were reviewed and updated as appropriate: allergies, current medications, past family history, past medical history, past social history, past surgical history and problem list.    Review of Systems   Constitutional: Positive for fatigue. Negative for appetite change and unexpected weight change.   HENT: Positive for congestion, ear pain, postnasal drip and sore throat. Negative for nosebleeds, rhinorrhea, trouble swallowing " "and voice change.    Eyes: Negative for pain and visual disturbance.   Respiratory: Positive for cough, shortness of breath and wheezing.    Cardiovascular: Negative for chest pain and palpitations.   Gastrointestinal: Positive for constipation and nausea. Negative for abdominal pain, blood in stool and diarrhea.   Endocrine: Negative for cold intolerance and polydipsia.   Genitourinary: Negative for difficulty urinating, flank pain and hematuria.   Musculoskeletal: Positive for arthralgias and myalgias. Negative for back pain, gait problem and joint swelling.   Skin: Negative for color change and rash.   Allergic/Immunologic: Negative.    Neurological: Positive for dizziness and headaches. Negative for syncope and numbness.   Hematological: Negative.    Psychiatric/Behavioral: Positive for sleep disturbance. Negative for suicidal ideas.   All other systems reviewed and are negative.      Objective     /76 (BP Location: Right arm, Patient Position: Sitting, Cuff Size: Adult)   Pulse 111   Temp 97.7 °F (36.5 °C) (Temporal)   Ht 180.3 cm (71\")   Wt 129 kg (284 lb 9.6 oz)   SpO2 98%   BMI 39.69 kg/m²     Physical Exam   Constitutional: She appears well-developed and well-nourished. No distress.   HENT:   Head: Normocephalic and atraumatic.   Right Ear: Ear canal normal. Tympanic membrane is injected. Tympanic membrane is not scarred, not retracted and not bulging. A middle ear effusion is present.   Left Ear: Ear canal normal. Tympanic membrane is injected. Tympanic membrane is not scarred, not retracted and not bulging. A middle ear effusion is present.   Nose: Mucosal edema and rhinorrhea present. No epistaxis. Right sinus exhibits no maxillary sinus tenderness and no frontal sinus tenderness. Left sinus exhibits no maxillary sinus tenderness and no frontal sinus tenderness.   Mouth/Throat: Uvula is midline and mucous membranes are normal. No uvula swelling. Oropharyngeal exudate and posterior " oropharyngeal erythema present.   Eyes: EOM are normal. Pupils are equal, round, and reactive to light. No scleral icterus.   Neck: Normal range of motion. Tracheal tenderness (due to recent surgery) present. No thyromegaly present.   Healing incision to base of neck   Cardiovascular: Normal rate, regular rhythm, normal heart sounds and intact distal pulses.   Pulmonary/Chest: Effort normal. She has decreased breath sounds (mildly over bronchial region of anterior chest). She has wheezes (scattered mild).   Abdominal: Soft. Bowel sounds are normal. There is no hepatosplenomegaly. There is no tenderness. A hernia is present.       Protuberant abdomen   Musculoskeletal: She exhibits tenderness.        Thoracic back: She exhibits tenderness and spasm.        Lumbar back: She exhibits decreased range of motion, tenderness, pain and spasm.   Pain with palpation over sciatic bilaterally    Lymphadenopathy:     She has cervical adenopathy.        Right cervical: Superficial cervical adenopathy present.        Left cervical: Superficial cervical adenopathy present.        Right: Supraclavicular (firm mobile node.  Mild tenderness) adenopathy present.   Neurological: She is alert. She displays normal reflexes. No cranial nerve deficit.   Skin: Skin is warm and dry. Capillary refill takes less than 2 seconds.   Psychiatric: She has a normal mood and affect. Her behavior is normal. Judgment and thought content normal.   Vitals reviewed.      Assessment/Plan     Problem List Items Addressed This Visit        Cardiovascular and Mediastinum    DM (diabetes mellitus) type II uncontrolled, periph vascular disorder (CMS/HCC) - Primary    Current Assessment & Plan     Continue to take Gabapentin as directed.  Continue soliqua as directed.  Blood sugar checks at home.           Essential hypertension    Current Assessment & Plan     Hypertension is worsening.  Stop smoking.  Medication changes per orders.  Ambulatory blood pressure  monitoring.  Blood pressure will be reassessed at the next regular appointment.         Relevant Medications    enalapril (VASOTEC) 10 MG tablet       Endocrine    Thyroid nodule    Current Assessment & Plan     Reviewed hospital and surgery notes.  Discussed with patient that swallowing will improve as she may continue to have some spasm of throat during healing.  Take small bites and chew well.  May tuck chin to aid with swallowing.  Report increase in pain or choking.           Other Visit Diagnoses     Acute URI        Relevant Medications    doxycycline (MONODOX) 100 MG capsule          Patient's Body mass index is 39.69 kg/m². BMI is above normal parameters. Recommendations include: nutrition counseling.   (Normal BMI:  18.5-24.9, OW 25-29.9, Obesity 30 or greater)    Understands disease processes and need for medications.  Understands reasons for urgent and emergent care.  Patient (& family) verbalized agreement for treatment plan.   Emotional support and active listening provided.  Patient provided time to verbalize feelings.    Instructed to complete all of antibiotics for acute illness.  Increase PO fluids, avoid/limit caffeine.  Do not save any of the meds for later use.  Rest PRN  Steam expectoration, warm compress to sinus, Saline PRN for moisture  Encouraged mask when outside for activities/chores  RTC 1 month, sooner if needed.             This document has been electronically signed by:  JOAQUÍN Ross, LORYP-C

## 2019-04-21 NOTE — ASSESSMENT & PLAN NOTE
Hypertension is worsening.  Stop smoking.  Medication changes per orders.  Ambulatory blood pressure monitoring.  Blood pressure will be reassessed at the next regular appointment.

## 2019-04-21 NOTE — ASSESSMENT & PLAN NOTE
Continue to take Gabapentin as directed.  Continue soliqua as directed.  Blood sugar checks at home.

## 2019-04-21 NOTE — ASSESSMENT & PLAN NOTE
Reviewed hospital and surgery notes.  Discussed with patient that swallowing will improve as she may continue to have some spasm of throat during healing.  Take small bites and chew well.  May tuck chin to aid with swallowing.  Report increase in pain or choking.

## 2019-05-14 ENCOUNTER — OFFICE VISIT (OUTPATIENT)
Dept: SURGERY | Facility: CLINIC | Age: 49
End: 2019-05-14

## 2019-05-14 VITALS — WEIGHT: 284 LBS | BODY MASS INDEX: 39.76 KG/M2 | HEIGHT: 71 IN

## 2019-05-14 DIAGNOSIS — Z98.890 STATUS POST THYROID SURGERY: Primary | ICD-10-CM

## 2019-05-14 PROCEDURE — 99024 POSTOP FOLLOW-UP VISIT: CPT | Performed by: SURGERY

## 2019-05-14 RX ORDER — OXYCODONE HYDROCHLORIDE AND ACETAMINOPHEN 5; 325 MG/1; MG/1
4 TABLET ORAL EVERY 8 HOURS
COMMUNITY
End: 2022-01-04

## 2019-05-14 NOTE — PROGRESS NOTES
Subjective   Soheila Brewster is a 49 y.o. female.     History of Present Illness Her T3 was a little low last month. TSH was normal. She started on some synthroid and is feeling better. Her neck is feeling fine and she has less coughing but is still smoking.     The following portions of the patient's history were reviewed and updated as appropriate: current medications, past family history, past medical history, past social history, past surgical history and problem list.    Review of Systems thyroid nodule    Objective   Physical Exam Her incision is healing well     Assessment/Plan   Soheila was seen today for follow-up.    Diagnoses and all orders for this visit:    Status post thyroid surgery    return in 6 months and recheck labs.

## 2019-05-16 ENCOUNTER — OFFICE VISIT (OUTPATIENT)
Dept: NEUROSURGERY | Facility: CLINIC | Age: 49
End: 2019-05-16

## 2019-05-16 VITALS
DIASTOLIC BLOOD PRESSURE: 75 MMHG | TEMPERATURE: 98 F | SYSTOLIC BLOOD PRESSURE: 120 MMHG | RESPIRATION RATE: 16 BRPM | HEART RATE: 77 BPM | WEIGHT: 286 LBS | HEIGHT: 71 IN | BODY MASS INDEX: 40.04 KG/M2 | OXYGEN SATURATION: 97 %

## 2019-05-16 DIAGNOSIS — J32.9 CHRONIC SINUSITIS, UNSPECIFIED LOCATION: ICD-10-CM

## 2019-05-16 DIAGNOSIS — M53.9 MULTILEVEL DEGENERATIVE DISC DISEASE: Primary | ICD-10-CM

## 2019-05-16 RX ORDER — CELECOXIB 200 MG/1
200 CAPSULE ORAL DAILY
Qty: 60 CAPSULE | Refills: 0 | Status: SHIPPED | OUTPATIENT
Start: 2019-05-16 | End: 2019-07-18 | Stop reason: SDUPTHER

## 2019-05-16 NOTE — PROGRESS NOTES
Soheila Brewster  1970  [id number]      Chief Complaint   Patient presents with   • Back Pain   • Leg Pain       HISTORY OF PRESENT ILLNESS:  [ ]    Past Medical History:   Diagnosis Date   • Allergic rhinitis    • Arthritis    • Asthma    • Back pain    • Diabetes mellitus (CMS/HCC)    • Disease of thyroid gland    • Elevated cholesterol    • GERD (gastroesophageal reflux disease)    • History of transfusion    • Hyperlipidemia    • Hypertension    • Liver disease     fatty   • Sleep apnea    • Umbilical hernia        Past Surgical History:   Procedure Laterality Date   • BREAST SURGERY      bilat abcess   • CHOLECYSTECTOMY     • DILATATION AND CURETTAGE     • THYROID BIOPSY     • THYROIDECTOMY Right 4/1/2019    Procedure: RIGHT THYROIDECTOMY LOBECTOMY;  Surgeon: Anam Rodriguez MD;  Location: [facility];  Service: General       Family History   Problem Relation Age of Onset   • Arthritis Mother    • Asthma Mother    • Cancer Mother    • COPD Mother    • Diabetes Mother    • Heart disease Mother    • Hypertension Mother    • Hyperlipidemia Mother    • Kidney disease Mother    • Arthritis Father    • COPD Father    • Diabetes Father    • Hypertension Father    • Hyperlipidemia Father    • Arthritis Sister    • COPD Sister    • Diabetes Sister        Social History     Socioeconomic History   • Marital status:      Spouse name: Not on file   • Number of children: 0   • Years of education: Not on file   • Highest education level: GED or equivalent   Occupational History   • Occupation: Disability   Social Needs   • Financial resource strain: Somewhat hard   • Food insecurity:     Worry: Sometimes true     Inability: Sometimes true   • Transportation needs:     Medical: No     Non-medical: No   Tobacco Use   • Smoking status: Current Every Day Smoker     Packs/day: 1.50     Years: 10.00     Pack years: 15.00     Types: Cigarettes   • Smokeless tobacco: Never Used   Substance and Sexual Activity   • Alcohol  use: No   • Drug use: No   • Sexual activity: Defer   Lifestyle   • Physical activity:     Days per week: 0 days     Minutes per session: 0 min   • Stress: To some extent   Relationships   • Social connections:     Talks on phone: More than three times a week     Gets together: Twice a week     Attends Orthodox service: Never     Active member of club or organization: No     Attends meetings of clubs or organizations: Never     Relationship status:        Allergies   Allergen Reactions   • Morphine And Related Shortness Of Breath     Throat edema   • Penicillins      Also anything in the penicillin family           Current Outpatient Medications:   •  Cyanocobalamin (VITAMIN B-12 ER) 1000 MCG tablet controlled-release, TAKE 1 TABLET BY MOUTH DAILY., Disp: 30 each, Rfl: 5  •  enalapril (VASOTEC) 10 MG tablet, Take 1 tablet by mouth Daily., Disp: 30 tablet, Rfl: 5  •  esomeprazole (nexIUM) 40 MG capsule, Take 1 capsule by mouth Every Morning Before Breakfast., Disp: 30 capsule, Rfl: 5  •  ezetimibe (ZETIA) 10 MG tablet, TAKE 1 TABLET BY MOUTH EVERY NIGHT., Disp: 30 tablet, Rfl: 5  •  furosemide (LASIX) 40 MG tablet, Take 40 mg by mouth Daily., Disp: , Rfl:   •  gabapentin (NEURONTIN) 600 MG tablet, TAKE 1 TABLET BY MOUTH THREE TIMES DAILY, Disp: 90 tablet, Rfl: 2  •  glipiZIDE-metFORMIN (METAGLIP) 2.5-250 MG per tablet, TAKE 1 TABLET BY MOUTH TWICE DAILY BEFORE MEALS, Disp: 60 tablet, Rfl: 2  •  levothyroxine (SYNTHROID) 100 MCG tablet, Take 1 tablet by mouth Daily., Disp: 30 tablet, Rfl: 11  •  methocarbamol (ROBAXIN) 750 MG tablet, Take 1 tablet by mouth 3 (Three) Times a Day., Disp: 90 tablet, Rfl: 5  •  montelukast (SINGULAIR) 10 MG tablet, Take 1 tablet by mouth Every Night., Disp: 30 tablet, Rfl: 5  •  oxyCODONE-acetaminophen (PERCOCET) 5-325 MG per tablet, Take 1 tablet by mouth Every 6 (Six) Hours As Needed., Disp: , Rfl:   •  potassium chloride (K-DUR) 10 MEQ CR tablet, TAKE 1 TABLET BY MOUTH TWICE  DAILY, Disp: 60 tablet, Rfl: 5  •  rosuvastatin (CRESTOR) 40 MG tablet, TAKE 1 TABLET BY MOUTH ONCE DAILY, Disp: 90 tablet, Rfl: 3  •  vitamin D (ERGOCALCIFEROL) 20485 units capsule capsule, TAKE 1 CAPSULE BY MOUTH EVERY 7 DAYS, Disp: 4 capsule, Rfl: 5  •  azelastine (ASTELIN) 0.1 % nasal spray, 2 sprays both nostrils twice daily as needed, Disp: 1 each, Rfl: 5  •  bumetanide (BUMEX) 2 MG tablet, Take 1 tablet by mouth Daily. May take additional 0.5 tabs daily PRN, Disp: 45 tablet, Rfl: 5  •  doxycycline (MONODOX) 100 MG capsule, Take 1 capsule by mouth Every 12 (Twelve) Hours., Disp: 20 capsule, Rfl: 0  •  fenofibrate (TRICOR) 145 MG tablet, TAKE 1 TABLET BY MOUTH DAILY., Disp: 30 tablet, Rfl: 5  •  Fluticasone-Umeclidin-Vilant (TRELEGY ELLIPTA) 100-62.5-25 MCG/INH aerosol powder , Inhale 1 each Daily., Disp: 28 each, Rfl: 0  •  HYDROcodone-acetaminophen (NORCO) 7.5-325 MG per tablet, Take 1 tablet by mouth Every 8 (Eight) Hours As Needed., Disp: , Rfl: 0  •  levocetirizine (XYZAL) 5 MG tablet, TAKE 1 TABLET BY MOUTH EVERY EVENING., Disp: 30 tablet, Rfl: 5  •  lubiprostone (AMITIZA) 24 MCG capsule, Take 1 capsule by mouth 2 (Two) Times a Day With Meals., Disp: 30 capsule, Rfl: 0  •  SOLIQUA 100-33 UNT-MCG/ML solution pen-injector, INJECT 60 UNITS UNDER THE SKIN DAILY., Disp: 15 mL, Rfl: 5  •  TRULICITY 0.75 MG/0.5ML solution pen-injector, INJECT 0.75MG SUBQ INTO THE APPROPRIATE AREA AS DIRECTED ONCE PER WEEK, Disp: 2 mL, Rfl: 5  •  VENTOLIN  (90 Base) MCG/ACT inhaler, INHALE 2 PUFFS EVERY 4 (FOUR) HOURS AS NEEDED FOR SHORTNESS OF AIR., Disp: 18 g, Rfl: 5    Review of Systems   Constitutional: Positive for activity change and fatigue.   HENT: Positive for congestion, ear discharge, ear pain, rhinorrhea, sinus pressure and sneezing.    Eyes: Negative.    Respiratory: Positive for apnea, cough and wheezing.    Cardiovascular: Positive for leg swelling.   Gastrointestinal: Negative.    Endocrine: Positive for  "polydipsia.   Genitourinary: Negative.    Musculoskeletal: Positive for arthralgias, back pain, joint swelling, myalgias, neck pain and neck stiffness.   Skin: Negative.    Allergic/Immunologic: Positive for environmental allergies.   Neurological: Positive for dizziness, weakness and numbness.   Hematological: Negative.    Psychiatric/Behavioral: The patient is nervous/anxious.        Vitals:    05/16/19 0910   Height: 180.3 cm (70.98\")       Neurological Examination:            Medical Decision Making:     Diagnostic Data Set:  [ ]      Assessment:  [ ]          Recommendations:  [ ]        I greatly appreciate the opportunity to see and evaluate this individual.  If you have questions or concerns regarding issues that I may have overlooked please call me at any time: [phone].  Garett Wall M.D.  [facility]  [address]  [place] [address]    "

## 2019-05-16 NOTE — PROGRESS NOTES
Soheila Brewster  1970  [id number]      Chief Complaint   Patient presents with   • Back Pain   • Leg Pain       HISTORY OF PRESENT ILLNESS: This is a 49-year-old female who is well-known to our service being referred for pain in the thoracic and lumbar region.  The pain is primarily axial, nonradicular and not that associated with either a myelopathy or radiculopathy.  Lumbar and thoracic MRI been performed.  She has been to physical therapy in the past which is been of marginal benefit.  She has seen a rheumatologist and has been prescribed immunotherapy however has chronic sinus issues and that has been precluded.  Her rheumatologist has told her that her sinus issues need to be resolved and then he may start this medication.  She states, further, that she has seen an allergist who told her that she has multiple allergies yet no remedy has been prescribed.    Her neurosurgical or neurological symptoms are most consistent with continuation of degenerative osteoarthritic changes.  Lumbar and thoracic MRI are available for review.    Past Medical History:   Diagnosis Date   • Allergic rhinitis    • Arthritis    • Asthma    • Back pain    • Diabetes mellitus (CMS/HCC)    • Disease of thyroid gland    • Elevated cholesterol    • GERD (gastroesophageal reflux disease)    • History of transfusion    • Hyperlipidemia    • Hypertension    • Liver disease     fatty   • Sleep apnea    • Umbilical hernia        Past Surgical History:   Procedure Laterality Date   • BREAST SURGERY      bilat abcess   • CHOLECYSTECTOMY     • DILATATION AND CURETTAGE     • THYROID BIOPSY     • THYROIDECTOMY Right 4/1/2019    Procedure: RIGHT THYROIDECTOMY LOBECTOMY;  Surgeon: Anam Rodriguez MD;  Location: [facility];  Service: General       Family History   Problem Relation Age of Onset   • Arthritis Mother    • Asthma Mother    • Cancer Mother    • COPD Mother    • Diabetes Mother    • Heart disease Mother    • Hypertension Mother    •  Hyperlipidemia Mother    • Kidney disease Mother    • Arthritis Father    • COPD Father    • Diabetes Father    • Hypertension Father    • Hyperlipidemia Father    • Arthritis Sister    • COPD Sister    • Diabetes Sister        Social History     Socioeconomic History   • Marital status:      Spouse name: Not on file   • Number of children: 0   • Years of education: Not on file   • Highest education level: GED or equivalent   Occupational History   • Occupation: Disability   Social Needs   • Financial resource strain: Somewhat hard   • Food insecurity:     Worry: Sometimes true     Inability: Sometimes true   • Transportation needs:     Medical: No     Non-medical: No   Tobacco Use   • Smoking status: Current Every Day Smoker     Packs/day: 1.50     Years: 10.00     Pack years: 15.00     Types: Cigarettes   • Smokeless tobacco: Never Used   Substance and Sexual Activity   • Alcohol use: No   • Drug use: No   • Sexual activity: Defer   Lifestyle   • Physical activity:     Days per week: 0 days     Minutes per session: 0 min   • Stress: To some extent   Relationships   • Social connections:     Talks on phone: More than three times a week     Gets together: Twice a week     Attends Worship service: Never     Active member of club or organization: No     Attends meetings of clubs or organizations: Never     Relationship status:        Allergies   Allergen Reactions   • Morphine And Related Shortness Of Breath     Throat edema   • Penicillins      Also anything in the penicillin family           Current Outpatient Medications:   •  azelastine (ASTELIN) 0.1 % nasal spray, 2 sprays both nostrils twice daily as needed, Disp: 1 each, Rfl: 5  •  bumetanide (BUMEX) 2 MG tablet, Take 1 tablet by mouth Daily. May take additional 0.5 tabs daily PRN, Disp: 45 tablet, Rfl: 5  •  Cyanocobalamin (VITAMIN B-12 ER) 1000 MCG tablet controlled-release, TAKE 1 TABLET BY MOUTH DAILY., Disp: 30 each, Rfl: 5  •  enalapril  (VASOTEC) 10 MG tablet, Take 1 tablet by mouth Daily., Disp: 30 tablet, Rfl: 5  •  esomeprazole (nexIUM) 40 MG capsule, Take 1 capsule by mouth Every Morning Before Breakfast., Disp: 30 capsule, Rfl: 5  •  ezetimibe (ZETIA) 10 MG tablet, TAKE 1 TABLET BY MOUTH EVERY NIGHT., Disp: 30 tablet, Rfl: 5  •  Fluticasone-Umeclidin-Vilant (TRELEGY ELLIPTA) 100-62.5-25 MCG/INH aerosol powder , Inhale 1 each Daily., Disp: 28 each, Rfl: 0  •  furosemide (LASIX) 40 MG tablet, Take 40 mg by mouth Daily., Disp: , Rfl:   •  gabapentin (NEURONTIN) 600 MG tablet, TAKE 1 TABLET BY MOUTH THREE TIMES DAILY, Disp: 90 tablet, Rfl: 2  •  glipiZIDE-metFORMIN (METAGLIP) 2.5-250 MG per tablet, TAKE 1 TABLET BY MOUTH TWICE DAILY BEFORE MEALS, Disp: 60 tablet, Rfl: 2  •  levocetirizine (XYZAL) 5 MG tablet, TAKE 1 TABLET BY MOUTH EVERY EVENING., Disp: 30 tablet, Rfl: 5  •  levothyroxine (SYNTHROID) 100 MCG tablet, Take 1 tablet by mouth Daily., Disp: 30 tablet, Rfl: 11  •  lubiprostone (AMITIZA) 24 MCG capsule, Take 1 capsule by mouth 2 (Two) Times a Day With Meals., Disp: 30 capsule, Rfl: 0  •  methocarbamol (ROBAXIN) 750 MG tablet, Take 1 tablet by mouth 3 (Three) Times a Day., Disp: 90 tablet, Rfl: 5  •  montelukast (SINGULAIR) 10 MG tablet, Take 1 tablet by mouth Every Night., Disp: 30 tablet, Rfl: 5  •  oxyCODONE-acetaminophen (PERCOCET) 5-325 MG per tablet, Take 1 tablet by mouth Every 6 (Six) Hours As Needed., Disp: , Rfl:   •  potassium chloride (K-DUR) 10 MEQ CR tablet, TAKE 1 TABLET BY MOUTH TWICE DAILY, Disp: 60 tablet, Rfl: 5  •  rosuvastatin (CRESTOR) 40 MG tablet, TAKE 1 TABLET BY MOUTH ONCE DAILY, Disp: 90 tablet, Rfl: 3  •  SOLIQUA 100-33 UNT-MCG/ML solution pen-injector, INJECT 60 UNITS UNDER THE SKIN DAILY., Disp: 15 mL, Rfl: 5  •  TRULICITY 0.75 MG/0.5ML solution pen-injector, INJECT 0.75MG SUBQ INTO THE APPROPRIATE AREA AS DIRECTED ONCE PER WEEK, Disp: 2 mL, Rfl: 5  •  VENTOLIN  (90 Base) MCG/ACT inhaler, INHALE 2  "PUFFS EVERY 4 (FOUR) HOURS AS NEEDED FOR SHORTNESS OF AIR., Disp: 18 g, Rfl: 5  •  vitamin D (ERGOCALCIFEROL) 18746 units capsule capsule, TAKE 1 CAPSULE BY MOUTH EVERY 7 DAYS, Disp: 4 capsule, Rfl: 5  •  doxycycline (MONODOX) 100 MG capsule, Take 1 capsule by mouth Every 12 (Twelve) Hours., Disp: 20 capsule, Rfl: 0  •  fenofibrate (TRICOR) 145 MG tablet, TAKE 1 TABLET BY MOUTH DAILY., Disp: 30 tablet, Rfl: 5  •  HYDROcodone-acetaminophen (NORCO) 7.5-325 MG per tablet, Take 1 tablet by mouth Every 8 (Eight) Hours As Needed., Disp: , Rfl: 0    Review of Systems   Constitutional: Positive for activity change and fatigue.   HENT: Positive for congestion, ear discharge, ear pain, rhinorrhea, sinus pressure and sneezing.    Eyes: Negative.    Respiratory: Positive for apnea, cough and wheezing.    Gastrointestinal: Negative.    Endocrine: Positive for polydipsia.   Genitourinary: Negative.    Musculoskeletal: Positive for arthralgias, back pain, joint swelling, myalgias, neck pain and neck stiffness.   Skin: Negative.    Allergic/Immunologic: Positive for environmental allergies.   Neurological: Positive for dizziness, weakness and numbness.   Hematological: Negative.    Psychiatric/Behavioral: The patient is nervous/anxious.        Vitals:    05/16/19 0910   BP: 120/75   BP Location: Right arm   Patient Position: Sitting   Pulse: 77   Resp: 16   Temp: 98 °F (36.7 °C)   TempSrc: Oral   SpO2: 97%   Weight: 130 kg (286 lb)   Height: 180.3 cm (70.98\")       Neurological Examination:    Mental status/speech: The patient is alert and oriented.  Speech is clear without aphysia or dysarthria.  No overt cognitive deficits.    Cranial nerve examination:    Olfaction: Smell is intact.  Vision: Vision is intact without visual field abnormalities.  Funduscopic examination is normal.  No pupillary irregularity.  Ocular motor examination: The extraocular muscles are intact.  There is no diplopia.  The pupil is round and reactive to " "both light and accommodation.  There is no nystagmus.  Facial movement/sensation: There is no facial weakness.  Sensation is intact in the first, second, and third divisions of the trigeminal nerve.  The corneal reflex is intact.  Auditory: Hearing is intact to finger rub bilaterally.  Cranial nerves IX, X, XI, XII: Phonation is normal.  No dysphagia.  Tongue is protruded in the midline without atrophy.  The gag reflex is intact.  Shoulder shrug is normal.    Musculoligamentous ligamentous examination: She remains obese with limitation of the range of motion of the spine.  Straight leg raising, Lasegue's and flip test are negative.  No Babinski, Carolynn or clonus.  Her strength is intact without sensory loss.  Her gait is normal    Medical Decision Making:     Diagnostic Data Set: The thoracic and lumbar MRI data set show the multiple level disc degeneration, protrusion and mild spinal stenosis unassociated with spinal cord abnormalities.      Assessment: Multilevel degenerative osteoarthritis of the spine          Recommendations: Unfortunately she does not have a condition that can be helped from a surgical perspective.  I have given her a prescription of Celebrex 200 mg twice daily.  I have ordered CT of the sinuses and will ask ENT to evaluate in reference to the possibility of \"chronic sinusitis.  It would be very helpful to have all this resolved so she may be compliant with the suggestions of the rheumatologist which I would think would be most therapeutic and efficacious.  He will call me after she sees ENT.  I would be happy to refer her to an allergist or pulmonologist in [place] if required.  She is a straightforward young lady who has a genetic predisposition for both obesity and degenerative disc disease.        I greatly appreciate the opportunity to see and evaluate this individual.  If you have questions or concerns regarding issues that I may have overlooked please call me at any time: " [phone].  Garett Wall M.D.  [facility]  [address].  [place] [address]    Scribed for Chirag Wall MD by Naheed Robison CMA. 5/16/2019 9:55 AM     I have read and concur with the information provided by the scribe.  Chirag Wall MD

## 2019-05-16 NOTE — PATIENT INSTRUCTIONS
Call Dr. Wall on a Monday or Tuesday with an update.    Ask for Jeanne () and leave a message for  Dr. Wall.   He will call you back at the end of the day as soon as he can.     [phone]

## 2019-05-21 ENCOUNTER — OFFICE VISIT (OUTPATIENT)
Dept: FAMILY MEDICINE CLINIC | Facility: CLINIC | Age: 49
End: 2019-05-21

## 2019-05-21 VITALS
TEMPERATURE: 97.9 F | DIASTOLIC BLOOD PRESSURE: 76 MMHG | HEIGHT: 71 IN | BODY MASS INDEX: 40.23 KG/M2 | HEART RATE: 97 BPM | OXYGEN SATURATION: 96 % | WEIGHT: 287.4 LBS | SYSTOLIC BLOOD PRESSURE: 120 MMHG

## 2019-05-21 DIAGNOSIS — J30.1 CHRONIC SEASONAL ALLERGIC RHINITIS DUE TO POLLEN: ICD-10-CM

## 2019-05-21 DIAGNOSIS — E55.9 VITAMIN D DEFICIENCY: ICD-10-CM

## 2019-05-21 DIAGNOSIS — J30.89 CHRONIC NONSEASONAL ALLERGIC RHINITIS DUE TO POLLEN: ICD-10-CM

## 2019-05-21 DIAGNOSIS — E78.2 MIXED HYPERLIPIDEMIA: ICD-10-CM

## 2019-05-21 DIAGNOSIS — IMO0002 DM (DIABETES MELLITUS) TYPE II UNCONTROLLED, PERIPH VASCULAR DISORDER: ICD-10-CM

## 2019-05-21 DIAGNOSIS — J32.0 CHRONIC MAXILLARY SINUSITIS: Primary | ICD-10-CM

## 2019-05-21 DIAGNOSIS — E53.8 VITAMIN B12 DEFICIENCY: ICD-10-CM

## 2019-05-21 DIAGNOSIS — K21.9 GASTROESOPHAGEAL REFLUX DISEASE WITHOUT ESOPHAGITIS: ICD-10-CM

## 2019-05-21 PROCEDURE — 99214 OFFICE O/P EST MOD 30 MIN: CPT | Performed by: NURSE PRACTITIONER

## 2019-05-21 RX ORDER — ERGOCALCIFEROL 1.25 MG/1
50000 CAPSULE ORAL
Qty: 4 CAPSULE | Refills: 5 | Status: SHIPPED | OUTPATIENT
Start: 2019-05-21 | End: 2019-11-22

## 2019-05-21 RX ORDER — AZELASTINE 1 MG/ML
SPRAY, METERED NASAL
Qty: 1 EACH | Refills: 5 | Status: SHIPPED | OUTPATIENT
Start: 2019-05-21 | End: 2020-02-20 | Stop reason: SDUPTHER

## 2019-05-21 RX ORDER — MONTELUKAST SODIUM 10 MG/1
10 TABLET ORAL NIGHTLY
Qty: 30 TABLET | Refills: 5 | Status: SHIPPED | OUTPATIENT
Start: 2019-05-21 | End: 2019-09-18 | Stop reason: SDUPTHER

## 2019-05-21 RX ORDER — FAMOTIDINE 20 MG/1
20 TABLET, FILM COATED ORAL 2 TIMES DAILY PRN
Qty: 60 TABLET | Refills: 5 | Status: SHIPPED | OUTPATIENT
Start: 2019-05-21 | End: 2020-01-02

## 2019-05-21 RX ORDER — LEVOCETIRIZINE DIHYDROCHLORIDE 5 MG/1
5 TABLET, FILM COATED ORAL EVERY EVENING
Qty: 30 TABLET | Refills: 5 | Status: SHIPPED | OUTPATIENT
Start: 2019-05-21 | End: 2019-11-22 | Stop reason: SDUPTHER

## 2019-05-21 RX ORDER — FENOFIBRATE 145 MG/1
145 TABLET, COATED ORAL DAILY
Qty: 30 TABLET | Refills: 5 | Status: SHIPPED | OUTPATIENT
Start: 2019-05-21 | End: 2019-11-22 | Stop reason: SDUPTHER

## 2019-05-21 RX ORDER — CYANOCOBALAMIN (VITAMIN B-12) 1000 MCG
1 TABLET, EXTENDED RELEASE ORAL DAILY
Qty: 30 EACH | Refills: 5 | Status: SHIPPED | OUTPATIENT
Start: 2019-05-21 | End: 2019-11-22 | Stop reason: SDUPTHER

## 2019-05-21 NOTE — PROGRESS NOTES
Subjective   Soheila Brewster is a 49 y.o. female.     Chief Complaint   Patient presents with   • Diabetes     1 month f/u       History of Present Illness     HTN-improvement noted since increase in Enalapril to 10 mg.  BP has been more stable at home.    Ear complaint-left ear drainage.  Has been present for 2 weeks.  She reports some odor of the discharge.  She reports some ear pain then ringing.  She has noted some popping and cracking in her ear.  She has been having chronic sinus problems for approx 1 year.  She would like to be referred to ENT.  Diabetes-doing well.  She denies any concerns with Soliqua.  Is watching her diet and taking meds as directed.  No acute concerns.   GERD-has noted a mild increase.  She reports is not always associated with food.  Is taking her Nexium as directed.        The following portions of the patient's history were reviewed and updated as appropriate: allergies, current medications, past family history, past medical history, past social history, past surgical history and problem list.    Review of Systems   Constitutional: Positive for fatigue. Negative for appetite change and unexpected weight change.   HENT: Positive for congestion, ear pain and postnasal drip. Negative for nosebleeds, rhinorrhea, sore throat, trouble swallowing and voice change.    Eyes: Negative for pain and visual disturbance.   Respiratory: Positive for cough and shortness of breath. Negative for wheezing.    Cardiovascular: Negative for chest pain and palpitations.   Gastrointestinal: Positive for constipation and nausea. Negative for abdominal pain, blood in stool and diarrhea.   Endocrine: Negative for cold intolerance and polydipsia.   Genitourinary: Negative for difficulty urinating, flank pain and hematuria.   Musculoskeletal: Positive for arthralgias and myalgias. Negative for back pain, gait problem and joint swelling.   Skin: Negative for color change and rash.   Allergic/Immunologic: Negative.   "  Neurological: Positive for dizziness and headaches. Negative for syncope and numbness.   Hematological: Negative.    Psychiatric/Behavioral: Positive for sleep disturbance. Negative for dysphoric mood and suicidal ideas. The patient is not nervous/anxious.    All other systems reviewed and are negative.      Objective     /76 (BP Location: Right arm, Patient Position: Sitting, Cuff Size: Adult)   Pulse 97   Temp 97.9 °F (36.6 °C) (Temporal)   Ht 180.3 cm (70.98\")   Wt 130 kg (287 lb 6.4 oz)   SpO2 96%   BMI 40.11 kg/m²     Physical Exam   Constitutional: She appears well-developed and well-nourished. No distress.   HENT:   Head: Normocephalic and atraumatic.   Right Ear: Ear canal normal. Tympanic membrane is injected. Tympanic membrane is not scarred, not retracted and not bulging. A middle ear effusion is present.   Left Ear: Ear canal normal. Tympanic membrane is injected. Tympanic membrane is not scarred, not retracted and not bulging. A middle ear effusion is present.   Nose: Mucosal edema and rhinorrhea present. No epistaxis. Right sinus exhibits no maxillary sinus tenderness and no frontal sinus tenderness. Left sinus exhibits no maxillary sinus tenderness and no frontal sinus tenderness.   Mouth/Throat: Uvula is midline and mucous membranes are normal. No uvula swelling. Oropharyngeal exudate and posterior oropharyngeal erythema present.   Eyes: EOM are normal. Pupils are equal, round, and reactive to light. No scleral icterus.   Neck: Normal range of motion. No thyromegaly present.   Cardiovascular: Normal rate, regular rhythm, normal heart sounds and intact distal pulses.   Pulmonary/Chest: Effort normal. She has decreased breath sounds (mildly over bronchial region of anterior chest). She has wheezes (scattered mild).   Abdominal: Soft. Bowel sounds are normal. There is no hepatosplenomegaly. There is no tenderness. A hernia is present.       Protuberant abdomen   Musculoskeletal: She " exhibits tenderness.        Thoracic back: She exhibits tenderness and spasm.        Lumbar back: She exhibits decreased range of motion, tenderness, pain and spasm.   Pain with palpation over sciatic bilaterally    Lymphadenopathy:     She has cervical adenopathy.        Right cervical: Superficial cervical adenopathy present.        Left cervical: Superficial cervical adenopathy present.        Right: Supraclavicular (firm mobile node.  Mild tenderness) adenopathy present.   Neurological: She is alert. She displays normal reflexes. No cranial nerve deficit.   Skin: Skin is warm and dry. Capillary refill takes less than 2 seconds.   Psychiatric: She has a normal mood and affect. Her behavior is normal. Judgment and thought content normal.   Vitals reviewed.      Assessment/Plan     Problem List Items Addressed This Visit        Cardiovascular and Mediastinum    DM (diabetes mellitus) type II uncontrolled, periph vascular disorder (CMS/HCC)    Relevant Medications    Insulin Glargine-Lixisenatide (SOLIQUA) 100-33 UNT-MCG/ML solution pen-injector    Mixed hyperlipidemia    Relevant Medications    fenofibrate (TRICOR) 145 MG tablet       Digestive    Vitamin D deficiency    Relevant Medications    vitamin D (ERGOCALCIFEROL) 61667 units capsule capsule    Gastroesophageal reflux disease without esophagitis    Relevant Medications    famotidine (PEPCID) 20 MG tablet      Other Visit Diagnoses     Chronic maxillary sinusitis    -  Primary    Relevant Orders    Ambulatory Referral to ENT (Otolaryngology)    Chronic seasonal allergic rhinitis due to pollen        Relevant Medications    montelukast (SINGULAIR) 10 MG tablet    levocetirizine (XYZAL) 5 MG tablet    Vitamin B12 deficiency        Relevant Medications    Cyanocobalamin (VITAMIN B-12 ER) 1000 MCG tablet controlled-release    Chronic nonseasonal allergic rhinitis due to pollen        Relevant Medications    azelastine (ASTELIN) 0.1 % nasal spray           Patient's Body mass index is 40.11 kg/m². BMI is above normal parameters. Recommendations include: nutrition counseling.   (Normal BMI:  18.5-24.9, OW 25-29.9, Obesity 30 or greater)  Referral as above to speciality.  Refill on routine maintenance meds today.     Understands disease processes and need for medications.  Understands reasons for urgent and emergent care.  Patient (& family) verbalized agreement for treatment plan.   Emotional support and active listening provided.  Patient provided time to verbalize feelings.    RTC 3 months, sooner if needed.           This document has been electronically signed by:  JOAQUÍN Ross, FNP-C

## 2019-05-27 NOTE — ASSESSMENT & PLAN NOTE
Stable.  Continue Enalapril 10 mg as directed.  Ambulatory BP monitoring either at home or random community checks.  Patient to report continued elevations >140/90.  Patient may come by office for checks if needed.

## 2019-07-06 DIAGNOSIS — IMO0002 DM (DIABETES MELLITUS) TYPE II UNCONTROLLED, PERIPH VASCULAR DISORDER: ICD-10-CM

## 2019-07-06 DIAGNOSIS — E55.9 VITAMIN D DEFICIENCY: ICD-10-CM

## 2019-07-06 DIAGNOSIS — J30.1 CHRONIC SEASONAL ALLERGIC RHINITIS DUE TO POLLEN: ICD-10-CM

## 2019-07-08 DIAGNOSIS — Z98.890 STATUS POST THYROID SURGERY: ICD-10-CM

## 2019-07-08 DIAGNOSIS — D34 BENIGN NEOPLASM OF THYROID GLAND: ICD-10-CM

## 2019-07-08 RX ORDER — INSULIN GLARGINE AND LIXISENATIDE 100; 33 U/ML; UG/ML
INJECTION, SOLUTION SUBCUTANEOUS
Qty: 15 ML | Refills: 5 | Status: SHIPPED | OUTPATIENT
Start: 2019-07-08 | End: 2020-02-20 | Stop reason: SDUPTHER

## 2019-07-08 RX ORDER — ERGOCALCIFEROL 1.25 MG/1
CAPSULE ORAL
Qty: 4 CAPSULE | Refills: 5 | Status: SHIPPED | OUTPATIENT
Start: 2019-07-08 | End: 2020-01-20 | Stop reason: SDUPTHER

## 2019-07-08 RX ORDER — LEVOCETIRIZINE DIHYDROCHLORIDE 5 MG/1
5 TABLET, FILM COATED ORAL EVERY EVENING
Qty: 30 TABLET | Refills: 5 | Status: SHIPPED | OUTPATIENT
Start: 2019-07-08 | End: 2020-01-20 | Stop reason: SDUPTHER

## 2019-07-08 RX ORDER — GLIPIZIDE AND METFORMIN HCL 2.5; 25 MG/1; MG/1
TABLET, FILM COATED ORAL
Qty: 60 TABLET | Refills: 2 | Status: SHIPPED | OUTPATIENT
Start: 2019-07-08 | End: 2019-09-18 | Stop reason: SDUPTHER

## 2019-07-18 DIAGNOSIS — M19.90 ARTHRITIS: ICD-10-CM

## 2019-07-18 DIAGNOSIS — M53.9 MULTILEVEL DEGENERATIVE DISC DISEASE: Primary | ICD-10-CM

## 2019-07-18 DIAGNOSIS — E53.8 VITAMIN B12 DEFICIENCY: ICD-10-CM

## 2019-07-18 DIAGNOSIS — R60.1 GENERALIZED EDEMA: ICD-10-CM

## 2019-07-18 DIAGNOSIS — IMO0002 DM (DIABETES MELLITUS) TYPE II UNCONTROLLED, PERIPH VASCULAR DISORDER: ICD-10-CM

## 2019-07-18 RX ORDER — BUMETANIDE 2 MG/1
TABLET ORAL
Qty: 45 TABLET | Refills: 5 | Status: SHIPPED | OUTPATIENT
Start: 2019-07-18 | End: 2019-11-22 | Stop reason: SDUPTHER

## 2019-07-18 RX ORDER — CELECOXIB 200 MG/1
CAPSULE ORAL
Qty: 60 CAPSULE | Refills: 0 | Status: SHIPPED | OUTPATIENT
Start: 2019-07-18 | End: 2019-11-22 | Stop reason: SDUPTHER

## 2019-07-18 RX ORDER — GABAPENTIN 600 MG/1
TABLET ORAL
Qty: 90 TABLET | Refills: 2 | Status: SHIPPED | OUTPATIENT
Start: 2019-07-18 | End: 2019-10-21 | Stop reason: SDUPTHER

## 2019-07-18 RX ORDER — LANOLIN ALCOHOL/MO/W.PET/CERES
CREAM (GRAM) TOPICAL
Qty: 30 TABLET | Refills: 5 | Status: SHIPPED | OUTPATIENT
Start: 2019-07-18 | End: 2020-02-20 | Stop reason: SDUPTHER

## 2019-07-18 NOTE — TELEPHONE ENCOUNTER
Provider:  [facility]  Caller: jaida  Time of call: n/a    Phone #:  n/a  Last visit:  5/16/19   Next visit:  none     Reason for call:         Automated refill request.

## 2019-07-19 DIAGNOSIS — J32.0 CHRONIC MAXILLARY SINUSITIS: Primary | ICD-10-CM

## 2019-07-31 DIAGNOSIS — R53.83 OTHER FATIGUE: ICD-10-CM

## 2019-07-31 DIAGNOSIS — I10 ESSENTIAL HYPERTENSION: ICD-10-CM

## 2019-07-31 DIAGNOSIS — E53.8 VITAMIN B12 DEFICIENCY: ICD-10-CM

## 2019-07-31 DIAGNOSIS — E04.1 THYROID NODULE: ICD-10-CM

## 2019-07-31 DIAGNOSIS — E55.9 VITAMIN D DEFICIENCY: ICD-10-CM

## 2019-07-31 DIAGNOSIS — R68.89 FLU-LIKE SYMPTOMS: ICD-10-CM

## 2019-07-31 DIAGNOSIS — IMO0002 DM (DIABETES MELLITUS) TYPE II UNCONTROLLED, PERIPH VASCULAR DISORDER: Primary | ICD-10-CM

## 2019-07-31 PROCEDURE — 84481 FREE ASSAY (FT-3): CPT | Performed by: SURGERY

## 2019-08-06 DIAGNOSIS — IMO0002 DM (DIABETES MELLITUS) TYPE II UNCONTROLLED, PERIPH VASCULAR DISORDER: ICD-10-CM

## 2019-08-06 DIAGNOSIS — J32.0 CHRONIC MAXILLARY SINUSITIS: ICD-10-CM

## 2019-08-06 DIAGNOSIS — J30.1 CHRONIC SEASONAL ALLERGIC RHINITIS DUE TO POLLEN: ICD-10-CM

## 2019-08-06 DIAGNOSIS — E78.2 MIXED HYPERLIPIDEMIA: ICD-10-CM

## 2019-08-06 DIAGNOSIS — K21.9 GASTROESOPHAGEAL REFLUX DISEASE WITHOUT ESOPHAGITIS: ICD-10-CM

## 2019-08-06 DIAGNOSIS — R60.1 GENERALIZED EDEMA: ICD-10-CM

## 2019-08-06 RX ORDER — ALBUTEROL SULFATE 90 UG/1
AEROSOL, METERED RESPIRATORY (INHALATION)
Qty: 18 G | Refills: 5 | Status: SHIPPED | OUTPATIENT
Start: 2019-08-06 | End: 2020-01-30

## 2019-08-06 RX ORDER — MONTELUKAST SODIUM 10 MG/1
10 TABLET ORAL NIGHTLY
Qty: 30 TABLET | Refills: 5 | Status: SHIPPED | OUTPATIENT
Start: 2019-08-06 | End: 2020-01-30

## 2019-08-06 RX ORDER — DULAGLUTIDE 0.75 MG/.5ML
INJECTION, SOLUTION SUBCUTANEOUS
Qty: 2 ML | Refills: 5 | Status: SHIPPED | OUTPATIENT
Start: 2019-08-06 | End: 2019-09-18 | Stop reason: DRUGHIGH

## 2019-08-06 RX ORDER — POTASSIUM CHLORIDE 750 MG/1
TABLET, FILM COATED, EXTENDED RELEASE ORAL
Qty: 60 TABLET | Refills: 5 | Status: SHIPPED | OUTPATIENT
Start: 2019-08-06 | End: 2020-01-20 | Stop reason: SDUPTHER

## 2019-08-06 RX ORDER — EZETIMIBE 10 MG/1
10 TABLET ORAL NIGHTLY
Qty: 30 TABLET | Refills: 5 | Status: SHIPPED | OUTPATIENT
Start: 2019-08-06 | End: 2020-01-30

## 2019-08-06 RX ORDER — ESOMEPRAZOLE MAGNESIUM 40 MG/1
40 CAPSULE, DELAYED RELEASE ORAL
Qty: 30 CAPSULE | Refills: 5 | Status: SHIPPED | OUTPATIENT
Start: 2019-08-06 | End: 2020-01-20 | Stop reason: SDUPTHER

## 2019-08-21 ENCOUNTER — OFFICE VISIT (OUTPATIENT)
Dept: FAMILY MEDICINE CLINIC | Facility: CLINIC | Age: 49
End: 2019-08-21

## 2019-08-21 VITALS
HEIGHT: 71 IN | BODY MASS INDEX: 39.9 KG/M2 | OXYGEN SATURATION: 98 % | HEART RATE: 98 BPM | TEMPERATURE: 98.4 F | WEIGHT: 285 LBS | DIASTOLIC BLOOD PRESSURE: 84 MMHG | SYSTOLIC BLOOD PRESSURE: 152 MMHG

## 2019-08-21 DIAGNOSIS — J06.9 ACUTE URI: ICD-10-CM

## 2019-08-21 DIAGNOSIS — M25.78 DEGENERATION OF INTERVERTEBRAL DISC OF THORACIC REGION WITH OSTEOPHYTE: ICD-10-CM

## 2019-08-21 DIAGNOSIS — L02.91 ABSCESS: ICD-10-CM

## 2019-08-21 DIAGNOSIS — R00.2 PALPITATIONS: Primary | ICD-10-CM

## 2019-08-21 DIAGNOSIS — M51.34 DEGENERATION OF INTERVERTEBRAL DISC OF THORACIC REGION WITH OSTEOPHYTE: ICD-10-CM

## 2019-08-21 PROCEDURE — 10060 I&D ABSCESS SIMPLE/SINGLE: CPT | Performed by: NURSE PRACTITIONER

## 2019-08-21 PROCEDURE — 96372 THER/PROPH/DIAG INJ SC/IM: CPT | Performed by: NURSE PRACTITIONER

## 2019-08-21 PROCEDURE — 99214 OFFICE O/P EST MOD 30 MIN: CPT | Performed by: NURSE PRACTITIONER

## 2019-08-21 RX ORDER — GUAIFENESIN AND CODEINE PHOSPHATE 100; 10 MG/5ML; MG/5ML
5 SOLUTION ORAL 3 TIMES DAILY PRN
Qty: 180 ML | Refills: 0 | Status: CANCELLED | OUTPATIENT
Start: 2019-08-21

## 2019-08-21 RX ORDER — DEXAMETHASONE SODIUM PHOSPHATE 4 MG/ML
8 INJECTION, SOLUTION INTRA-ARTICULAR; INTRALESIONAL; INTRAMUSCULAR; INTRAVENOUS; SOFT TISSUE ONCE
Status: COMPLETED | OUTPATIENT
Start: 2019-08-21 | End: 2019-08-21

## 2019-08-21 RX ORDER — SULFAMETHOXAZOLE AND TRIMETHOPRIM 800; 160 MG/1; MG/1
1 TABLET ORAL EVERY 12 HOURS
Qty: 20 TABLET | Refills: 0 | Status: SHIPPED | OUTPATIENT
Start: 2019-08-21 | End: 2019-08-31

## 2019-08-21 RX ORDER — GUAIFENESIN AND CODEINE PHOSPHATE 100; 10 MG/5ML; MG/5ML
5 SOLUTION ORAL 3 TIMES DAILY PRN
Qty: 180 ML | Refills: 0 | Status: SHIPPED | OUTPATIENT
Start: 2019-08-21 | End: 2020-01-02

## 2019-08-21 RX ORDER — KETOROLAC TROMETHAMINE 30 MG/ML
60 INJECTION, SOLUTION INTRAMUSCULAR; INTRAVENOUS ONCE
Status: COMPLETED | OUTPATIENT
Start: 2019-08-21 | End: 2019-08-21

## 2019-08-21 RX ADMIN — DEXAMETHASONE SODIUM PHOSPHATE 8 MG: 4 INJECTION, SOLUTION INTRA-ARTICULAR; INTRALESIONAL; INTRAMUSCULAR; INTRAVENOUS; SOFT TISSUE at 09:28

## 2019-08-21 RX ADMIN — KETOROLAC TROMETHAMINE 60 MG: 30 INJECTION, SOLUTION INTRAMUSCULAR; INTRAVENOUS at 09:29

## 2019-08-21 NOTE — PROGRESS NOTES
"Subjective   Soheila Brewster is a 49 y.o. female.     Chief Complaint   Patient presents with   • Abscess   • Hypertension       History of Present Illness     HTN-chronic and ongoing.  Patient is elevated today.  She has taken meds just prior to her appt and reports \"I am in pain\".  Patient obviously uncomfortable shifting her weight and unable to sit flat onto her left leg.  Leaning over into her arm chair.  Abscess-inside of left leg near vaginal area.  She reports has been present since Sunday.  She reports is painful and swollen.  She reports no drainage.  She reports she does get abscesses occasionally.  \"This is the biggest one I've ever had\".  Not at goal.    Chest congestion and productive cough-\"over a month\".  She reports productive cough and pain shooting \"across my head\" \"temples\".  She reports she is having chronic PND and congestion.  Sinus pain pressure is present daily.  Intermittent sore throat is also reported.  Chest sensation-reports she feels she is noting an \"electric shock\" in her chest at times during the night as she begins to doze off.  She reports not nightly but can occur multiple times during the same night.  No obvious CP but sensation of heart pounding.  She reports a past stress test but \"several years ago\" done at [facility].  She reports she is noting the pounding 2-3 times per week.  Not at goal.     The following portions of the patient's history were reviewed and updated as appropriate: allergies, current medications, past family history, past medical history, past social history, past surgical history and problem list.    Review of Systems   Constitutional: Positive for fatigue and fever. Negative for appetite change.   HENT: Positive for congestion, postnasal drip, sinus pain and sore throat.    Respiratory: Positive for cough and shortness of breath. Negative for chest tightness.    Cardiovascular: Positive for palpitations. Negative for chest pain.   Gastrointestinal: Positive for " "abdominal pain and nausea. Negative for blood in stool and constipation.   Genitourinary: Negative for dysuria, hematuria and urgency.   Musculoskeletal: Positive for back pain.   Neurological: Positive for dizziness and headaches.   Psychiatric/Behavioral: Positive for sleep disturbance. Negative for dysphoric mood and suicidal ideas.   All other systems reviewed and are negative.      Objective     /84 (BP Location: Left arm, Patient Position: Sitting, Cuff Size: Adult)   Pulse 98   Temp 98.4 °F (36.9 °C) (Temporal)   Ht 180.3 cm (71\")   Wt 129 kg (285 lb)   SpO2 98%   BMI 39.75 kg/m²     Physical Exam   Constitutional: She appears well-developed and well-nourished. No distress.   HENT:   Head: Normocephalic and atraumatic.   Right Ear: Ear canal normal. Tympanic membrane is injected. Tympanic membrane is not scarred, not retracted and not bulging. A middle ear effusion is present.   Left Ear: Ear canal normal. Tympanic membrane is injected. Tympanic membrane is not scarred, not retracted and not bulging. A middle ear effusion is present.   Nose: Mucosal edema and rhinorrhea present. No epistaxis. Right sinus exhibits no maxillary sinus tenderness and no frontal sinus tenderness. Left sinus exhibits no maxillary sinus tenderness and no frontal sinus tenderness.   Mouth/Throat: Uvula is midline and mucous membranes are normal. No uvula swelling. Oropharyngeal exudate and posterior oropharyngeal erythema present.   Eyes: EOM are normal. Pupils are equal, round, and reactive to light. No scleral icterus.   Neck: Normal range of motion. No thyromegaly present.   Cardiovascular: Normal rate, regular rhythm, normal heart sounds and intact distal pulses.   Pulmonary/Chest: Effort normal. She has decreased breath sounds (mildly over bronchial region of anterior chest). She has wheezes (scattered mild).   Abdominal: Soft. Bowel sounds are normal. There is no hepatosplenomegaly. There is no tenderness. A hernia " is present.       Protuberant abdomen   Musculoskeletal: She exhibits tenderness.        Thoracic back: She exhibits tenderness and spasm.        Lumbar back: She exhibits decreased range of motion, tenderness, pain and spasm.   Pain with palpation over sciatic bilaterally    Lymphadenopathy:     She has cervical adenopathy.        Right cervical: Superficial cervical adenopathy present.        Left cervical: Superficial cervical adenopathy present.        Right: Supraclavicular (firm mobile node.  Mild tenderness) adenopathy present.   Neurological: She is alert. She displays normal reflexes. No cranial nerve deficit.   Skin: Skin is warm and dry. Capillary refill takes less than 2 seconds.   3 x 4 cm abscess to left inner groin.  Area erythemic, swollen warm to touch.  Induration noted to edges of the wound.  No drainage noted.  Center of abscess boggy.  Area cleaned x3 with Betadine incision made with scalpel for drainage.  Manual expression moderate amount of thick yellow pus from the incision.  Patient tolerated well   Psychiatric: She has a normal mood and affect. Her behavior is normal. Judgment and thought content normal.   Vitals reviewed.      Assessment/Plan     Problem List Items Addressed This Visit        Musculoskeletal and Integument    Degeneration of intervertebral disc of thoracic region with osteophyte    Relevant Medications    dexamethasone (DECADRON) injection 8 mg (Completed)    ketorolac (TORADOL) injection 60 mg (Completed)      Other Visit Diagnoses     Palpitations    -  Primary    Relevant Orders    Ambulatory Referral to Cardiology (Completed)    Abscess        Relevant Medications    sulfamethoxazole-trimethoprim (BACTRIM DS,SEPTRA DS) 800-160 MG per tablet    mupirocin (BACTROBAN) 2 % ointment    Acute URI        Relevant Medications    sulfamethoxazole-trimethoprim (BACTRIM DS,SEPTRA DS) 800-160 MG per tablet    guaifenesin-codeine (G TUSSIN AC) 100-10 MG/5ML liquid           Patient's Body mass index is 39.75 kg/m². BMI is above normal parameters. Recommendations include: nutrition counseling.   (Normal BMI:  18.5-24.9, OW 25-29.9, Obesity 30 or greater)    Understands disease processes and need for medications.  Understands reasons for urgent and emergent care.  Patient (& family) verbalized agreement for treatment plan.   Emotional support and active listening provided.  Patient provided time to verbalize feelings.    Instructed to complete all of antibiotics for acute illness.  Increase PO fluids, avoid/limit caffeine.  Do not save any of the meds for later use.  Rest PRN    [facility] #[id number] reviewed today and consistent.  Will feel short supply of cough medication with codeine.      Continue under the care of pain management  Instructed to complete all of antibiotics for acute illness.  Increase PO fluids, avoid/limit caffeine.  Do not save any of the meds for later use.  Rest PRN  Compress to area ongoing.  3 times daily to 4 times daily.    Referral to cardiology as above.  Injections to aid with acute pain relief today.  RTC 3 months, sooner if needed for problems or concerns            This document has been electronically signed by:  JOAQUÍN Ross, FNP-C    Dragon disclaimer:  Much of this encounter note is an electronic transcription/translation of spoken language to printed text. The electronic translation of spoken language may permit erroneous, or at times, nonsensical words or phrases to be inadvertently transcribed; Although I have reviewed the note for such errors, some may still exist.

## 2019-09-04 DIAGNOSIS — R60.0 BILATERAL EDEMA OF LOWER EXTREMITY: ICD-10-CM

## 2019-09-05 RX ORDER — FUROSEMIDE 40 MG/1
40 TABLET ORAL EVERY MORNING
Qty: 30 TABLET | Refills: 5 | Status: SHIPPED | OUTPATIENT
Start: 2019-09-05 | End: 2019-11-22

## 2019-09-10 LAB
25(OH)D3 SERPL-MCNC: 31.4 NG/ML (ref 30–100)
ALBUMIN SERPL-MCNC: 3.8 G/DL (ref 3.5–5.2)
ALBUMIN/GLOB SERPL: 1.2 G/DL
ALP SERPL-CCNC: 58 U/L (ref 39–117)
ALT SERPL W P-5'-P-CCNC: 9 U/L (ref 1–33)
ANION GAP SERPL CALCULATED.3IONS-SCNC: 12.8 MMOL/L (ref 5–15)
AST SERPL-CCNC: 11 U/L (ref 1–32)
B-HCG UR QL: NEGATIVE
BASOPHILS # BLD AUTO: 0.03 10*3/MM3 (ref 0–0.2)
BASOPHILS NFR BLD AUTO: 0.4 % (ref 0–1.5)
BILIRUB SERPL-MCNC: 0.4 MG/DL (ref 0.2–1.2)
BUN BLD-MCNC: 9 MG/DL (ref 6–20)
BUN/CREAT SERPL: 12.9 (ref 7–25)
CALCIUM SPEC-SCNC: 10 MG/DL (ref 8.6–10.5)
CHLORIDE SERPL-SCNC: 100 MMOL/L (ref 98–107)
CHOLEST SERPL-MCNC: 97 MG/DL (ref 0–200)
CO2 SERPL-SCNC: 24.2 MMOL/L (ref 22–29)
CREAT BLD-MCNC: 0.7 MG/DL (ref 0.57–1)
DEPRECATED RDW RBC AUTO: 45.8 FL (ref 37–54)
EOSINOPHIL # BLD AUTO: 0.23 10*3/MM3 (ref 0–0.4)
EOSINOPHIL NFR BLD AUTO: 2.9 % (ref 0.3–6.2)
ERYTHROCYTE [DISTWIDTH] IN BLOOD BY AUTOMATED COUNT: 14.6 % (ref 12.3–15.4)
GFR SERPL CREATININE-BSD FRML MDRD: 89 ML/MIN/1.73
GLOBULIN UR ELPH-MCNC: 3.2 GM/DL
GLUCOSE BLD-MCNC: 180 MG/DL (ref 65–99)
HBA1C MFR BLD: 10.19 % (ref 4.8–5.6)
HCT VFR BLD AUTO: 40.6 % (ref 34–46.6)
HDLC SERPL-MCNC: 34 MG/DL (ref 40–60)
HGB BLD-MCNC: 12.9 G/DL (ref 12–15.9)
IMM GRANULOCYTES # BLD AUTO: 0.02 10*3/MM3 (ref 0–0.05)
IMM GRANULOCYTES NFR BLD AUTO: 0.3 % (ref 0–0.5)
LDLC SERPL CALC-MCNC: 29 MG/DL (ref 0–100)
LDLC/HDLC SERPL: 0.86 {RATIO}
LYMPHOCYTES # BLD AUTO: 2.6 10*3/MM3 (ref 0.7–3.1)
LYMPHOCYTES NFR BLD AUTO: 32.7 % (ref 19.6–45.3)
MCH RBC QN AUTO: 27.5 PG (ref 26.6–33)
MCHC RBC AUTO-ENTMCNC: 31.8 G/DL (ref 31.5–35.7)
MCV RBC AUTO: 86.6 FL (ref 79–97)
MONOCYTES # BLD AUTO: 0.57 10*3/MM3 (ref 0.1–0.9)
MONOCYTES NFR BLD AUTO: 7.2 % (ref 5–12)
NEUTROPHILS # BLD AUTO: 4.51 10*3/MM3 (ref 1.7–7)
NEUTROPHILS NFR BLD AUTO: 56.5 % (ref 42.7–76)
NRBC BLD AUTO-RTO: 0 /100 WBC (ref 0–0.2)
PLATELET # BLD AUTO: 147 10*3/MM3 (ref 140–450)
PMV BLD AUTO: 11.9 FL (ref 6–12)
POTASSIUM BLD-SCNC: 4.2 MMOL/L (ref 3.5–5.2)
PROT SERPL-MCNC: 7 G/DL (ref 6–8.5)
RBC # BLD AUTO: 4.69 10*6/MM3 (ref 3.77–5.28)
SODIUM BLD-SCNC: 137 MMOL/L (ref 136–145)
T4 FREE SERPL-MCNC: 1.44 NG/DL (ref 0.93–1.7)
TRIGL SERPL-MCNC: 169 MG/DL (ref 0–150)
TSH SERPL DL<=0.05 MIU/L-ACNC: 2.72 UIU/ML (ref 0.27–4.2)
VIT B12 BLD-MCNC: >2000 PG/ML (ref 211–946)
VLDLC SERPL-MCNC: 33.8 MG/DL (ref 5–40)
WBC NRBC COR # BLD: 7.96 10*3/MM3 (ref 3.4–10.8)

## 2019-09-10 PROCEDURE — 80053 COMPREHEN METABOLIC PANEL: CPT | Performed by: NURSE PRACTITIONER

## 2019-09-10 PROCEDURE — 84443 ASSAY THYROID STIM HORMONE: CPT | Performed by: NURSE PRACTITIONER

## 2019-09-10 PROCEDURE — 81025 URINE PREGNANCY TEST: CPT | Performed by: NURSE PRACTITIONER

## 2019-09-10 PROCEDURE — 83036 HEMOGLOBIN GLYCOSYLATED A1C: CPT | Performed by: NURSE PRACTITIONER

## 2019-09-10 PROCEDURE — 83880 ASSAY OF NATRIURETIC PEPTIDE: CPT | Performed by: INTERNAL MEDICINE

## 2019-09-10 PROCEDURE — 82607 VITAMIN B-12: CPT | Performed by: NURSE PRACTITIONER

## 2019-09-10 PROCEDURE — 84439 ASSAY OF FREE THYROXINE: CPT | Performed by: NURSE PRACTITIONER

## 2019-09-10 PROCEDURE — 80061 LIPID PANEL: CPT | Performed by: NURSE PRACTITIONER

## 2019-09-10 PROCEDURE — 82306 VITAMIN D 25 HYDROXY: CPT | Performed by: NURSE PRACTITIONER

## 2019-09-10 PROCEDURE — 85025 COMPLETE CBC W/AUTO DIFF WBC: CPT | Performed by: NURSE PRACTITIONER

## 2019-09-18 ENCOUNTER — OFFICE VISIT (OUTPATIENT)
Dept: FAMILY MEDICINE CLINIC | Facility: CLINIC | Age: 49
End: 2019-09-18

## 2019-09-18 VITALS
BODY MASS INDEX: 40.74 KG/M2 | OXYGEN SATURATION: 98 % | TEMPERATURE: 99.6 F | HEIGHT: 71 IN | HEART RATE: 90 BPM | DIASTOLIC BLOOD PRESSURE: 74 MMHG | WEIGHT: 291 LBS | SYSTOLIC BLOOD PRESSURE: 120 MMHG

## 2019-09-18 DIAGNOSIS — Z51.81 ENCOUNTER FOR THERAPEUTIC DRUG LEVEL MONITORING: ICD-10-CM

## 2019-09-18 DIAGNOSIS — IMO0002 DM (DIABETES MELLITUS) TYPE II UNCONTROLLED, PERIPH VASCULAR DISORDER: ICD-10-CM

## 2019-09-18 DIAGNOSIS — R09.82 POST-NASAL DRAINAGE: Primary | ICD-10-CM

## 2019-09-18 DIAGNOSIS — K52.9 GASTROENTERITIS: ICD-10-CM

## 2019-09-18 PROCEDURE — 99214 OFFICE O/P EST MOD 30 MIN: CPT | Performed by: NURSE PRACTITIONER

## 2019-09-18 RX ORDER — GLIPIZIDE AND METFORMIN HCL 2.5; 25 MG/1; MG/1
1 TABLET, FILM COATED ORAL 3 TIMES DAILY
Qty: 90 TABLET | Refills: 2 | Status: SHIPPED | OUTPATIENT
Start: 2019-09-18 | End: 2019-11-22 | Stop reason: SDUPTHER

## 2019-09-18 RX ORDER — ONDANSETRON 4 MG/1
4 TABLET, FILM COATED ORAL EVERY 8 HOURS PRN
Qty: 60 TABLET | Refills: 1 | Status: SHIPPED | OUTPATIENT
Start: 2019-09-18 | End: 2020-01-02

## 2019-09-18 RX ORDER — CLARITHROMYCIN 500 MG/1
500 TABLET, COATED ORAL EVERY 12 HOURS SCHEDULED
Qty: 20 TABLET | Refills: 0 | Status: SHIPPED | OUTPATIENT
Start: 2019-09-18 | End: 2019-09-28

## 2019-09-18 RX ORDER — PSEUDOEPHEDRINE HCL 120 MG/1
120 TABLET, FILM COATED, EXTENDED RELEASE ORAL DAILY
Qty: 20 TABLET | Refills: 0 | Status: SHIPPED | OUTPATIENT
Start: 2019-09-18 | End: 2020-01-02

## 2019-09-18 RX ORDER — PROMETHAZINE HYDROCHLORIDE 25 MG/1
25 TABLET ORAL EVERY 6 HOURS PRN
Qty: 60 TABLET | Refills: 1 | Status: SHIPPED | OUTPATIENT
Start: 2019-09-18 | End: 2020-01-02

## 2019-09-18 NOTE — PROGRESS NOTES
"Subjective   Soheila Brewster is a 49 y.o. female.     Chief Complaint   Patient presents with   • Vomiting   • Diarrhea   • Cough       History of Present Illness     GI complaint-vomiting and diarrhea since \"3 this morning\".  Reports she began with pain in her abdomen near her umbilical region and radiating to the right side.  Vomiting and diarrhea.  She has drank some 7 up this morning.  She attempted to eat breakfast but was not able.  She reports abdominal pain is intermittent in nature.  Watery diarrhea.  Some chills and body aches.  She also reports she has had some URI symptoms.  Fever since yesterday.    Diabetes-noted further increase of A1C.  Patient reports she is taking Soliqua as directed, glipizide-metformin 2.5-250 twice daily and Trulicity 0.75 weekly.  She reports she continues to not see blood sugars less than 200.  She denies any negative side effects of medications.  She is not watching her diet intake consistently.  Not at goal  Chronic sinusitis- patient reports continued chronic sinus issues.  She reports the worst of her symptoms seems to be the PND.  She questions if there are symptoms she can do for decongestant.  She is presently on Xyzal 5 mg.  She has been to see ENT but there were no changes in her medication regimen.  She continues to have chronic cough, sinus pain or pressure as well as ear pain.  Not at goal    The following portions of the patient's history were reviewed and updated as appropriate: allergies, current medications, past family history, past medical history, past social history, past surgical history and problem list.    Review of Systems   Constitutional: Positive for appetite change and fatigue. Negative for unexpected weight change.   HENT: Positive for congestion, ear pain, rhinorrhea, sinus pressure, sinus pain and sore throat. Negative for nosebleeds, postnasal drip, trouble swallowing and voice change.    Eyes: Negative for pain and visual disturbance. " "  Respiratory: Positive for cough. Negative for shortness of breath and wheezing.    Cardiovascular: Negative for chest pain and palpitations.   Gastrointestinal: Positive for diarrhea, nausea and vomiting. Negative for abdominal pain, blood in stool and constipation.   Endocrine: Negative for cold intolerance and polydipsia.   Genitourinary: Negative for difficulty urinating, flank pain and hematuria.   Musculoskeletal: Negative for arthralgias, back pain, gait problem, joint swelling and myalgias.   Skin: Negative for color change and rash.   Allergic/Immunologic: Negative.    Neurological: Positive for dizziness and headaches. Negative for syncope and numbness.   Hematological: Negative.    Psychiatric/Behavioral: Negative for sleep disturbance and suicidal ideas.   All other systems reviewed and are negative.      Objective     /74   Pulse 90   Temp 99.6 °F (37.6 °C) (Temporal)   Ht 180.3 cm (71\")   Wt 132 kg (291 lb)   SpO2 98%   BMI 40.59 kg/m²     Physical Exam   Constitutional: She appears well-developed and well-nourished. No distress.   HENT:   Head: Normocephalic and atraumatic.   Right Ear: Ear canal normal. Tympanic membrane is injected. Tympanic membrane is not scarred, not retracted and not bulging. A middle ear effusion is present.   Left Ear: Ear canal normal. Tympanic membrane is injected. Tympanic membrane is not scarred, not retracted and not bulging. A middle ear effusion is present.   Nose: Mucosal edema and rhinorrhea present. No epistaxis. Right sinus exhibits no maxillary sinus tenderness and no frontal sinus tenderness. Left sinus exhibits no maxillary sinus tenderness and no frontal sinus tenderness.   Mouth/Throat: Uvula is midline and mucous membranes are normal. No uvula swelling. Oropharyngeal exudate (Copious amounts of thick white PND noted) and posterior oropharyngeal erythema present.   Eyes: EOM are normal. Pupils are equal, round, and reactive to light. No scleral " icterus.   Neck: Normal range of motion. No thyromegaly present.   Cardiovascular: Normal rate, regular rhythm, normal heart sounds and intact distal pulses.   Pulmonary/Chest: Effort normal. She has decreased breath sounds (mildly over bronchial region of anterior chest). She has wheezes (scattered mild).   Abdominal: Soft. Bowel sounds are normal. There is no hepatosplenomegaly. There is generalized tenderness. A hernia is present.       Protuberant abdomen   Musculoskeletal: She exhibits tenderness.        Thoracic back: She exhibits tenderness and spasm.        Lumbar back: She exhibits decreased range of motion, tenderness, pain and spasm.   Pain with palpation over sciatic bilaterally    Lymphadenopathy:     She has cervical adenopathy.        Right cervical: Superficial cervical adenopathy present.        Left cervical: Superficial cervical adenopathy present.        Right: Supraclavicular (firm mobile node.  Mild tenderness) adenopathy present.   Neurological: She is alert. She displays normal reflexes. No cranial nerve deficit.   Skin: Skin is warm and dry. Capillary refill takes less than 2 seconds.   3 x 4 cm abscess to left inner groin.  Area erythemic, swollen warm to touch.  Induration noted to edges of the wound.  No drainage noted.  Center of abscess boggy.  Area cleaned x3 with Betadine incision made with scalpel for drainage.  Manual expression moderate amount of thick yellow pus from the incision.  Patient tolerated well   Psychiatric: She has a normal mood and affect. Her behavior is normal. Judgment and thought content normal.   Vitals reviewed.      Assessment/Plan     Problem List Items Addressed This Visit        Cardiovascular and Mediastinum    DM (diabetes mellitus) type II uncontrolled, periph vascular disorder (CMS/HCC)    Relevant Medications    glipiZIDE-metFORMIN (METAGLIP) 2.5-250 MG per tablet    Dulaglutide (TRULICITY) 1.5 MG/0.5ML solution pen-injector      Other Visit Diagnoses      Post-nasal drainage    -  Primary    Relevant Medications    pseudoephedrine (SUDAFED 12 HOUR) 120 MG 12 hr tablet    Gastroenteritis        Relevant Medications    clarithromycin (BIAXIN) 500 MG tablet          Understands disease processes and need for medications.  Understands reasons for urgent and emergent care.  Patient (& family) verbalized agreement for treatment plan.   Emotional support and active listening provided.  Patient provided time to verbalize feelings.    If not holding PO by tomorrow, report to office for orders for IVF hydration.    Small amounts of Pedialyte, Gatorade, or Powerade.  PRN meds for nausea.     Trial of Sudafed for PND.  Instructed to complete all of antibiotics for a chronic sinus complaints.  Increase PO fluids, avoid/limit caffeine.  Do not save any of the meds for later use.  Rest PRN  Encouraged smoking cessation.    Increasing glipizide-metformin to 3 times daily.  Increase in Trulicity from 0.75 to 1.5 mg.  3 times daily Accu-Cheks.  Advised patient to have blood sugar log when she returns to the clinic for her follow-up appointment.    Keep scheduled follow-up            This document has been electronically signed by:  JOAQUÍN Ross, FNP-C    Dragon disclaimer:  Much of this encounter note is an electronic transcription/translation of spoken language to printed text. The electronic translation of spoken language may permit erroneous, or at times, nonsensical words or phrases to be inadvertently transcribed; Although I have reviewed the note for such errors, some may still exist.

## 2019-10-07 ENCOUNTER — TELEPHONE (OUTPATIENT)
Dept: FAMILY MEDICINE CLINIC | Facility: CLINIC | Age: 49
End: 2019-10-07

## 2019-10-07 RX ORDER — OFLOXACIN 3 MG/ML
5 SOLUTION AURICULAR (OTIC) 2 TIMES DAILY
Qty: 10 ML | Refills: 0 | Status: SHIPPED | OUTPATIENT
Start: 2019-10-07 | End: 2019-10-14

## 2019-10-07 RX ORDER — SULFAMETHOXAZOLE AND TRIMETHOPRIM 800; 160 MG/1; MG/1
1 TABLET ORAL EVERY 12 HOURS
Qty: 20 TABLET | Refills: 0 | Status: SHIPPED | OUTPATIENT
Start: 2019-10-07 | End: 2019-10-17

## 2019-10-21 DIAGNOSIS — M19.90 ARTHRITIS: ICD-10-CM

## 2019-10-21 DIAGNOSIS — I10 ESSENTIAL HYPERTENSION: ICD-10-CM

## 2019-10-21 DIAGNOSIS — IMO0002 DM (DIABETES MELLITUS) TYPE II UNCONTROLLED, PERIPH VASCULAR DISORDER: ICD-10-CM

## 2019-10-21 RX ORDER — GABAPENTIN 600 MG/1
TABLET ORAL
Qty: 90 TABLET | Refills: 0 | Status: SHIPPED | OUTPATIENT
Start: 2019-10-21 | End: 2019-11-22 | Stop reason: SDUPTHER

## 2019-10-21 RX ORDER — CELECOXIB 200 MG/1
CAPSULE ORAL
Qty: 60 CAPSULE | Refills: 0 | Status: SHIPPED | OUTPATIENT
Start: 2019-10-21 | End: 2019-11-22 | Stop reason: SDUPTHER

## 2019-10-21 RX ORDER — ENALAPRIL MALEATE 10 MG/1
TABLET ORAL
Qty: 30 TABLET | Refills: 5 | Status: SHIPPED | OUTPATIENT
Start: 2019-10-21 | End: 2020-02-20 | Stop reason: SDUPTHER

## 2019-10-21 NOTE — TELEPHONE ENCOUNTER
Provider:  [facility]   Caller:  Automated refill request  Surgery:  NA  Surgery Date:  NA   Last visit:  5/16/2019  Next visit: tbd    Reason for call:         Requested Prescriptions     Pending Prescriptions Disp Refills   • celecoxib (CeleBREX) 200 MG capsule [Pharmacy Med Name: CELECOXIB 200 MG CAPS 200 CAP] 60 capsule 0     Sig: TAKE 1 CAPSULE BY MOUTH ONCE DAILY

## 2019-11-15 DIAGNOSIS — E78.2 MIXED HYPERLIPIDEMIA: ICD-10-CM

## 2019-11-16 RX ORDER — ROSUVASTATIN CALCIUM 40 MG/1
TABLET, COATED ORAL
Qty: 90 TABLET | Refills: 3 | Status: SHIPPED | OUTPATIENT
Start: 2019-11-16 | End: 2020-06-11 | Stop reason: SDUPTHER

## 2019-11-22 ENCOUNTER — OFFICE VISIT (OUTPATIENT)
Dept: FAMILY MEDICINE CLINIC | Facility: CLINIC | Age: 49
End: 2019-11-22

## 2019-11-22 VITALS
WEIGHT: 279 LBS | OXYGEN SATURATION: 95 % | SYSTOLIC BLOOD PRESSURE: 138 MMHG | HEART RATE: 96 BPM | DIASTOLIC BLOOD PRESSURE: 88 MMHG | HEIGHT: 71 IN | BODY MASS INDEX: 39.06 KG/M2

## 2019-11-22 DIAGNOSIS — Z23 ENCOUNTER FOR IMMUNIZATION: ICD-10-CM

## 2019-11-22 DIAGNOSIS — E55.9 VITAMIN D DEFICIENCY: ICD-10-CM

## 2019-11-22 DIAGNOSIS — I10 ESSENTIAL HYPERTENSION: ICD-10-CM

## 2019-11-22 DIAGNOSIS — M19.90 ARTHRITIS: ICD-10-CM

## 2019-11-22 DIAGNOSIS — R60.1 GENERALIZED EDEMA: ICD-10-CM

## 2019-11-22 DIAGNOSIS — E04.1 THYROID NODULE: ICD-10-CM

## 2019-11-22 DIAGNOSIS — E66.01 CLASS 2 SEVERE OBESITY DUE TO EXCESS CALORIES WITH SERIOUS COMORBIDITY AND BODY MASS INDEX (BMI) OF 38.0 TO 38.9 IN ADULT (HCC): ICD-10-CM

## 2019-11-22 DIAGNOSIS — IMO0002 DM (DIABETES MELLITUS) TYPE II UNCONTROLLED, PERIPH VASCULAR DISORDER: ICD-10-CM

## 2019-11-22 DIAGNOSIS — E53.8 VITAMIN B12 DEFICIENCY: ICD-10-CM

## 2019-11-22 DIAGNOSIS — F33.8 SEASONAL AFFECTIVE DISORDER (HCC): Primary | ICD-10-CM

## 2019-11-22 PROBLEM — E66.812 CLASS 2 OBESITY WITH ALVEOLAR HYPOVENTILATION AND BODY MASS INDEX (BMI) OF 38.0 TO 38.9 IN ADULT: Status: ACTIVE | Noted: 2018-03-09

## 2019-11-22 PROBLEM — E66.2 CLASS 2 OBESITY WITH ALVEOLAR HYPOVENTILATION AND BODY MASS INDEX (BMI) OF 38.0 TO 38.9 IN ADULT: Status: ACTIVE | Noted: 2018-03-09

## 2019-11-22 LAB
25(OH)D3 SERPL-MCNC: 43.3 NG/ML (ref 30–100)
ALBUMIN SERPL-MCNC: 4.2 G/DL (ref 3.5–5.2)
ALBUMIN UR-MCNC: 7.7 MG/DL
ALBUMIN/GLOB SERPL: 1.2 G/DL
ALP SERPL-CCNC: 62 U/L (ref 39–117)
ALT SERPL W P-5'-P-CCNC: 9 U/L (ref 1–33)
ANION GAP SERPL CALCULATED.3IONS-SCNC: 13.6 MMOL/L (ref 5–15)
AST SERPL-CCNC: 9 U/L (ref 1–32)
BASOPHILS # BLD AUTO: 0.05 10*3/MM3 (ref 0–0.2)
BASOPHILS NFR BLD AUTO: 0.4 % (ref 0–1.5)
BILIRUB SERPL-MCNC: 0.4 MG/DL (ref 0.2–1.2)
BUN BLD-MCNC: 13 MG/DL (ref 6–20)
BUN/CREAT SERPL: 18.6 (ref 7–25)
CALCIUM SPEC-SCNC: 10.1 MG/DL (ref 8.6–10.5)
CHLORIDE SERPL-SCNC: 100 MMOL/L (ref 98–107)
CHOLEST SERPL-MCNC: 110 MG/DL (ref 0–200)
CO2 SERPL-SCNC: 23.4 MMOL/L (ref 22–29)
CREAT BLD-MCNC: 0.7 MG/DL (ref 0.57–1)
DEPRECATED RDW RBC AUTO: 42.5 FL (ref 37–54)
EOSINOPHIL # BLD AUTO: 0.48 10*3/MM3 (ref 0–0.4)
EOSINOPHIL NFR BLD AUTO: 4 % (ref 0.3–6.2)
ERYTHROCYTE [DISTWIDTH] IN BLOOD BY AUTOMATED COUNT: 13.9 % (ref 12.3–15.4)
GFR SERPL CREATININE-BSD FRML MDRD: 89 ML/MIN/1.73
GLOBULIN UR ELPH-MCNC: 3.4 GM/DL
GLUCOSE BLD-MCNC: 274 MG/DL (ref 65–99)
HBA1C MFR BLD: 9.56 % (ref 4.8–5.6)
HCT VFR BLD AUTO: 40.3 % (ref 34–46.6)
HDLC SERPL-MCNC: 30 MG/DL (ref 40–60)
HGB BLD-MCNC: 13.5 G/DL (ref 12–15.9)
IMM GRANULOCYTES # BLD AUTO: 0.06 10*3/MM3 (ref 0–0.05)
IMM GRANULOCYTES NFR BLD AUTO: 0.5 % (ref 0–0.5)
LDLC SERPL CALC-MCNC: 31 MG/DL (ref 0–100)
LDLC/HDLC SERPL: 1.04 {RATIO}
LYMPHOCYTES # BLD AUTO: 2.95 10*3/MM3 (ref 0.7–3.1)
LYMPHOCYTES NFR BLD AUTO: 24.4 % (ref 19.6–45.3)
MCH RBC QN AUTO: 28.2 PG (ref 26.6–33)
MCHC RBC AUTO-ENTMCNC: 33.5 G/DL (ref 31.5–35.7)
MCV RBC AUTO: 84.1 FL (ref 79–97)
MONOCYTES # BLD AUTO: 0.65 10*3/MM3 (ref 0.1–0.9)
MONOCYTES NFR BLD AUTO: 5.4 % (ref 5–12)
NEUTROPHILS # BLD AUTO: 7.91 10*3/MM3 (ref 1.7–7)
NEUTROPHILS NFR BLD AUTO: 65.3 % (ref 42.7–76)
NRBC BLD AUTO-RTO: 0 /100 WBC (ref 0–0.2)
PLATELET # BLD AUTO: 191 10*3/MM3 (ref 140–450)
PMV BLD AUTO: 12.2 FL (ref 6–12)
POTASSIUM BLD-SCNC: 4.4 MMOL/L (ref 3.5–5.2)
PROT SERPL-MCNC: 7.6 G/DL (ref 6–8.5)
RBC # BLD AUTO: 4.79 10*6/MM3 (ref 3.77–5.28)
SODIUM BLD-SCNC: 137 MMOL/L (ref 136–145)
T4 FREE SERPL-MCNC: 1.59 NG/DL (ref 0.93–1.7)
TRIGL SERPL-MCNC: 244 MG/DL (ref 0–150)
TSH SERPL DL<=0.05 MIU/L-ACNC: 1.39 UIU/ML (ref 0.27–4.2)
VIT B12 BLD-MCNC: 1940 PG/ML (ref 211–946)
VLDLC SERPL-MCNC: 48.8 MG/DL (ref 5–40)
WBC NRBC COR # BLD: 12.1 10*3/MM3 (ref 3.4–10.8)

## 2019-11-22 PROCEDURE — 82607 VITAMIN B-12: CPT | Performed by: NURSE PRACTITIONER

## 2019-11-22 PROCEDURE — 83036 HEMOGLOBIN GLYCOSYLATED A1C: CPT | Performed by: NURSE PRACTITIONER

## 2019-11-22 PROCEDURE — 84439 ASSAY OF FREE THYROXINE: CPT | Performed by: NURSE PRACTITIONER

## 2019-11-22 PROCEDURE — 82306 VITAMIN D 25 HYDROXY: CPT | Performed by: NURSE PRACTITIONER

## 2019-11-22 PROCEDURE — 80053 COMPREHEN METABOLIC PANEL: CPT | Performed by: NURSE PRACTITIONER

## 2019-11-22 PROCEDURE — 80061 LIPID PANEL: CPT | Performed by: NURSE PRACTITIONER

## 2019-11-22 PROCEDURE — 99214 OFFICE O/P EST MOD 30 MIN: CPT | Performed by: NURSE PRACTITIONER

## 2019-11-22 PROCEDURE — 85025 COMPLETE CBC W/AUTO DIFF WBC: CPT | Performed by: NURSE PRACTITIONER

## 2019-11-22 PROCEDURE — 82043 UR ALBUMIN QUANTITATIVE: CPT | Performed by: NURSE PRACTITIONER

## 2019-11-22 PROCEDURE — 36415 COLL VENOUS BLD VENIPUNCTURE: CPT | Performed by: NURSE PRACTITIONER

## 2019-11-22 PROCEDURE — 84443 ASSAY THYROID STIM HORMONE: CPT | Performed by: NURSE PRACTITIONER

## 2019-11-22 RX ORDER — MEDROXYPROGESTERONE ACETATE 150 MG/ML
150 INJECTION, SUSPENSION INTRAMUSCULAR
Qty: 1 ML | Refills: 3 | Status: SHIPPED | OUTPATIENT
Start: 2019-11-22 | End: 2020-06-11 | Stop reason: SDUPTHER

## 2019-11-22 RX ORDER — GLIPIZIDE AND METFORMIN HCL 2.5; 25 MG/1; MG/1
1 TABLET, FILM COATED ORAL 3 TIMES DAILY
Qty: 90 TABLET | Refills: 2 | Status: SHIPPED | OUTPATIENT
Start: 2019-11-22 | End: 2020-02-20 | Stop reason: SDUPTHER

## 2019-11-22 RX ORDER — GABAPENTIN 600 MG/1
600 TABLET ORAL 3 TIMES DAILY
Qty: 90 TABLET | Refills: 3 | Status: SHIPPED | OUTPATIENT
Start: 2019-11-22 | End: 2020-02-20 | Stop reason: SDUPTHER

## 2019-11-22 RX ORDER — BUMETANIDE 2 MG/1
2 TABLET ORAL 2 TIMES DAILY
Qty: 60 TABLET | Refills: 5 | Status: SHIPPED | OUTPATIENT
Start: 2019-11-22 | End: 2020-01-20 | Stop reason: SDUPTHER

## 2019-11-22 RX ORDER — CELECOXIB 200 MG/1
200 CAPSULE ORAL DAILY
Qty: 60 CAPSULE | Refills: 5 | Status: SHIPPED | OUTPATIENT
Start: 2019-11-22 | End: 2020-06-11 | Stop reason: SDUPTHER

## 2019-11-22 NOTE — PROGRESS NOTES
"Subjective   Soheila Brewster is a 49 y.o. female.     Chief Complaint   Patient presents with   • Diabetes   • Abdominal Pain       History of Present Illness     Diabetes-reports she is continuing to be elevated.  She did use Trulicity 1.5 samples and 3 glipizide-metformin combo she was seeing better blood sugars.  She has used all of her samples so she is now only taking 0.75 mg.    Comp care-request a referral to comp care.  She reports some depressive symptoms as winter is approaching.  She does not wish to take meds but is interested in therapy.      Throat pressure-has had removal of nodule.  She reports some tenderness along her left throat where her partial gland remains.  She has not had any recent imaging.  Her last TSH was WNL.  Discomfort in chest wall-between breast.  She reports she reports is having sharp pain in her ribs that \"goes straight thru me\".  She reports occurs with side to side turning and when she bends over to get objects from the bottom shelf or laundry from the washer.    Depo-Provera-is interested in Depo-Provra due to her irregular heavy periods.  She reports she would like to stop having any bleeding.  Her sister uses the medication for menses control and has tolerated it well.  Ongoing.      The following portions of the patient's history were reviewed and updated as appropriate: CC, ROS, allergies, current medications, past family history, past medical history, past social history, past surgical history and problem list.    Review of Systems   Constitutional: Positive for fatigue. Negative for appetite change, fever and unexpected weight change.   HENT: Positive for congestion, ear pain, postnasal drip, rhinorrhea, sinus pressure, sinus pain, sore throat and tinnitus. Negative for nosebleeds, trouble swallowing and voice change.    Eyes: Positive for discharge and itching. Negative for pain and visual disturbance.   Respiratory: Positive for cough and shortness of breath. Negative " "for chest tightness and wheezing.    Cardiovascular: Positive for palpitations. Negative for chest pain.   Gastrointestinal: Positive for abdominal pain and nausea. Negative for blood in stool, constipation and diarrhea.   Endocrine: Negative for cold intolerance and polydipsia.   Genitourinary: Negative for difficulty urinating, dysuria, flank pain, hematuria and urgency.   Musculoskeletal: Positive for back pain and neck pain. Negative for arthralgias, gait problem, joint swelling and myalgias.   Skin: Negative for color change and rash.   Allergic/Immunologic: Negative.    Neurological: Positive for dizziness and headaches. Negative for syncope and numbness.   Hematological: Negative.    Psychiatric/Behavioral: Positive for sleep disturbance. Negative for dysphoric mood and suicidal ideas.   All other systems reviewed and are negative.      Objective     /88   Pulse 96   Ht 180.3 cm (71\")   Wt 127 kg (279 lb)   SpO2 95%   BMI 38.91 kg/m²     Physical Exam   Constitutional: She is oriented to person, place, and time. She appears well-developed and well-nourished. No distress.   HENT:   Head: Normocephalic and atraumatic.   Right Ear: Hearing, tympanic membrane, external ear and ear canal normal.   Left Ear: Hearing, tympanic membrane, external ear and ear canal normal.   Nose: No mucosal edema or rhinorrhea.   Mouth/Throat: Oropharynx is clear and moist and mucous membranes are normal.   Eyes: Conjunctivae, EOM and lids are normal. Pupils are equal, round, and reactive to light. No scleral icterus. Right eye exhibits normal extraocular motion and no nystagmus. Left eye exhibits normal extraocular motion and no nystagmus.   Neck: Normal range of motion. Neck supple. No JVD present. No tracheal tenderness present. Carotid bruit is not present. No thyromegaly present.   Cardiovascular: Normal rate, regular rhythm, S1 normal, S2 normal, normal heart sounds and intact distal pulses.   No murmur " heard.  Pulmonary/Chest: Effort normal and breath sounds normal. She exhibits tenderness.   Tenderness to sternum between breast region with tenderness along axillary rib region And mid T-spine.   Abdominal: Soft. Bowel sounds are normal. She exhibits no mass. There is no hepatosplenomegaly. There is no tenderness. A hernia is present. Hernia confirmed positive in the ventral area.   Protuberant abdomen   Musculoskeletal: Normal range of motion. She exhibits no edema or tenderness.        Right ankle: She exhibits swelling. She exhibits normal pulse.        Left ankle: She exhibits swelling. She exhibits normal pulse.   Gait normal.   equal bilaterally. No muscular atrophy or flaccidity.   Lymphadenopathy:     She has no cervical adenopathy.        Right cervical: No superficial cervical adenopathy present.       Left cervical: No superficial cervical adenopathy present.   Neurological: She is alert and oriented to person, place, and time. She has normal strength and normal reflexes. She displays no tremor. No cranial nerve deficit or sensory deficit. She exhibits normal muscle tone. Coordination and gait normal.   Skin: Skin is warm and dry. Capillary refill takes less than 2 seconds. She is not diaphoretic. No cyanosis. No pallor. Nails show no clubbing.   Psychiatric: She has a normal mood and affect. Her speech is normal and behavior is normal. Judgment and thought content normal. She is not actively hallucinating. Cognition and memory are normal. She is attentive.   Vitals reviewed.      Assessment/Plan     Problem List Items Addressed This Visit        Cardiovascular and Mediastinum    DM (diabetes mellitus) type II uncontrolled, periph vascular disorder (CMS/HCC)    Relevant Medications    Dulaglutide (TRULICITY) 1.5 MG/0.5ML solution pen-injector    glipiZIDE-metFORMIN (METAGLIP) 2.5-250 MG per tablet    gabapentin (NEURONTIN) 600 MG tablet    Other Relevant Orders    Hemoglobin A1c    MicroAlbumin,  Urine, Random - Urine, Clean Catch    Essential hypertension    Relevant Medications    bumetanide (BUMEX) 2 MG tablet    Other Relevant Orders    CBC & Differential    Comprehensive Metabolic Panel    Lipid Panel    CBC Auto Differential       Digestive    Vitamin D deficiency    Relevant Orders    Vitamin D 25 Hydroxy    Class 2 severe obesity due to excess calories with serious comorbidity and body mass index (BMI) of 38.0 to 38.9 in adult (CMS/[facility])    Current Assessment & Plan     Encouraged to continue to work on weight reduction.  Be active as physically able.  Diet and portion control advised            Endocrine    Thyroid nodule    Relevant Orders    US Thyroid    TSH    T4, Free       Musculoskeletal and Integument    Arthritis    Relevant Medications    gabapentin (NEURONTIN) 600 MG tablet      Other Visit Diagnoses     Seasonal affective disorder (CMS/[facility])    -  Primary    Relevant Orders    Ambulatory Referral to Behavioral Health (Completed)    Generalized edema        Relevant Medications    bumetanide (BUMEX) 2 MG tablet    Encounter for immunization        Vitamin B12 deficiency        Relevant Orders    Vitamin B12          Patient's Body mass index is 38.91 kg/m². BMI is above normal parameters. Recommendations include: nutrition counseling.     Emotional support and active listening provided.  Patient provided time to verbalize feelings.  Understands disease processes and need for medications.  Understands reasons for urgent and emergent care.  Patient (& family) verbalized agreement for treatment plan.     Labs today.  Imaging for thyroid ordered.     [facility] #[id number] reviewed today and consistent.  Will refill prescribed controlled medication today.  Patient is aware they cannot receive narcotics from any other provider except if under care of pain management or speciality clinic.  Risk and benefits of medication use has been reviewed.  History and physical exam exhibit continued safe and  appropriate use of controlled substances.  The patient is aware of the potential for addiction and dependence.  This patient has been made aware of the appropriate use of such medications, including potential risk of somnolence, limited ability to drive and / or work safely, and potential for overdose.    It has also been made clear that these medications are for use by this patient only, without concomitant use of alcohol or other substances unless prescribed/advised by medical provider.  Patient understands they may be subject to UDS and pill counts at random.      Patient considered to be moderate risk for addiction due to use of multiple controlled medications.  Patient understands and accepts these risks.  Patient need for medication will be reassessed at each visit.  Doses will be adjusted according to patient need and findings.    Goal of TX: Patient will not have any adverse reactions of medication.  Patient will have reduction in her diabetic neuropathy symptoms with use of Gabapentin as directed.    ACHES warning reviewed as warning S/SX of clot.  Patient verbalized understanding of risk due to hormone use.   Wishes to proceed with use.  Discussed risk of weight gain.  Encouraged tobacco cessation.      RTC 3 months, sooner if needed.            This document has been electronically signed by:  JOAQUÍN Ross, FNP-C    Dragon disclaimer:  Much of this encounter note is an electronic transcription/translation of spoken language to printed text. The electronic translation of spoken language may permit erroneous, or at times, nonsensical words or phrases to be inadvertently transcribed; Although I have reviewed the note for such errors, some may still exist.

## 2019-11-22 NOTE — ASSESSMENT & PLAN NOTE
Encouraged to continue to work on weight reduction.  Be active as physically able.  Diet and portion control advised

## 2019-11-24 NOTE — PROGRESS NOTES
Vitamin D is normal.  Vitamin B12 is normal.  Her urine screen for protein was normal.  Her thyroid level is normal.  Her blood sugars continue to be high but her A1c has improved to 9.5.  It was 10.1 on her last labs.  Her blood sugar was 274 on her check at the office cholesterol overall is normal.  But her triglycerides have increased from 169.  They are now 264.

## 2019-12-19 ENCOUNTER — OFFICE VISIT (OUTPATIENT)
Dept: SURGERY | Facility: CLINIC | Age: 49
End: 2019-12-19

## 2019-12-19 VITALS — HEIGHT: 71 IN | WEIGHT: 279 LBS | BODY MASS INDEX: 39.06 KG/M2

## 2019-12-19 DIAGNOSIS — G89.29 CHRONIC MIDLINE THORACIC BACK PAIN: ICD-10-CM

## 2019-12-19 DIAGNOSIS — M54.6 CHRONIC MIDLINE THORACIC BACK PAIN: ICD-10-CM

## 2019-12-19 DIAGNOSIS — Z98.890 STATUS POST THYROID SURGERY: Primary | ICD-10-CM

## 2019-12-19 DIAGNOSIS — E04.1 LEFT THYROID NODULE: ICD-10-CM

## 2019-12-19 PROCEDURE — 99212 OFFICE O/P EST SF 10 MIN: CPT | Performed by: SURGERY

## 2019-12-19 NOTE — PROGRESS NOTES
Subjective   Soheila Brewster is a 49 y.o. female.     Chief Complaint: post thyroid surgery    History of Present Illness She had felt some occasional soreness in her neck and felt like her laryngeal area was more prominent. TSH done in Nov was normal. US done in [place] showed a 1.7 cm nodule on the left and a FNA was suggested.     The following portions of the patient's history were reviewed and updated as appropriate: current medications, past family history, past medical history, past social history, past surgical history and problem list.    Review of Systems   HENT: Positive for sore throat and trouble swallowing.        Objective   Physical Exam   Constitutional: She appears well-developed and well-nourished.   Eyes: Pupils are equal, round, and reactive to light. Conjunctivae are normal.   Neck: No JVD present. No tracheal deviation present. No thyromegaly present.   Cardiovascular: Normal rate and regular rhythm.   Lymphadenopathy:     She has no cervical adenopathy.   I cannot feel the nodule     Assessment/Plan   Soheila was seen today for follow-up.    Diagnoses and all orders for this visit:    Status post thyroid surgery    Chronic midline thoracic back pain    Left thyroid nodule    Get FNA and follow up.

## 2019-12-24 DIAGNOSIS — E04.1 THYROID NODULE: Primary | ICD-10-CM

## 2020-01-02 ENCOUNTER — OFFICE VISIT (OUTPATIENT)
Dept: FAMILY MEDICINE CLINIC | Facility: CLINIC | Age: 50
End: 2020-01-02

## 2020-01-02 VITALS
HEART RATE: 97 BPM | OXYGEN SATURATION: 97 % | BODY MASS INDEX: 39.62 KG/M2 | DIASTOLIC BLOOD PRESSURE: 78 MMHG | HEIGHT: 71 IN | TEMPERATURE: 98.7 F | WEIGHT: 283 LBS | SYSTOLIC BLOOD PRESSURE: 128 MMHG

## 2020-01-02 DIAGNOSIS — E78.2 MIXED HYPERLIPIDEMIA: ICD-10-CM

## 2020-01-02 DIAGNOSIS — E03.9 ACQUIRED HYPOTHYROIDISM: ICD-10-CM

## 2020-01-02 DIAGNOSIS — IMO0002 DM (DIABETES MELLITUS) TYPE II UNCONTROLLED, PERIPH VASCULAR DISORDER: ICD-10-CM

## 2020-01-02 DIAGNOSIS — J01.00 ACUTE NON-RECURRENT MAXILLARY SINUSITIS: ICD-10-CM

## 2020-01-02 DIAGNOSIS — R30.0 DYSURIA: ICD-10-CM

## 2020-01-02 DIAGNOSIS — E04.1 LEFT THYROID NODULE: Primary | ICD-10-CM

## 2020-01-02 DIAGNOSIS — R10.9 FLANK PAIN, ACUTE: Primary | ICD-10-CM

## 2020-01-02 LAB
BILIRUB BLD-MCNC: NEGATIVE MG/DL
CLARITY, POC: CLEAR
COLOR UR: ABNORMAL
EXPIRATION DATE: NORMAL
FLUAV AG NPH QL: NEGATIVE
FLUBV AG NPH QL: NEGATIVE
GLUCOSE UR STRIP-MCNC: ABNORMAL MG/DL
INTERNAL CONTROL: NORMAL
KETONES UR QL: NEGATIVE
LEUKOCYTE EST, POC: NEGATIVE
Lab: NORMAL
NITRITE UR-MCNC: NEGATIVE MG/ML
PH UR: 6 [PH] (ref 5–8)
PROT UR STRIP-MCNC: NEGATIVE MG/DL
RBC # UR STRIP: NEGATIVE /UL
SP GR UR: 1.01 (ref 1–1.03)
UROBILINOGEN UR QL: NORMAL

## 2020-01-02 PROCEDURE — 87804 INFLUENZA ASSAY W/OPTIC: CPT | Performed by: PHYSICIAN ASSISTANT

## 2020-01-02 PROCEDURE — 99214 OFFICE O/P EST MOD 30 MIN: CPT | Performed by: PHYSICIAN ASSISTANT

## 2020-01-02 PROCEDURE — 96372 THER/PROPH/DIAG INJ SC/IM: CPT | Performed by: PHYSICIAN ASSISTANT

## 2020-01-02 PROCEDURE — 81003 URINALYSIS AUTO W/O SCOPE: CPT | Performed by: PHYSICIAN ASSISTANT

## 2020-01-02 RX ORDER — METHYLPREDNISOLONE ACETATE 80 MG/ML
80 INJECTION, SUSPENSION INTRA-ARTICULAR; INTRALESIONAL; INTRAMUSCULAR; SOFT TISSUE ONCE
Status: COMPLETED | OUTPATIENT
Start: 2020-01-02 | End: 2020-01-02

## 2020-01-02 RX ORDER — KETOROLAC TROMETHAMINE 30 MG/ML
60 INJECTION, SOLUTION INTRAMUSCULAR; INTRAVENOUS ONCE
Status: COMPLETED | OUTPATIENT
Start: 2020-01-02 | End: 2020-01-02

## 2020-01-02 RX ORDER — SULFAMETHOXAZOLE AND TRIMETHOPRIM 800; 160 MG/1; MG/1
1 TABLET ORAL 2 TIMES DAILY
Qty: 20 TABLET | Refills: 0 | Status: SHIPPED | OUTPATIENT
Start: 2020-01-02 | End: 2020-01-20 | Stop reason: SDUPTHER

## 2020-01-02 RX ADMIN — METHYLPREDNISOLONE ACETATE 80 MG: 80 INJECTION, SUSPENSION INTRA-ARTICULAR; INTRALESIONAL; INTRAMUSCULAR; SOFT TISSUE at 11:36

## 2020-01-02 RX ADMIN — KETOROLAC TROMETHAMINE 60 MG: 30 INJECTION, SOLUTION INTRAMUSCULAR; INTRAVENOUS at 11:36

## 2020-01-02 NOTE — PROGRESS NOTES
Subjective   Soheila Brewster is a 49 y.o. female.       Chief Complaint -  Back pain    History of Present Illness -      Back pain-  She complains of acute onset of sharp intermittent lateral and low back severe pain that began last night.  She denies any fever nausea vomiting diarrhea or constipation    Facial pain-  She complains of a bilateral maxillary facial pain and pressure headache hoarseness dry cough fever 101 °F for the past 3 days.  Minimal relief with Tylenol.    Influenza testing in office today was negative    Diabetes-   Uncontrolled with trulicity, glipizide-metformin, soliqua, and diet  Lab Results   Component Value Date    HGBA1C 9.56 (H) 11/22/2019     Hypertension-stable with Crestor  Lab Results   Component Value Date    CHOL 110 11/22/2019    CHOL 97 09/10/2019    CHOL 89 11/26/2018     Lab Results   Component Value Date    TRIG 244 (H) 11/22/2019    TRIG 169 (H) 09/10/2019    TRIG 295 (H) 11/26/2018     Lab Results   Component Value Date    HDL 30 (L) 11/22/2019    HDL 34 (L) 09/10/2019    HDL 30 (L) 11/26/2018     Lab Results   Component Value Date    LDL 31 11/22/2019    LDL 29 09/10/2019    LDL 0 11/26/2018     Hypothyroidism-stable with Synthroid 100 mcg daily  Lab Results   Component Value Date    TSH 1.390 11/22/2019       The following portions of the patient's history were reviewed and updated as appropriate: allergies, current medications, past family history, past medical history, past social history, past surgical history and problem list.    Review of Systems   Constitutional: Positive for activity change, appetite change, fatigue and fever.   HENT: Positive for congestion, sinus pressure, sinus pain, sore throat and voice change. Negative for ear pain.    Eyes: Negative for pain and visual disturbance.   Respiratory: Positive for cough. Negative for chest tightness.    Cardiovascular: Negative for chest pain and palpitations.   Gastrointestinal: Negative for abdominal pain,  "constipation, diarrhea, nausea and vomiting.   Endocrine: Negative for polydipsia and polyuria.   Genitourinary: Negative for dysuria and frequency.   Musculoskeletal: Negative for back pain and myalgias.   Skin: Negative for color change and rash.   Allergic/Immunologic: Negative for food allergies and immunocompromised state.   Neurological: Positive for headaches. Negative for dizziness and syncope.   Hematological: Negative for adenopathy. Does not bruise/bleed easily.   Psychiatric/Behavioral: Negative for hallucinations and suicidal ideas. The patient is not nervous/anxious.        /78   Pulse 97   Temp 98.7 °F (37.1 °C) (Oral)   Ht 180.3 cm (70.98\")   Wt 128 kg (283 lb)   SpO2 97%   BMI 39.49 kg/m²   Office Visit on 11/22/2019   Component Date Value Ref Range Status   • Glucose 11/22/2019 274* 65 - 99 mg/dL Final   • BUN 11/22/2019 13  6 - 20 mg/dL Final   • Creatinine 11/22/2019 0.70  0.57 - 1.00 mg/dL Final   • Sodium 11/22/2019 137  136 - 145 mmol/L Final   • Potassium 11/22/2019 4.4  3.5 - 5.2 mmol/L Final   • Chloride 11/22/2019 100  98 - 107 mmol/L Final   • CO2 11/22/2019 23.4  22.0 - 29.0 mmol/L Final   • Calcium 11/22/2019 10.1  8.6 - 10.5 mg/dL Final   • Total Protein 11/22/2019 7.6  6.0 - 8.5 g/dL Final   • Albumin 11/22/2019 4.20  3.50 - 5.20 g/dL Final   • ALT (SGPT) 11/22/2019 9  1 - 33 U/L Final   • AST (SGOT) 11/22/2019 9  1 - 32 U/L Final   • Alkaline Phosphatase 11/22/2019 62  39 - 117 U/L Final   • Total Bilirubin 11/22/2019 0.4  0.2 - 1.2 mg/dL Final   • eGFR Non African Amer 11/22/2019 89  >60 mL/min/1.73 Final   • Globulin 11/22/2019 3.4  gm/dL Final   • A/G Ratio 11/22/2019 1.2  g/dL Final   • BUN/Creatinine Ratio 11/22/2019 18.6  7.0 - 25.0 Final   • Anion Gap 11/22/2019 13.6  5.0 - 15.0 mmol/L Final   • Total Cholesterol 11/22/2019 110  0 - 200 mg/dL Final   • Triglycerides 11/22/2019 244* 0 - 150 mg/dL Final   • HDL Cholesterol 11/22/2019 30* 40 - 60 mg/dL Final   • LDL " Cholesterol  11/22/2019 31  0 - 100 mg/dL Final   • VLDL Cholesterol 11/22/2019 48.8* 5 - 40 mg/dL Final   • LDL/HDL Ratio 11/22/2019 1.04   Final   • TSH 11/22/2019 1.390  0.270 - 4.200 uIU/mL Final   • Hemoglobin A1C 11/22/2019 9.56* 4.80 - 5.60 % Final   • 25 Hydroxy, Vitamin D 11/22/2019 43.3  30.0 - 100.0 ng/ml Final   • Free T4 11/22/2019 1.59  0.93 - 1.70 ng/dL Final   • Vitamin B-12 11/22/2019 1,940* 211 - 946 pg/mL Final   • Microalbumin, Urine 11/22/2019 7.7  mg/dL Final   • WBC 11/22/2019 12.10* 3.40 - 10.80 10*3/mm3 Final   • RBC 11/22/2019 4.79  3.77 - 5.28 10*6/mm3 Final   • Hemoglobin 11/22/2019 13.5  12.0 - 15.9 g/dL Final   • Hematocrit 11/22/2019 40.3  34.0 - 46.6 % Final   • MCV 11/22/2019 84.1  79.0 - 97.0 fL Final   • MCH 11/22/2019 28.2  26.6 - 33.0 pg Final   • MCHC 11/22/2019 33.5  31.5 - 35.7 g/dL Final   • RDW 11/22/2019 13.9  12.3 - 15.4 % Final   • RDW-SD 11/22/2019 42.5  37.0 - 54.0 fl Final   • MPV 11/22/2019 12.2* 6.0 - 12.0 fL Final   • Platelets 11/22/2019 191  140 - 450 10*3/mm3 Final   • Neutrophil % 11/22/2019 65.3  42.7 - 76.0 % Final   • Lymphocyte % 11/22/2019 24.4  19.6 - 45.3 % Final   • Monocyte % 11/22/2019 5.4  5.0 - 12.0 % Final   • Eosinophil % 11/22/2019 4.0  0.3 - 6.2 % Final   • Basophil % 11/22/2019 0.4  0.0 - 1.5 % Final   • Immature Grans % 11/22/2019 0.5  0.0 - 0.5 % Final   • Neutrophils, Absolute 11/22/2019 7.91* 1.70 - 7.00 10*3/mm3 Final   • Lymphocytes, Absolute 11/22/2019 2.95  0.70 - 3.10 10*3/mm3 Final   • Monocytes, Absolute 11/22/2019 0.65  0.10 - 0.90 10*3/mm3 Final   • Eosinophils, Absolute 11/22/2019 0.48* 0.00 - 0.40 10*3/mm3 Final   • Basophils, Absolute 11/22/2019 0.05  0.00 - 0.20 10*3/mm3 Final   • Immature Grans, Absolute 11/22/2019 0.06* 0.00 - 0.05 10*3/mm3 Final   • nRBC 11/22/2019 0.0  0.0 - 0.2 /100 WBC Final       Physical Exam   Constitutional: She is oriented to person, place, and time. She appears well-developed and well-nourished.    HENT:   Head: Normocephalic and atraumatic.   Right Ear: External ear normal.   Left Ear: External ear normal.   Nose: Nose normal.   Swelling erythematous bilateral nasal mucosa.  Tenderness noted over bilateral maxillary sinus areas.  Oropharynx erythematous without exudate.   Eyes: Pupils are equal, round, and reactive to light. Conjunctivae and EOM are normal.   Neck: Normal range of motion. Neck supple. No tracheal deviation present. No thyromegaly present.   Cardiovascular: Normal rate, regular rhythm, normal heart sounds and intact distal pulses.   No murmur heard.  Pulmonary/Chest: Effort normal and breath sounds normal. No respiratory distress. She has no wheezes.   Abdominal: Soft. Bowel sounds are normal. There is no tenderness. There is no guarding.   Left CVA tenderness   Musculoskeletal: Normal range of motion. She exhibits no edema or tenderness.   Lymphadenopathy:     She has no cervical adenopathy.   Neurological: She is alert and oriented to person, place, and time.   Skin: Skin is warm and dry. No rash noted.   Psychiatric: She has a normal mood and affect. Her behavior is normal.   Nursing note and vitals reviewed.      Assessment/Plan     Diagnoses and all orders for this visit:    Flank pain, acute  Comments:  Differential diagnosis includes renal calculi as was discussed with the patient to increase fluid intake and report any change in/persistence of symptoms  Orders:  -     methylPREDNISolone acetate (DEPO-medrol) injection 80 mg  -     ketorolac (TORADOL) injection 60 mg    DM (diabetes mellitus) type II uncontrolled, periph vascular disorder (CMS/HCC)  Comments:  Continue Trulicity, glipizide-metformin, Soliqua, and low carbohydrate diabetic diet.  Advised patient to keep daily fasting and PP blood glucose log    Mixed hyperlipidemia  Comments:  Continue Crestor    Acquired hypothyroidism  Comments:  Continue Synthroid 100 mcg daily    Acute non-recurrent maxillary sinusitis  -      sulfamethoxazole-trimethoprim (BACTRIM DS) 800-160 MG per tablet; Take 1 tablet by mouth 2 (Two) Times a Day.  -     POCT Influenza A/B    Dysuria  -     POCT urinalysis dipstick, automated                 This document has been electronically signed by:  Sharon Chopra PA-C

## 2020-01-16 ENCOUNTER — HOSPITAL ENCOUNTER (OUTPATIENT)
Dept: ULTRASOUND IMAGING | Facility: HOSPITAL | Age: 50
Discharge: HOME | End: 2020-01-16
Admitting: SURGERY

## 2020-01-16 VITALS
HEIGHT: 71 IN | HEART RATE: 69 BPM | SYSTOLIC BLOOD PRESSURE: 119 MMHG | BODY MASS INDEX: 39.06 KG/M2 | OXYGEN SATURATION: 96 % | RESPIRATION RATE: 16 BRPM | DIASTOLIC BLOOD PRESSURE: 67 MMHG | TEMPERATURE: 98.6 F | WEIGHT: 279 LBS

## 2020-01-16 DIAGNOSIS — E04.1 LEFT THYROID NODULE: ICD-10-CM

## 2020-01-16 DIAGNOSIS — E04.1 THYROID NODULE: ICD-10-CM

## 2020-01-16 PROCEDURE — 88172 CYTP DX EVAL FNA 1ST EA SITE: CPT | Performed by: RADIOLOGY

## 2020-01-16 PROCEDURE — 88173 CYTOPATH EVAL FNA REPORT: CPT | Performed by: RADIOLOGY

## 2020-01-16 PROCEDURE — 10005 FNA BX W/US GDN 1ST LES: CPT | Performed by: RADIOLOGY

## 2020-01-20 ENCOUNTER — OFFICE VISIT (OUTPATIENT)
Dept: FAMILY MEDICINE CLINIC | Facility: CLINIC | Age: 50
End: 2020-01-20

## 2020-01-20 VITALS
DIASTOLIC BLOOD PRESSURE: 82 MMHG | WEIGHT: 281 LBS | HEIGHT: 71 IN | SYSTOLIC BLOOD PRESSURE: 140 MMHG | HEART RATE: 94 BPM | BODY MASS INDEX: 39.34 KG/M2 | TEMPERATURE: 98.3 F | OXYGEN SATURATION: 97 %

## 2020-01-20 DIAGNOSIS — R60.1 GENERALIZED EDEMA: ICD-10-CM

## 2020-01-20 DIAGNOSIS — R11.0 NAUSEA: ICD-10-CM

## 2020-01-20 DIAGNOSIS — N61.1 ABSCESS OF RIGHT BREAST: Primary | ICD-10-CM

## 2020-01-20 DIAGNOSIS — K21.9 GASTROESOPHAGEAL REFLUX DISEASE WITHOUT ESOPHAGITIS: ICD-10-CM

## 2020-01-20 DIAGNOSIS — E55.9 VITAMIN D DEFICIENCY: ICD-10-CM

## 2020-01-20 DIAGNOSIS — J30.1 CHRONIC SEASONAL ALLERGIC RHINITIS DUE TO POLLEN: ICD-10-CM

## 2020-01-20 LAB — LAB AP CASE REPORT: NORMAL

## 2020-01-20 PROCEDURE — 99214 OFFICE O/P EST MOD 30 MIN: CPT | Performed by: NURSE PRACTITIONER

## 2020-01-20 PROCEDURE — 96372 THER/PROPH/DIAG INJ SC/IM: CPT | Performed by: NURSE PRACTITIONER

## 2020-01-20 RX ORDER — GENTAMICIN SULFATE 40 MG/ML
80 INJECTION, SOLUTION INTRAMUSCULAR; INTRAVENOUS EVERY 24 HOURS
Status: SHIPPED | OUTPATIENT
Start: 2020-01-20 | End: 2020-01-23

## 2020-01-20 RX ORDER — POTASSIUM CHLORIDE 750 MG/1
10 TABLET, FILM COATED, EXTENDED RELEASE ORAL 2 TIMES DAILY
Qty: 60 TABLET | Refills: 5 | Status: SHIPPED | OUTPATIENT
Start: 2020-01-20 | End: 2020-01-30

## 2020-01-20 RX ORDER — LEVOCETIRIZINE DIHYDROCHLORIDE 5 MG/1
5 TABLET, FILM COATED ORAL EVERY EVENING
Qty: 30 TABLET | Refills: 5 | Status: SHIPPED | OUTPATIENT
Start: 2020-01-20 | End: 2020-06-11 | Stop reason: SDUPTHER

## 2020-01-20 RX ORDER — BUMETANIDE 2 MG/1
2 TABLET ORAL 2 TIMES DAILY
Qty: 60 TABLET | Refills: 5 | Status: SHIPPED | OUTPATIENT
Start: 2020-01-20 | End: 2020-06-11 | Stop reason: SDUPTHER

## 2020-01-20 RX ORDER — SULFAMETHOXAZOLE AND TRIMETHOPRIM 800; 160 MG/1; MG/1
1 TABLET ORAL 2 TIMES DAILY
Qty: 20 TABLET | Refills: 0 | Status: SHIPPED | OUTPATIENT
Start: 2020-01-20 | End: 2020-02-20

## 2020-01-20 RX ORDER — ESOMEPRAZOLE MAGNESIUM 40 MG/1
40 CAPSULE, DELAYED RELEASE ORAL
Qty: 30 CAPSULE | Refills: 5 | Status: SHIPPED | OUTPATIENT
Start: 2020-01-20 | End: 2020-01-30

## 2020-01-20 RX ORDER — PROMETHAZINE HYDROCHLORIDE 25 MG/1
25 TABLET ORAL EVERY 6 HOURS PRN
Qty: 60 TABLET | Refills: 1 | Status: SHIPPED | OUTPATIENT
Start: 2020-01-20 | End: 2020-08-20

## 2020-01-20 RX ORDER — ERGOCALCIFEROL 1.25 MG/1
50000 CAPSULE ORAL
Qty: 4 CAPSULE | Refills: 5 | Status: SHIPPED | OUTPATIENT
Start: 2020-01-20 | End: 2020-06-11 | Stop reason: SDUPTHER

## 2020-01-20 RX ORDER — KETOROLAC TROMETHAMINE 30 MG/ML
60 INJECTION, SOLUTION INTRAMUSCULAR; INTRAVENOUS ONCE
Status: COMPLETED | OUTPATIENT
Start: 2020-01-20 | End: 2020-01-20

## 2020-01-20 RX ADMIN — GENTAMICIN SULFATE 80 MG: 40 INJECTION, SOLUTION INTRAMUSCULAR; INTRAVENOUS at 11:55

## 2020-01-20 RX ADMIN — KETOROLAC TROMETHAMINE 60 MG: 30 INJECTION, SOLUTION INTRAMUSCULAR; INTRAVENOUS at 11:55

## 2020-01-20 NOTE — PROGRESS NOTES
"Subjective   Soheila Brewster is a 49 y.o. female.     Chief Complaint   Patient presents with   • Spot on breast       History of Present Illness     Area on breast-abscess on right breast.  Reports nausea and GI upset.  She reports some drainage this morning.  Reports the area come up on her breast last week early \"in the week\".  She reports by Wednesday she had redness and a warm area.  She reports \"sharp pain shooting thru\".  She reports she has been able to express drainage this morning as well.  She reports bending forward causes pressure.  She has noted some fever and chills.  She reports her pain meds are \"not helping the pain\".    FNA Thyroid-of nodule.  Pathology shows no malignancy.  She reports she will have a follow up with surgeon upcoming.    DM-has noted elevated since acute infection.  Is taking meds as directed.  She reports appetite has been poor.    Vit D def-needs refills on routine Vit D. No negative side effects of meds.  Taking weekly as ordered.   Allergic rhinitis-chronic and ongoing.  Request refills on her allergy meds.  Taking as directed without any negative side effects.     The following portions of the patient's history were reviewed and updated as appropriate: CC, ROS, allergies, current medications, past family history, past medical history, past social history, past surgical history and problem list.      Review of Systems   Constitutional: Positive for fatigue and fever. Negative for appetite change and unexpected weight change.   HENT: Negative for congestion, ear pain, nosebleeds, postnasal drip, rhinorrhea, sore throat, trouble swallowing and voice change.    Eyes: Negative for pain and visual disturbance.   Respiratory: Negative for cough, shortness of breath and wheezing.    Cardiovascular: Negative for chest pain and palpitations.   Gastrointestinal: Positive for nausea and vomiting. Negative for abdominal pain, blood in stool, constipation and diarrhea.   Endocrine: Negative " "for cold intolerance and polydipsia.   Genitourinary: Negative for difficulty urinating, flank pain and hematuria.   Musculoskeletal: Positive for arthralgias and back pain. Negative for gait problem, joint swelling and myalgias.   Skin: Positive for wound. Negative for color change and rash.   Allergic/Immunologic: Negative.    Neurological: Negative for dizziness, syncope, numbness and headaches.   Hematological: Negative.    Psychiatric/Behavioral: Negative for sleep disturbance and suicidal ideas.   All other systems reviewed and are negative.      Objective     /82   Pulse 94   Temp 98.3 °F (36.8 °C) (Temporal)   Ht 180.3 cm (71\")   Wt 127 kg (281 lb)   SpO2 97%   BMI 39.19 kg/m²     Physical Exam   Constitutional: She is oriented to person, place, and time. She appears well-developed and well-nourished. No distress.   HENT:   Head: Normocephalic and atraumatic.   Right Ear: External ear normal.   Left Ear: External ear normal.   Nose: Nose normal.   Mouth/Throat: Oropharynx is clear and moist.   Eyes: Pupils are equal, round, and reactive to light. EOM are normal. No scleral icterus.   Neck: Normal range of motion. Neck supple. No JVD present. No thyromegaly present.   Cardiovascular: Normal rate, regular rhythm and normal heart sounds.   Pulmonary/Chest: Effort normal and breath sounds normal.       Abdominal: Soft. Bowel sounds are normal. There is no tenderness.   Neurological: She is alert and oriented to person, place, and time. No cranial nerve deficit. Coordination normal.   Skin: Skin is warm and dry.   Psychiatric: She has a normal mood and affect. Her behavior is normal. Judgment and thought content normal.   Vitals reviewed.      Assessment/Plan     Problem List Items Addressed This Visit        Digestive    Vitamin D deficiency    Relevant Medications    vitamin D (ERGOCALCIFEROL) 1.25 MG (52324 UT) capsule capsule    Gastroesophageal reflux disease without esophagitis    Relevant " Medications    esomeprazole (nexIUM) 40 MG capsule      Other Visit Diagnoses     Abscess of right breast    -  Primary    Relevant Medications    gentamicin (GARAMYCIN) injection 80 mg    ketorolac (TORADOL) injection 60 mg (Completed)    sulfamethoxazole-trimethoprim (BACTRIM DS) 800-160 MG per tablet    mupirocin (BACTROBAN) 2 % ointment    Nausea        Relevant Medications    promethazine (PHENERGAN) 25 MG tablet    Generalized edema        Relevant Medications    potassium chloride (K-DUR) 10 MEQ CR tablet    bumetanide (BUMEX) 2 MG tablet    Chronic seasonal allergic rhinitis due to pollen        Relevant Medications    levocetirizine (XYZAL) 5 MG tablet              Emotional support and active listening provided.  Patient provided time to verbalize feelings.  Understands disease processes and need for medications.  Understands reasons for urgent and emergent care.  Patient (& family) verbalized agreement for treatment plan.     Refill on routine maintenance meds today.   Injections today while in the office for acute symptom relief. RTC next 2 days for antibiotic injection.    Patient declines for incision of wound today.  Report back to office if becomes unable to express secretions from present opening.      meds for nausea.  Attempt to remain hydrated.  Report if unable to tolerate PO fluids.   RTC PRN 3-5 days for worsening or non resolving symptoms  Keep sched follow up.           This document has been electronically signed by:  JOAQUÍN Ross, FNP-C    Dragon disclaimer:  Much of this encounter note is an electronic transcription/translation of spoken language to printed text. The electronic translation of spoken language may permit erroneous, or at times, nonsensical words or phrases to be inadvertently transcribed; Although I have reviewed the note for such errors, some may still exist.

## 2020-01-21 ENCOUNTER — CLINICAL SUPPORT (OUTPATIENT)
Dept: FAMILY MEDICINE CLINIC | Facility: CLINIC | Age: 50
End: 2020-01-21

## 2020-01-21 DIAGNOSIS — N61.1 ABSCESS OF RIGHT BREAST: ICD-10-CM

## 2020-01-21 PROCEDURE — 96372 THER/PROPH/DIAG INJ SC/IM: CPT | Performed by: NURSE PRACTITIONER

## 2020-01-21 RX ADMIN — GENTAMICIN SULFATE 80 MG: 40 INJECTION, SOLUTION INTRAMUSCULAR; INTRAVENOUS at 14:05

## 2020-01-30 DIAGNOSIS — K21.9 GASTROESOPHAGEAL REFLUX DISEASE WITHOUT ESOPHAGITIS: ICD-10-CM

## 2020-01-30 DIAGNOSIS — R60.1 GENERALIZED EDEMA: ICD-10-CM

## 2020-01-30 DIAGNOSIS — E78.2 MIXED HYPERLIPIDEMIA: ICD-10-CM

## 2020-01-30 DIAGNOSIS — J32.0 CHRONIC MAXILLARY SINUSITIS: ICD-10-CM

## 2020-01-30 DIAGNOSIS — J30.1 CHRONIC SEASONAL ALLERGIC RHINITIS DUE TO POLLEN: ICD-10-CM

## 2020-01-30 RX ORDER — ALBUTEROL SULFATE 90 UG/1
AEROSOL, METERED RESPIRATORY (INHALATION)
Qty: 18 G | Refills: 5 | Status: SHIPPED | OUTPATIENT
Start: 2020-01-30 | End: 2020-06-11 | Stop reason: SDUPTHER

## 2020-01-30 RX ORDER — MONTELUKAST SODIUM 10 MG/1
10 TABLET ORAL NIGHTLY
Qty: 30 TABLET | Refills: 5 | Status: SHIPPED | OUTPATIENT
Start: 2020-01-30 | End: 2020-06-11 | Stop reason: SDUPTHER

## 2020-01-30 RX ORDER — ESOMEPRAZOLE MAGNESIUM 40 MG/1
CAPSULE, DELAYED RELEASE ORAL
Qty: 30 CAPSULE | Refills: 5 | Status: SHIPPED | OUTPATIENT
Start: 2020-01-30 | End: 2020-06-11 | Stop reason: SDUPTHER

## 2020-01-30 RX ORDER — EZETIMIBE 10 MG/1
10 TABLET ORAL NIGHTLY
Qty: 30 TABLET | Refills: 5 | Status: SHIPPED | OUTPATIENT
Start: 2020-01-30 | End: 2020-06-11 | Stop reason: SDUPTHER

## 2020-01-30 RX ORDER — POTASSIUM CHLORIDE 750 MG/1
TABLET, FILM COATED, EXTENDED RELEASE ORAL
Qty: 60 TABLET | Refills: 5 | Status: SHIPPED | OUTPATIENT
Start: 2020-01-30 | End: 2020-06-11 | Stop reason: SDUPTHER

## 2020-02-20 ENCOUNTER — OFFICE VISIT (OUTPATIENT)
Dept: FAMILY MEDICINE CLINIC | Facility: CLINIC | Age: 50
End: 2020-02-20

## 2020-02-20 VITALS
TEMPERATURE: 97.7 F | HEART RATE: 74 BPM | HEIGHT: 71 IN | BODY MASS INDEX: 39.76 KG/M2 | SYSTOLIC BLOOD PRESSURE: 138 MMHG | OXYGEN SATURATION: 96 % | DIASTOLIC BLOOD PRESSURE: 82 MMHG | WEIGHT: 284 LBS

## 2020-02-20 DIAGNOSIS — M79.671 PAIN OF RIGHT HEEL: Primary | ICD-10-CM

## 2020-02-20 DIAGNOSIS — IMO0002 DM (DIABETES MELLITUS) TYPE II UNCONTROLLED, PERIPH VASCULAR DISORDER: ICD-10-CM

## 2020-02-20 DIAGNOSIS — K59.04 CHRONIC IDIOPATHIC CONSTIPATION: ICD-10-CM

## 2020-02-20 DIAGNOSIS — J30.89 CHRONIC NONSEASONAL ALLERGIC RHINITIS DUE TO POLLEN: ICD-10-CM

## 2020-02-20 DIAGNOSIS — J06.9 ACUTE URI: ICD-10-CM

## 2020-02-20 DIAGNOSIS — M19.90 ARTHRITIS: ICD-10-CM

## 2020-02-20 DIAGNOSIS — I10 ESSENTIAL HYPERTENSION: ICD-10-CM

## 2020-02-20 DIAGNOSIS — E53.8 VITAMIN B12 DEFICIENCY: ICD-10-CM

## 2020-02-20 PROCEDURE — 99214 OFFICE O/P EST MOD 30 MIN: CPT | Performed by: NURSE PRACTITIONER

## 2020-02-20 RX ORDER — GABAPENTIN 600 MG/1
600 TABLET ORAL 3 TIMES DAILY
Qty: 90 TABLET | Refills: 2 | Status: SHIPPED | OUTPATIENT
Start: 2020-02-20 | End: 2020-06-11 | Stop reason: SDUPTHER

## 2020-02-20 RX ORDER — ENALAPRIL MALEATE 10 MG/1
10 TABLET ORAL DAILY
Qty: 30 TABLET | Refills: 5 | Status: SHIPPED | OUTPATIENT
Start: 2020-02-20 | End: 2020-06-11 | Stop reason: SDUPTHER

## 2020-02-20 RX ORDER — SULFAMETHOXAZOLE AND TRIMETHOPRIM 800; 160 MG/1; MG/1
1 TABLET ORAL EVERY 12 HOURS
Qty: 20 TABLET | Refills: 0 | Status: SHIPPED | OUTPATIENT
Start: 2020-02-20 | End: 2020-03-01

## 2020-02-20 RX ORDER — AZELASTINE 1 MG/ML
SPRAY, METERED NASAL
Qty: 1 EACH | Refills: 5 | Status: SHIPPED | OUTPATIENT
Start: 2020-02-20 | End: 2020-06-11 | Stop reason: SDUPTHER

## 2020-02-20 RX ORDER — LANOLIN ALCOHOL/MO/W.PET/CERES
1000 CREAM (GRAM) TOPICAL DAILY
Qty: 30 TABLET | Refills: 5 | Status: SHIPPED | OUTPATIENT
Start: 2020-02-20 | End: 2020-06-11 | Stop reason: SDUPTHER

## 2020-02-20 RX ORDER — GLIPIZIDE AND METFORMIN HCL 2.5; 25 MG/1; MG/1
1 TABLET, FILM COATED ORAL 3 TIMES DAILY
Qty: 90 TABLET | Refills: 2 | Status: SHIPPED | OUTPATIENT
Start: 2020-02-20 | End: 2020-06-11 | Stop reason: SDUPTHER

## 2020-02-20 NOTE — PROGRESS NOTES
Abdomen x-ray shows constipation.  She can begin the medicines I gave her to help with cleanout.  Regarding her foot, she can be doing some warm soaks in addition to freezing a water bottle and holding toward her heel do not help apply directly to her skin tissue.  Leave for short amounts of time or not greater than 10 minutes

## 2020-02-20 NOTE — PROGRESS NOTES
"Subjective   Soheila Brewster is a 49 y.o. female.     Chief Complaint   Patient presents with   • Diabetes   • Abdominal Pain       History of Present Illness     Right heel complaint-has been tender for approx 4-5 days.  Tenderness near base of foot.  Increase in discomfort with walking.  No known injury.  She reports no falls.  She has not done heat or ice.  She reports past sprain but no fracture.  Abdominal pain-firmness of abdomen.  She reports pain is along RUQ and \"into back\".   She continues to struggle with constipation.  She reports she has noted increase in nausea when she eats.  She reports she is taking Amitza 24 mg BID but feels she continues to have difficulty.  She reports she is not passing gas well but is having some \"foul tasting\" belching.    Chest congestion-more increased for the last week.  Throat irritation but not sore.  Cough is productive.  Rhinorrhea and nose congestion.  Voice hoarseness that has been progressive.  HA and dizziness.  Ear pain bilaterally and under chin line and ringing \"in left ear\".  Ongoing.    Diabetes-Ongoing.  She is not checking consistently but \"is high\" when she does.  She reports 200-220's on most checks.  She reports      The following portions of the patient's history were reviewed and updated as appropriate: CC, ROS, allergies, current medications, past family history, past medical history, past social history, past surgical history and problem list.    Review of Systems   Constitutional: Positive for fatigue. Negative for appetite change, fever and unexpected weight change.   HENT: Positive for congestion, ear pain, postnasal drip, sore throat and tinnitus. Negative for nosebleeds, sinus pressure, sinus pain, trouble swallowing and voice change.    Eyes: Positive for discharge and itching. Negative for pain and visual disturbance.   Respiratory: Positive for cough and shortness of breath. Negative for chest tightness and wheezing.    Cardiovascular: Positive " "for palpitations. Negative for chest pain.   Gastrointestinal: Positive for abdominal pain and nausea. Negative for blood in stool, constipation and diarrhea.   Endocrine: Negative for cold intolerance and polydipsia.   Genitourinary: Negative for difficulty urinating, dysuria, flank pain, hematuria and urgency.   Musculoskeletal: Positive for back pain and neck pain. Negative for arthralgias, gait problem, joint swelling and myalgias.   Skin: Negative for color change and rash.   Allergic/Immunologic: Negative.    Neurological: Positive for dizziness and headaches. Negative for syncope and numbness.   Hematological: Negative.    Psychiatric/Behavioral: Positive for sleep disturbance. Negative for dysphoric mood and suicidal ideas.   All other systems reviewed and are negative.      Objective     /82   Pulse 74   Temp 97.7 °F (36.5 °C) (Temporal)   Ht 180.3 cm (71\")   Wt 129 kg (284 lb)   SpO2 96%   BMI 39.61 kg/m²     Physical Exam   Constitutional: She is oriented to person, place, and time. She appears well-developed and well-nourished. No distress.   HENT:   Head: Normocephalic and atraumatic.   Right Ear: Hearing, external ear and ear canal normal. A middle ear effusion is present.   Left Ear: Hearing, external ear and ear canal normal. A middle ear effusion is present.   Nose: Mucosal edema and rhinorrhea present. Right sinus exhibits maxillary sinus tenderness and frontal sinus tenderness. Left sinus exhibits maxillary sinus tenderness and frontal sinus tenderness.   Mouth/Throat: Mucous membranes are normal. Oropharyngeal exudate and posterior oropharyngeal erythema present.   Eyes: Pupils are equal, round, and reactive to light. Conjunctivae, EOM and lids are normal. No scleral icterus. Right eye exhibits normal extraocular motion and no nystagmus. Left eye exhibits normal extraocular motion and no nystagmus.   Neck: Normal range of motion. Neck supple. No JVD present. No tracheal tenderness " present. Carotid bruit is not present. No thyromegaly present.   Cardiovascular: Normal rate, regular rhythm, S1 normal, S2 normal, normal heart sounds and intact distal pulses.   No murmur heard.  Pulmonary/Chest: Effort normal and breath sounds normal. She exhibits no tenderness.   Abdominal: Soft. Bowel sounds are normal. She exhibits no mass. There is no hepatosplenomegaly. There is generalized tenderness.   Tenderness along top of abdomen over transverse colon.  Mild distention noted   Musculoskeletal:        Right knee: Tenderness found.        Left knee: Tenderness found.        Thoracic back: She exhibits tenderness and pain.        Lumbar back: She exhibits decreased range of motion, tenderness and pain.        Right lower leg: She exhibits edema.        Left lower leg: She exhibits edema.   Gait antalgic.   equal bilaterally. No muscular atrophy or flaccidity.   Lymphadenopathy:     She has cervical adenopathy.        Right cervical: Superficial cervical adenopathy present.        Left cervical: Superficial cervical adenopathy present.   Neurological: She is alert and oriented to person, place, and time. She has normal strength and normal reflexes. She displays no tremor. No cranial nerve deficit or sensory deficit. She exhibits normal muscle tone. Coordination and gait normal.   Skin: Skin is warm and dry. Capillary refill takes less than 2 seconds. She is not diaphoretic. No cyanosis. No pallor. Nails show no clubbing.   Psychiatric: She has a normal mood and affect. Her speech is normal and behavior is normal. Judgment and thought content normal. She is not actively hallucinating. Cognition and memory are normal. She is attentive.   Vitals reviewed.      Assessment/Plan     Problem List Items Addressed This Visit        Cardiovascular and Mediastinum    DM (diabetes mellitus) type II uncontrolled, periph vascular disorder (CMS/HCC)    Relevant Medications    gabapentin (NEURONTIN) 600 MG tablet     Insulin Glargine-Lixisenatide (SOLIQUA) 100-33 UNT-MCG/ML solution pen-injector injection    glipiZIDE-metFORMIN (METAGLIP) 2.5-250 MG per tablet    Dulaglutide (TRULICITY) 1.5 MG/0.5ML solution pen-injector    Essential hypertension    Relevant Medications    enalapril (VASOTEC) 10 MG tablet       Musculoskeletal and Integument    Arthritis    Relevant Medications    gabapentin (NEURONTIN) 600 MG tablet      Other Visit Diagnoses     Pain of right heel    -  Primary    Relevant Orders    XR Foot 3+ View Right    Vitamin B12 deficiency        Relevant Medications    vitamin B-12 (CYANOCOBALAMIN) 1000 MCG tablet    Chronic nonseasonal allergic rhinitis due to pollen        Relevant Medications    azelastine (ASTELIN) 0.1 % nasal spray    Chronic idiopathic constipation        Relevant Medications    magnesium hydroxide (MILK OF MAGNESIA) 400 MG/5ML suspension    Other Relevant Orders    XR abdomen 3 vw (Completed)    Acute URI        Relevant Medications    sulfamethoxazole-trimethoprim (BACTRIM DS,SEPTRA DS) 800-160 MG per tablet          Patient's Body mass index is 39.61 kg/m². BMI is above normal parameters. Recommendations include: nutrition counseling.    Emotional support and active listening provided.  Patient provided time to verbalize feelings.  Understands disease processes and need for medications.  Understands reasons for urgent and emergent care.  Patient (& family) verbalized agreement for treatment plan    IVETTE #[id number] reviewed today and consistent.  Last noted pickup of gabapentin at her pharmacy was December 23, 2019.  Will refill prescribed controlled medication today.  Patient is aware they cannot receive narcotics from any other provider except if under care of pain management or speciality clinic.  Risk and benefits of medication use has been reviewed.  History and physical exam exhibit continued safe and appropriate use of controlled substances.  The patient is aware of the potential for  addiction and dependence.  This patient has been made aware of the appropriate use of such medications, including potential risk of somnolence, limited ability to drive and / or work safely, and potential for overdose.  Patient understands not to take medication and drive until they know how medication may affect their cognition/decision making.   It has also been made clear that these medications are for use by this patient only, without concomitant use of alcohol or other substances unless prescribed/advised by medical provider.  Patient understands they may be subject to UDS and pill counts at random.    Patient considered to be moderate risk for addiction due to use of multiple controlled medications.  Patient understands and accepts these risks.  Patient need for medication will be reassessed at each visit.  Doses will be adjusted according to patient need and findings.    Goal of [place]: Patient will not have any adverse reactions of medication.  Patient will have reduction in neuropathic symptoms of pain with use of gabapentin as directed.  Patient will not have any drug drug interactions.  Continue under the care of pain management    Trial of trulance for constipation.     RTC 1-2 weeks, sooner if needed.  Report any worsening symptoms.            This document has been electronically signed by:  JOAQUÍN Ross, FNP-C    Dragon disclaimer:  Much of this encounter note is an electronic transcription/translation of spoken language to printed text. The electronic translation of spoken language may permit erroneous, or at times, nonsensical words or phrases to be inadvertently transcribed; Although I have reviewed the note for such errors, some may still exist.

## 2020-02-24 ENCOUNTER — PRIOR AUTHORIZATION (OUTPATIENT)
Dept: FAMILY MEDICINE CLINIC | Facility: CLINIC | Age: 50
End: 2020-02-24

## 2020-03-11 ENCOUNTER — OFFICE VISIT (OUTPATIENT)
Dept: FAMILY MEDICINE CLINIC | Facility: CLINIC | Age: 50
End: 2020-03-11

## 2020-03-11 VITALS
BODY MASS INDEX: 37.94 KG/M2 | SYSTOLIC BLOOD PRESSURE: 140 MMHG | DIASTOLIC BLOOD PRESSURE: 80 MMHG | HEART RATE: 88 BPM | WEIGHT: 271 LBS | OXYGEN SATURATION: 96 % | HEIGHT: 71 IN | TEMPERATURE: 98.6 F

## 2020-03-11 DIAGNOSIS — E66.01 CLASS 2 SEVERE OBESITY DUE TO EXCESS CALORIES WITH SERIOUS COMORBIDITY AND BODY MASS INDEX (BMI) OF 37.0 TO 37.9 IN ADULT (HCC): ICD-10-CM

## 2020-03-11 DIAGNOSIS — M51.27 PROTRUSION OF INTERVERTEBRAL DISC OF LUMBOSACRAL REGION: ICD-10-CM

## 2020-03-11 DIAGNOSIS — J18.9 PNEUMONIA OF RIGHT MIDDLE LOBE DUE TO INFECTIOUS ORGANISM: Primary | ICD-10-CM

## 2020-03-11 DIAGNOSIS — F33.8 SEASONAL AFFECTIVE DISORDER (HCC): ICD-10-CM

## 2020-03-11 DIAGNOSIS — M25.78 DEGENERATION OF INTERVERTEBRAL DISC OF THORACIC REGION WITH OSTEOPHYTE: ICD-10-CM

## 2020-03-11 DIAGNOSIS — IMO0002 DM (DIABETES MELLITUS) TYPE II UNCONTROLLED, PERIPH VASCULAR DISORDER: ICD-10-CM

## 2020-03-11 DIAGNOSIS — M51.34 DEGENERATION OF INTERVERTEBRAL DISC OF THORACIC REGION WITH OSTEOPHYTE: ICD-10-CM

## 2020-03-11 PROCEDURE — 99214 OFFICE O/P EST MOD 30 MIN: CPT | Performed by: NURSE PRACTITIONER

## 2020-03-11 RX ORDER — PREDNISONE 20 MG/1
TABLET ORAL
Qty: 10 TABLET | Refills: 0 | Status: SHIPPED | OUTPATIENT
Start: 2020-03-11 | End: 2020-03-21

## 2020-03-11 RX ORDER — LEVOFLOXACIN 750 MG/1
750 TABLET ORAL DAILY
Qty: 10 TABLET | Refills: 0 | Status: SHIPPED | OUTPATIENT
Start: 2020-03-11 | End: 2020-03-21

## 2020-03-11 NOTE — PROGRESS NOTES
"Subjective   Soheila Brewster is a 49 y.o. female.     Chief Complaint   Patient presents with   • Follow up radiology       History of Present Illness     Radiology follow up-abd series (+) for mild constipation.  Has tried trulance.  \"can't tell much difference\".  She reports she is having bm \"but small amounts\".   URI symptoms-since Thursday.  PND and rhinorrhea.  SInus pressure and HA.  She reports some SOA and cough.  Dizziness with sharp \"headache\".  She reports poor appetite.  Cough is productive with green sputum, thick and difficult to expectorate.  She reports SOA with exertion.  She has used albuterol but has not used Trelegy today.  Using nasal spray but mostly PRN due to drainage.  Taking xyzal as directed.    HTN-mild elevation today.  Has taken meds but \"feel bad\".  No associated symptoms such as CP or palpitations.  Diabetes-mild elevation more than usual for the last few days.  She reports close to 200's.  She is taking meds as directed.  No concerns today.  SAD-doing \"some better\".  Under care of comp care.  Looking forward to spring and being able to be outdoors more.  No concerns today.   Obesity-not actively working on weight loss.  No increase in weight from last visit.    The following portions of the patient's history were reviewed and updated as appropriate: CC, ROS, allergies, current medications, past family history, past medical history, past social history, past surgical history and problem list.    Review of Systems   Constitutional: Positive for fatigue. Negative for appetite change, fever and unexpected weight change.   HENT: Positive for congestion, ear pain, postnasal drip, sore throat, tinnitus and voice change. Negative for nosebleeds, sinus pressure, sinus pain and trouble swallowing.    Eyes: Positive for discharge and itching. Negative for pain and visual disturbance.   Respiratory: Positive for cough and shortness of breath. Negative for chest tightness and wheezing.  " "  Cardiovascular: Positive for palpitations. Negative for chest pain.   Gastrointestinal: Positive for abdominal pain and nausea. Negative for blood in stool, constipation and diarrhea.   Endocrine: Negative for cold intolerance and polydipsia.   Genitourinary: Negative for difficulty urinating, dysuria, flank pain, hematuria and urgency.   Musculoskeletal: Positive for back pain and neck pain. Negative for arthralgias, gait problem, joint swelling and myalgias.   Skin: Negative for color change and rash.   Allergic/Immunologic: Negative.    Neurological: Positive for dizziness and headaches. Negative for syncope and numbness.   Hematological: Negative.    Psychiatric/Behavioral: Positive for sleep disturbance. Negative for dysphoric mood and suicidal ideas.   All other systems reviewed and are negative.      Objective     /80   Pulse 88   Temp 98.6 °F (37 °C) (Temporal)   Ht 180.3 cm (71\")   Wt 123 kg (271 lb)   SpO2 96%   BMI 37.80 kg/m²     Physical Exam   Constitutional: She appears well-developed and well-nourished. No distress.   HENT:   Head: Normocephalic and atraumatic.   Right Ear: Ear canal normal. Tympanic membrane is injected and retracted. Tympanic membrane is not scarred and not bulging. A middle ear effusion is present.   Left Ear: Ear canal normal. Tympanic membrane is injected and retracted. Tympanic membrane is not scarred and not bulging. A middle ear effusion is present.   Nose: Mucosal edema and rhinorrhea present. No epistaxis. Right sinus exhibits maxillary sinus tenderness and frontal sinus tenderness. Left sinus exhibits maxillary sinus tenderness and frontal sinus tenderness.   Mouth/Throat: Uvula is midline and mucous membranes are normal. No uvula swelling. Oropharyngeal exudate and posterior oropharyngeal erythema (mild to moderately injected) present. No posterior oropharyngeal edema.   Eyes: Pupils are equal, round, and reactive to light. Conjunctivae and EOM are normal. " No scleral icterus.   Neck: Normal range of motion. No thyromegaly present.   Cardiovascular: Normal rate, regular rhythm, normal heart sounds and intact distal pulses.   Pulmonary/Chest: Effort normal. She has decreased breath sounds (mildly over bronchial region of anterior chest) in the right upper field, the right middle field, the left upper field and the left middle field. She has wheezes (scattered mild) in the right upper field, the right middle field, the right lower field and the left upper field.   Abdominal: Soft. Bowel sounds are normal. There is no tenderness.   Musculoskeletal: Normal range of motion.   Lymphadenopathy:     She has cervical adenopathy.        Right cervical: Superficial cervical adenopathy present.        Left cervical: Superficial cervical adenopathy present.   Neurological: She is alert. She displays normal reflexes. No cranial nerve deficit.   Skin: Skin is warm and dry.   Psychiatric: She has a normal mood and affect. Her behavior is normal. Judgment and thought content normal.   Vitals reviewed.      Assessment/Plan     Problem List Items Addressed This Visit        Cardiovascular and Mediastinum    DM (diabetes mellitus) type II uncontrolled, periph vascular disorder (CMS/HCC)    Current Assessment & Plan     Monitor blood sugars.  Advised patient to be aware of elevations and to hold or stop steroids if necessary.  Push fluids to flush            Digestive    Class 2 severe obesity due to excess calories with serious comorbidity and body mass index (BMI) of 37.0 to 37.9 in adult (CMS/[facility])    Current Assessment & Plan     Continue to work on diet changes.  Weight loss encouraged.  Low CHO high-protein diet.            Other    Seasonal affective disorder (CMS/[facility])    Current Assessment & Plan     Continue under the care at [facility].  Report worsening symptoms.  Encouraged to be active as physically able           Other Visit Diagnoses     Pneumonia of right middle lobe due to  infectious organism (CMS/[facility])    -  Primary    Relevant Medications    levoFLOXacin (LEVAQUIN) 750 MG tablet    predniSONE (DELTASONE) 20 MG tablet        Patient's Body mass index is 37.8 kg/m². BMI is above normal parameters. Recommendations include: nutrition counseling.  Emotional support and active listening provided.  Patient provided time to verbalize feelings.  Understands disease processes and need for medications.  Understands reasons for urgent and emergent care.  Patient (& family) verbalized agreement for treatment plan.     [facility] #[id number] reviewed today and consistent.  Will refill prescribed controlled medication today.  Patient is aware they cannot receive narcotics from any other provider except if under care of pain management or speciality clinic.  Risk and benefits of medication use has been reviewed.  History and physical exam exhibit continued safe and appropriate use of controlled substances.  The patient is aware of the potential for addiction and dependence.  This patient has been made aware of the appropriate use of such medications, including potential risk of somnolence, limited ability to drive and / or work safely, and potential for overdose.    It has also been made clear that these medications are for use by this patient only, without concomitant use of alcohol or other substances unless prescribed/advised by medical provider.  Patient understands they may be subject to UDS and pill counts at random.      Patient considered to be moderate risk for addiction due to use of multiple controlled medications.  Patient understands and accepts these risks.  Patient need for medication will be reassessed at each visit.  Doses will be adjusted according to patient need and findings.    Goal of TX: Patient will not have any adverse reactions of medication.  Patient will have reduction in neuropathic symptoms of pain with use of gabapentin as directed.  Patient will not have any drug drug  interactions.  Continue under the care of pain management for hydrocodone    Sample of movantik 25 given.        Instructed to complete all of antibiotics for acute illness.  Increase PO fluids, avoid/limit caffeine.  Do not save any of the meds for later use.  Rest PRN  Steroids for home.  Understands risk for lower immune response and to avoid others who are sick.   Understands increased risk for sunburn with prolonged exposure or tanning.  Understands may have facial flushing and possible jitteriness.  Take early in the AM.     Will plan for updated labs.     Discussed with patient to stagger dose vitamins as magnesium can interfere with absorption of fluoroquinolone.  Also discussed with patient that metformin combo medication and Levaquin can have possible interaction, advised To watch for signs and symptoms of hypoglycemia.    RTC 3 months, sooner if needed.             This document has been electronically signed by:  JOAQUÍN Ross, FNP-C    Dragon disclaimer:  Much of this encounter note is an electronic transcription/translation of spoken language to printed text. The electronic translation of spoken language may permit erroneous, or at times, nonsensical words or phrases to be inadvertently transcribed; Although I have reviewed the note for such errors, some may still exist.

## 2020-03-11 NOTE — ASSESSMENT & PLAN NOTE
Monitor blood sugars.  Advised patient to be aware of elevations and to hold or stop steroids if necessary.  Push fluids to flush

## 2020-03-11 NOTE — ASSESSMENT & PLAN NOTE
Continue under the care at [facility].  Report worsening symptoms.  Encouraged to be active as physically able

## 2020-04-07 DIAGNOSIS — M25.78 DEGENERATION OF INTERVERTEBRAL DISC OF THORACIC REGION WITH OSTEOPHYTE: Primary | ICD-10-CM

## 2020-04-07 DIAGNOSIS — M51.27 PROTRUSION OF INTERVERTEBRAL DISC OF LUMBOSACRAL REGION: ICD-10-CM

## 2020-04-07 DIAGNOSIS — M51.34 DEGENERATION OF INTERVERTEBRAL DISC OF THORACIC REGION WITH OSTEOPHYTE: Primary | ICD-10-CM

## 2020-04-08 ENCOUNTER — TELEPHONE (OUTPATIENT)
Dept: FAMILY MEDICINE CLINIC | Facility: CLINIC | Age: 50
End: 2020-04-08

## 2020-04-08 NOTE — TELEPHONE ENCOUNTER
----- Message from JOAQUÍN Ross sent at 4/7/2020 10:40 AM EDT -----  I placed orders for future of 1 month  ----- Message -----  From: Lynn Calle MA  Sent: 4/7/2020  10:05 AM EDT  To: JOAQUÍN Ross    [facility] is scheduling but it will be 30 days out.       ----- Message -----  From: Mariana Mcknight APRN  Sent: 4/6/2020   2:25 PM EDT  To: Lynn Calle MA    I can but it may be several months before a non emergent MRI is done.  Is any place doing imaging right now?  ----- Message -----  From: Lynn Calle MA  Sent: 4/6/2020  12:55 PM EDT  To: JOAQUÍN Ross    Pt said her back is getting worse and wanted to know if you wanted to order an MRI?

## 2020-06-04 ENCOUNTER — PRIOR AUTHORIZATION (OUTPATIENT)
Dept: FAMILY MEDICINE CLINIC | Facility: CLINIC | Age: 50
End: 2020-06-04

## 2020-06-11 ENCOUNTER — LAB (OUTPATIENT)
Dept: LAB | Facility: HOSPITAL | Age: 50
End: 2020-06-11

## 2020-06-11 ENCOUNTER — OFFICE VISIT (OUTPATIENT)
Dept: FAMILY MEDICINE CLINIC | Facility: CLINIC | Age: 50
End: 2020-06-11

## 2020-06-11 DIAGNOSIS — I10 ESSENTIAL HYPERTENSION: ICD-10-CM

## 2020-06-11 DIAGNOSIS — J30.89 CHRONIC NONSEASONAL ALLERGIC RHINITIS DUE TO POLLEN: ICD-10-CM

## 2020-06-11 DIAGNOSIS — R05.9 COUGH: Primary | ICD-10-CM

## 2020-06-11 DIAGNOSIS — M25.78 DEGENERATION OF INTERVERTEBRAL DISC OF THORACIC REGION WITH OSTEOPHYTE: ICD-10-CM

## 2020-06-11 DIAGNOSIS — E55.9 VITAMIN D DEFICIENCY: ICD-10-CM

## 2020-06-11 DIAGNOSIS — IMO0002 DM (DIABETES MELLITUS) TYPE II UNCONTROLLED, PERIPH VASCULAR DISORDER: ICD-10-CM

## 2020-06-11 DIAGNOSIS — M19.90 ARTHRITIS: ICD-10-CM

## 2020-06-11 DIAGNOSIS — R50.9 FEVER, UNSPECIFIED FEVER CAUSE: ICD-10-CM

## 2020-06-11 DIAGNOSIS — R60.1 GENERALIZED EDEMA: ICD-10-CM

## 2020-06-11 DIAGNOSIS — M51.34 DEGENERATION OF INTERVERTEBRAL DISC OF THORACIC REGION WITH OSTEOPHYTE: ICD-10-CM

## 2020-06-11 DIAGNOSIS — N61.1 LEFT BREAST ABSCESS: ICD-10-CM

## 2020-06-11 DIAGNOSIS — J32.0 CHRONIC MAXILLARY SINUSITIS: ICD-10-CM

## 2020-06-11 DIAGNOSIS — E53.8 VITAMIN B12 DEFICIENCY: ICD-10-CM

## 2020-06-11 DIAGNOSIS — E78.2 MIXED HYPERLIPIDEMIA: ICD-10-CM

## 2020-06-11 DIAGNOSIS — J30.1 CHRONIC SEASONAL ALLERGIC RHINITIS DUE TO POLLEN: ICD-10-CM

## 2020-06-11 DIAGNOSIS — K21.9 GASTROESOPHAGEAL REFLUX DISEASE WITHOUT ESOPHAGITIS: ICD-10-CM

## 2020-06-11 PROCEDURE — U0003 INFECTIOUS AGENT DETECTION BY NUCLEIC ACID (DNA OR RNA); SEVERE ACUTE RESPIRATORY SYNDROME CORONAVIRUS 2 (SARS-COV-2) (CORONAVIRUS DISEASE [COVID-19]), AMPLIFIED PROBE TECHNIQUE, MAKING USE OF HIGH THROUGHPUT TECHNOLOGIES AS DESCRIBED BY CMS-2020-01-R: HCPCS | Performed by: NURSE PRACTITIONER

## 2020-06-11 PROCEDURE — G2025 DIS SITE TELE SVCS RHC/FQHC: HCPCS | Performed by: NURSE PRACTITIONER

## 2020-06-11 RX ORDER — AZELASTINE 1 MG/ML
SPRAY, METERED NASAL
Qty: 1 EACH | Refills: 5 | Status: SHIPPED | OUTPATIENT
Start: 2020-06-11 | End: 2021-02-04 | Stop reason: SDUPTHER

## 2020-06-11 RX ORDER — LANOLIN ALCOHOL/MO/W.PET/CERES
1000 CREAM (GRAM) TOPICAL DAILY
Qty: 30 TABLET | Refills: 5 | Status: SHIPPED | OUTPATIENT
Start: 2020-06-11 | End: 2021-02-04 | Stop reason: SDUPTHER

## 2020-06-11 RX ORDER — CELECOXIB 200 MG/1
200 CAPSULE ORAL DAILY
Qty: 60 CAPSULE | Refills: 5 | Status: SHIPPED | OUTPATIENT
Start: 2020-06-11 | End: 2020-10-19

## 2020-06-11 RX ORDER — GLIPIZIDE AND METFORMIN HCL 2.5; 25 MG/1; MG/1
1 TABLET, FILM COATED ORAL 3 TIMES DAILY
Qty: 90 TABLET | Refills: 2 | Status: SHIPPED | OUTPATIENT
Start: 2020-06-11 | End: 2020-07-13 | Stop reason: SDUPTHER

## 2020-06-11 RX ORDER — MONTELUKAST SODIUM 10 MG/1
10 TABLET ORAL NIGHTLY
Qty: 30 TABLET | Refills: 5 | Status: SHIPPED | OUTPATIENT
Start: 2020-06-11 | End: 2021-02-04 | Stop reason: SDUPTHER

## 2020-06-11 RX ORDER — ERGOCALCIFEROL 1.25 MG/1
50000 CAPSULE ORAL
Qty: 4 CAPSULE | Refills: 5 | Status: SHIPPED | OUTPATIENT
Start: 2020-06-11 | End: 2020-08-20

## 2020-06-11 RX ORDER — EZETIMIBE 10 MG/1
10 TABLET ORAL NIGHTLY
Qty: 30 TABLET | Refills: 5 | Status: SHIPPED | OUTPATIENT
Start: 2020-06-11 | End: 2020-08-21 | Stop reason: SDUPTHER

## 2020-06-11 RX ORDER — ENALAPRIL MALEATE 10 MG/1
10 TABLET ORAL DAILY
Qty: 30 TABLET | Refills: 5 | Status: SHIPPED | OUTPATIENT
Start: 2020-06-11 | End: 2020-11-05

## 2020-06-11 RX ORDER — DEXTROMETHORPHAN HYDROBROMIDE AND PROMETHAZINE HYDROCHLORIDE 15; 6.25 MG/5ML; MG/5ML
5 SYRUP ORAL 4 TIMES DAILY PRN
Qty: 180 ML | Refills: 0 | Status: SHIPPED | OUTPATIENT
Start: 2020-06-11 | End: 2020-07-13

## 2020-06-11 RX ORDER — GABAPENTIN 600 MG/1
600 TABLET ORAL 3 TIMES DAILY
Qty: 90 TABLET | Refills: 2 | Status: SHIPPED | OUTPATIENT
Start: 2020-06-11 | End: 2020-09-11

## 2020-06-11 RX ORDER — GUAIFENESIN AND DEXTROMETHORPHAN HYDROBROMIDE 100; 10 MG/5ML; MG/5ML
5-10 SOLUTION ORAL EVERY 6 HOURS PRN
Qty: 60 ML | Refills: 0 | Status: SHIPPED | OUTPATIENT
Start: 2020-06-11 | End: 2020-07-13

## 2020-06-11 RX ORDER — ESOMEPRAZOLE MAGNESIUM 40 MG/1
40 CAPSULE, DELAYED RELEASE ORAL
Qty: 30 CAPSULE | Refills: 5 | Status: SHIPPED | OUTPATIENT
Start: 2020-06-11 | End: 2020-10-26

## 2020-06-11 RX ORDER — CLINDAMYCIN HYDROCHLORIDE 300 MG/1
300 CAPSULE ORAL 3 TIMES DAILY
Qty: 30 CAPSULE | Refills: 0 | Status: SHIPPED | OUTPATIENT
Start: 2020-06-11 | End: 2020-06-21

## 2020-06-11 RX ORDER — BUMETANIDE 2 MG/1
2 TABLET ORAL 2 TIMES DAILY
Qty: 60 TABLET | Refills: 5 | Status: SHIPPED | OUTPATIENT
Start: 2020-06-11 | End: 2020-12-10

## 2020-06-11 RX ORDER — POTASSIUM CHLORIDE 750 MG/1
10 TABLET, FILM COATED, EXTENDED RELEASE ORAL 2 TIMES DAILY
Qty: 60 TABLET | Refills: 5 | Status: SHIPPED | OUTPATIENT
Start: 2020-06-11 | End: 2020-12-14

## 2020-06-11 RX ORDER — DICLOFENAC SODIUM 75 MG/1
TABLET, DELAYED RELEASE ORAL
COMMUNITY
Start: 2020-04-29 | End: 2021-02-04 | Stop reason: SDUPTHER

## 2020-06-11 RX ORDER — MEDROXYPROGESTERONE ACETATE 150 MG/ML
150 INJECTION, SUSPENSION INTRAMUSCULAR
Qty: 1 ML | Refills: 3 | Status: SHIPPED | OUTPATIENT
Start: 2020-06-11 | End: 2021-01-19 | Stop reason: RX

## 2020-06-11 RX ORDER — ROSUVASTATIN CALCIUM 40 MG/1
40 TABLET, COATED ORAL DAILY
Qty: 90 TABLET | Refills: 3 | Status: SHIPPED | OUTPATIENT
Start: 2020-06-11 | End: 2021-02-04 | Stop reason: SDUPTHER

## 2020-06-11 RX ORDER — PREDNISONE 20 MG/1
TABLET ORAL
Qty: 10 TABLET | Refills: 0 | Status: SHIPPED | OUTPATIENT
Start: 2020-06-11 | End: 2020-06-21

## 2020-06-11 RX ORDER — SERTRALINE HYDROCHLORIDE 100 MG/1
TABLET, FILM COATED ORAL DAILY
COMMUNITY
Start: 2020-06-05 | End: 2020-08-20

## 2020-06-11 RX ORDER — LEVOCETIRIZINE DIHYDROCHLORIDE 5 MG/1
5 TABLET, FILM COATED ORAL EVERY EVENING
Qty: 30 TABLET | Refills: 5 | Status: SHIPPED | OUTPATIENT
Start: 2020-06-11 | End: 2020-12-10

## 2020-06-11 RX ORDER — ALBUTEROL SULFATE 90 UG/1
2 AEROSOL, METERED RESPIRATORY (INHALATION) EVERY 4 HOURS PRN
Qty: 18 G | Refills: 5 | Status: SHIPPED | OUTPATIENT
Start: 2020-06-11 | End: 2020-12-14

## 2020-06-11 NOTE — ASSESSMENT & PLAN NOTE
Discussed with patient.  Will consider general surgery follow-up for debridement of area when patient is over current acute illness

## 2020-06-11 NOTE — PROGRESS NOTES
"You have chosen to receive care through a telephone visit today. Do you consent to use a telephone visit for your medical care today? Yes    Establish patient makes contact with office of APRN to discuss health conditions.  Patient is due for routine checkup but due to current pandemic is not recommended to present to the office for face-to-face evaluation.      Chief Complaint   Patient presents with   • Cough       Brief HPI/ROS obtained as follows:    Cough with some increase in baseline in SOA.  She reports \"not like a usual flare\".  She reports she has had intermittent low grade fever for approx 1 week.  She reports she feels like taking a breath is painful and \"I cannot get a good breath\".  Patient has no known specific COVID exposures.  She reports that she and her significant other have been staying home for the most part.  They are making some short trips to the grocery store.  She reports they have been trying to wear the PPE.  Not at goal  MRI follow up for T spine-reports she has noted some increase in pain with a sensation of \"electric shock\" shooting up spine and into her back and head.  She reports this has happened on 2 occasions.  She has seen dr rushing in the past but would like to see someone else for a second opinion.  Her T-spine results are viable today.  Patient reports she also had an L-spine MRI but does results are not available.  Patient reports that she has had generalized increase in her low back pain for the last several months.  As well as the pain in her T-spine.  DM-has been elevated 200-250 since she has been sick.  She reports she is trying to watch how she eats and has been taking her diabetes medication.  She is not having any hypoglycemia.  Ongoing  Breast abscess-right.  Patient reports that she still has an area that is hard under her skin in her right breast.  She has been treated several times for openings of that abscess.  She reports when \"viruses over\" she would like to " see a general surgeon for probable cleanout of that area.  She is concerned that the abscess may have a wall that is not allowing it to fully heal.      The following portions of the patient's history were reviewed and updated as appropriate: CC, ROS, allergies, current medications, past family history, past medical history, past social history, past surgical history and problem list.    Review of Systems   Constitutional: Positive for chills, fatigue and fever. Negative for appetite change and unexpected weight change.   HENT: Negative for congestion, ear pain, nosebleeds, postnasal drip, rhinorrhea, sore throat, trouble swallowing and voice change.    Eyes: Negative for photophobia, pain and visual disturbance.   Respiratory: Positive for cough, chest tightness, shortness of breath and wheezing.    Cardiovascular: Positive for leg swelling (chronic). Negative for chest pain and palpitations.   Gastrointestinal: Positive for abdominal pain and constipation. Negative for blood in stool, diarrhea and nausea.   Endocrine: Negative for cold intolerance and polydipsia.   Genitourinary: Negative for difficulty urinating, flank pain, frequency and hematuria.   Musculoskeletal: Positive for back pain, gait problem, myalgias and neck stiffness. Negative for arthralgias and joint swelling.   Skin: Negative for color change and rash.   Allergic/Immunologic: Negative.    Neurological: Positive for dizziness. Negative for syncope, numbness and headaches.   Hematological: Negative.    Psychiatric/Behavioral: Negative for dysphoric mood, sleep disturbance and suicidal ideas. The patient is not nervous/anxious.    All other systems reviewed and are negative.      Assessment     Physical Exam     Patient alert and oriented.  Able to follow conversation without sounding breathless.  No obvious distress.  Thought processes congruent with conversation.  Answers and ask questions without difficulty.  Patient actively coughing during  discussion.  Some occasional wheezing sound noted    Problem List Items Addressed This Visit        Musculoskeletal and Integument    Degeneration of intervertebral disc of thoracic region with osteophyte    Current Assessment & Plan     Reviewed MRI.  Patient with multiple areas of disc herniation including T3-4, T5-6, T6-7, T7-8, and T9-10         Relevant Medications    diclofenac (VOLTAREN) 75 MG EC tablet       Other    Left breast abscess    Current Assessment & Plan     Discussed with patient.  Will consider general surgery follow-up for debridement of area when patient is over current acute illness           Other Visit Diagnoses     Cough    -  Primary    Relevant Medications    predniSONE (DELTASONE) 20 MG tablet    clindamycin (Cleocin) 300 MG capsule    albuterol (PROVENTIL,VENTOLIN) 2 MG/5ML syrup    promethazine-dextromethorphan (PROMETHAZINE-DM) 6.25-15 MG/5ML syrup    dextromethorphan-guaifenesin (Tussin DM)  MG/5ML syrup    Fever, unspecified fever cause        Relevant Orders    COVID-19,[facility] ROUTINE, NP/OP SWAB IN TRANSPORT MEDIA OR ESWAB 72 HR TAT - Swab, Oropharynx            Patient instructed and advised to call if symptoms are increasing or new symptoms occur.    Understands reasons for urgent and emergent care.  Patient (& family) verbalized agreement for treatment plan.     Generalized precautions advised including social distancing, hand washing, cough/sneeze hygiene.     COVID testing ordered.  Steroids for home.  Understands risk for lower immune response and to avoid others who are sick.   Understands increased risk for sunburn with prolonged exposure or tanning.  Understands may have facial flushing and possible jitteriness.  Take early in the AM.  Instructed to complete all of antibiotics for acute illness.  Increase PO fluids, avoid/limit caffeine.  Do not save any of the meds for later use.  Rest PRN    Patient to report any change in respiratory status.  Discussed reasons  for emergent care.    We will plan for 1 month follow-up in the office as patient is due for routine labs.      This visit has been rescheduled as a phone visit to comply with patient safety concerns in accordance with CDC recommendations. Total time of discussion was 15 minutes.        This document has been electronically signed by:  JOAQUÍN Ross FNP-C Dragon disclaimer:  Much of this encounter note is an electronic transcription/translation of spoken language to printed text. The electronic translation of spoken language may permit erroneous, or at times, nonsensical words or phrases to be inadvertently transcribed; Although I have reviewed the note for such errors, some may still exist.

## 2020-06-13 LAB — SARS-COV-2 RNA RESP QL NAA+PROBE: NOT DETECTED

## 2020-06-24 ENCOUNTER — TELEPHONE (OUTPATIENT)
Dept: SURGERY | Facility: CLINIC | Age: 50
End: 2020-06-24

## 2020-06-24 DIAGNOSIS — E04.1 LEFT THYROID NODULE: Primary | ICD-10-CM

## 2020-06-24 RX ORDER — LEVOTHYROXINE SODIUM 0.1 MG/1
100 TABLET ORAL DAILY
Qty: 30 TABLET | Refills: 3 | Status: SHIPPED | OUTPATIENT
Start: 2020-06-24 | End: 2022-11-29

## 2020-06-24 NOTE — TELEPHONE ENCOUNTER
Patient called for refill on her thyroid RX. A refill was sen to her new pharmacy [facility] in [place].     levothyroxine (Synthroid) 100 MCG tablet [[id number]]     Order Details   Dose: 100 mcg Route: Oral Frequency: Daily   Dispense Quantity: 30 tablet Refills: 3 Fills remaining: --           Sig: Take 1 tablet by mouth Daily for 120 days.        Patient has a follow up scheduled with Dr. Rodriguez

## 2020-07-13 ENCOUNTER — OFFICE VISIT (OUTPATIENT)
Dept: FAMILY MEDICINE CLINIC | Facility: CLINIC | Age: 50
End: 2020-07-13

## 2020-07-13 VITALS
OXYGEN SATURATION: 96 % | BODY MASS INDEX: 38.64 KG/M2 | TEMPERATURE: 97.8 F | SYSTOLIC BLOOD PRESSURE: 136 MMHG | HEART RATE: 93 BPM | HEIGHT: 71 IN | WEIGHT: 276 LBS | DIASTOLIC BLOOD PRESSURE: 82 MMHG

## 2020-07-13 DIAGNOSIS — K59.03 CONSTIPATION DUE TO OPIOID THERAPY: ICD-10-CM

## 2020-07-13 DIAGNOSIS — J32.0 CHRONIC MAXILLARY SINUSITIS: Primary | ICD-10-CM

## 2020-07-13 DIAGNOSIS — T40.2X5A CONSTIPATION DUE TO OPIOID THERAPY: ICD-10-CM

## 2020-07-13 DIAGNOSIS — IMO0002 DM (DIABETES MELLITUS) TYPE II UNCONTROLLED, PERIPH VASCULAR DISORDER: ICD-10-CM

## 2020-07-13 PROCEDURE — 99214 OFFICE O/P EST MOD 30 MIN: CPT | Performed by: NURSE PRACTITIONER

## 2020-07-13 RX ORDER — GLIPIZIDE AND METFORMIN HCL 2.5; 25 MG/1; MG/1
2 TABLET, FILM COATED ORAL
Qty: 120 TABLET | Refills: 2 | Status: SHIPPED | OUTPATIENT
Start: 2020-07-13 | End: 2020-10-19 | Stop reason: SDUPTHER

## 2020-07-13 RX ORDER — SULFAMETHOXAZOLE AND TRIMETHOPRIM 800; 160 MG/1; MG/1
1 TABLET ORAL EVERY 12 HOURS
Qty: 20 TABLET | Refills: 0 | Status: SHIPPED | OUTPATIENT
Start: 2020-07-13 | End: 2020-07-23

## 2020-07-13 RX ORDER — LUBIPROSTONE 24 UG/1
24 CAPSULE ORAL 2 TIMES DAILY WITH MEALS
Qty: 60 CAPSULE | Refills: 2 | COMMUNITY
Start: 2020-07-13 | End: 2020-10-19

## 2020-07-13 NOTE — PROGRESS NOTES
"Subjective   Soheila Brewster is a 50 y.o. female.     Chief Complaint   Patient presents with   • Hypertension   • Diabetes       History of Present Illness     MRI follow up for [facility]-her pain management provider is planning to send her for a neurosurgery evaluation.  She reports she has had a increase in Oxycodone 5 mg to QID from TID.  She reports that she was over there on June 24th.  She reports she had noted an increase in pain and \"in different spots\".  She has not had any recent falls or injuries.  She reports leg numbness and \"my legs are like jello at times\".  She reports \"unstable\".    HTN-she has not noted any elevations at home.   She reports she has taken meds this morning.  She reports she is not having any signs or symptoms of hypertension.  No associated symptoms such as CP, SOA, HA, or dizziness.  Breast Abscess-has resolved. She reports none since her last appt. she request to wait on surgery referral today.  Stable  Blood sugars-reports she continues to be high 240-250.  She is taking Trulicity 1.5 mg weekly, soliqua 60 units daily and Metaglip 1 tablet TID.  She is not actively exercising as she feels she is not physically able.  She does try to watch her diet.  Right leg concern-has noted increase in reddish discoloration on the lower 2/3 of calf.  Blanches to touch.  Some increased swelling at times.  She reports leg is uncomfortable at times.  She questions if the worsening symptoms could be coming from her increased back problem.  Sinus complaint-reports thick white secretions.  PND is present.  Some tinnitus and mild otalgia.  She reports \"not bad\".  Sneezing and watery eyes.  She is taking xyzal at [facility] and singulair 10 mg.  She has had chronic sinusitis for > 1 year.       The following portions of the patient's history were reviewed and updated as appropriate: CC, ROS, allergies, current medications, past family history, past medical history, past social history, past surgical " "history and problem list.      Review of Systems   Constitutional: Positive for chills, fatigue and fever. Negative for appetite change and unexpected weight change.   HENT: Negative for congestion, ear pain, nosebleeds, postnasal drip, rhinorrhea, sore throat, trouble swallowing and voice change.    Eyes: Negative for photophobia, pain and visual disturbance.   Respiratory: Positive for shortness of breath and wheezing. Negative for cough and chest tightness.    Cardiovascular: Positive for leg swelling (chronic). Negative for chest pain and palpitations.   Gastrointestinal: Positive for abdominal pain and constipation. Negative for blood in stool, diarrhea and nausea.   Endocrine: Negative for cold intolerance and polydipsia.   Genitourinary: Negative for difficulty urinating, flank pain, frequency and hematuria.   Musculoskeletal: Positive for back pain, gait problem, myalgias and neck stiffness. Negative for arthralgias and joint swelling.   Skin: Negative for color change and rash.   Allergic/Immunologic: Negative.    Neurological: Positive for dizziness. Negative for syncope, numbness and headaches.   Hematological: Negative.    Psychiatric/Behavioral: Negative for dysphoric mood, sleep disturbance and suicidal ideas. The patient is not nervous/anxious.    All other systems reviewed and are negative.      Objective     /82   Pulse 93   Temp 97.8 °F (36.6 °C) (Temporal)   Ht 180.3 cm (71\")   Wt 125 kg (276 lb)   SpO2 96%   BMI 38.49 kg/m²     Physical Exam   Constitutional: She is oriented to person, place, and time. She appears well-developed and well-nourished. No distress.   HENT:   Head: Normocephalic and atraumatic.   Right Ear: Hearing, tympanic membrane, external ear and ear canal normal.   Left Ear: Hearing, tympanic membrane, external ear and ear canal normal.   Oropharynx not examined.  Patient is presently wearing a face covering/mask due to COVID-19 pandemic.   Eyes: Pupils are equal, " round, and reactive to light. Conjunctivae, EOM and lids are normal. No scleral icterus. Right eye exhibits normal extraocular motion and no nystagmus. Left eye exhibits normal extraocular motion and no nystagmus.   Neck: Neck supple. No JVD present. Spinous process tenderness and muscular tenderness present. No tracheal tenderness present. Carotid bruit is not present. Decreased range of motion present. No thyromegaly present.   Cardiovascular: Normal rate, regular rhythm, S1 normal, S2 normal, normal heart sounds and intact distal pulses.   No murmur heard.  Pulmonary/Chest: Effort normal and breath sounds normal. She exhibits no tenderness.   Abdominal: Soft. Bowel sounds are normal. She exhibits no mass. There is no hepatosplenomegaly. There is no tenderness.   Musculoskeletal: She exhibits tenderness.        Right knee: Tenderness found. Medial joint line and lateral joint line tenderness noted.        Left knee: Tenderness found. Medial joint line and lateral joint line tenderness noted.        Thoracic back: She exhibits decreased range of motion, tenderness, pain and spasm.        Lumbar back: She exhibits decreased range of motion, tenderness, pain and spasm.        Right lower leg: She exhibits tenderness (Generalized) and edema (1-2+ nonpitting).   Gait antalgic.   equal bilaterally. No muscular atrophy or flaccidity.   Lymphadenopathy:     She has cervical adenopathy.        Right cervical: Superficial cervical adenopathy present.        Left cervical: Superficial cervical adenopathy present.   Neurological: She is alert and oriented to person, place, and time. She has normal reflexes. She displays no atrophy and no tremor. No cranial nerve deficit or sensory deficit. She exhibits normal muscle tone. Coordination normal.   Skin: Skin is warm and dry. Capillary refill takes less than 2 seconds. She is not diaphoretic. No cyanosis. No pallor. Nails show no clubbing.   Psychiatric: She has a normal  mood and affect. Her speech is normal and behavior is normal. Judgment and thought content normal. She is not actively hallucinating. Cognition and memory are normal. She is attentive.   Vitals reviewed.      Assessment/Plan     Problem List Items Addressed This Visit        Cardiovascular and Mediastinum    DM (diabetes mellitus) type II uncontrolled, periph vascular disorder (CMS/HCC)    Current Assessment & Plan     Increase in meds today         Relevant Medications    glipiZIDE-metFORMIN (METAGLIP) 2.5-250 MG per tablet      Other Visit Diagnoses     Chronic maxillary sinusitis    -  Primary    Relevant Medications    sulfamethoxazole-trimethoprim (BACTRIM DS,SEPTRA DS) 800-160 MG per tablet    Constipation due to opioid therapy        Relevant Medications    lubiprostone (Amitiza) 24 MCG capsule          Patient's Body mass index is 38.49 kg/m². BMI is above normal parameters. Recommendations include: nutrition counseling.       Understands disease processes and need for medications.  Understands reasons for urgent and emergent care.  Patient (& family) verbalized agreement for treatment plan.   Emotional support and active listening provided.  Patient provided time to verbalize feelings.    [facility] #[id number] reviewed today and consistent.  Will refill prescribed controlled medication today.  Patient is aware they cannot receive narcotics from any other provider except if under care of pain management or speciality clinic.  Risk and benefits of medication use has been reviewed.  History and physical exam exhibit continued safe and appropriate use of controlled substances.  The patient is aware of the potential for addiction and dependence.  This patient has been made aware of the appropriate use of such medications, including potential risk of somnolence, limited ability to drive and / or work safely, and potential for overdose.    It has also been made clear that these medications are for use by this patient  only, without concomitant use of alcohol or other substances unless prescribed/advised by medical provider.  Patient understands they may be subject to UDS and pill counts at random.      Patient considered to be moderate risk for addiction due to use of multiple controlled medications.  Patient understands and accepts these risks.  Patient need for medication will be reassessed at each visit.  Doses will be adjusted according to patient need and findings.    Goal of TX: Patient will not have any adverse reactions of medication.  Patient will have reduction in neuropathic symptoms of pain with use of gabapentin as directed.  Patient will not have any drug drug interactions.    Continue under the care of pain management provider.     Reviewed thoracic spine MRI with patient multiple areas of disc bulging noted.      Increase in Metaglip to 2 tabs BID.  Encouraged low CHO high-protein diet    RTC 3 months, sooner if needed.           This document has been electronically signed by:  JOAQUÍN Ross, FNP-C    Dragon disclaimer:  Much of this encounter note is an electronic transcription/translation of spoken language to printed text. The electronic translation of spoken language may permit erroneous, or at times, nonsensical words or phrases to be inadvertently transcribed; Although I have reviewed the note for such errors, some may still exist.

## 2020-07-16 ENCOUNTER — OFFICE VISIT (OUTPATIENT)
Dept: SURGERY | Facility: CLINIC | Age: 50
End: 2020-07-16

## 2020-07-16 VITALS — WEIGHT: 276 LBS | BODY MASS INDEX: 38.64 KG/M2 | HEIGHT: 71 IN

## 2020-07-16 DIAGNOSIS — E04.1 LEFT THYROID NODULE: ICD-10-CM

## 2020-07-16 DIAGNOSIS — E66.01 CLASS 2 SEVERE OBESITY DUE TO EXCESS CALORIES WITH SERIOUS COMORBIDITY AND BODY MASS INDEX (BMI) OF 37.0 TO 37.9 IN ADULT (HCC): Primary | ICD-10-CM

## 2020-07-16 PROCEDURE — 99212 OFFICE O/P EST SF 10 MIN: CPT | Performed by: SURGERY

## 2020-07-16 RX ORDER — METHOCARBAMOL 500 MG/1
TABLET, FILM COATED ORAL
COMMUNITY
Start: 2020-07-13 | End: 2021-02-04 | Stop reason: SDUPTHER

## 2020-07-16 NOTE — PROGRESS NOTES
Subjective   Soheila Brewster is a 50 y.o. female.     Chief Complaint: left thyroid nodule    History of Present Illness She had a FNA of the left thyroid nodule and it is benign. She is too large to palpate anything in the neck.     The following portions of the patient's history were reviewed and updated as appropriate: current medications, past family history, past medical history, past social history, past surgical history and problem list.    Review of Systems    Objective   Physical Exam no bruising or palpable neck mass    Assessment/Plan   Soheila was seen today for follow-up.    Diagnoses and all orders for this visit:    Class 2 severe obesity due to excess calories with serious comorbidity and body mass index (BMI) of 37.0 to 37.9 in adult (CMS/HCC)    Left thyroid nodule      Recommend repeat US in 1 year to recheck the size of the thyroid nodule.

## 2020-08-20 ENCOUNTER — OFFICE VISIT (OUTPATIENT)
Dept: NEUROSURGERY | Facility: CLINIC | Age: 50
End: 2020-08-20

## 2020-08-20 VITALS
HEIGHT: 71 IN | HEART RATE: 78 BPM | DIASTOLIC BLOOD PRESSURE: 74 MMHG | BODY MASS INDEX: 38.84 KG/M2 | TEMPERATURE: 97.3 F | WEIGHT: 277.4 LBS | RESPIRATION RATE: 18 BRPM | SYSTOLIC BLOOD PRESSURE: 133 MMHG

## 2020-08-20 DIAGNOSIS — J32.9 CHRONIC SINUSITIS, UNSPECIFIED LOCATION: ICD-10-CM

## 2020-08-20 DIAGNOSIS — E66.9 OBESITY (BMI 35.0-39.9 WITHOUT COMORBIDITY): ICD-10-CM

## 2020-08-20 DIAGNOSIS — R51.9 NONINTRACTABLE HEADACHE, UNSPECIFIED CHRONICITY PATTERN, UNSPECIFIED HEADACHE TYPE: ICD-10-CM

## 2020-08-20 DIAGNOSIS — M53.9 MULTILEVEL DEGENERATIVE DISC DISEASE: Primary | ICD-10-CM

## 2020-08-20 PROCEDURE — 99213 OFFICE O/P EST LOW 20 MIN: CPT | Performed by: NEUROLOGICAL SURGERY

## 2020-08-20 RX ORDER — DULOXETIN HYDROCHLORIDE 30 MG/1
30 CAPSULE, DELAYED RELEASE ORAL DAILY
COMMUNITY
End: 2021-04-22

## 2020-08-20 NOTE — PATIENT INSTRUCTIONS
After seeing Dr. Ribera and after CT scan:     Call Dr. Wall on a Monday or Tuesday (ask for Deysi or Giovanna) and leave a message.     Dr. Wall will call you back at the end of the day as soon as he can.     [phone] Deysi    [phone] Giovanna Block

## 2020-08-20 NOTE — PROGRESS NOTES
Soheila Brewster  1970  [id number]                        CHIEF COMPLAINT: Cervical and low back pain         MEDICAL HISTORY SINCE LAST ENCOUNTER: This 50-year-old female is well-known to my service.  She has a history of chronic cervical and low back pain.  Diagnostic studies in the past have shown the presence of degenerative osteoarthritic changes.  She has had mild stenosis however surgery has not been a consideration.  Her symptoms have worsened and she referred once again for reevaluation.  She has pain in the cervical area which radiates into the lumbar region.  She has numbness in both lower extremities more so on the right than the left.  The numbness is located in the lateral aspect and anterior aspect of her thigh.  She has no bowel bladder dysfunction.    She is currently under pain management.  She has been referred to the rheumatologist however they cannot use medications because of her chronic sinus infection.           Past Medical History:   Diagnosis Date   • Allergic rhinitis    • Arthritis    • Asthma    • Back pain    • Diabetes mellitus (CMS/HCC)    • Disease of thyroid gland    • Elevated cholesterol    • GERD (gastroesophageal reflux disease)    • History of transfusion    • Hyperlipidemia    • Hypertension    • Liver disease     fatty   • Sleep apnea    • Umbilical hernia               Past Surgical History:   Procedure Laterality Date   • BREAST SURGERY      bilat abcess   • CHOLECYSTECTOMY     • DILATATION AND CURETTAGE     • THYROID BIOPSY     • THYROIDECTOMY Right 4/1/2019    Procedure: RIGHT THYROIDECTOMY LOBECTOMY;  Surgeon: Anam Rodriguez MD;  Location: [facility];  Service: General   • US GUIDED FINE NEEDLE ASPIRATION  1/16/2020              Family History   Problem Relation Age of Onset   • Arthritis Mother    • Asthma Mother    • Cancer Mother    • COPD Mother    • Diabetes Mother    • Heart disease Mother    • Hypertension Mother    • Hyperlipidemia Mother    • Kidney disease  Mother    • Arthritis Father    • COPD Father    • Diabetes Father    • Hypertension Father    • Hyperlipidemia Father    • Arthritis Sister    • COPD Sister    • Diabetes Sister               Social History     Socioeconomic History   • Marital status:      Spouse name: Not on file   • Number of children: 0   • Years of education: Not on file   • Highest education level: GED or equivalent   Occupational History   • Occupation: Disability   Social Needs   • Financial resource strain: Somewhat hard   • Food insecurity:     Worry: Sometimes true     Inability: Sometimes true   • Transportation needs:     Medical: No     Non-medical: No   Tobacco Use   • Smoking status: Current Every Day Smoker     Packs/day: 1.50     Years: 10.00     Pack years: 15.00     Types: Cigarettes   • Smokeless tobacco: Never Used   Substance and Sexual Activity   • Alcohol use: No   • Drug use: No   • Sexual activity: Defer   Lifestyle   • Physical activity:     Days per week: 0 days     Minutes per session: 0 min   • Stress: To some extent   Relationships   • Social connections:     Talks on phone: More than three times a week     Gets together: Twice a week     Attends Spiritism service: Never     Active member of club or organization: No     Attends meetings of clubs or organizations: Never     Relationship status:               Allergies   Allergen Reactions   • Morphine And Related Shortness Of Breath     Throat edema   • Penicillins      Also anything in the penicillin family                Current Outpatient Medications:   •  albuterol sulfate HFA (Ventolin HFA) 108 (90 Base) MCG/ACT inhaler, Inhale 2 puffs Every 4 (Four) Hours As Needed for Wheezing., Disp: 18 g, Rfl: 5  •  azelastine (ASTELIN) 0.1 % nasal spray, 2 sprays both nostrils twice daily as needed, Disp: 1 each, Rfl: 5  •  bumetanide (BUMEX) 2 MG tablet, Take 1 tablet by mouth 2 (Two) Times a Day., Disp: 60 tablet, Rfl: 5  •  celecoxib (CeleBREX) 200 MG  capsule, Take 1 capsule by mouth Daily., Disp: 60 capsule, Rfl: 5  •  diclofenac (VOLTAREN) 75 MG EC tablet, , Disp: , Rfl:   •  Dulaglutide (Trulicity) 1.5 MG/0.5ML solution pen-injector, Inject 1.5 mg under the skin into the appropriate area as directed 1 (One) Time Per Week., Disp: 4 pen, Rfl: 5  •  DULoxetine (CYMBALTA) 30 MG capsule, Take 30 mg by mouth Daily., Disp: , Rfl:   •  enalapril (VASOTEC) 10 MG tablet, Take 1 tablet by mouth Daily., Disp: 30 tablet, Rfl: 5  •  esomeprazole (nexIUM) 40 MG capsule, Take 1 capsule by mouth Every Morning Before Breakfast., Disp: 30 capsule, Rfl: 5  •  ezetimibe (ZETIA) 10 MG tablet, Take 1 tablet by mouth Every Night., Disp: 30 tablet, Rfl: 5  •  Fluticasone-Umeclidin-Vilant (TRELEGY ELLIPTA) 100-62.5-25 MCG/INH aerosol powder , Inhale 1 each Daily., Disp: 28 each, Rfl: 0  •  gabapentin (NEURONTIN) 600 MG tablet, Take 1 tablet by mouth 3 (Three) Times a Day., Disp: 90 tablet, Rfl: 2  •  glipiZIDE-metFORMIN (METAGLIP) 2.5-250 MG per tablet, Take 2 tablets by mouth 2 (Two) Times a Day Before Meals., Disp: 120 tablet, Rfl: 2  •  Insulin Glargine-Lixisenatide (Soliqua) 100-33 UNT-MCG/ML solution pen-injector injection, Inject 60 Units under the skin into the appropriate area as directed Daily., Disp: 15 mL, Rfl: 5  •  levocetirizine (XYZAL) 5 MG tablet, Take 1 tablet by mouth Every Evening., Disp: 30 tablet, Rfl: 5  •  levothyroxine (Synthroid) 100 MCG tablet, Take 1 tablet by mouth Daily for 120 days., Disp: 30 tablet, Rfl: 3  •  medroxyPROGESTERone (Depo-Provera) 150 MG/ML injection, Inject 1 mL into the appropriate muscle as directed by prescriber Every 3 (Three) Months., Disp: 1 mL, Rfl: 3  •  methocarbamol (ROBAXIN) 500 MG tablet, TK 2 TABLETS PO Q 8 HOURS, Disp: , Rfl:   •  montelukast (SINGULAIR) 10 MG tablet, Take 1 tablet by mouth Every Night., Disp: 30 tablet, Rfl: 5  •  oxyCODONE-acetaminophen (PERCOCET) 5-325 MG per tablet, Take 4 tablets by mouth Every 8 (Eight)  "Hours., Disp: , Rfl:   •  potassium chloride (K-DUR) 10 MEQ CR tablet, Take 1 tablet by mouth 2 (Two) Times a Day., Disp: 60 tablet, Rfl: 5  •  rosuvastatin (CRESTOR) 40 MG tablet, Take 1 tablet by mouth Daily., Disp: 90 tablet, Rfl: 3  •  Syringe/Needle, Disp, (BD ECLIPSE SYRINGE) 25G X 1\" 3 ML misc, 1 each Every 3 (Three) Months., Disp: 1 each, Rfl: 3  •  vitamin B-12 (CYANOCOBALAMIN) 1000 MCG tablet, Take 1 tablet by mouth Daily., Disp: 30 tablet, Rfl: 5  •  lubiprostone (Amitiza) 24 MCG capsule, Take 1 capsule by mouth 2 (Two) Times a Day With Meals., Disp: 60 capsule, Rfl: 2         Review of Systems   Constitutional: Positive for activity change. Negative for appetite change, chills, diaphoresis, fatigue, fever and unexpected weight change.   HENT: Positive for sinus pressure and sneezing. Negative for congestion, dental problem, drooling, ear discharge, ear pain, facial swelling, hearing loss, mouth sores, nosebleeds, postnasal drip, rhinorrhea, sore throat, tinnitus, trouble swallowing and voice change.    Eyes: Positive for discharge and itching. Negative for photophobia, pain, redness and visual disturbance.   Respiratory: Positive for apnea, cough, shortness of breath and wheezing. Negative for choking, chest tightness and stridor.    Cardiovascular: Positive for leg swelling. Negative for chest pain and palpitations.   Gastrointestinal: Positive for constipation. Negative for abdominal distention, abdominal pain, anal bleeding, blood in stool, diarrhea, nausea, rectal pain and vomiting.   Endocrine: Positive for polydipsia. Negative for cold intolerance, heat intolerance, polyphagia and polyuria.   Genitourinary: Negative.  Negative for decreased urine volume, difficulty urinating, dysuria, enuresis, flank pain, frequency, genital sores, hematuria and urgency.   Musculoskeletal: Positive for arthralgias, back pain, joint swelling, myalgias, neck pain and neck stiffness. Negative for gait problem.   Skin: " "Negative.  Negative for color change, pallor, rash and wound.   Allergic/Immunologic: Positive for environmental allergies. Negative for food allergies and immunocompromised state.   Neurological: Positive for dizziness, light-headedness, numbness and headaches. Negative for tremors, seizures, syncope, facial asymmetry, speech difficulty and weakness.   Hematological: Negative.  Negative for adenopathy. Does not bruise/bleed easily.   Psychiatric/Behavioral: Negative.  Negative for agitation, behavioral problems, confusion, decreased concentration, dysphoric mood, hallucinations, self-injury, sleep disturbance and suicidal ideas. The patient is not nervous/anxious and is not hyperactive.    All other systems reviewed and are negative.              Vitals:    08/20/20 1405   BP: 133/74   BP Location: Right arm   Patient Position: Sitting   Cuff Size: Adult   Pulse: 78   Resp: 18   Temp: 97.3 °F (36.3 °C)   TempSrc: Temporal   Weight: 126 kg (277 lb 6.4 oz)   Height: 180.3 cm (71\")               EXAMINATION: BMP 38.7; weight 2 7 7 pounds.  She has limited range of motion of the lumbar spine.  Straight leg raising, Lasègue and flip test are negative.  She has no weakness although she does have spotty sensory loss both in the upper lower extremities.  She is generally hyporeflexic.            MEDICAL DECISION MAKING: Review of her diagnostic studies show the presence of degenerative osteoarthritis throughout.  At L3-L4 she has some mild spinal stenosis and suggestion of spondylolisthesis although there are no Modic changes.  She has facet hypertrophy.  At L4-5 and L5-S1 she has disc degeneration as well.  At L5-S1 he tends to deviate more so to the the left than the right.           ASSESSMENT/DISPOSITION: Although she has significant disease in the lumbar spine I do not think she is a candidate for successful surgical intervention.  If 1 were to treat the issues she would require pedicle screw fixation for adequate " decompression.  In my opinion that is not in her best interest at this time.  I think it is imperative that she shed weight.  I have recommended bariatric surgery is medically justifiable indicated.  If she can get her weight off her diabetes would improve and the likelihood of progression of the degenerative changes in her lumbar spine leading to surgery would be markedly lessened.    Ordered a CT of the head and she will call me subsequent.  If her sinuses are now clear I would recommend that she be evaluated by rheumatology.  All the symptoms that she has are primarily arthritic.  Continue to follow and thank you let me see her              I APPRECIATE THE OPPORTUNITY OF THIS REFERRAL. PLEASE CALL IF ANY       QUESTIONS [phone]      Scribed for Chirag Wall MD by Naheed Robison CMA. 8/20/2020 14:31

## 2020-08-21 DIAGNOSIS — E78.2 MIXED HYPERLIPIDEMIA: ICD-10-CM

## 2020-08-21 RX ORDER — EZETIMIBE 10 MG/1
TABLET ORAL
Qty: 30 TABLET | Refills: 5 | Status: SHIPPED | OUTPATIENT
Start: 2020-08-21 | End: 2021-02-04 | Stop reason: SDUPTHER

## 2020-08-24 RX ORDER — POTASSIUM CHLORIDE 750 MG/1
TABLET, EXTENDED RELEASE ORAL
Qty: 60 TABLET | Refills: 3 | Status: SHIPPED | OUTPATIENT
Start: 2020-08-24 | End: 2021-01-11

## 2020-08-27 ENCOUNTER — HOSPITAL ENCOUNTER (OUTPATIENT)
Dept: CT IMAGING | Facility: HOSPITAL | Age: 50
Discharge: HOME | End: 2020-08-27
Admitting: NEUROLOGICAL SURGERY

## 2020-08-27 DIAGNOSIS — J32.9 CHRONIC SINUSITIS, UNSPECIFIED LOCATION: ICD-10-CM

## 2020-08-27 DIAGNOSIS — R51.9 NONINTRACTABLE HEADACHE, UNSPECIFIED CHRONICITY PATTERN, UNSPECIFIED HEADACHE TYPE: ICD-10-CM

## 2020-08-27 PROCEDURE — 70450 CT HEAD/BRAIN W/O DYE: CPT

## 2020-08-27 PROCEDURE — 70450 CT HEAD/BRAIN W/O DYE: CPT | Performed by: RADIOLOGY

## 2020-09-02 ENCOUNTER — TELEPHONE (OUTPATIENT)
Dept: NEUROSURGERY | Facility: CLINIC | Age: 50
End: 2020-09-02

## 2020-09-02 DIAGNOSIS — G89.29 CHRONIC MIDLINE THORACIC BACK PAIN: Primary | ICD-10-CM

## 2020-09-02 DIAGNOSIS — M25.78 DEGENERATION OF INTERVERTEBRAL DISC OF THORACIC REGION WITH OSTEOPHYTE: ICD-10-CM

## 2020-09-02 DIAGNOSIS — M51.34 DEGENERATION OF INTERVERTEBRAL DISC OF THORACIC REGION WITH OSTEOPHYTE: ICD-10-CM

## 2020-09-02 DIAGNOSIS — M53.9 MULTILEVEL DEGENERATIVE DISC DISEASE: ICD-10-CM

## 2020-09-02 DIAGNOSIS — M54.6 CHRONIC MIDLINE THORACIC BACK PAIN: Primary | ICD-10-CM

## 2020-09-02 DIAGNOSIS — M19.90 ARTHRITIS: ICD-10-CM

## 2020-09-11 DIAGNOSIS — M19.90 ARTHRITIS: ICD-10-CM

## 2020-09-11 DIAGNOSIS — IMO0002 DM (DIABETES MELLITUS) TYPE II UNCONTROLLED, PERIPH VASCULAR DISORDER: ICD-10-CM

## 2020-09-11 RX ORDER — GABAPENTIN 600 MG/1
TABLET ORAL
Qty: 90 TABLET | Refills: 2 | Status: SHIPPED | OUTPATIENT
Start: 2020-09-11 | End: 2020-10-19 | Stop reason: SDUPTHER

## 2020-10-07 NOTE — ASSESSMENT & PLAN NOTE
DERMATOLOGY PROGRESS NOTE      10/7/2020  Fortino Chacon  1950  MRN: [id number]    HPI:  Fortino Chacon is a 69 year old male established patient who presents for FBSE without any cutaneous concerns.  Patient denies new lesions or lesions that itch, bleed or have changed.           Patient feels otherwise well with no additional cutaneous concerns today.          PAST DERMATOLOGIC HISTORY:   9- SCC in situ  Right posterior upper arm s/p ED & C    FAMILY HISTORY:   History of skin cancer--Negative    SOCIAL HISTORY:  Sun burns and Use of sun screen    REVIEW OF SYSTEMS:    Patient denies nausea, vomiting, fever, or chills.    ALLERGIES:  No Known Allergies    Current Outpatient Medications   Medication Sig Dispense Refill   • atorvastatin (LIPITOR) 80 MG tablet Take 1 tablet by mouth daily. 90 tablet 1   • lisinopril (ZESTRIL) 10 MG tablet Take 1 tablet by mouth daily. 90 tablet 1   • aspirin  MG EC tablet Take 325 mg by mouth daily.     • MULTIPLE VITAMIN PO Take  by mouth.       No current facility-administered medications for this visit.        Past Medical History:   Diagnosis Date   • Acne rosacea 09/2017   • Hernia, inguinal 01/01/2011   • History of BPH    • HTN (hypertension)    • Hyperlipidemia    • Squamous cell carcinoma 10/18/2017    right posterior upper arm-    • Tubular adenoma of colon 02/21/2012         EXAM:   Visit Vitals  Resp 14   Ht 6' (1.829 m)   Wt 80.7 kg   BMI 24.14 kg/m²       The patient is a well developed Treadwell type 3 male and is alert and oriented x 3, normal mood and affect and is in no acute distress.  Examination of the scalp/body hair, face, neck, ears, eyelids/conjunctiva, lips/oral mucosa, chest, back, abdomen, right upper extremity, left upper extremity, right lower extremity, left lower extremity, digits/nails and genitals/buttocks was performed (Physical exam findings noted in A/P)      IMPRESSION / PLAN:     CHERRY ANGIOMATA (Multiple red 2-4mm  Reviewed MRI.  Patient with multiple areas of disc herniation including T3-4, T5-6, T6-7, T7-8, and T9-10   papules scattered on trunk)  -nature of the disorder discussed in detail  -clinically benign today on exam, reassurance was given  -continue to monitor for any changes  -call if any changes occur    SEBORRHEIC KERATOSES (Scattered brown-grey, waxy, hyperkeratotic papules and plaques on the chest and arms)  -nature of the disorder discussed in detail  -clinically benign today on exam, reassurance was given  -continue to monitor for any changes  -call if any changes occur  -Patient is going to schedule cosmetic procedure to have Sameer K removed     SUN DISTRIBUTED LENTIGINES (Scattered tan macules on trunk,legs and arms)  - Benign, reassurance.  - Recommend daily physical sunblock SPF30+ with reapplication per 's instructions and photoprotection w/UV-protective clothing and consideration for heliocare polypodium PO; sun protection handout provided    Milia (smooth minute well-circumscribed whitish papules on the nape of the neck)  - Benign nature of condition as retained keratin cysts under the skin reviewed.  Retinoids can be helpful and rarely the condition can resolve spontaneously, but often requires surgical extraction which is not covered by insurance.  - treatment options discussed including extraction followed by topical retinoids QHS to prevent formation of new lesions    Personal history of skin cancer  - Patient with a history of squamous cell carcinoma  - Last cancer  located on the right posterior upper arm s/p ED&C, done 09/2017, No evidence of recurrence to surgical site showing smooth scar.   - FBSE (full body skin examination) done 10/7/2020, next due 1 year  - Sun-protective behavior discussed including the use of sunscreen SPF 30+ daily and reapplying at least every 2 hours.  Also discussed sun protective clothing including long sleeves and wide brimmed hats when anticipating sun exposure.  Avoid peak sun hours and seek shade from 10 a.m. to 4 p.m. when possible    Neoplasm of uncertain  behavior -Recommend shave biopsy  Location:  A. right  occiput (silver scaly patch with hemmoragic crust)  Measurement: 1.0 x 0.8 cm  Rule out: lsc vs r/o malignancy  2.5 cm superior from top of helix right ear        PROCEDURE NOTE - SHAVE BIOPSY   Correct patient, procedure, site verified.  Verbal consent obtained.  Risks and benefits of the procedure were discussed including pain, bleeding, infection, scarring, nerve damage.  The site(s) was cleansed with alcohol and anesthetized with 1% lidocaine with epinephrine.  The lesion was removed using a Dermablade.  Hemostasis was achieved using aluminum chloride.  Estimated blood loss less than 0.3 mL. The site was covered with petrolatum ointment and a bandage.  Specimen was labeled and sent to pathology.  Patient instructed to keep the area clean and dry for 24 hours and wound care instructions were given.  Patient tolerated the procedure well without any complications.          Return in about 1 year (around 10/7/2021) for 1 year FBSE hx of SCC.    Call sooner if any problems or concerns.    Thank you  Taran Ann MD  for allowing us to participate in the care of your patient.      On 10/7/2020, Karen LEWIS scribed the services personally performed by RICO Vazquez, Sonal Nelson NP, attest that I performed all of the work during this encounter and that the scribe only recorded my findings.

## 2020-10-13 ENCOUNTER — OFFICE VISIT (OUTPATIENT)
Dept: FAMILY MEDICINE CLINIC | Facility: CLINIC | Age: 50
End: 2020-10-13

## 2020-10-13 VITALS
BODY MASS INDEX: 38.75 KG/M2 | WEIGHT: 276.8 LBS | HEART RATE: 110 BPM | OXYGEN SATURATION: 97 % | HEIGHT: 71 IN | SYSTOLIC BLOOD PRESSURE: 110 MMHG | DIASTOLIC BLOOD PRESSURE: 68 MMHG | TEMPERATURE: 97.1 F

## 2020-10-13 DIAGNOSIS — Z53.20 REFUSAL OF TREATMENT: Primary | ICD-10-CM

## 2020-10-13 DIAGNOSIS — Z79.899 LONG-TERM USE OF HIGH-RISK MEDICATION: ICD-10-CM

## 2020-10-19 ENCOUNTER — OFFICE VISIT (OUTPATIENT)
Dept: FAMILY MEDICINE CLINIC | Facility: CLINIC | Age: 50
End: 2020-10-19

## 2020-10-19 VITALS
WEIGHT: 274 LBS | HEIGHT: 71 IN | SYSTOLIC BLOOD PRESSURE: 122 MMHG | TEMPERATURE: 97.6 F | DIASTOLIC BLOOD PRESSURE: 80 MMHG | BODY MASS INDEX: 38.36 KG/M2 | HEART RATE: 102 BPM | OXYGEN SATURATION: 96 %

## 2020-10-19 DIAGNOSIS — J32.0 CHRONIC MAXILLARY SINUSITIS: ICD-10-CM

## 2020-10-19 DIAGNOSIS — I10 ESSENTIAL HYPERTENSION: ICD-10-CM

## 2020-10-19 DIAGNOSIS — Z23 ENCOUNTER FOR IMMUNIZATION: Primary | ICD-10-CM

## 2020-10-19 DIAGNOSIS — E55.9 VITAMIN D DEFICIENCY: ICD-10-CM

## 2020-10-19 DIAGNOSIS — Z11.59 NEED FOR HEPATITIS C SCREENING TEST: ICD-10-CM

## 2020-10-19 DIAGNOSIS — M19.90 ARTHRITIS: ICD-10-CM

## 2020-10-19 DIAGNOSIS — E03.9 ACQUIRED HYPOTHYROIDISM: ICD-10-CM

## 2020-10-19 DIAGNOSIS — E78.2 MIXED HYPERLIPIDEMIA: ICD-10-CM

## 2020-10-19 DIAGNOSIS — IMO0002 DM (DIABETES MELLITUS) TYPE II UNCONTROLLED, PERIPH VASCULAR DISORDER: ICD-10-CM

## 2020-10-19 DIAGNOSIS — E53.8 VITAMIN B12 DEFICIENCY: ICD-10-CM

## 2020-10-19 PROCEDURE — 86803 HEPATITIS C AB TEST: CPT | Performed by: NURSE PRACTITIONER

## 2020-10-19 PROCEDURE — 99214 OFFICE O/P EST MOD 30 MIN: CPT | Performed by: NURSE PRACTITIONER

## 2020-10-19 PROCEDURE — 83036 HEMOGLOBIN GLYCOSYLATED A1C: CPT | Performed by: NURSE PRACTITIONER

## 2020-10-19 PROCEDURE — G0008 ADMIN INFLUENZA VIRUS VAC: HCPCS | Performed by: NURSE PRACTITIONER

## 2020-10-19 PROCEDURE — 82607 VITAMIN B-12: CPT | Performed by: NURSE PRACTITIONER

## 2020-10-19 PROCEDURE — 90472 IMMUNIZATION ADMIN EACH ADD: CPT | Performed by: NURSE PRACTITIONER

## 2020-10-19 PROCEDURE — 80061 LIPID PANEL: CPT | Performed by: NURSE PRACTITIONER

## 2020-10-19 PROCEDURE — 90686 IIV4 VACC NO PRSV 0.5 ML IM: CPT | Performed by: NURSE PRACTITIONER

## 2020-10-19 PROCEDURE — 85025 COMPLETE CBC W/AUTO DIFF WBC: CPT | Performed by: NURSE PRACTITIONER

## 2020-10-19 PROCEDURE — 80053 COMPREHEN METABOLIC PANEL: CPT | Performed by: NURSE PRACTITIONER

## 2020-10-19 PROCEDURE — 84439 ASSAY OF FREE THYROXINE: CPT | Performed by: NURSE PRACTITIONER

## 2020-10-19 PROCEDURE — 90732 PPSV23 VACC 2 YRS+ SUBQ/IM: CPT | Performed by: NURSE PRACTITIONER

## 2020-10-19 PROCEDURE — 82306 VITAMIN D 25 HYDROXY: CPT | Performed by: NURSE PRACTITIONER

## 2020-10-19 PROCEDURE — 36415 COLL VENOUS BLD VENIPUNCTURE: CPT | Performed by: NURSE PRACTITIONER

## 2020-10-19 PROCEDURE — 84443 ASSAY THYROID STIM HORMONE: CPT | Performed by: NURSE PRACTITIONER

## 2020-10-19 RX ORDER — IPRATROPIUM BROMIDE 21 UG/1
SPRAY, METERED NASAL
Qty: 1 EACH | Refills: 5 | Status: SHIPPED | OUTPATIENT
Start: 2020-10-19 | End: 2021-02-04 | Stop reason: SDUPTHER

## 2020-10-19 RX ORDER — GABAPENTIN 600 MG/1
600 TABLET ORAL 3 TIMES DAILY
Qty: 90 TABLET | Refills: 2 | Status: SHIPPED | OUTPATIENT
Start: 2020-10-19 | End: 2021-01-19 | Stop reason: DRUGHIGH

## 2020-10-19 RX ORDER — GLIPIZIDE AND METFORMIN HCL 2.5; 25 MG/1; MG/1
2 TABLET, FILM COATED ORAL
Qty: 120 TABLET | Refills: 2 | Status: SHIPPED | OUTPATIENT
Start: 2020-10-19 | End: 2021-01-29 | Stop reason: SDUPTHER

## 2020-10-19 RX ORDER — SULFAMETHOXAZOLE AND TRIMETHOPRIM 800; 160 MG/1; MG/1
1 TABLET ORAL EVERY 12 HOURS
Qty: 20 TABLET | Refills: 0 | Status: SHIPPED | OUTPATIENT
Start: 2020-10-19 | End: 2020-10-29

## 2020-10-19 NOTE — PROGRESS NOTES
"Subjective   Soheila Brewster is a 50 y.o. female.     Chief Complaint   Patient presents with   • Joint Pain       History of Present Illness     Hypertension-chronic and ongoing.  Patient is presently taking Vasotec 10 mg.  She denies any negative side effects.  No associated symptoms such as CP, SOA, HA, or dizziness.  BLE edema-patient is presently taking Bumex 2 mg twice daily.  She reports \"bad\" she reports she continues to swell over \"my shoes\".  She does have several varicosities.  Left hip pain-increased with more numbness in her leg.  She feels her balance has declined. She reports \"like a cramp\".    Shoulder pain with increase in neck pain-\"a lot of pain\".  She reports like an electric shock in her shoulders and radiating toward her neck.  She has seen Dr Wall but is not a surgical candidate.  She was advised she would benefit from weight loss surgery.  She reports this discomfort has been increasing over the last 6 months.  She is noting a muscle spasm in her arm that makes her have to stretch.  \"worse than a leg cramp\".  She is on Robaxin 500 2 tabs BID.  She has been started on Diclofenac.    Sinus Complaint-has had a CT of head.  Was \"normal\".  seh is having some shooting ear pain in the left.  She reports some cough with production with \"clear to milky\".  She continues to smoke.  Using Astelin spray.        The following portions of the patient's history were reviewed and updated as appropriate: CC, ROS, allergies, current medications, past family history, past medical history, past social history, past surgical history and problem list.    Review of Systems   Constitutional: Negative for appetite change, fatigue and fever.   HENT: Negative for congestion, ear pain, nosebleeds, postnasal drip, rhinorrhea, sore throat, tinnitus, trouble swallowing and voice change.    Eyes: Negative for blurred vision, photophobia and visual disturbance.   Respiratory: Negative for cough, chest tightness, shortness of " "breath and wheezing.    Cardiovascular: Negative for chest pain and palpitations.   Gastrointestinal: Positive for constipation. Negative for abdominal pain, blood in stool, diarrhea, nausea and GERD.   Endocrine: Negative for cold intolerance, heat intolerance and polydipsia.   Genitourinary: Negative for decreased urine volume, difficulty urinating, dysuria and hematuria.   Musculoskeletal: Positive for arthralgias, back pain, gait problem, myalgias, neck pain and neck stiffness.   Skin: Negative for color change, pallor and bruise.   Allergic/Immunologic: Negative.    Neurological: Negative for dizziness, syncope, numbness and headache.   Hematological: Negative.    Psychiatric/Behavioral: Negative for decreased concentration, sleep disturbance, suicidal ideas and depressed mood. The patient is not nervous/anxious.    All other systems reviewed and are negative.      Objective     /80   Pulse 102   Temp 97.6 °F (36.4 °C) (Temporal)   Ht 180.3 cm (71\")   Wt 124 kg (274 lb)   SpO2 96%   BMI 38.22 kg/m²     Physical Exam  Vitals signs reviewed.   Constitutional:       General: She is not in acute distress.     Appearance: She is well-developed. She is not diaphoretic.   HENT:      Head: Normocephalic and atraumatic.      Comments: Oropharynx not examined.  Patient is presently wearing a face covering/mask due to COVID-19 pandemic.     Right Ear: Hearing, ear canal and external ear normal. A middle ear effusion is present. Tympanic membrane is erythematous.      Left Ear: Hearing, ear canal and external ear normal. A middle ear effusion is present. Tympanic membrane is erythematous.   Eyes:      General: Lids are normal. No scleral icterus.     Extraocular Movements:      Right eye: Normal extraocular motion and no nystagmus.      Left eye: Normal extraocular motion and no nystagmus.      Conjunctiva/sclera: Conjunctivae normal.      Pupils: Pupils are equal, round, and reactive to light.   Neck:      " Musculoskeletal: Neck supple. Decreased range of motion. Spinous process tenderness and muscular tenderness present.      Thyroid: No thyromegaly.      Vascular: No carotid bruit or JVD.      Trachea: No tracheal tenderness.   Cardiovascular:      Rate and Rhythm: Normal rate and regular rhythm.      Heart sounds: Normal heart sounds, S1 normal and S2 normal. No murmur.   Pulmonary:      Effort: Pulmonary effort is normal.      Breath sounds: Examination of the right-upper field reveals wheezing. Examination of the left-upper field reveals wheezing. Decreased breath sounds and wheezing present.   Chest:      Chest wall: No tenderness.   Abdominal:      General: Bowel sounds are normal.      Palpations: Abdomen is soft. There is no mass.      Tenderness: There is no abdominal tenderness.   Musculoskeletal:         General: No tenderness.      Right lower leg: No edema.      Left lower leg: No edema.      Right foot: Decreased range of motion. No deformity, bunion or foot drop.      Left foot: Decreased range of motion. No deformity, bunion or foot drop.      Comments: Gait antalgic.   equal bilaterally. No muscular atrophy or flaccidity.   Feet:      Right foot:      Skin integrity: Dry skin present. No skin breakdown, erythema or warmth.      Toenail Condition: Right toenails are normal.      Left foot:      Skin integrity: Dry skin present. No skin breakdown or erythema.      Toenail Condition: Left toenails are normal.   Lymphadenopathy:      Cervical: Cervical adenopathy present.      Right cervical: Superficial cervical adenopathy present.      Left cervical: Superficial cervical adenopathy present.   Skin:     General: Skin is warm and dry.      Capillary Refill: Capillary refill takes less than 2 seconds.      Coloration: Skin is not pale.      Findings: No erythema.      Nails: There is no clubbing.        Comments: Multiple varicose veins noted in BLE.  Worse along ankle region with spider vein pattern  noted.  Edema present today mild nonpitting   Neurological:      Mental Status: She is alert and oriented to person, place, and time.      Cranial Nerves: No cranial nerve deficit.      Sensory: No sensory deficit.      Motor: No tremor, atrophy or abnormal muscle tone.      Coordination: Coordination normal.      Gait: Gait normal.      Deep Tendon Reflexes: Reflexes are normal and symmetric.   Psychiatric:         Attention and Perception: She is attentive.         Mood and Affect: Mood normal.         Speech: Speech normal.         Behavior: Behavior normal.         Thought Content: Thought content normal.         Judgment: Judgment normal.         Assessment/Plan     Problem List Items Addressed This Visit        Cardiovascular and Mediastinum    DM (diabetes mellitus) type II uncontrolled, periph vascular disorder (CMS/HCC)    Relevant Medications    gabapentin (NEURONTIN) 600 MG tablet    glipiZIDE-metFORMIN (METAGLIP) 2.5-250 MG per tablet    Other Relevant Orders    Ambulatory Referral for Diabetic Eye Exam-Ophthalmology    Hemoglobin A1c    Essential hypertension    Relevant Orders    CBC & Differential    Comprehensive Metabolic Panel    CBC Auto Differential    Mixed hyperlipidemia    Relevant Orders    Lipid Panel       Digestive    Vitamin D deficiency    Relevant Orders    Vitamin D 25 Hydroxy    Vitamin B12       Musculoskeletal and Integument    Arthritis    Relevant Medications    gabapentin (NEURONTIN) 600 MG tablet      Other Visit Diagnoses     Encounter for immunization    -  Primary    Relevant Orders    Pneumococcal Polysaccharide Vaccine 23-Valent Greater Than or Equal To 1yo Subcutaneous / IM (Completed)    Fluarix Quad >6 Months (4313-0869) (Completed)    Chronic maxillary sinusitis        Relevant Medications    ipratropium (ATROVENT) 0.03 % nasal spray    sulfamethoxazole-trimethoprim (BACTRIM DS,SEPTRA DS) 800-160 MG per tablet    Vitamin B12 deficiency        Acquired hypothyroidism         Relevant Orders    TSH    T4, Free    Need for hepatitis C screening test        Relevant Orders    Hepatitis C Antibody          Patient's Body mass index is 38.22 kg/m². BMI is above normal parameters. Recommendations include: nutrition counseling.       Understands disease processes and need for medications.  Understands reasons for urgent and emergent care.  Patient (& family) verbalized agreement for treatment plan.   Emotional support and active listening provided.  Patient provided time to verbalize feelings.    IVETTE reviewed today and consistent.  Will refill prescribed controlled medication today.  Patient is aware they cannot receive narcotics from any other provider except if under care of pain management or speciality clinic.  Risk and benefits of medication use has been reviewed.  History and physical exam exhibit continued safe and appropriate use of controlled substances.  The patient is aware of the potential for addiction and dependence.  This patient has been made aware of the appropriate use of such medications, including potential risk of somnolence, limited ability to drive and / or work safely, and potential for overdose.  Patient understands not to take medication and drive until they know how medication may affect their cognition/decision making.   It has also been made clear that these medications are for use by this patient only, without concomitant use of alcohol or other substances unless prescribed/advised by medical provider.  Patient understands they may be subject to UDS and pill counts at random.      Patient considered to be moderate risk for addiction due to use of multiple controlled medications.  Patient understands and accepts these risks.  Patient need for medication will be reassessed at each visit.  Doses will be adjusted according to patient need and findings.    Goal of TX: Patient will not have any adverse reactions of medication.  Patient will have reduction in  neuropathic symptoms of pain with use of gabapentin as directed.  Patient will not have any drug drug interactions.    Continue under the care of pain management for pain medication.  Medication Dispense Information    Oxycodone/Acetaminophen   Dispensed: 10/7/2020 12:00 AM   Written:  8/28/2020   Unit strength: 325MG/5MG   Days supply: 30   Dispense Note: GivenName=Wero=DAVISERBirthDate=[id number]=[address] [place] [address]   Quantity: 120 each   Pharmacy: [facility]   Authorizing provider: FAUSTO LEYVA   Received from: [facility] (Fill History)   Brand or Generic:       Medication Dispense Information    Gabapentin   Dispensed: 10/12/2020 12:00 AM   Written:  9/11/2020   Unit strength: 600MG   Days supply: 30   Dispense Note: GivenName=Wero=DAVISERBirthDate=[id number]=[address] [place] [address], [id number]   Quantity: 90 each   Pharmacy: [facility]   Authorizing provider: JOSEY MICHAEL   Received from: [facility] (Fill History)   Brand or Generic:         Flu and pneumonia vaccine today.    We will plan for updated fasting labs.    RTC 3 months, sooner if needed for problems or concerns          This document has been electronically signed by:  JOAQUÍN Ross, FNP-C    Dragon disclaimer:  Much of this encounter note is an electronic transcription/translation of spoken language to printed text. The electronic translation of spoken language may permit erroneous, or at times, nonsensical words or phrases to be inadvertently transcribed; Although I have reviewed the note for such errors, some may still exist.

## 2020-10-20 DIAGNOSIS — Z79.899 LONG-TERM USE OF HIGH-RISK MEDICATION: Primary | ICD-10-CM

## 2020-10-20 LAB
25(OH)D3 SERPL-MCNC: 34.7 NG/ML (ref 30–100)
ALBUMIN SERPL-MCNC: 3.9 G/DL (ref 3.5–5.2)
ALBUMIN/GLOB SERPL: 1.3 G/DL
ALP SERPL-CCNC: 53 U/L (ref 39–117)
ALT SERPL W P-5'-P-CCNC: 21 U/L (ref 1–33)
ANION GAP SERPL CALCULATED.3IONS-SCNC: 9.3 MMOL/L (ref 5–15)
AST SERPL-CCNC: 13 U/L (ref 1–32)
BASOPHILS # BLD AUTO: 0.05 10*3/MM3 (ref 0–0.2)
BASOPHILS NFR BLD AUTO: 0.5 % (ref 0–1.5)
BILIRUB SERPL-MCNC: 0.3 MG/DL (ref 0–1.2)
BUN SERPL-MCNC: 17 MG/DL (ref 6–20)
BUN/CREAT SERPL: 22.7 (ref 7–25)
CALCIUM SPEC-SCNC: 10 MG/DL (ref 8.6–10.5)
CHLORIDE SERPL-SCNC: 104 MMOL/L (ref 98–107)
CHOLEST SERPL-MCNC: 93 MG/DL (ref 0–200)
CO2 SERPL-SCNC: 22.7 MMOL/L (ref 22–29)
CREAT SERPL-MCNC: 0.75 MG/DL (ref 0.57–1)
DEPRECATED RDW RBC AUTO: 40.6 FL (ref 37–54)
EOSINOPHIL # BLD AUTO: 0.44 10*3/MM3 (ref 0–0.4)
EOSINOPHIL NFR BLD AUTO: 4.4 % (ref 0.3–6.2)
ERYTHROCYTE [DISTWIDTH] IN BLOOD BY AUTOMATED COUNT: 13.1 % (ref 12.3–15.4)
GFR SERPL CREATININE-BSD FRML MDRD: 82 ML/MIN/1.73
GLOBULIN UR ELPH-MCNC: 2.9 GM/DL
GLUCOSE SERPL-MCNC: 271 MG/DL (ref 65–99)
HBA1C MFR BLD: 10.6 % (ref 4.8–5.6)
HCT VFR BLD AUTO: 38.4 % (ref 34–46.6)
HCV AB SER DONR QL: NORMAL
HDLC SERPL-MCNC: 31 MG/DL (ref 40–60)
HGB BLD-MCNC: 13 G/DL (ref 12–15.9)
IMM GRANULOCYTES # BLD AUTO: 0.02 10*3/MM3 (ref 0–0.05)
IMM GRANULOCYTES NFR BLD AUTO: 0.2 % (ref 0–0.5)
LDLC SERPL CALC-MCNC: 27 MG/DL (ref 0–100)
LDLC/HDLC SERPL: 0.56 {RATIO}
LYMPHOCYTES # BLD AUTO: 2.43 10*3/MM3 (ref 0.7–3.1)
LYMPHOCYTES NFR BLD AUTO: 24.5 % (ref 19.6–45.3)
MCH RBC QN AUTO: 29.2 PG (ref 26.6–33)
MCHC RBC AUTO-ENTMCNC: 33.9 G/DL (ref 31.5–35.7)
MCV RBC AUTO: 86.3 FL (ref 79–97)
MONOCYTES # BLD AUTO: 0.52 10*3/MM3 (ref 0.1–0.9)
MONOCYTES NFR BLD AUTO: 5.3 % (ref 5–12)
NEUTROPHILS NFR BLD AUTO: 6.44 10*3/MM3 (ref 1.7–7)
NEUTROPHILS NFR BLD AUTO: 65.1 % (ref 42.7–76)
NRBC BLD AUTO-RTO: 0 /100 WBC (ref 0–0.2)
PLATELET # BLD AUTO: 172 10*3/MM3 (ref 140–450)
PMV BLD AUTO: 11.7 FL (ref 6–12)
POTASSIUM SERPL-SCNC: 4.8 MMOL/L (ref 3.5–5.2)
PROT SERPL-MCNC: 6.8 G/DL (ref 6–8.5)
RBC # BLD AUTO: 4.45 10*6/MM3 (ref 3.77–5.28)
SODIUM SERPL-SCNC: 136 MMOL/L (ref 136–145)
T4 FREE SERPL-MCNC: 1.57 NG/DL (ref 0.93–1.7)
TRIGL SERPL-MCNC: 223 MG/DL (ref 0–150)
TSH SERPL DL<=0.05 MIU/L-ACNC: 1.23 UIU/ML (ref 0.27–4.2)
VIT B12 BLD-MCNC: 1458 PG/ML (ref 211–946)
VLDLC SERPL-MCNC: 35 MG/DL (ref 5–40)
WBC # BLD AUTO: 9.9 10*3/MM3 (ref 3.4–10.8)

## 2020-10-20 NOTE — PROGRESS NOTES
Patient left without being able to be seen per provider.  She was only triaged by medical assistant

## 2020-10-21 RX ORDER — CALCIUM CARB/VITAMIN D3/VIT K1 500-100-40
1 TABLET,CHEWABLE ORAL 3 TIMES DAILY PRN
Qty: 100 EACH | Refills: 5 | Status: SHIPPED | OUTPATIENT
Start: 2020-10-21 | End: 2021-02-04 | Stop reason: SDUPTHER

## 2020-10-26 ENCOUNTER — TELEPHONE (OUTPATIENT)
Dept: FAMILY MEDICINE CLINIC | Facility: CLINIC | Age: 50
End: 2020-10-26

## 2020-10-26 ENCOUNTER — TELEPHONE (OUTPATIENT)
Dept: SURGERY | Facility: CLINIC | Age: 50
End: 2020-10-26

## 2020-10-26 DIAGNOSIS — K21.9 GASTROESOPHAGEAL REFLUX DISEASE WITHOUT ESOPHAGITIS: ICD-10-CM

## 2020-10-26 RX ORDER — ESOMEPRAZOLE MAGNESIUM 40 MG/1
40 CAPSULE, DELAYED RELEASE ORAL
Qty: 30 CAPSULE | Refills: 5 | Status: SHIPPED | OUTPATIENT
Start: 2020-10-26 | End: 2021-02-04 | Stop reason: SDUPTHER

## 2020-10-26 NOTE — TELEPHONE ENCOUNTER
Patient sent RX per Dr. Rodriguez to [facility] in [place]. Patient is to follow up in one yr with Dr. Rodriguez for thyroid.    RX: Levothyroxine 100 mcg          Take one daily #30 with 8 refills

## 2020-10-26 NOTE — TELEPHONE ENCOUNTER
----- Message from JOAQUÍN Ross sent at 10/22/2020  3:13 PM EDT -----  I sent a different insulin  ----- Message -----  From: Radha Estrada MA  Sent: 10/21/2020   4:22 PM EDT  To: JOAQUÍN Ross    Okay, called back to let the pharmacy know this and they said it is actually not covered on her insurance. They did not show a preferred when running it. Do you want us to try a pa or do you want to send her in something different?   ----- Message -----  From: Mariana Mcknight APRN  Sent: 10/21/2020   3:37 PM EDT  To: Radha Estrada MA    12 units QID with blood sugar checks.  ----- Message -----  From: Radha Estrada MA  Sent: 10/21/2020   3:31 PM EDT  To: JOAQUÍN Ross    Pharmacy called and wanted to know what the max daily dose is for adiel's novolog.

## 2020-11-05 DIAGNOSIS — I10 ESSENTIAL HYPERTENSION: ICD-10-CM

## 2020-11-05 RX ORDER — ENALAPRIL MALEATE 10 MG/1
TABLET ORAL
Qty: 30 TABLET | Refills: 5 | Status: SHIPPED | OUTPATIENT
Start: 2020-11-05 | End: 2021-02-04 | Stop reason: SDUPTHER

## 2020-11-21 DIAGNOSIS — IMO0002 DM (DIABETES MELLITUS) TYPE II UNCONTROLLED, PERIPH VASCULAR DISORDER: ICD-10-CM

## 2020-11-23 RX ORDER — DULAGLUTIDE 1.5 MG/.5ML
INJECTION, SOLUTION SUBCUTANEOUS
Qty: 2 ML | Refills: 5 | Status: SHIPPED | OUTPATIENT
Start: 2020-11-23 | End: 2021-01-19 | Stop reason: DRUGHIGH

## 2020-12-10 DIAGNOSIS — J30.1 CHRONIC SEASONAL ALLERGIC RHINITIS DUE TO POLLEN: ICD-10-CM

## 2020-12-10 DIAGNOSIS — R60.1 GENERALIZED EDEMA: ICD-10-CM

## 2020-12-10 RX ORDER — BUMETANIDE 2 MG/1
TABLET ORAL
Qty: 60 TABLET | Refills: 5 | Status: SHIPPED | OUTPATIENT
Start: 2020-12-10 | End: 2021-02-04 | Stop reason: SDUPTHER

## 2020-12-10 RX ORDER — LEVOCETIRIZINE DIHYDROCHLORIDE 5 MG/1
5 TABLET, FILM COATED ORAL EVERY EVENING
Qty: 30 TABLET | Refills: 5 | Status: SHIPPED | OUTPATIENT
Start: 2020-12-10 | End: 2021-02-04 | Stop reason: SDUPTHER

## 2020-12-12 DIAGNOSIS — J32.0 CHRONIC MAXILLARY SINUSITIS: ICD-10-CM

## 2020-12-12 DIAGNOSIS — R60.1 GENERALIZED EDEMA: ICD-10-CM

## 2020-12-14 RX ORDER — POTASSIUM CHLORIDE 750 MG/1
TABLET, FILM COATED, EXTENDED RELEASE ORAL
Qty: 60 TABLET | Refills: 5 | Status: SHIPPED | OUTPATIENT
Start: 2020-12-14 | End: 2021-02-04 | Stop reason: SDUPTHER

## 2020-12-14 RX ORDER — ALBUTEROL SULFATE 90 UG/1
AEROSOL, METERED RESPIRATORY (INHALATION)
Qty: 18 G | Refills: 5 | Status: SHIPPED | OUTPATIENT
Start: 2020-12-14 | End: 2021-02-04 | Stop reason: SDUPTHER

## 2021-01-11 RX ORDER — POTASSIUM CHLORIDE 750 MG/1
TABLET, EXTENDED RELEASE ORAL
Qty: 60 TABLET | Refills: 3 | Status: SHIPPED | OUTPATIENT
Start: 2021-01-11 | End: 2021-02-04 | Stop reason: SDUPTHER

## 2021-01-19 ENCOUNTER — OFFICE VISIT (OUTPATIENT)
Dept: FAMILY MEDICINE CLINIC | Facility: CLINIC | Age: 51
End: 2021-01-19

## 2021-01-19 VITALS
BODY MASS INDEX: 37.94 KG/M2 | TEMPERATURE: 96.9 F | HEIGHT: 71 IN | WEIGHT: 271 LBS | OXYGEN SATURATION: 97 % | DIASTOLIC BLOOD PRESSURE: 90 MMHG | SYSTOLIC BLOOD PRESSURE: 150 MMHG | HEART RATE: 90 BPM

## 2021-01-19 DIAGNOSIS — L02.419 AXILLARY ABSCESS: ICD-10-CM

## 2021-01-19 DIAGNOSIS — M51.27 PROTRUSION OF INTERVERTEBRAL DISC OF LUMBOSACRAL REGION: ICD-10-CM

## 2021-01-19 DIAGNOSIS — IMO0002 DM (DIABETES MELLITUS) TYPE II UNCONTROLLED, PERIPH VASCULAR DISORDER: Primary | ICD-10-CM

## 2021-01-19 PROCEDURE — 96372 THER/PROPH/DIAG INJ SC/IM: CPT | Performed by: NURSE PRACTITIONER

## 2021-01-19 PROCEDURE — 99214 OFFICE O/P EST MOD 30 MIN: CPT | Performed by: NURSE PRACTITIONER

## 2021-01-19 RX ORDER — METHYLPREDNISOLONE ACETATE 40 MG/ML
40 INJECTION, SUSPENSION INTRA-ARTICULAR; INTRALESIONAL; INTRAMUSCULAR; SOFT TISSUE ONCE
Status: COMPLETED | OUTPATIENT
Start: 2021-01-19 | End: 2021-01-19

## 2021-01-19 RX ORDER — FLUCONAZOLE 150 MG/1
150 TABLET ORAL DAILY
Qty: 3 TABLET | Refills: 0 | Status: SHIPPED | OUTPATIENT
Start: 2021-01-19 | End: 2021-01-22

## 2021-01-19 RX ORDER — KETOROLAC TROMETHAMINE 30 MG/ML
60 INJECTION, SOLUTION INTRAMUSCULAR; INTRAVENOUS ONCE
Status: COMPLETED | OUTPATIENT
Start: 2021-01-19 | End: 2021-01-19

## 2021-01-19 RX ORDER — DULAGLUTIDE 3 MG/.5ML
3 INJECTION, SOLUTION SUBCUTANEOUS WEEKLY
Qty: 4 PEN | Refills: 5 | Status: SHIPPED | OUTPATIENT
Start: 2021-01-19 | End: 2021-02-04 | Stop reason: SDUPTHER

## 2021-01-19 RX ORDER — CLINDAMYCIN HYDROCHLORIDE 300 MG/1
300 CAPSULE ORAL 3 TIMES DAILY
Qty: 30 CAPSULE | Refills: 0 | Status: SHIPPED | OUTPATIENT
Start: 2021-01-19 | End: 2021-01-29

## 2021-01-19 RX ORDER — GABAPENTIN 800 MG/1
800 TABLET ORAL 3 TIMES DAILY
Qty: 90 TABLET | Refills: 2 | Status: SHIPPED | OUTPATIENT
Start: 2021-01-19 | End: 2021-02-04 | Stop reason: SDUPTHER

## 2021-01-19 RX ADMIN — METHYLPREDNISOLONE ACETATE 40 MG: 40 INJECTION, SUSPENSION INTRA-ARTICULAR; INTRALESIONAL; INTRAMUSCULAR; SOFT TISSUE at 10:10

## 2021-01-19 RX ADMIN — KETOROLAC TROMETHAMINE 60 MG: 30 INJECTION, SOLUTION INTRAMUSCULAR; INTRAVENOUS at 10:09

## 2021-01-19 NOTE — ASSESSMENT & PLAN NOTE
Continue glipizide Metformin combination as directed.  We will increase Trulicity to 3 mg weekly.  Patient to report any intolerance.  Consistent blood sugar checks encouraged.  Low CHO diet.  Continue sliding scale insulin coverage

## 2021-01-19 NOTE — PROGRESS NOTES
"Subjective   Soheila Brewster is a 50 y.o. female.     Chief Complaint   Patient presents with   • Diabetes       History of Present Illness     Hypertension-chronic and ongoing.  Elevation on triage at 150/90.  She reports she is in \"quite a bit of pain\".  She is presently taking enalapril 10 mg.  No negative side effects.  She had not noted any elevations on her home checks.  Diabetes-chronic and ongoing.  Patient is presently taking Glipizide-Metformin 2.5-250 2 tabs BID, Soliqua 60 units insulin, and Humalog sliding scale insulin.   She reports she has been having some > 400 but is improving with SSIC.    Back Pain-chronic and ongoing. Has noted increase in low back pain over the last month.  She reports numb and tingling feeling on her buttocks and into both thighs.  She reports that she is had a fall in December \"before christmas\".  She reports she was getting out of bed and her feet \"went numb\" and \"I fell by the bed\".   She reports her lower back pain has increased since that time.  She reports she has noted some increased spasm in her upper spine into her right arm.  She has noted some radiculopathy in her right arm.  She is presently on Diclofenac 75 mg, Robaxin 500 mg 1 tablet QID, and Oxycodone 5 mg as directed.  She reports she is not getting relief at times.  She has a follow up on 2/25/2021 at her pain management provider.    Chronic sinusitis-she reports PND, itching watery eyes.  She reports pressure and some intermittent ear pain.  Sneezing.  She reports throat is irritated with PND but is not sore.  No known illness exposures.  She reports not fever and not fatigued more than usual.    Right arm abscess-has been present for about \"5 days\".  Has not changed much in size.  She reports she gets theses areas intermittently.  No changes in hygiene products.  She is not having any drainage from the area.  She reports is just a \"hard painful knot under the skin\".    The following portions of the patient's " "history were reviewed and updated as appropriate: CC, ROS, allergies, current medications, past family history, past medical history, past social history, past surgical history and problem list.    Review of Systems   Constitutional: Positive for fatigue. Negative for appetite change and fever.   HENT: Positive for congestion, ear pain, postnasal drip, rhinorrhea, sinus pressure, sneezing and sore throat. Negative for nosebleeds, tinnitus, trouble swallowing and voice change.         No loss of taste or smell   Eyes: Negative for blurred vision, photophobia and visual disturbance.        Eyes watering   Respiratory: Positive for cough, chest tightness, shortness of breath and wheezing.    Cardiovascular: Positive for leg swelling. Negative for chest pain and palpitations.   Gastrointestinal: Negative for abdominal pain, blood in stool, constipation, diarrhea, nausea and GERD.   Endocrine: Negative for cold intolerance, heat intolerance and polydipsia.   Genitourinary: Negative for decreased urine volume, difficulty urinating, dysuria and hematuria.   Musculoskeletal: Positive for arthralgias, back pain, gait problem, myalgias and neck pain.   Skin: Negative for color change, pallor and bruise.   Allergic/Immunologic: Negative.    Neurological: Positive for dizziness, numbness (worse in BLE) and headache. Negative for syncope.   Hematological: Negative.    Psychiatric/Behavioral: Positive for dysphoric mood and stress. Negative for decreased concentration, sleep disturbance, suicidal ideas and depressed mood. The patient is not nervous/anxious.    All other systems reviewed and are negative.      Objective     /90 (BP Location: Left arm, Patient Position: Sitting, Cuff Size: Adult)   Pulse 90   Temp 96.9 °F (36.1 °C) (Temporal)   Ht 180.3 cm (70.98\")   Wt 123 kg (271 lb)   SpO2 97%   BMI 37.81 kg/m²     Physical Exam  Vitals signs reviewed.   Constitutional:       General: She is not in acute distress.    "  Appearance: She is well-developed. She is not diaphoretic.   HENT:      Head: Normocephalic and atraumatic.      Comments: Oropharynx not examined.  Patient is presently wearing a face covering/mask due to COVID-19 pandemic.     Right Ear: Hearing and external ear normal.      Left Ear: Hearing and external ear normal.      Ears:      Comments: EAC with generalized erythema.  Bilateral TM's dull, poor cone of light.  Injected and retracted.    Eyes:      General: Lids are normal. No scleral icterus.     Extraocular Movements:      Right eye: Normal extraocular motion and no nystagmus.      Left eye: Normal extraocular motion and no nystagmus.      Conjunctiva/sclera: Conjunctivae normal.      Pupils: Pupils are equal, round, and reactive to light.   Neck:      Musculoskeletal: Normal range of motion and neck supple.      Thyroid: No thyromegaly.      Vascular: No carotid bruit or JVD.      Trachea: No tracheal tenderness.   Cardiovascular:      Rate and Rhythm: Normal rate and regular rhythm.      Heart sounds: Normal heart sounds, S1 normal and S2 normal. No murmur.   Pulmonary:      Effort: Pulmonary effort is normal.      Breath sounds: Normal breath sounds.   Chest:      Chest wall: No tenderness.   Abdominal:      General: Bowel sounds are normal.      Palpations: Abdomen is soft. There is no hepatomegaly, splenomegaly or mass.      Tenderness: There is no abdominal tenderness.   Musculoskeletal:      Lumbar back: She exhibits decreased range of motion, tenderness, pain and spasm.      Right upper leg: She exhibits tenderness.      Left upper leg: She exhibits tenderness.      Right lower leg: No edema.      Left lower leg: No edema.      Comments: Gait slow and antalgic.   equal bilaterally. No muscular atrophy or flaccidity.  Palpation over lumbar spine creates worsening discomfort in bilateral posterior thighs   Lymphadenopathy:      Cervical: No cervical adenopathy.      Right cervical: No superficial  cervical adenopathy.     Left cervical: No superficial cervical adenopathy.   Skin:     General: Skin is warm and dry.      Capillary Refill: Capillary refill takes less than 2 seconds.      Coloration: Skin is not pale.      Findings: No erythema.      Nails: There is no clubbing.        Comments: 4 to 5 mm indurated erythemic area to the anterior portion of the upper axillary region.  No drainage.  Some dry skin noted to wound bed.  Tender to touch   Neurological:      Mental Status: She is alert and oriented to person, place, and time.      Cranial Nerves: No cranial nerve deficit.      Sensory: Sensory deficit (scattered pattern in BLE) present.      Motor: No tremor, atrophy or abnormal muscle tone.      Coordination: Coordination normal.      Deep Tendon Reflexes: Reflexes are normal and symmetric.   Psychiatric:         Attention and Perception: She is attentive.         Mood and Affect: Mood normal.         Speech: Speech normal.         Behavior: Behavior normal.         Thought Content: Thought content normal.         Judgment: Judgment normal.       Assessment/Plan     Problem List Items Addressed This Visit        Endocrine and Metabolic    DM (diabetes mellitus) type II uncontrolled, periph vascular disorder (CMS/HCC) - Primary    Current Assessment & Plan     Continue glipizide Metformin combination as directed.  We will increase Trulicity to 3 mg weekly.  Patient to report any intolerance.  Consistent blood sugar checks encouraged.  Low CHO diet.  Continue sliding scale insulin coverage         Relevant Medications    Insulin Glargine-Lixisenatide (Soliqua) 100-33 UNT-MCG/ML solution pen-injector injection    glipiZIDE-metFORMIN (METAGLIP) 2.5-250 MG per tablet    insulin lispro (HumaLOG) 100 UNIT/ML injection    Dulaglutide (Trulicity) 3 MG/0.5ML solution pen-injector    gabapentin (NEURONTIN) 800 MG tablet       Neuro    Protrusion of intervertebral disc of lumbosacral region    Current Assessment &  Plan     Increasing gabapentin today.  Continue under the care of pain management provider for oxycodone pain management.    We will send a new referral to pain management provider for further evaluation and management of T-spine and neck pain.  We will send updated MRIs to provider.         Relevant Medications    gabapentin (NEURONTIN) 800 MG tablet    methylPREDNISolone acetate (DEPO-medrol) injection 40 mg (Completed)    ketorolac (TORADOL) injection 60 mg (Completed)      Other Visit Diagnoses     Axillary abscess        Relevant Medications    clindamycin (Cleocin) 300 MG capsule    fluconazole (DIFLUCAN) 150 MG tablet          Patient's Body mass index is 37.81 kg/m². BMI is above normal parameters. Recommendations include: nutrition counseling.       Understands disease processes and need for medications.  Understands reasons for urgent and emergent care.  Patient (& family) verbalized agreement for treatment plan.   Emotional support and active listening provided.  Patient provided time to verbalize feelings.    IVETTE reviewed today and consistent.  Will refill prescribed controlled medication today.  Patient is aware they cannot receive narcotics from any other provider except if under care of pain management or speciality clinic.  Risk and benefits of medication use has been reviewed.  History and physical exam exhibit continued safe and appropriate use of controlled substances.  The patient is aware of the potential for addiction and dependence.  This patient has been made aware of the appropriate use of such medications, including potential risk of somnolence, limited ability to drive and / or work safely, and potential for overdose.    It has also been made clear that these medications are for use by this patient only, without concomitant use of alcohol or other substances unless prescribed/advised by medical provider.  Patient understands they may be subject to UDS and pill counts at random.       Patient considered to be moderate risk for addiction due to use of multiple controlled medications.  Patient understands and accepts these risks.  Patient need for medication will be reassessed at each visit.  Doses will be adjusted according to patient need and findings.    Goal of TX: Patient will not have any adverse reactions of medication.  Patient will have reduction in neuropathic symptoms of pain with use of gabapentin as directed.  Patient will not have any drug drug interactions.      Medication Dispense Information    Oxycodone/Acetaminophen   Dispensed: 1/4/2021 12:00 AM   Written:  12/21/2020   Unit strength: 325MG/5MG   Days supply: 30   Dispense Note: GivenName=Wero=DAVISERBirthDate=[id number]=[address] [place] [address]   Quantity: 120 each   Pharmacy: [facility]   Authorizing provider: FAUSTO LEYVA   Received from: [facility] (Fill History)   Brand or Generic:       Medication Dispense Information    Gabapentin   Dispensed: 1/8/2021 12:00 AM   Written:  10/19/2020   Unit strength: 600MG   Days supply: 30   Dispense Note: GivenName=Wero=MESSERBirthDate=[id number]=[address] [place] [address]   Quantity: 90 each   Pharmacy: [facility]   Authorizing provider: JOSEY MICHAEL   Received from: [facility] (Fill History)   Brand or Generic:       Injection today for acute pain relief.     RTC 1-2 months, sooner if needed.  We will reevaluate blood sugars at this time.          This document has been electronically signed by:  JOAQUÍN Ross FNP-C Dragon disclaimer:  Much of this encounter note is an electronic transcription/translation of spoken language to printed text. The electronic translation of spoken language may permit erroneous, or at times, nonsensical words or phrases to be inadvertently transcribed; Although I have reviewed the note for such errors, some may still exist.

## 2021-01-19 NOTE — ASSESSMENT & PLAN NOTE
Increasing gabapentin today.  Continue under the care of pain management provider for oxycodone pain management.    We will send a new referral to pain management provider for further evaluation and management of T-spine and neck pain.  We will send updated MRIs to provider.

## 2021-01-29 DIAGNOSIS — IMO0002 DM (DIABETES MELLITUS) TYPE II UNCONTROLLED, PERIPH VASCULAR DISORDER: ICD-10-CM

## 2021-01-29 RX ORDER — GLIPIZIDE AND METFORMIN HCL 2.5; 25 MG/1; MG/1
TABLET, FILM COATED ORAL
Qty: 120 TABLET | Refills: 2 | Status: SHIPPED | OUTPATIENT
Start: 2021-01-29 | End: 2021-02-04 | Stop reason: SDUPTHER

## 2021-02-04 DIAGNOSIS — E53.8 VITAMIN B12 DEFICIENCY: ICD-10-CM

## 2021-02-04 DIAGNOSIS — M51.27 PROTRUSION OF INTERVERTEBRAL DISC OF LUMBOSACRAL REGION: ICD-10-CM

## 2021-02-04 DIAGNOSIS — M25.78 DEGENERATION OF INTERVERTEBRAL DISC OF THORACIC REGION WITH OSTEOPHYTE: ICD-10-CM

## 2021-02-04 DIAGNOSIS — R60.1 GENERALIZED EDEMA: ICD-10-CM

## 2021-02-04 DIAGNOSIS — J30.89 CHRONIC NONSEASONAL ALLERGIC RHINITIS DUE TO POLLEN: ICD-10-CM

## 2021-02-04 DIAGNOSIS — I10 ESSENTIAL HYPERTENSION: ICD-10-CM

## 2021-02-04 DIAGNOSIS — K21.9 GASTROESOPHAGEAL REFLUX DISEASE WITHOUT ESOPHAGITIS: ICD-10-CM

## 2021-02-04 DIAGNOSIS — IMO0002 DM (DIABETES MELLITUS) TYPE II UNCONTROLLED, PERIPH VASCULAR DISORDER: ICD-10-CM

## 2021-02-04 DIAGNOSIS — J30.1 CHRONIC SEASONAL ALLERGIC RHINITIS DUE TO POLLEN: ICD-10-CM

## 2021-02-04 DIAGNOSIS — J32.0 CHRONIC MAXILLARY SINUSITIS: ICD-10-CM

## 2021-02-04 DIAGNOSIS — E78.2 MIXED HYPERLIPIDEMIA: ICD-10-CM

## 2021-02-04 DIAGNOSIS — M51.34 DEGENERATION OF INTERVERTEBRAL DISC OF THORACIC REGION WITH OSTEOPHYTE: ICD-10-CM

## 2021-02-04 RX ORDER — POTASSIUM CHLORIDE 750 MG/1
10 TABLET, FILM COATED, EXTENDED RELEASE ORAL 2 TIMES DAILY
Qty: 60 TABLET | Refills: 5 | Status: SHIPPED | OUTPATIENT
Start: 2021-02-04 | End: 2021-12-13

## 2021-02-04 RX ORDER — GABAPENTIN 800 MG/1
800 TABLET ORAL 3 TIMES DAILY
Qty: 90 TABLET | Refills: 2 | Status: SHIPPED | OUTPATIENT
Start: 2021-02-04 | End: 2021-04-15 | Stop reason: SDUPTHER

## 2021-02-04 RX ORDER — IPRATROPIUM BROMIDE 21 UG/1
SPRAY, METERED NASAL
Qty: 1 EACH | Refills: 5 | Status: SHIPPED | OUTPATIENT
Start: 2021-02-04 | End: 2022-04-04

## 2021-02-04 RX ORDER — CALCIUM CARB/VITAMIN D3/VIT K1 500-100-40
1 TABLET,CHEWABLE ORAL 3 TIMES DAILY PRN
Qty: 100 EACH | Refills: 5 | Status: SHIPPED | OUTPATIENT
Start: 2021-02-04

## 2021-02-04 RX ORDER — EZETIMIBE 10 MG/1
10 TABLET ORAL DAILY
Qty: 30 TABLET | Refills: 5 | Status: SHIPPED | OUTPATIENT
Start: 2021-02-04 | End: 2021-07-31

## 2021-02-04 RX ORDER — ALBUTEROL SULFATE 90 UG/1
2 AEROSOL, METERED RESPIRATORY (INHALATION) EVERY 4 HOURS PRN
Qty: 18 G | Refills: 5 | Status: SHIPPED | OUTPATIENT
Start: 2021-02-04 | End: 2021-08-17 | Stop reason: SDUPTHER

## 2021-02-04 RX ORDER — GLIPIZIDE AND METFORMIN HCL 2.5; 25 MG/1; MG/1
2 TABLET, FILM COATED ORAL
Qty: 120 TABLET | Refills: 2 | Status: SHIPPED | OUTPATIENT
Start: 2021-02-04 | End: 2021-02-17 | Stop reason: DRUGHIGH

## 2021-02-04 RX ORDER — MONTELUKAST SODIUM 10 MG/1
10 TABLET ORAL NIGHTLY
Qty: 30 TABLET | Refills: 5 | Status: SHIPPED | OUTPATIENT
Start: 2021-02-04 | End: 2021-07-31

## 2021-02-04 RX ORDER — DULOXETIN HYDROCHLORIDE 30 MG/1
30 CAPSULE, DELAYED RELEASE ORAL DAILY
Qty: 60 CAPSULE | Refills: 5 | OUTPATIENT
Start: 2021-02-04

## 2021-02-04 RX ORDER — INSULIN GLARGINE AND LIXISENATIDE 100; 33 U/ML; UG/ML
60 INJECTION, SOLUTION SUBCUTANEOUS DAILY
Qty: 15 ML | Refills: 5 | Status: SHIPPED | OUTPATIENT
Start: 2021-02-04 | End: 2021-04-22

## 2021-02-04 RX ORDER — LANOLIN ALCOHOL/MO/W.PET/CERES
1000 CREAM (GRAM) TOPICAL DAILY
Qty: 30 TABLET | Refills: 5 | Status: SHIPPED | OUTPATIENT
Start: 2021-02-04 | End: 2021-08-17 | Stop reason: SDUPTHER

## 2021-02-04 RX ORDER — ENALAPRIL MALEATE 10 MG/1
10 TABLET ORAL DAILY
Qty: 30 TABLET | Refills: 5 | Status: SHIPPED | OUTPATIENT
Start: 2021-02-04 | End: 2021-07-31

## 2021-02-04 RX ORDER — DICLOFENAC SODIUM 75 MG/1
75 TABLET, DELAYED RELEASE ORAL 2 TIMES DAILY
Qty: 60 TABLET | Refills: 5 | Status: SHIPPED | OUTPATIENT
Start: 2021-02-04 | End: 2021-05-13

## 2021-02-04 RX ORDER — POTASSIUM CHLORIDE 750 MG/1
10 TABLET, EXTENDED RELEASE ORAL 2 TIMES DAILY
Qty: 60 TABLET | Refills: 3 | Status: SHIPPED | OUTPATIENT
Start: 2021-02-04 | End: 2022-01-04 | Stop reason: SDUPTHER

## 2021-02-04 RX ORDER — ROSUVASTATIN CALCIUM 40 MG/1
40 TABLET, COATED ORAL DAILY
Qty: 90 TABLET | Refills: 3 | Status: SHIPPED | OUTPATIENT
Start: 2021-02-04 | End: 2021-04-15 | Stop reason: SDUPTHER

## 2021-02-04 RX ORDER — LEVOCETIRIZINE DIHYDROCHLORIDE 5 MG/1
5 TABLET, FILM COATED ORAL EVERY EVENING
Qty: 30 TABLET | Refills: 5 | Status: SHIPPED | OUTPATIENT
Start: 2021-02-04 | End: 2021-07-31

## 2021-02-04 RX ORDER — OXYCODONE HYDROCHLORIDE AND ACETAMINOPHEN 5; 325 MG/1; MG/1
4 TABLET ORAL EVERY 8 HOURS
OUTPATIENT
Start: 2021-02-04

## 2021-02-04 RX ORDER — BUMETANIDE 2 MG/1
2 TABLET ORAL 2 TIMES DAILY
Qty: 60 TABLET | Refills: 5 | Status: SHIPPED | OUTPATIENT
Start: 2021-02-04 | End: 2021-07-31

## 2021-02-04 RX ORDER — METHOCARBAMOL 500 MG/1
1000 TABLET, FILM COATED ORAL 3 TIMES DAILY
Qty: 120 TABLET | Refills: 5 | Status: SHIPPED | OUTPATIENT
Start: 2021-02-04 | End: 2022-09-01 | Stop reason: SDUPTHER

## 2021-02-04 RX ORDER — AZELASTINE 1 MG/ML
SPRAY, METERED NASAL
Qty: 1 EACH | Refills: 5 | Status: SHIPPED | OUTPATIENT
Start: 2021-02-04 | End: 2022-04-04 | Stop reason: SDUPTHER

## 2021-02-04 RX ORDER — NEEDLES, SAFETY 18GX1 1/2"
1 NEEDLE, DISPOSABLE MISCELLANEOUS
Qty: 1 EACH | Refills: 3 | Status: SHIPPED | OUTPATIENT
Start: 2021-02-04 | End: 2022-01-04 | Stop reason: SDUPTHER

## 2021-02-04 RX ORDER — ESOMEPRAZOLE MAGNESIUM 40 MG/1
40 CAPSULE, DELAYED RELEASE ORAL
Qty: 30 CAPSULE | Refills: 5 | Status: SHIPPED | OUTPATIENT
Start: 2021-02-04 | End: 2021-07-31

## 2021-02-04 RX ORDER — DULAGLUTIDE 3 MG/.5ML
3 INJECTION, SOLUTION SUBCUTANEOUS WEEKLY
Qty: 4 PEN | Refills: 5 | Status: SHIPPED | OUTPATIENT
Start: 2021-02-04 | End: 2021-02-17 | Stop reason: DRUGHIGH

## 2021-02-04 NOTE — TELEPHONE ENCOUNTER
Patient switching from [facility] to [facility], pharmacy states to send in all medications.

## 2021-02-06 DIAGNOSIS — E78.2 MIXED HYPERLIPIDEMIA: ICD-10-CM

## 2021-02-08 RX ORDER — EZETIMIBE 10 MG/1
TABLET ORAL
Qty: 30 TABLET | Refills: 5 | OUTPATIENT
Start: 2021-02-08

## 2021-02-17 ENCOUNTER — OFFICE VISIT (OUTPATIENT)
Dept: FAMILY MEDICINE CLINIC | Facility: CLINIC | Age: 51
End: 2021-02-17

## 2021-02-17 VITALS
HEIGHT: 71 IN | DIASTOLIC BLOOD PRESSURE: 80 MMHG | HEART RATE: 90 BPM | SYSTOLIC BLOOD PRESSURE: 132 MMHG | OXYGEN SATURATION: 98 % | WEIGHT: 267 LBS | TEMPERATURE: 97.1 F | BODY MASS INDEX: 37.38 KG/M2

## 2021-02-17 DIAGNOSIS — K43.9 VENTRAL HERNIA WITHOUT OBSTRUCTION OR GANGRENE: ICD-10-CM

## 2021-02-17 DIAGNOSIS — F33.8 SEASONAL AFFECTIVE DISORDER (HCC): ICD-10-CM

## 2021-02-17 DIAGNOSIS — IMO0002 DM (DIABETES MELLITUS) TYPE II UNCONTROLLED, PERIPH VASCULAR DISORDER: Primary | ICD-10-CM

## 2021-02-17 DIAGNOSIS — J32.0 CHRONIC MAXILLARY SINUSITIS: ICD-10-CM

## 2021-02-17 PROCEDURE — 99214 OFFICE O/P EST MOD 30 MIN: CPT | Performed by: NURSE PRACTITIONER

## 2021-02-17 RX ORDER — SULFAMETHOXAZOLE AND TRIMETHOPRIM 800; 160 MG/1; MG/1
1 TABLET ORAL EVERY 12 HOURS
Qty: 20 TABLET | Refills: 0 | Status: SHIPPED | OUTPATIENT
Start: 2021-02-17 | End: 2021-02-27

## 2021-02-17 RX ORDER — DULAGLUTIDE 1.5 MG/.5ML
1.5 INJECTION, SOLUTION SUBCUTANEOUS WEEKLY
Qty: 4 PEN | Refills: 5 | Status: SHIPPED | OUTPATIENT
Start: 2021-02-17 | End: 2021-08-05 | Stop reason: SDUPTHER

## 2021-02-17 RX ORDER — CELECOXIB 200 MG/1
200 CAPSULE ORAL DAILY
COMMUNITY
Start: 2021-01-02 | End: 2021-02-17

## 2021-02-17 NOTE — PROGRESS NOTES
"Subjective   Soheila Brewster is a 50 y.o. female.     Chief Complaint   Patient presents with   • Diabetes   • Hypertension       History of Present Illness     Nausea-has noted increase.  She has not had vomiting.   She reports her appetite has been decreased.  She has had Trulicity increase at her last appt.  She reports nausea has been worse over the course of the month.   She reports \"it has been bad\".   She is using Novolog SSIC and reports blood sugars are doing good.  She has not been able to tolerate large doses of Metformin in the past but reports \"only gave me diarrhea\".  She is interested in trying an increase.   Hernia-concerned that her hernia is getting bigger on her abdomen.  She reports when she changes positions from laying to sit she thinks the \"bulge is bigger\".  She has not been straining or lifting.  She reports she has never been evaluated by general surgery.   Leg pain-reports some increase in leg and knee pain.  She reports has been worse with cold weather.  She is taking Diclofenac 75 mg BID.  She continues under care of pain management.   She reports generalized myalgia of her legs as well as \"the joint\".   Sinus complaint-continues to have thick \"chunky\" sputum.  Continued sharp pain in bilateral ears.  She reports PND and yellow secretions.  She continues to take Singulair 10 mg and xyzal 5 mg.  She has been on Singulair for years.  She reports she continues to smoke as does her significant other.  She does seem to have increased mucus in the winter months.   SAD/depression-ongoing.  Patient is presently on Cymbalta 30 mg.  She is under the care of [facility].  She denies any new concerns today.  She presents a physical form that needs to be filled out and sent back to [facility].    The following portions of the patient's history were reviewed and updated as appropriate: CC, ROS, allergies, current medications, past family history, past medical history, past social history, past surgical " "history and problem list.      Review of Systems   Constitutional: Positive for appetite change, fatigue and unexpected weight loss. Negative for fever.   HENT: Positive for congestion, ear pain, rhinorrhea, sinus pressure, sneezing and sore throat. Negative for nosebleeds, postnasal drip, tinnitus, trouble swallowing and voice change.    Eyes: Negative for blurred vision, photophobia and visual disturbance.   Respiratory: Positive for cough and shortness of breath. Negative for chest tightness and wheezing.    Cardiovascular: Negative for chest pain and palpitations.   Gastrointestinal: Positive for abdominal pain and nausea. Negative for blood in stool, constipation, diarrhea, vomiting and GERD.   Endocrine: Negative for cold intolerance, heat intolerance and polydipsia.   Genitourinary: Negative for decreased urine volume, difficulty urinating, dysuria and hematuria.   Musculoskeletal: Positive for arthralgias, gait problem, myalgias and neck pain. Negative for back pain.   Skin: Negative for color change, pallor and bruise.   Allergic/Immunologic: Negative.    Neurological: Negative for dizziness, syncope, numbness and headache.   Hematological: Negative.    Psychiatric/Behavioral: Positive for dysphoric mood and stress. Negative for decreased concentration, sleep disturbance, suicidal ideas and depressed mood. The patient is nervous/anxious.    All other systems reviewed and are negative.      Objective     /80   Pulse 90   Temp 97.1 °F (36.2 °C)   Ht 180.3 cm (70.98\")   Wt 121 kg (267 lb)   SpO2 98%   BMI 37.26 kg/m²     Physical Exam  Vitals signs reviewed.   Constitutional:       General: She is not in acute distress.     Appearance: She is well-developed. She is not diaphoretic.   HENT:      Head: Normocephalic and atraumatic.      Comments: Oropharynx not examined.  Patient is presently wearing a face covering/mask due to COVID-19 pandemic.  Patient frequently clearing throat     Right Ear: " Hearing and external ear normal.      Left Ear: Hearing and external ear normal.      Ears:      Comments: EAC with generalized erythema.  Bilateral TM's dull, poor cone of light.  Injected and retracted.    Eyes:      General: Lids are normal. No scleral icterus.     Extraocular Movements:      Right eye: Normal extraocular motion and no nystagmus.      Left eye: Normal extraocular motion and no nystagmus.      Conjunctiva/sclera: Conjunctivae normal.      Pupils: Pupils are equal, round, and reactive to light.   Neck:      Musculoskeletal: Normal range of motion and neck supple.      Thyroid: No thyromegaly.      Vascular: No carotid bruit or JVD.      Trachea: No tracheal tenderness.   Cardiovascular:      Rate and Rhythm: Normal rate and regular rhythm.      Heart sounds: Normal heart sounds, S1 normal and S2 normal. No murmur.   Pulmonary:      Effort: Pulmonary effort is normal.      Breath sounds: Examination of the right-upper field reveals decreased breath sounds and wheezing. Examination of the left-upper field reveals decreased breath sounds and wheezing. Decreased breath sounds and wheezing (Inspiratory and expiratory) present. No rhonchi or rales.      Comments: Actively coughing at times  Chest:      Chest wall: No tenderness.   Abdominal:      General: Bowel sounds are normal.      Palpations: Abdomen is soft. There is no hepatomegaly, splenomegaly or mass.      Tenderness: There is no abdominal tenderness.      Hernia: A hernia is present.       Musculoskeletal:      Lumbar back: She exhibits decreased range of motion, tenderness, pain and spasm.      Right upper leg: She exhibits tenderness.      Left upper leg: She exhibits tenderness.      Right lower leg: She exhibits tenderness. Edema (Mild nonpitting edema) present.      Left lower leg: She exhibits tenderness. Edema (Mild nonpitting edema) present.      Comments: Gait slow and antalgic.   equal bilaterally. No muscular atrophy or  flaccidity.  Palpation over lumbar spine creates worsening discomfort in bilateral posterior thighs   Lymphadenopathy:      Cervical: No cervical adenopathy.      Right cervical: No superficial cervical adenopathy.     Left cervical: No superficial cervical adenopathy.   Skin:     General: Skin is warm and dry.      Capillary Refill: Capillary refill takes less than 2 seconds.      Coloration: Skin is not pale.      Findings: No erythema.      Nails: There is no clubbing.     Neurological:      Mental Status: She is alert and oriented to person, place, and time.      Cranial Nerves: No cranial nerve deficit.      Sensory: Sensory deficit (scattered pattern in BLE) present.      Motor: No tremor, atrophy or abnormal muscle tone.      Coordination: Coordination normal.      Deep Tendon Reflexes: Reflexes are normal and symmetric.   Psychiatric:         Attention and Perception: She is attentive.         Mood and Affect: Mood normal.         Speech: Speech normal.         Behavior: Behavior normal.         Thought Content: Thought content normal.         Judgment: Judgment normal.       Assessment/Plan     Problem List Items Addressed This Visit        Endocrine and Metabolic    DM (diabetes mellitus) type II uncontrolled, periph vascular disorder (CMS/HCC) - Primary    Current Assessment & Plan     Will decrease Trulicity back to 1.5 due to patient's nausea/vomiting/weight loss this month.  We will stop glipizide and increase Metformin to 500 mg twice daily.  Patient has previously been on 250 twice daily.  Patient encouraged to monitor blood sugars consistently.  Encouraged a low CHO high-protein diet         Relevant Medications    gabapentin (NEURONTIN) 800 MG tablet    insulin lispro (HumaLOG) 100 UNIT/ML injection    Insulin Glargine-Lixisenatide (Soliqua) 100-33 UNT-MCG/ML solution pen-injector injection    NovoLOG 100 UNIT/ML injection    metFORMIN (GLUCOPHAGE) 500 MG tablet    Dulaglutide (Trulicity) 1.5  MG/0.5ML solution pen-injector       [facility]    Seasonal affective disorder (CMS/HCC)    Current Assessment & Plan     Continue Cymbalta 30 mg.  Continue under the care of [facility].  Patient has a physical form that needs to be filled out.  Discussed with patient that form will be filled out and sent back to [facility].         Relevant Medications    DULoxetine (CYMBALTA) 30 MG capsule      Other Visit Diagnoses     Ventral hernia without obstruction or gangrene        Relevant Orders    Ambulatory Referral to General Surgery    Chronic maxillary sinusitis        Relevant Medications    sulfamethoxazole-trimethoprim (BACTRIM DS,SEPTRA DS) 800-160 MG per tablet          Patient's Body mass index is 37.26 kg/m². BMI is above normal parameters. Recommendations include: nutrition counseling.     Understands disease processes and need for medications.  Understands reasons for urgent and emergent care.  Patient (& family) verbalized agreement for treatment plan.   Emotional support and active listening provided.  Patient provided time to verbalize feelings.    Referral as above to speciality for evaluation of a possible abdominal wall hernia.    Instructed to complete all of antibiotics for chronic sinusitis.  Increase PO fluids, avoid/limit caffeine.  Do not save any of the meds for later use.  Rest PRN.  Encourage patient to continue her antihistamine.  Discussed that it may be beneficial if she stops the Singulair for short course of time to see if thickness of viscosity of secretions may change.      RTC 3 months, sooner if needed.             This document has been electronically signed by:  JOAQUÍN Ross, FNP-C    Dragon disclaimer:  Much of this encounter note is an electronic transcription/translation of spoken language to printed text. The electronic translation of spoken language may permit erroneous, or at times, nonsensical words or phrases to be inadvertently transcribed; Although I have reviewed  the note for such errors, some may still exist.

## 2021-02-22 NOTE — ASSESSMENT & PLAN NOTE
Continue Cymbalta 30 mg.  Continue under the care of [facility].  Patient has a physical form that needs to be filled out.  Discussed with patient that form will be filled out and sent back to [facility].

## 2021-02-22 NOTE — ASSESSMENT & PLAN NOTE
Patient's (Body mass index is 37.26 kg/m².) indicates that they are morbidly obese (BMI > 40 or > 35 with obesity - related health condition) with obesity-related health conditions that include hypertension, diabetes mellitus and osteoarthritis .   Encouraged to work on dietary changes.  Encouraged to be walking more as she is able

## 2021-02-22 NOTE — ASSESSMENT & PLAN NOTE
Will decrease Trulicity back to 1.5 due to patient's nausea/vomiting/weight loss this month.  We will stop glipizide and increase Metformin to 500 mg twice daily.  Patient has previously been on 250 twice daily.  Patient encouraged to monitor blood sugars consistently.  Encouraged a low CHO high-protein diet

## 2021-03-15 ENCOUNTER — BULK ORDERING (OUTPATIENT)
Dept: CASE MANAGEMENT | Facility: OTHER | Age: 51
End: 2021-03-15

## 2021-03-15 DIAGNOSIS — Z23 IMMUNIZATION DUE: ICD-10-CM

## 2021-04-13 ENCOUNTER — LAB (OUTPATIENT)
Dept: FAMILY MEDICINE CLINIC | Facility: CLINIC | Age: 51
End: 2021-04-13

## 2021-04-13 DIAGNOSIS — E66.01 CLASS 2 SEVERE OBESITY DUE TO EXCESS CALORIES WITH SERIOUS COMORBIDITY AND BODY MASS INDEX (BMI) OF 37.0 TO 37.9 IN ADULT (HCC): ICD-10-CM

## 2021-04-13 DIAGNOSIS — K43.9 VENTRAL HERNIA WITHOUT OBSTRUCTION OR GANGRENE: ICD-10-CM

## 2021-04-13 DIAGNOSIS — E55.9 VITAMIN D DEFICIENCY: ICD-10-CM

## 2021-04-13 DIAGNOSIS — Z98.890 STATUS POST THYROID SURGERY: ICD-10-CM

## 2021-04-13 DIAGNOSIS — E78.2 MIXED HYPERLIPIDEMIA: ICD-10-CM

## 2021-04-13 DIAGNOSIS — E04.1 LEFT THYROID NODULE: ICD-10-CM

## 2021-04-13 DIAGNOSIS — I10 ESSENTIAL HYPERTENSION: ICD-10-CM

## 2021-04-13 DIAGNOSIS — R60.1 GENERALIZED EDEMA: ICD-10-CM

## 2021-04-13 DIAGNOSIS — E53.8 VITAMIN B12 DEFICIENCY: ICD-10-CM

## 2021-04-13 DIAGNOSIS — IMO0002 DM (DIABETES MELLITUS) TYPE II UNCONTROLLED, PERIPH VASCULAR DISORDER: Primary | ICD-10-CM

## 2021-04-13 DIAGNOSIS — K21.9 GASTROESOPHAGEAL REFLUX DISEASE WITHOUT ESOPHAGITIS: ICD-10-CM

## 2021-04-13 PROCEDURE — 82306 VITAMIN D 25 HYDROXY: CPT | Performed by: NURSE PRACTITIONER

## 2021-04-13 PROCEDURE — 85025 COMPLETE CBC W/AUTO DIFF WBC: CPT | Performed by: NURSE PRACTITIONER

## 2021-04-13 PROCEDURE — 84481 FREE ASSAY (FT-3): CPT | Performed by: NURSE PRACTITIONER

## 2021-04-13 PROCEDURE — 83036 HEMOGLOBIN GLYCOSYLATED A1C: CPT | Performed by: NURSE PRACTITIONER

## 2021-04-13 PROCEDURE — 80061 LIPID PANEL: CPT | Performed by: NURSE PRACTITIONER

## 2021-04-13 PROCEDURE — 80053 COMPREHEN METABOLIC PANEL: CPT | Performed by: NURSE PRACTITIONER

## 2021-04-13 PROCEDURE — 84443 ASSAY THYROID STIM HORMONE: CPT | Performed by: NURSE PRACTITIONER

## 2021-04-13 PROCEDURE — 84439 ASSAY OF FREE THYROXINE: CPT | Performed by: NURSE PRACTITIONER

## 2021-04-13 PROCEDURE — 82607 VITAMIN B-12: CPT | Performed by: NURSE PRACTITIONER

## 2021-04-14 LAB
25(OH)D3 SERPL-MCNC: 23.7 NG/ML (ref 30–100)
ALBUMIN SERPL-MCNC: 3.7 G/DL (ref 3.5–5.2)
ALBUMIN/GLOB SERPL: 1.5 G/DL
ALP SERPL-CCNC: 56 U/L (ref 39–117)
ALT SERPL W P-5'-P-CCNC: 56 U/L (ref 1–33)
ANION GAP SERPL CALCULATED.3IONS-SCNC: 9.1 MMOL/L (ref 5–15)
AST SERPL-CCNC: 45 U/L (ref 1–32)
BASOPHILS # BLD AUTO: 0.06 10*3/MM3 (ref 0–0.2)
BASOPHILS NFR BLD AUTO: 0.8 % (ref 0–1.5)
BILIRUB SERPL-MCNC: 0.3 MG/DL (ref 0–1.2)
BUN SERPL-MCNC: 13 MG/DL (ref 6–20)
BUN/CREAT SERPL: 17.6 (ref 7–25)
CALCIUM SPEC-SCNC: 9.6 MG/DL (ref 8.6–10.5)
CHLORIDE SERPL-SCNC: 102 MMOL/L (ref 98–107)
CHOLEST SERPL-MCNC: 90 MG/DL (ref 0–200)
CO2 SERPL-SCNC: 22.9 MMOL/L (ref 22–29)
CREAT SERPL-MCNC: 0.74 MG/DL (ref 0.57–1)
DEPRECATED RDW RBC AUTO: 40.8 FL (ref 37–54)
EOSINOPHIL # BLD AUTO: 0.3 10*3/MM3 (ref 0–0.4)
EOSINOPHIL NFR BLD AUTO: 3.9 % (ref 0.3–6.2)
ERYTHROCYTE [DISTWIDTH] IN BLOOD BY AUTOMATED COUNT: 13.1 % (ref 12.3–15.4)
GFR SERPL CREATININE-BSD FRML MDRD: 83 ML/MIN/1.73
GLOBULIN UR ELPH-MCNC: 2.5 GM/DL
GLUCOSE SERPL-MCNC: 382 MG/DL (ref 65–99)
HBA1C MFR BLD: 10.7 % (ref 4.8–5.6)
HCT VFR BLD AUTO: 36.6 % (ref 34–46.6)
HDLC SERPL-MCNC: 27 MG/DL (ref 40–60)
HGB BLD-MCNC: 12.6 G/DL (ref 12–15.9)
IMM GRANULOCYTES # BLD AUTO: 0.02 10*3/MM3 (ref 0–0.05)
IMM GRANULOCYTES NFR BLD AUTO: 0.3 % (ref 0–0.5)
LDLC SERPL CALC-MCNC: 11 MG/DL (ref 0–100)
LDLC/HDLC SERPL: -0.5 {RATIO}
LYMPHOCYTES # BLD AUTO: 2.39 10*3/MM3 (ref 0.7–3.1)
LYMPHOCYTES NFR BLD AUTO: 30.7 % (ref 19.6–45.3)
MCH RBC QN AUTO: 29.7 PG (ref 26.6–33)
MCHC RBC AUTO-ENTMCNC: 34.4 G/DL (ref 31.5–35.7)
MCV RBC AUTO: 86.3 FL (ref 79–97)
MONOCYTES # BLD AUTO: 0.35 10*3/MM3 (ref 0.1–0.9)
MONOCYTES NFR BLD AUTO: 4.5 % (ref 5–12)
NEUTROPHILS NFR BLD AUTO: 4.67 10*3/MM3 (ref 1.7–7)
NEUTROPHILS NFR BLD AUTO: 59.8 % (ref 42.7–76)
NRBC BLD AUTO-RTO: 0 /100 WBC (ref 0–0.2)
PLATELET # BLD AUTO: 151 10*3/MM3 (ref 140–450)
PMV BLD AUTO: 12.1 FL (ref 6–12)
POTASSIUM SERPL-SCNC: 4.8 MMOL/L (ref 3.5–5.2)
PROT SERPL-MCNC: 6.2 G/DL (ref 6–8.5)
RBC # BLD AUTO: 4.24 10*6/MM3 (ref 3.77–5.28)
SODIUM SERPL-SCNC: 134 MMOL/L (ref 136–145)
T3FREE SERPL-MCNC: 2.53 PG/ML (ref 2–4.4)
T4 FREE SERPL-MCNC: 1.64 NG/DL (ref 0.93–1.7)
TRIGL SERPL-MCNC: 382 MG/DL (ref 0–150)
TSH SERPL DL<=0.05 MIU/L-ACNC: 1.43 UIU/ML (ref 0.27–4.2)
VIT B12 BLD-MCNC: 526 PG/ML (ref 211–946)
VLDLC SERPL-MCNC: 52 MG/DL (ref 5–40)
WBC # BLD AUTO: 7.79 10*3/MM3 (ref 3.4–10.8)

## 2021-04-15 ENCOUNTER — OFFICE VISIT (OUTPATIENT)
Dept: FAMILY MEDICINE CLINIC | Facility: CLINIC | Age: 51
End: 2021-04-15

## 2021-04-15 VITALS
WEIGHT: 263.4 LBS | BODY MASS INDEX: 36.88 KG/M2 | HEART RATE: 90 BPM | OXYGEN SATURATION: 98 % | HEIGHT: 71 IN | TEMPERATURE: 97.3 F | DIASTOLIC BLOOD PRESSURE: 80 MMHG | SYSTOLIC BLOOD PRESSURE: 126 MMHG

## 2021-04-15 DIAGNOSIS — F95.9 TIC: ICD-10-CM

## 2021-04-15 DIAGNOSIS — IMO0002 DM (DIABETES MELLITUS) TYPE II UNCONTROLLED, PERIPH VASCULAR DISORDER: ICD-10-CM

## 2021-04-15 DIAGNOSIS — Z00.00 MEDICARE ANNUAL WELLNESS VISIT, SUBSEQUENT: Primary | ICD-10-CM

## 2021-04-15 DIAGNOSIS — M51.27 PROTRUSION OF INTERVERTEBRAL DISC OF LUMBOSACRAL REGION: ICD-10-CM

## 2021-04-15 DIAGNOSIS — R68.81 EARLY SATIETY: ICD-10-CM

## 2021-04-15 DIAGNOSIS — E55.9 VITAMIN D DEFICIENCY: ICD-10-CM

## 2021-04-15 DIAGNOSIS — Z72.0 TOBACCO ABUSE DISORDER: ICD-10-CM

## 2021-04-15 DIAGNOSIS — J32.0 CHRONIC MAXILLARY SINUSITIS: ICD-10-CM

## 2021-04-15 DIAGNOSIS — E78.2 MIXED HYPERLIPIDEMIA: ICD-10-CM

## 2021-04-15 DIAGNOSIS — R11.0 NAUSEA: ICD-10-CM

## 2021-04-15 DIAGNOSIS — Z00.00 VISIT FOR ANNUAL HEALTH EXAMINATION: ICD-10-CM

## 2021-04-15 DIAGNOSIS — J30.9 CHRONIC ALLERGIC RHINITIS: ICD-10-CM

## 2021-04-15 DIAGNOSIS — K21.9 GASTROESOPHAGEAL REFLUX DISEASE WITHOUT ESOPHAGITIS: ICD-10-CM

## 2021-04-15 PROCEDURE — G0439 PPPS, SUBSEQ VISIT: HCPCS | Performed by: NURSE PRACTITIONER

## 2021-04-15 PROCEDURE — 1170F FXNL STATUS ASSESSED: CPT | Performed by: NURSE PRACTITIONER

## 2021-04-15 PROCEDURE — 1159F MED LIST DOCD IN RCRD: CPT | Performed by: NURSE PRACTITIONER

## 2021-04-15 PROCEDURE — 99396 PREV VISIT EST AGE 40-64: CPT | Performed by: NURSE PRACTITIONER

## 2021-04-15 PROCEDURE — 1126F AMNT PAIN NOTED NONE PRSNT: CPT | Performed by: NURSE PRACTITIONER

## 2021-04-15 RX ORDER — TIZANIDINE 2 MG/1
1 TABLET ORAL 3 TIMES DAILY
COMMUNITY
Start: 2021-03-03 | End: 2021-04-15 | Stop reason: SINTOL

## 2021-04-15 RX ORDER — SULFAMETHOXAZOLE AND TRIMETHOPRIM 800; 160 MG/1; MG/1
1 TABLET ORAL EVERY 12 HOURS
Qty: 20 TABLET | Refills: 0 | Status: SHIPPED | OUTPATIENT
Start: 2021-04-15 | End: 2021-04-25

## 2021-04-15 RX ORDER — ERGOCALCIFEROL 1.25 MG/1
50000 CAPSULE ORAL WEEKLY
Qty: 4 CAPSULE | Refills: 5 | Status: SHIPPED | OUTPATIENT
Start: 2021-04-15 | End: 2021-12-13

## 2021-04-15 RX ORDER — GABAPENTIN 800 MG/1
800 TABLET ORAL 3 TIMES DAILY
Qty: 90 TABLET | Refills: 2 | Status: SHIPPED | OUTPATIENT
Start: 2021-04-15 | End: 2021-07-14 | Stop reason: SDUPTHER

## 2021-04-15 RX ORDER — ROSUVASTATIN CALCIUM 40 MG/1
40 TABLET, COATED ORAL DAILY
Qty: 90 TABLET | Refills: 3 | Status: SHIPPED | OUTPATIENT
Start: 2021-04-15 | End: 2022-04-04 | Stop reason: SDUPTHER

## 2021-04-15 RX ORDER — LACTULOSE 10 G/15ML
15 SOLUTION ORAL DAILY PRN
COMMUNITY
Start: 2021-03-03 | End: 2021-11-08 | Stop reason: SDUPTHER

## 2021-04-15 NOTE — PATIENT INSTRUCTIONS
It is important for your health to eat a healthy balanced diet, to exercise as tolerated, obtain dental and vision checkups routinely, work on stress reduction and be active about taking care of your mental health.  Routine age related immunizations(pneumonia, flu, shingles if applicable) are recommended.  Be active in obtaining age and gender routine maintenance exams (such as paps, breast exams, colonscopy, prostate exams, etc).    You are encouraged to have safe sex practices for STD prevention.    Be alert/educated on gun and water safety, seek help for any domestic violence concerns, and seatbelt use is strongly encouraged.          Stress, Adult  Stress is a normal reaction to life events. Stress is what you feel when life demands more than you are used to, or more than you think you can handle. Some stress can be useful, such as studying for a test or meeting a deadline at work. Stress that occurs too often or for too long can cause problems. It can affect your emotional health and interfere with relationships and normal daily activities. Too much stress can weaken your body's defense system (immune system) and increase your risk for physical illness. If you already have a medical problem, stress can make it worse.  What are the causes?  All sorts of life events can cause stress. An event that causes stress for one person may not be stressful for another person. Major life events, whether positive or negative, commonly cause stress. Examples include:  · Losing a job or starting a new job.  · Losing a loved one.  · Moving to a new town or home.  · Getting  or .  · Having a baby.  · Getting injured or sick.  Less obvious life events can also cause stress, especially if they occur day after day or in combination with each other. Examples include:  · Working long hours.  · Driving in traffic.  · Caring for children.  · Being in debt.  · Being in a difficult relationship.  What are the signs or  symptoms?  Stress can cause emotional symptoms, including:  · Anxiety. This is feeling worried, afraid, on edge, overwhelmed, or out of control.  · Anger, including irritation or impatience.  · Depression. This is feeling sad, down, helpless, or guilty.  · Trouble focusing, remembering, or making decisions.  Stress can cause physical symptoms, including:  · Aches and pains. These may affect your head, neck, back, stomach, or other areas of your body.  · Tight muscles or a clenched jaw.  · Low energy.  · Trouble sleeping.  Stress can cause unhealthy behaviors, including:  · Eating to feel better (overeating) or skipping meals.  · Working too much or putting off tasks.  · Smoking, drinking alcohol, or using drugs to feel better.  How is this diagnosed?  Stress is diagnosed through an assessment by your health care provider. He or she may diagnose this condition based on:  · Your symptoms and any stressful life events.  · Your medical history.  · Tests to rule out other causes of your symptoms.  Depending on your condition, your health care provider may refer you to a specialist for further evaluation.  How is this treated?    Stress management techniques are the recommended treatment for stress. Medicine is not typically recommended for the treatment of stress.  Techniques to reduce your reaction to stressful life events include:  · Stress identification. Monitor yourself for symptoms of stress and identify what causes stress for you. These skills may help you to avoid or prepare for stressful events.  · Time management. Set your priorities, keep a calendar of events, and learn to say no. Taking these actions can help you avoid making too many commitments.  Techniques for coping with stress include:  · Rethinking the problem. Try to think realistically about stressful events rather than ignoring them or overreacting. Try to find the positives in a stressful situation rather than focusing on the negatives.  · Exercise.  Physical exercise can release both physical and emotional tension. The key is to find a form of exercise that you enjoy and do it regularly.  · Relaxation techniques. These relax the body and mind. The key is to find one or more that you enjoy and use the techniques regularly. Examples include:  ? Meditation, deep breathing, or progressive relaxation techniques.  ? Yoga or gabby chi.  ? Biofeedback, mindfulness techniques, or journaling.  ? Listening to music, being out in nature, or participating in other hobbies.  · Practicing a healthy lifestyle. Eat a balanced diet, drink plenty of water, limit or avoid caffeine, and get plenty of sleep.  · Having a strong support network. Spend time with family, friends, or other people you enjoy being around. Express your feelings and talk things over with someone you trust.  Counseling or talk therapy with a mental health professional may be helpful if you are having trouble managing stress on your own.  Follow these instructions at home:  Lifestyle    · Avoid drugs.  · Do not use any products that contain nicotine or tobacco, such as cigarettes, e-cigarettes, and chewing tobacco. If you need help quitting, ask your health care provider.  · Limit alcohol intake to no more than 1 drink a day for nonpregnant women and 2 drinks a day for men. One drink equals 12 oz of beer, 5 oz of wine, or 1½ oz of hard liquor  · Do not use alcohol or drugs to relax.  · Eat a balanced diet that includes fresh fruits and vegetables, whole grains, lean meats, fish, eggs, and beans, and low-fat dairy. Avoid processed foods and foods high in added fat, sugar, and salt.  · Exercise at least 30 minutes on 5 or more days each week.  · Get 7-8 hours of sleep each night.  General instructions    · Practice stress management techniques as discussed with your health care provider.  · Drink enough fluid to keep your urine clear or pale yellow.  · Take over-the-counter and prescription medicines only as told  by your health care provider.  · Keep all follow-up visits as told by your health care provider. This is important.  Contact a health care provider if:  · Your symptoms get worse.  · You have new symptoms.  · You feel overwhelmed by your problems and can no longer manage them on your own.  Get help right away if:  · You have thoughts of hurting yourself or others.  If you ever feel like you may hurt yourself or others, or have thoughts about taking your own life, get help right away. You can go to your nearest emergency department or call:  · Your local emergency services (911 in the [place]  · A suicide crisis helpline, such as the National Suicide Prevention Lifeline at 1-697.881.9523. This is open 24 hours a day.  Summary  · Stress is a normal reaction to life events. It can cause problems if it happens too often or for too long.  · Practicing stress management techniques is the best way to treat stress.  · Counseling or talk therapy with a mental health professional may be helpful if you are having trouble managing stress on your own.  This information is not intended to replace advice given to you by your health care provider. Make sure you discuss any questions you have with your health care provider.  Document Revised: 07/17/2020 Document Reviewed: 02/07/2018  [employer] Patient Education © 2021 [employer]  Health Risks of Smoking  Smoking cigarettes is very bad for your health. Tobacco smoke has over 200 known poisons in it. It contains the poisonous gases nitrogen oxide and carbon monoxide. There are over 60 chemicals in tobacco smoke that cause cancer.  Smoking is difficult to quit because a chemical in tobacco, called nicotine, causes addiction or dependence. When you smoke and inhale, nicotine is absorbed rapidly into the bloodstream through your lungs. Both inhaled and non-inhaled nicotine may be addictive.  What are the risks of cigarette smoke?  Cigarette smokers have an increased risk of many serious  medical problems, including:  · Lung cancer.  · Lung disease, such as pneumonia, bronchitis, and emphysema.  · Chest pain (angina) and heart attack because the heart is not getting enough oxygen.  · Heart disease and peripheral blood vessel disease.  · High blood pressure (hypertension).  · Stroke.  · Oral cancer, including cancer of the lip, mouth, or voice box.  · Bladder cancer.  · Pancreatic cancer.  · Cervical cancer.  · Pregnancy complications, including premature birth.  · Stillbirths and smaller  babies, birth defects, and genetic damage to sperm.  · Early menopause.  · Lower estrogen level for women.  · Infertility.  · Facial wrinkles.  · Blindness.  · Increased risk of broken bones (fractures).  · Senile dementia.  · Stomach ulcers and internal bleeding.  · Delayed wound healing and increased risk of complications during surgery.  · Even smoking lightly shortens your life expectancy by several years.  Because of secondhand smoke exposure, children of smokers have an increased risk of the following:  · Sudden infant death syndrome (SIDS).  · Respiratory infections.  · Lung cancer.  · Heart disease.  · Ear infections.  What are the benefits of quitting?  There are many health benefits of quitting smoking. Here are some of them:  · Within days of quitting smoking, your risk of having a heart attack decreases, your blood flow improves, and your lung capacity improves. Blood pressure, pulse rate, and breathing patterns start returning to normal soon after quitting.  · Within months, your lungs may clear up completely.  · Quitting for 10 years reduces your risk of developing lung cancer and heart disease to almost that of a nonsmoker.  · People who quit may see an improvement in their overall quality of life.  How do I quit smoking?         Smoking is an addiction with both physical and psychological effects, and longtime habits can be hard to change. Your health care provider can recommend:  · Programs  and community resources, which may include group support, education, or talk therapy.  · Prescription medicines to help reduce cravings.  · Nicotine replacement products, such as patches, gum, and nasal sprays. Use these products only as directed. Do not replace cigarette smoking with electronic cigarettes, which are commonly called e-cigarettes. The safety of e-cigarettes is not known, and some may contain harmful chemicals.  · A combination of two or more of these methods.  Where to find more information  · American Lung Association: www.lung.org  · American Cancer Society: www.cancer.org  Summary  · Smoking cigarettes is very bad for your health. Cigarette smokers have an increased risk of many serious medical problems, including several cancers, heart disease, and stroke.  · Smoking is an addiction with both physical and psychological effects, and longtime habits can be hard to change.  · By stopping right away, you can greatly reduce the risk of medical problems for you and your family.  · To help you quit smoking, your health care provider can recommend programs, community resources, prescription medicines, and nicotine replacement products such as patches, gum, and nasal sprays.  This information is not intended to replace advice given to you by your health care provider. Make sure you discuss any questions you have with your health care provider.  Document Revised: 03/21/2019 Document Reviewed: 12/22/2017  [employer] Patient Education © 2021 [employer]  Fall Prevention in the Home, Adult  Falls can cause injuries and can affect people from all age groups. There are many simple things that you can do to make your home safe and to help prevent falls. Ask for help when making these changes, if needed.  What actions can I take to prevent falls?  General instructions  · Use good lighting in all rooms. Replace any light bulbs that burn out.  · Turn on lights if it is dark. Use night-lights.  · Place frequently  used items in easy-to-reach places. Lower the shelves around your home if necessary.  · Set up furniture so that there are clear paths around it. Avoid moving your furniture around.  · Remove throw rugs and other tripping hazards from the floor.  · Avoid walking on wet floors.  · Fix any uneven floor surfaces.  · Add color or contrast paint or tape to grab bars and handrails in your home. Place contrasting color strips on the first and last steps of stairways.  · When you use a stepladder, make sure that it is completely opened and that the sides are firmly locked. Have someone hold the ladder while you are using it. Do not climb a closed stepladder.  · Be aware of any and all pets.  What can I do in the bathroom?         · Keep the floor dry. Immediately clean up any water that spills onto the floor.  · Remove soap buildup in the tub or shower on a regular basis.  · Use non-skid mats or decals on the floor of the tub or shower.  · Attach bath mats securely with double-sided, non-slip rug tape.  · If you need to sit down while you are in the shower, use a plastic, non-slip stool.  · Install grab bars by the toilet and in the tub and shower. Do not use towel bars as grab bars.  What can I do in the bedroom?  · Make sure that a bedside light is easy to reach.  · Do not use oversized bedding that drapes onto the floor.  · Have a firm chair that has side arms to use for getting dressed.  What can I do in the kitchen?  · Clean up any spills right away.  · If you need to reach for something above you, use a sturdy step stool that has a grab bar.  · Keep electrical cables out of the way.  · Do not use floor polish or wax that makes floors slippery. If you must use wax, make sure that it is non-skid floor wax.  What can I do in the stairways?  · Do not leave any items on the stairs.  · Make sure that you have a light switch at the top of the stairs and the bottom of the stairs. Have them installed if you do not have  them.  · Make sure that there are handrails on both sides of the stairs. Fix handrails that are broken or loose. Make sure that handrails are as long as the stairways.  · Install non-slip stair treads on all stairs in your home.  · Avoid having throw rugs at the top or bottom of stairways, or secure the rugs with carpet tape to prevent them from moving.  · Choose a carpet design that does not hide the edge of steps on the stairway.  · Check any carpeting to make sure that it is firmly attached to the stairs. Fix any carpet that is loose or worn.  What can I do on the outside of my home?  · Use bright outdoor lighting.  · Regularly repair the edges of walkways and driveways and fix any cracks.  · Remove high doorway thresholds.  · Trim any shrubbery on the main path into your home.  · Regularly check that handrails are securely fastened and in good repair. Both sides of any steps should have handrails.  · Install guardrails along the edges of any raised decks or porches.  · Clear walkways of debris and clutter, including tools and rocks.  · Have leaves, snow, and ice cleared regularly.  · Use sand or salt on walkways during winter months.  · In the garage, clean up any spills right away, including grease or oil spills.  What other actions can I take?  · Wear closed-toe shoes that fit well and support your feet. Wear shoes that have rubber soles or low heels.  · Use mobility aids as needed, such as canes, walkers, scooters, and crutches.  · Review your medicines with your health care provider. Some medicines can cause dizziness or changes in blood pressure, which increase your risk of falling.  Talk with your health care provider about other ways that you can decrease your risk of falls. This may include working with a physical therapist or  to improve your strength, balance, and endurance.  Where to find more information  · Centers for Disease Control and PreventionMARIO: https://www.cdc.gov  · National  [place] on Aging: https://ax8xspk.colby.nih.gov  Contact a health care provider if:  · You are afraid of falling at home.  · You feel weak, drowsy, or dizzy at home.  · You fall at home.  Summary  · There are many simple things that you can do to make your home safe and to help prevent falls.  · Ways to make your home safe include removing tripping hazards and installing grab bars in the bathroom.  · Ask for help when making these changes in your home.  This information is not intended to replace advice given to you by your health care provider. Make sure you discuss any questions you have with your health care provider.  Document Revised: 11/30/2018 Document Reviewed: 08/02/2018  [employer] Patient Education © 2021 [employer]  Exercises To Do While Sitting    Exercises that you do while sitting (chair exercises) can give you many of the same benefits as full exercise. Benefits include strengthening your heart, burning calories, and keeping muscles and joints healthy. Exercise can also improve your mood and help with depression and anxiety.  You may benefit from chair exercises if you are unable to do standing exercises because of:  · Diabetic foot pain.  · Obesity.  · Illness.  · Arthritis.  · Recovery from surgery or injury.  · Breathing problems.  · Balance problems.  · Another type of disability.  Before starting chair exercises, check with your health care provider or a physical therapist to find out how much exercise you can tolerate and which exercises are safe for you. If your health care provider approves:  · Start out slowly and build up over time. Aim to work up to about 10-20 minutes for each exercise session.  · Make exercise part of your daily routine.  · Drink water when you exercise. Do not wait until you are thirsty. Drink every 10-15 minutes.  · Stop exercising right away if you have pain, nausea, shortness of breath, or dizziness.  · If you are exercising in a wheelchair, make sure to lock the  "wheels.  · Ask your health care provider whether you can do gabby chi or yoga. Many positions in these mind-body exercises can be modified to do while seated.  Warm-up  Before starting other exercises:  1. Sit up as straight as you can. Have your knees bent at 90 degrees, which is the shape of the capital letter \"L.\" Keep your feet flat on the floor.  2. Sit at the front edge of your chair, if you can.  3. Pull in (tighten) the muscles in your abdomen and stretch your spine and neck as straight as you can. Hold this position for a few minutes.  4. Breathe in and out evenly. Try to concentrate on your breathing, and relax your mind.  Stretching  Exercise A: Arm stretch  1. Hold your arms out straight in front of your body.  2. Bend your hands at the wrist with your fingers pointing up, as if signaling someone to stop. Notice the slight tension in your forearms as you hold the position.  3. Keeping your arms out and your hands bent, rotate your hands outward as far as you can and hold this stretch. Aim to have your thumbs pointing up and your pinkie fingers pointing down.  Slowly repeat arm stretches for one minute as tolerated.  Exercise B: Leg stretch  1. If you can move your legs, try to \"draw\" letters on the floor with the toes of your foot. Write your name with one foot.  2. Write your name with the toes of your other foot.  Slowly repeat the movements for one minute as tolerated.  Exercise C: Reach for the kamila  1. Reach your hands as far over your head as you can to stretch your spine.  2. Move your hands and arms as if you are climbing a rope.  Slowly repeat the movements for one minute as tolerated.  Range of motion exercises  Exercise A: Shoulder roll  1. Let your arms hang loosely at your sides.  2. Lift just your shoulders up toward your ears, then let them relax back down.  3. When your shoulders feel loose, rotate your shoulders in backward and forward circles.  Do shoulder rolls slowly for one minute as " tolerated.  Exercise B: March in place  1. As if you are marching, pump your arms and lift your legs up and down. Lift your knees as high as you can.  ? If you are unable to lift your knees, just pump your arms and move your ankles and feet up and down.  March in place for one minute as tolerated.  Exercise C: Seated jumping jacks  1. Let your arms hang down straight.  2. Keeping your arms straight, lift them up over your head. Aim to point your fingers to the ceiling.  3. While you lift your arms, straighten your legs and slide your heels along the floor to your sides, as wide as you can.  4. As you bring your arms back down to your sides, slide your legs back together.  ? If you are unable to use your legs, just move your arms.  Slowly repeat seated jumping jacks for one minute as tolerated.  Strengthening exercises  Exercise A: Shoulder squeeze  1. Hold your arms straight out from your body to your sides, with your elbows bent and your fists pointed at the ceiling.  2. Keeping your arms in the bent position, move them forward so your elbows and forearms meet in front of your face.  3. Open your arms back out as wide as you can with your elbows still bent, until you feel your shoulder blades squeezing together. Hold for 5 seconds.  Slowly repeat the movements forward and backward for one minute as tolerated.  Contact a health care provider if you:  · Had to stop exercising due to any of the following:  ? Pain.  ? Nausea.  ? Shortness of breath.  ? Dizziness.  ? Fatigue.  · Have significant pain or soreness after exercising.  Get help right away if you have:  · Chest pain.  · Difficulty breathing.  These symptoms may represent a serious problem that is an emergency. Do not wait to see if the symptoms will go away. Get medical help right away. Call your local emergency services (911 in the [place] Do not drive yourself to the hospital.  This information is not intended to replace advice given to you by your health  care provider. Make sure you discuss any questions you have with your health care provider.  Document Revised: 04/09/2020 Document Reviewed: 10/31/2018  [employer] Patient Education © 2021 [employer]  Advance Directive    Advance directives are legal documents that let you make choices ahead of time about your health care and medical treatment in case you become unable to communicate for yourself. Advance directives are a way for you to make known your wishes to family, friends, and health care providers. This can let others know about your end-of-life care if you become unable to communicate.  Discussing and writing advance directives should happen over time rather than all at once. Advance directives can be changed depending on your situation and what you want, even after you have signed the advance directives.  There are different types of advance directives, such as:  · Medical power of .  · Living will.  · Do not resuscitate (DNR) or do not attempt resuscitation (DNAR) order.  Health care proxy and medical power of   A health care proxy is also called a health care agent. This is a person who is appointed to make medical decisions for you in cases where you are unable to make the decisions yourself. Generally, people choose someone they know well and trust to represent their preferences. Make sure to ask this person for an agreement to act as your proxy. A proxy may have to exercise judgment in the event of a medical decision for which your wishes are not known.  A medical power of  is a legal document that names your health care proxy. Depending on the laws in your state, after the document is written, it may also need to be:  · Signed.  · Notarized.  · Dated.  · Copied.  · Witnessed.  · Incorporated into your medical record.  You may also want to appoint someone to manage your money in a situation in which you are unable to do so. This is called a durable power of  for  finances. It is a separate legal document from the durable power of  for health care. You may choose the same person or someone different from your health care proxy to act as your agent in money matters.  If you do not appoint a proxy, or if there is a concern that the proxy is not acting in your best interests, a court may appoint a guardian to act on your behalf.  Living will  A living will is a set of instructions that state your wishes about medical care when you cannot express them yourself. Health care providers should keep a copy of your living will in your medical record. You may want to give a copy to family members or friends. To alert caregivers in case of an emergency, you can place a card in your wallet to let them know that you have a living will and where they can find it. A living will is used if you become:  · Terminally ill.  · Disabled.  · Unable to communicate or make decisions.  Items to consider in your living will include:  · To use or not to use life-support equipment, such as dialysis machines and breathing machines (ventilators).  · A DNR or DNAR order. This tells health care providers not to use cardiopulmonary resuscitation (CPR) if breathing or heartbeat stops.  · To use or not to use tube feeding.  · To be given or not to be given food and fluids.  · Comfort (palliative) care when the goal becomes comfort rather than a cure.  · Donation of organs and tissues.  A living will does not give instructions for distributing your money and property if you should pass away.  DNR or DNAR  A DNR or DNAR order is a request not to have CPR in the event that your heart stops beating or you stop breathing. If a DNR or DNAR order has not been made and shared, a health care provider will try to help any patient whose heart has stopped or who has stopped breathing. If you plan to have surgery, talk with your health care provider about how your DNR or DNAR order will be followed if problems  occur.  What if I do not have an advance directive?  If you do not have an advance directive, some states assign family decision makers to act on your behalf based on how closely you are related to them. Each state has its own laws about advance directives. You may want to check with your health care provider, , or state representative about the laws in your state.  Summary  · Advance directives are the legal documents that allow you to make choices ahead of time about your health care and medical treatment in case you become unable to tell others about your care.  · The process of discussing and writing advance directives should happen over time. You can change the advance directives, even after you have signed them.  · Advance directives include DNR or DNAR orders, living hoffman, and designating an agent as your medical power of .  This information is not intended to replace advice given to you by your health care provider. Make sure you discuss any questions you have with your health care provider.  Document Revised: 01/28/2021 Document Reviewed: 07/16/2020  Elsevier Patient Education © 2021 [employer]

## 2021-04-15 NOTE — PROGRESS NOTES
"The ABCs of the Annual Wellness Visit  Subsequent Medicare Wellness Visit    Chief Complaint   Patient presents with   • Annual Exam        Subjective   History of Present Illness:  Soheila Brewster is a 50 y.o. female who presents for a Subsequent Medicare Wellness Visit.     Diabetes-chronic and ongoing.  Patient is presently taking Trulicity 1.5 mg weekly Soliqua 60 units daily, Humalog sliding scale insulin and Metformin 500 mg twice daily.  Her last A1c was elevated. She is interested in seeing a specialist for diabetes control.   Lab Results   Component Value Date    HGBA1C 10.70 (H) 04/13/2021     GERD-chronic and ongoing.  Patient is presently taking Nexium 40 mg.  She denies any negative side effects.  She does have chronic GI pain.  She has a hernia on the right side but \"it has not been hurting\".    Hyperlipidemia-patient is presently taking Crestor 40 mg.  No negative side effects.  She is also on Zetia 10 mg.  Patient denies any negative side effects of cholesterol medication.  No reported myalgia or myopathies.  Her triglycerides continue to be elevated greater than 300.  Her overall cholesterol is within normal limits.  Lab Results   Component Value Date    CHOL 90 04/13/2021    TRIG 382 (H) 04/13/2021    HDL 27 (L) 04/13/2021    LDL 11 04/13/2021     Vitamin B12 deficiency-chronic and ongoing.  Patient is presently taking vitamin B12 supplement.  Patient is tolerating well and denies any negative side effects.  Chronic allergic rhinitis-patient is a smoker.  She does have chronic cough as well as sinusitis.  She is presently ordered Astelin nasal spray, Xyzal 5 mg, and montelukast 10 mg.  She denies any negative side effects.  She reports at times she is having productive cough but mostly in the AM.  She reports colored secretions when productive.  She reports in the evenings she is noting some chest tightness and \"it bothers my breathing\".  She reports ears \"ring and hurt all the time\".    GI " "complaint-she reports over the last month she has had 2 episodes of nausea and some occasional vomiting with diarrhea that has lasted  For 2-3 days then resolves.  She reports she has had nausea with almost every meal.  She reports even with meals she is having to cease eating due to periumbilical pain.  She reports the discomfort is sharp in nature and is intermittent.  She has noted some \"popping\" of her sternal area when she leans forward.  She feels full even with a small amount of intake.   Head Tic-reports a family member noted that she was having some head \"jerk\".  She reports since that was pointed out she has noted some of the movement herself.  She reports some noted dizziness when it occurs as well as some vision change.  She reports Lasts \"maybe 2 seconds\" then is gone.  She has not noted any HA after episodes.  No obvious nausea or sensation of illness.   She reports she has not noted the symptom daily but \"maybe 2-3 times per week\".  She reports at times she does notice that she \"spaces out\".  No family history of seizure disorder or epilepsy.    Chronic low back pain-under the care of pain management.  She is treated with oxycodone as well as methocarbamol for muscle spasms.  She is also on diclofenac 75 mg.  She has multiple levels of degenerative disc in her lumbar and thoracic spine with bone spurring noted.  She reports that she feels her back continues to worsen over time.  She is also on gabapentin 800 mg for neuropathic leg pain as well is diabetic neuropathy.      HEALTH RISK ASSESSMENT    Recent Hospitalizations:  No hospitalization(s) within the last year.    Current Medical Providers:  Patient Care Team:  Mariana Mcknight APRN as PCP - General (Family Medicine)    Smoking Status:  Social History     Tobacco Use   Smoking Status Current Every Day Smoker   • Packs/day: 1.50   • Years: 10.00   • Pack years: 15.00   • Types: Cigarettes   Smokeless Tobacco Never Used       Alcohol " Consumption:  Social History     Substance and Sexual Activity   Alcohol Use No       Depression Screen:   PHQ-2/PHQ-9 Depression Screening 4/15/2021   Little interest or pleasure in doing things 0   Feeling down, depressed, or hopeless 0   Total Score 0       Fall Risk Screen:  MARIO Fall Risk Assessment was completed, and patient is at MODERATE risk for falls. Assessment completed on:4/15/2021    Health Habits and Functional and Cognitive Screening:  Functional & Cognitive Status 2/18/2019   Do you have difficulty preparing food and eating? Yes   Do you have difficulty bathing yourself, getting dressed or grooming yourself? No   Do you have difficulty using the toilet? No   Do you have difficulty moving around from place to place? No   Do you have trouble with steps or getting out of a bed or a chair? No   Current Diet Well Balanced Diet   Dental Exam Not up to date   Eye Exam Not up to date   Exercise (times per week) 0 times per week   Current Exercise Activities Include None   Do you need help using the phone?  No   Are you deaf or do you have serious difficulty hearing?  No   Do you need help with transportation? No   Do you need help shopping? No   Do you need help preparing meals?  Yes   Do you need help with housework?  Yes   Do you need help with laundry? Yes   Do you need help taking your medications? No   Do you need help managing money? No   Do you ever drive or ride in a car without wearing a seat belt? No   Have you felt unusual stress, anger or loneliness in the last month? Yes   Who do you live with? Other   If you need help, do you have trouble finding someone available to you? Yes   Have you been bothered in the last four weeks by sexual problems? No   Do you have difficulty concentrating, remembering or making decisions? No         Does the patient have evidence of cognitive impairment? No    Asprin use counseling:Does not need ASA (and currently is not on it)    Age-appropriate Screening  Schedule:  Refer to the list below for future screening recommendations based on patient's age, sex and/or medical conditions. Orders for these recommended tests are listed in the plan section. The patient has been provided with a written plan.    Health Maintenance   Topic Date Due   • PAP SMEAR  Never done   • DIABETIC EYE EXAM  03/05/2020   • URINE MICROALBUMIN  11/22/2020   • INFLUENZA VACCINE  08/01/2021   • HEMOGLOBIN A1C  10/13/2021   • DIABETIC FOOT EXAM  10/19/2021   • LIPID PANEL  04/13/2022   • COLONOSCOPY  06/03/2023   • TDAP/TD VACCINES (2 - Td) 03/06/2027          The following portions of the patient's history were reviewed and updated as appropriate: CC, ROS, allergies, current medications, past family history, past medical history, past social history, past surgical history and problem list.    Outpatient Medications Prior to Visit   Medication Sig Dispense Refill   • albuterol sulfate HFA (Ventolin HFA) 108 (90 Base) MCG/ACT inhaler Inhale 2 puffs Every 4 (Four) Hours As Needed for Wheezing. 18 g 5   • azelastine (ASTELIN) 0.1 % nasal spray 2 sprays both nostrils twice daily as needed 1 each 5   • bumetanide (BUMEX) 2 MG tablet Take 1 tablet by mouth 2 (Two) Times a Day. 60 tablet 5   • diclofenac (VOLTAREN) 75 MG EC tablet Take 1 tablet by mouth 2 (Two) Times a Day. 60 tablet 5   • Dulaglutide (Trulicity) 1.5 MG/0.5ML solution pen-injector Inject 1.5 mg under the skin into the appropriate area as directed 1 (One) Time Per Week. 4 pen 5   • enalapril (VASOTEC) 10 MG tablet Take 1 tablet by mouth Daily. 30 tablet 5   • esomeprazole (nexIUM) 40 MG capsule Take 1 capsule by mouth Every Morning Before Breakfast. 30 capsule 5   • ezetimibe (ZETIA) 10 MG tablet Take 1 tablet by mouth Daily. 30 tablet 5   • Fluticasone-Umeclidin-Vilant (Trelegy Ellipta) 100-62.5-25 MCG/INH aerosol powder  Inhale 1 each Daily. 28 each 5   • insulin lispro (HumaLOG) 100 UNIT/ML injection Inject 12 Units under the skin into  "the appropriate area as directed 3 (Three) Times a Day Before Meals. 200-250 4u 251-300 6u, 301-350-8u, 351-400 10u, >400 12 10 mL 12   • Insulin Syringe 31G X 5/16\" 1 ML misc 1 each 3 (Three) Times a Day As Needed (elevated blood sugars). 100 each 5   • ipratropium (ATROVENT) 0.03 % nasal spray 2 sprays both nostrils twice daily as needed 1 each 5   • levocetirizine (XYZAL) 5 MG tablet Take 1 tablet by mouth Every Evening. 30 tablet 5   • medroxyPROGESTERone Acetate 104 MG/0.65ML suspension prefilled syringe Inject 0.65 mL under the skin into the appropriate area as directed Every 3 (Three) Months. 0.65 mL 2   • methocarbamol (ROBAXIN) 500 MG tablet Take 2 tablets by mouth 3 (Three) Times a Day. 120 tablet 5   • montelukast (SINGULAIR) 10 MG tablet Take 1 tablet by mouth Every Night. 30 tablet 5   • NovoLOG 100 UNIT/ML injection      • oxyCODONE-acetaminophen (PERCOCET) 5-325 MG per tablet Take 4 tablets by mouth Every 8 (Eight) Hours.     • potassium chloride (K-DUR,KLOR-CON) 10 MEQ CR tablet Take 1 tablet by mouth 2 (Two) Times a Day. 60 tablet 3   • potassium chloride 10 MEQ CR tablet Take 1 tablet by mouth 2 (Two) Times a Day. 60 tablet 5   • Syringe/Needle, Disp, (BD Eclipse Syringe) 25G X 1\" 3 ML misc 1 each Every 3 (Three) Months. 1 each 3   • vitamin B-12 (CYANOCOBALAMIN) 1000 MCG tablet Take 1 tablet by mouth Daily. 30 tablet 5   • DULoxetine (CYMBALTA) 30 MG capsule Take 30 mg by mouth Daily.     • gabapentin (NEURONTIN) 800 MG tablet Take 1 tablet by mouth 3 (Three) Times a Day. 90 tablet 2   • Insulin Glargine-Lixisenatide (Soliqua) 100-33 UNT-MCG/ML solution pen-injector injection Inject 60 Units under the skin into the appropriate area as directed Daily. 15 mL 5   • metFORMIN (GLUCOPHAGE) 500 MG tablet Take 1 tablet by mouth 2 (Two) Times a Day With Meals. 60 tablet 5   • rosuvastatin (CRESTOR) 40 MG tablet Take 1 tablet by mouth Daily. 90 tablet 3   • Constulose 10 GM/15ML solution Take 15 mL by " mouth Daily As Needed. Per pain management     • tiZANidine (ZANAFLEX) 2 MG tablet Take 1 tablet by mouth 3 (Three) Times a Day.       No facility-administered medications prior to visit.       Patient Active Problem List   Diagnosis   • Tobacco abuse disorder   • DM (diabetes mellitus) type II uncontrolled, periph vascular disorder (CMS/[facility])   • Essential hypertension   • Arthritis   • Mixed hyperlipidemia   • Chronic allergic rhinitis   • Chronic midline thoracic back pain   • Vitamin D deficiency   • Left breast abscess   • Protrusion of intervertebral disc of lumbosacral region   • Class 2 severe obesity due to excess calories with serious comorbidity and body mass index (BMI) of 37.0 to 37.9 in adult (CMS/[facility])   • Lymphadenopathy   • Hepatomegaly   • Left thyroid nodule   • Degeneration of intervertebral disc of thoracic region with osteophyte   • Gastroesophageal reflux disease without esophagitis   • Status post thyroid surgery   • Multilevel degenerative disc disease   • Seasonal affective disorder (CMS/[facility])       Advanced Care Planning:  ACP discussion was held with the patient during this visit. Patient does not have an advance directive, information provided.    Review of Systems   Constitutional: Positive for appetite change and fatigue. Negative for unexpected weight change.   HENT: Positive for congestion, ear pain, postnasal drip, rhinorrhea, sinus pressure, sinus pain and sore throat. Negative for nosebleeds, trouble swallowing and voice change.    Eyes: Negative for photophobia, pain and visual disturbance.   Respiratory: Positive for cough, shortness of breath and wheezing. Negative for chest tightness.    Cardiovascular: Positive for leg swelling. Negative for chest pain and palpitations.   Gastrointestinal: Positive for abdominal pain and nausea. Negative for blood in stool, constipation and diarrhea.   Endocrine: Negative for cold intolerance and polydipsia.   Genitourinary: Negative for  "difficulty urinating, flank pain, frequency and hematuria.   Musculoskeletal: Positive for arthralgias, back pain, gait problem and myalgias. Negative for joint swelling.   Skin: Negative for color change and rash.   Allergic/Immunologic: Negative.    Neurological: Negative for dizziness, syncope, numbness and headaches.   Hematological: Negative.    Psychiatric/Behavioral: Negative for dysphoric mood, sleep disturbance and suicidal ideas. The patient is not nervous/anxious.    All other systems reviewed and are negative.      Compared to one year ago, the patient feels her physical health is worse.  Due to worsening back and GI symptoms  Compared to one year ago, the patient feels her mental health is worse.  She is going to [facility] at present for a mental health program    Reviewed chart for potential of high risk medication in the elderly: not applicable  Reviewed chart for potential of harmful drug interactions in the elderly:not applicable    Objective         Vitals:    04/15/21 0913   BP: 126/80   Pulse: 90   Temp: 97.3 °F (36.3 °C)   SpO2: 98%   Weight: 119 kg (263 lb 6.4 oz)   Height: 180.3 cm (70.98\")       Body mass index is 36.75 kg/m².  Discussed the patient's BMI with her. The BMI Is above range..  Nutritional information provided    Physical Exam  Vitals reviewed.   Constitutional:       General: She is not in acute distress.     Appearance: She is well-developed. She is not diaphoretic.   HENT:      Head: Normocephalic and atraumatic.      Comments: Oropharynx not examined.  Patient is presently wearing a face covering/mask due to COVID-19 pandemic.     Right Ear: Hearing, ear canal and external ear normal. A middle ear effusion is present.      Left Ear: Hearing, ear canal and external ear normal. A middle ear effusion is present.      Nose:      Right Sinus: Maxillary sinus tenderness and frontal sinus tenderness present.      Left Sinus: Maxillary sinus tenderness and frontal sinus tenderness " present.   Eyes:      General: Lids are normal. No scleral icterus.     Extraocular Movements:      Right eye: Normal extraocular motion and no nystagmus.      Left eye: Normal extraocular motion and no nystagmus.      Conjunctiva/sclera: Conjunctivae normal.      Pupils: Pupils are equal, round, and reactive to light.   Neck:      Thyroid: No thyromegaly.      Vascular: No carotid bruit or JVD.      Trachea: No tracheal tenderness.   Cardiovascular:      Rate and Rhythm: Normal rate and regular rhythm.      Heart sounds: Normal heart sounds, S1 normal and S2 normal. No murmur heard.     Pulmonary:      Effort: Pulmonary effort is normal.      Breath sounds: Examination of the right-upper field reveals decreased breath sounds and wheezing. Examination of the left-upper field reveals decreased breath sounds and wheezing. Examination of the right-middle field reveals wheezing. Examination of the left-middle field reveals wheezing. Decreased breath sounds and wheezing present.   Chest:      Chest wall: No tenderness.   Abdominal:      General: Bowel sounds are normal.      Palpations: Abdomen is soft. There is no mass.      Tenderness: There is no abdominal tenderness.   Musculoskeletal:      Cervical back: Normal range of motion and neck supple. Spasms, tenderness and bony tenderness present.      Thoracic back: Spasms and tenderness present. Decreased range of motion.      Lumbar back: Spasms and tenderness present. Decreased range of motion.      Right lower leg: No edema.      Left lower leg: No edema.      Comments: No muscular atrophy or flaccidity.  Gait slow and steady with antalgia.   Lymphadenopathy:      Cervical: Cervical adenopathy present.      Right cervical: Superficial cervical adenopathy present.      Left cervical: Superficial cervical adenopathy present.   Skin:     General: Skin is warm and dry.      Capillary Refill: Capillary refill takes less than 2 seconds.      Coloration: Skin is not pale.       Findings: No erythema.      Nails: There is no clubbing.   Neurological:      Mental Status: She is alert and oriented to person, place, and time.      Cranial Nerves: No cranial nerve deficit or facial asymmetry.      Sensory: No sensory deficit.      Motor: No tremor, atrophy or abnormal muscle tone.      Coordination: Coordination normal.      Deep Tendon Reflexes: Reflexes are normal and symmetric.   Psychiatric:         Attention and Perception: She is attentive.         Mood and Affect: Mood normal.         Speech: Speech normal.         Behavior: Behavior normal.         Thought Content: Thought content normal.         Judgment: Judgment normal.         Lab Results   Component Value Date    TRIG 382 (H) 04/13/2021    HDL 27 (L) 04/13/2021    LDL 11 04/13/2021    VLDL 52 (H) 04/13/2021    HGBA1C 10.70 (H) 04/13/2021        Assessment/Plan   Medicare Risks and Personalized Health Plan  CMS Preventative Services Quick Reference  Advance Directive Discussion  Cardiovascular risk  Chronic Pain   Depression/Dysphoria  Fall Risk  Tobacco abuse    The above risks/problems have been discussed with the patient.  Pertinent information has been shared with the patient in the After Visit Summary.  Follow up plans and orders are seen below in the Assessment/Plan Section.    Diagnoses and all orders for this visit:    1. Medicare annual wellness visit, subsequent (Primary)    2. Visit for annual health examination    3. Gastroesophageal reflux disease without esophagitis  Assessment & Plan:  40 mg.  Advised to avoid known GI triggers such as spicy foods.  Upright 30 minutes after meals and avoid eating large meals.  Several small meals daily as able to avoid overfilling stomach.    Orders:  -     Ambulatory Referral to Gastroenterology    4. Early satiety  -     Ambulatory Referral to Gastroenterology  -     NM gastric emptying; Future    5. Nausea  -     Ambulatory Referral to Gastroenterology    6. Chronic maxillary  sinusitis  -     sulfamethoxazole-trimethoprim (BACTRIM DS,SEPTRA DS) 800-160 MG per tablet; Take 1 tablet by mouth Every 12 (Twelve) Hours for 10 days.  Dispense: 20 tablet; Refill: 0    7. Vitamin D deficiency  Assessment & Plan:  Continue vitamin D as directed.  Report any negative side effects.  We will continue to monitor vitamin D labs and adjust supplement if necessary.    Orders:  -     vitamin D (ERGOCALCIFEROL) 1.25 MG (82733 UT) capsule capsule; Take 1 capsule by mouth 1 (One) Time Per Week.  Dispense: 4 capsule; Refill: 5    8. Mixed hyperlipidemia  Assessment & Plan:  Continue rosuvastatin 40 mg.  Continue to work on low-cholesterol diet.  Continue Zetia 10 mg.    Orders:  -     rosuvastatin (CRESTOR) 40 MG tablet; Take 1 tablet by mouth Daily.  Dispense: 90 tablet; Refill: 3    9. DM (diabetes mellitus) type II uncontrolled, periph vascular disorder (CMS/HCC)  Assessment & Plan:  Continued elevation with elevated A1c.  Will make patient an appointment with CDE for evaluation of her diabetic medications and recommendations to better aid with glycemic control    Orders:  -     gabapentin (NEURONTIN) 800 MG tablet; Take 1 tablet by mouth 3 (Three) Times a Day.  Dispense: 90 tablet; Refill: 2    10. Protrusion of intervertebral disc of lumbosacral region  -     gabapentin (NEURONTIN) 800 MG tablet; Take 1 tablet by mouth 3 (Three) Times a Day.  Dispense: 90 tablet; Refill: 2    11. Tobacco abuse disorder  Assessment & Plan:  Encourage cessation.  Patient remains uninterested in stopping smoking      12. Chronic allergic rhinitis  Assessment & Plan:  Continue as needed albuterol for shortness of breath.  Continue Trelegy as directed.  Continue Singulair 10 mg.  Avoid known allergy and respiratory triggers.  May use Saline spray PRN as desired      13. Tic  Comments:  Of head.  Patient to keep a diary of symptoms including other things that she may notice such as increased caffeine intake or elevated blood  pressure when sympt    Follow Up:  Return in about 1 month (around 5/15/2021) for HTN, DM, GERD.     Dietary counseling provided:  Healthy food choices including fruits and vegetables, limit soda and junk foods, adequate water intake.  Increase in physical activity advised at least 30 minutes per day 3-5 days per week.    Counseling provided today including importance of good nutrition, exercise as tolerated, dental health, stress reduction and mental health. Importance of immunizations discussed.  Routine maintenance such as paps and breast exams recommended.   Counseled on safe sex practices and STD prevention.   Counseling regarding gun and water safety, domestic violence, and seatbelt use.      Reviewed most recent labs with patient.  Discussed any abnormal findings.  Patient's questions answered.    RTC 1 month, sooner if needed.       An After Visit Summary and PPPS were given to the patient.             This document has been electronically signed by:  JOAQUÍN Ross, FNP-C    Dragon disclaimer:  Much of this encounter note is an electronic transcription/translation of spoken language to printed text. The electronic translation of spoken language may permit erroneous, or at times, nonsensical words or phrases to be inadvertently transcribed; Although I have reviewed the note for such errors, some may still exist.

## 2021-04-22 ENCOUNTER — OFFICE VISIT (OUTPATIENT)
Dept: FAMILY MEDICINE CLINIC | Facility: CLINIC | Age: 51
End: 2021-04-22

## 2021-04-22 VITALS
HEIGHT: 71 IN | WEIGHT: 264 LBS | HEART RATE: 75 BPM | OXYGEN SATURATION: 96 % | DIASTOLIC BLOOD PRESSURE: 80 MMHG | BODY MASS INDEX: 36.96 KG/M2 | SYSTOLIC BLOOD PRESSURE: 160 MMHG | TEMPERATURE: 97.1 F

## 2021-04-22 DIAGNOSIS — I10 ESSENTIAL HYPERTENSION: Chronic | ICD-10-CM

## 2021-04-22 DIAGNOSIS — E78.2 MIXED DYSLIPIDEMIA: Chronic | ICD-10-CM

## 2021-04-22 DIAGNOSIS — IMO0002 DM TYPE 2, UNCONTROLLED, WITH NEUROPATHY: Primary | Chronic | ICD-10-CM

## 2021-04-22 PROCEDURE — 99214 OFFICE O/P EST MOD 30 MIN: CPT | Performed by: NURSE PRACTITIONER

## 2021-04-22 RX ORDER — PROCHLORPERAZINE 25 MG/1
1 SUPPOSITORY RECTAL DAILY
Qty: 1 EACH | Refills: 0 | Status: SHIPPED | OUTPATIENT
Start: 2021-04-22 | End: 2021-04-25 | Stop reason: SDUPTHER

## 2021-04-22 RX ORDER — INSULIN PUMP CONTROLLER
1 EACH MISCELLANEOUS EVERY OTHER DAY
Qty: 15 EACH | Refills: 0 | Status: SHIPPED | OUTPATIENT
Start: 2021-04-22 | End: 2021-04-25 | Stop reason: SDUPTHER

## 2021-04-22 RX ORDER — PROCHLORPERAZINE 25 MG/1
SUPPOSITORY RECTAL
Qty: 9 EACH | Refills: 0 | Status: SHIPPED | OUTPATIENT
Start: 2021-04-22 | End: 2021-04-25 | Stop reason: SDUPTHER

## 2021-04-22 RX ORDER — SITAGLIPTIN AND METFORMIN HYDROCHLORIDE 500; 50 MG/1; MG/1
1 TABLET, FILM COATED ORAL 2 TIMES DAILY WITH MEALS
Qty: 60 TABLET | Refills: 0 | Status: SHIPPED | OUTPATIENT
Start: 2021-04-22 | End: 2021-05-06 | Stop reason: SDUPTHER

## 2021-04-22 RX ORDER — INSULIN DEGLUDEC 200 U/ML
80 INJECTION, SOLUTION SUBCUTANEOUS DAILY
Qty: 5 PEN | Refills: 0 | Status: SHIPPED | OUTPATIENT
Start: 2021-04-22 | End: 2021-05-09

## 2021-04-22 RX ORDER — INSULIN PUMP CONTROLLER
1 EACH MISCELLANEOUS DAILY
Qty: 1 KIT | Refills: 0 | Status: SHIPPED | OUTPATIENT
Start: 2021-04-22 | End: 2021-04-25 | Stop reason: SDUPTHER

## 2021-04-22 NOTE — PROGRESS NOTES
History of Present Illness   oSheila Brewster is a 50 y.o. female who presents to the clinic today pertaining to her DM, type 2 which is uncontrolled with a recent A1C of over 10. In addition, she has HTN, Dyslipidemia and DDD.    Diabetes  She presents for her initial diabetic visit. She has type 2 diabetes mellitus. Her disease course has been worsening. Associated symptoms include foot paresthesias, polydipsia, polyuria and visual change. Pertinent negatives for diabetes include no fatigue, no polyphagia and no weakness. Diabetic complications include peripheral neuropathy. Risk factors for coronary artery disease include hypertension, diabetes mellitus, dyslipidemia, stress and tobacco exposure. Current diabetic treatment includes insulin injections, oral agent (monotherapy) and intensive insulin program. When asked about meal planning, she reported none. She has not had a previous visit with a dietitian. An ACE inhibitor/angiotensin II receptor blocker is being taken. She does not see a podiatrist.Eye exam is current.   Lab Results   Component Value Date    HGBA1C 10.70 (H) 04/13/2021     Dyslipidemia  Current antihyperlipidemic treatment includes ezetimibe and statins. Risk factors for coronary artery disease include diabetes mellitus, dyslipidemia, hypertension, obesity and a sedentary lifestyle.   Lab Results   Component Value Date    CHOL 90 04/13/2021    CHOL 93 10/19/2020    CHOL 110 11/22/2019     Lab Results   Component Value Date    TRIG 382 (H) 04/13/2021    TRIG 223 (H) 10/19/2020    TRIG 244 (H) 11/22/2019     Lab Results   Component Value Date    HDL 27 (L) 04/13/2021    HDL 31 (L) 10/19/2020    HDL 30 (L) 11/22/2019     Lab Results   Component Value Date    LDL 11 04/13/2021    LDL 27 10/19/2020    LDL 31 11/22/2019     Hypertension  The current episode started more than 1 year ago. Risk factors for coronary artery disease include diabetes mellitus, dyslipidemia, obesity, sedentary lifestyle and  "smoking/tobacco exposure. Current antihypertension treatment includes ACE inhibitors and diuretics.   Lab Results   Component Value Date    GLUCOSE 382 (H) 04/13/2021    BUN 13 04/13/2021    CREATININE 0.74 04/13/2021    EGFRIFNONA 83 04/13/2021    BCR 17.6 04/13/2021    K 4.8 04/13/2021    CO2 22.9 04/13/2021    CALCIUM 9.6 04/13/2021    ALBUMIN 3.70 04/13/2021    AST 45 (H) 04/13/2021    ALT 56 (H) 04/13/2021     The following portions of the patient's history were reviewed and updated as appropriate: allergies, current medications, past family history, past medical history, past social history, past surgical history and problem list.    Review of Systems   Constitutional: Positive for appetite change. Negative for activity change, chills, fatigue, fever and unexpected weight change.   HENT: Positive for congestion.    Eyes: Positive for visual disturbance.   Respiratory: Negative for cough, shortness of breath and wheezing.    Cardiovascular: Positive for leg swelling. Negative for chest pain and palpitations.   Gastrointestinal: Positive for nausea and vomiting. Negative for constipation and diarrhea.        Trulicity has been reduced from 3 to 1.5 mg week   Endocrine: Positive for polydipsia and polyuria. Negative for cold intolerance, heat intolerance and polyphagia.   Musculoskeletal: Positive for arthralgias.   Skin: Negative for color change and rash.   Neurological: Positive for dizziness, weakness and numbness. Negative for light-headedness.   Hematological: Negative for adenopathy. Bruises/bleeds easily.   Psychiatric/Behavioral: Positive for sleep disturbance. Negative for decreased concentration and suicidal ideas.   All other systems reviewed and are negative.    Vital signs:  /80   Pulse 75   Temp 97.1 °F (36.2 °C) (Tympanic)   Ht 180.3 cm (70.98\")   Wt 120 kg (264 lb)   SpO2 96%   BMI 36.84 kg/m²     Physical Exam  Vitals and nursing note reviewed.   Constitutional:       General: She is " not in acute distress.     Appearance: She is well-developed.      Comments: Pleasant; wearing appropriate face covering   HENT:      Head: Normocephalic.      Nose: Nose normal.   Eyes:      General: No scleral icterus.        Right eye: No discharge.         Left eye: No discharge.      Conjunctiva/sclera: Conjunctivae normal.   Neck:      Thyroid: No thyromegaly.      Vascular: No JVD.   Cardiovascular:      Rate and Rhythm: Normal rate and regular rhythm.      Heart sounds: Normal heart sounds. No murmur heard.   No friction rub.   Pulmonary:      Effort: Pulmonary effort is normal. No respiratory distress.      Breath sounds: Normal breath sounds. No wheezing or rales.   Abdominal:      General: Bowel sounds are normal. There is no distension.      Palpations: Abdomen is soft.      Tenderness: There is no abdominal tenderness. There is no guarding or rebound.   Musculoskeletal:      Cervical back: Neck supple.      Right lower leg: No edema.      Left lower leg: No edema.   Lymphadenopathy:      Cervical: No cervical adenopathy.   Skin:     General: Skin is warm and dry.      Capillary Refill: Capillary refill takes less than 2 seconds.      Findings: No erythema or rash.   Neurological:      Mental Status: She is alert and oriented to person, place, and time.   Psychiatric:         Mood and Affect: Mood and affect normal.         Speech: Speech normal.         Behavior: Behavior is cooperative.         Cognition and Memory: Cognition normal.       Result Review :  Results for orders placed or performed in visit on 04/13/21   Vitamin B12    Specimen: Arm, Right; Blood   Result Value Ref Range    Vitamin B-12 526 211 - 946 pg/mL   Vitamin D 25 hydroxy    Specimen: Arm, Right; Blood   Result Value Ref Range    25 Hydroxy, Vitamin D 23.7 (L) 30.0 - 100.0 ng/ml   Hemoglobin A1c    Specimen: Arm, Right; Blood   Result Value Ref Range    Hemoglobin A1C 10.70 (H) 4.80 - 5.60 %   TSH    Specimen: Arm, Right; Blood    Result Value Ref Range    TSH 1.430 0.270 - 4.200 uIU/mL   Lipid panel    Specimen: Arm, Right; Blood   Result Value Ref Range    Total Cholesterol 90 0 - 200 mg/dL    Triglycerides 382 (H) 0 - 150 mg/dL    HDL Cholesterol 27 (L) 40 - 60 mg/dL    LDL Cholesterol  11 0 - 100 mg/dL    VLDL Cholesterol 52 (H) 5 - 40 mg/dL    LDL/HDL Ratio -0.50    Comprehensive metabolic panel    Specimen: Arm, Right; Blood   Result Value Ref Range    Glucose 382 (H) 65 - 99 mg/dL    BUN 13 6 - 20 mg/dL    Creatinine 0.74 0.57 - 1.00 mg/dL    Sodium 134 (L) 136 - 145 mmol/L    Potassium 4.8 3.5 - 5.2 mmol/L    Chloride 102 98 - 107 mmol/L    CO2 22.9 22.0 - 29.0 mmol/L    Calcium 9.6 8.6 - 10.5 mg/dL    Total Protein 6.2 6.0 - 8.5 g/dL    Albumin 3.70 3.50 - 5.20 g/dL    ALT (SGPT) 56 (H) 1 - 33 U/L    AST (SGOT) 45 (H) 1 - 32 U/L    Alkaline Phosphatase 56 39 - 117 U/L    Total Bilirubin 0.3 0.0 - 1.2 mg/dL    eGFR Non African Amer 83 >60 mL/min/1.73    Globulin 2.5 gm/dL    A/G Ratio 1.5 g/dL    BUN/Creatinine Ratio 17.6 7.0 - 25.0    Anion Gap 9.1 5.0 - 15.0 mmol/L   T4, free    Specimen: Arm, Right; Blood   Result Value Ref Range    Free T4 1.64 0.93 - 1.70 ng/dL   T3, free    Specimen: Arm, Right; Blood   Result Value Ref Range    T3, Free 2.53 2.00 - 4.40 pg/mL   CBC Auto Differential    Specimen: Arm, Right; Blood   Result Value Ref Range    WBC 7.79 3.40 - 10.80 10*3/mm3    RBC 4.24 3.77 - 5.28 10*6/mm3    Hemoglobin 12.6 12.0 - 15.9 g/dL    Hematocrit 36.6 34.0 - 46.6 %    MCV 86.3 79.0 - 97.0 fL    MCH 29.7 26.6 - 33.0 pg    MCHC 34.4 31.5 - 35.7 g/dL    RDW 13.1 12.3 - 15.4 %    RDW-SD 40.8 37.0 - 54.0 fl    MPV 12.1 (H) 6.0 - 12.0 fL    Platelets 151 140 - 450 10*3/mm3    Neutrophil % 59.8 42.7 - 76.0 %    Lymphocyte % 30.7 19.6 - 45.3 %    Monocyte % 4.5 (L) 5.0 - 12.0 %    Eosinophil % 3.9 0.3 - 6.2 %    Basophil % 0.8 0.0 - 1.5 %    Immature Grans % 0.3 0.0 - 0.5 %    Neutrophils, Absolute 4.67 1.70 - 7.00 10*3/mm3     Lymphocytes, Absolute 2.39 0.70 - 3.10 10*3/mm3    Monocytes, Absolute 0.35 0.10 - 0.90 10*3/mm3    Eosinophils, Absolute 0.30 0.00 - 0.40 10*3/mm3    Basophils, Absolute 0.06 0.00 - 0.20 10*3/mm3    Immature Grans, Absolute 0.02 0.00 - 0.05 10*3/mm3    nRBC 0.0 0.0 - 0.2 /100 WBC     Assessment/Plan     Diagnoses and all orders for this visit:    1. DM type 2, uncontrolled, with neuropathy (CMS/HCC) (Primary)  Comments:  Findings and treatment options reviewed.  Will discontinue Soliqua and add a basal insulin: Tresiba. DexCom 6 prescribed.    Orders:  -     Insulin Degludec (Tresiba FlexTouch) 200 UNIT/ML solution pen-injector pen injection; Inject 80 Units under the skin into the appropriate area as directed Daily.  Dispense: 5 pen; Refill: 0  -     Discontinue: Continuous Blood Gluc Sensor (Dexcom G6 Sensor); Every 10 (Ten) Days.  Dispense: 9 each; Refill: 0  -     Discontinue: Continuous Blood Gluc  (Dexcom G6 ) device; 1 Device Daily.  Dispense: 1 each; Refill: 0  -     Discontinue: Continuous Blood Gluc Transmit (Dexcom G6 Transmitter) misc; 1 Device Daily.  Dispense: 1 each; Refill: 0  -     Discontinue: Insulin Disposable Pump (OmniPod Dash 5 Pack Pods) misc; 1 Device Every Other Day.  Dispense: 15 each; Refill: 0  -     Discontinue: Insulin Disposable Pump (OmniPod Dash System) kit; 1 Device Daily.  Dispense: 1 kit; Refill: 0  -     sitaGLIPtin-metFORMIN (Janumet)  MG per tablet; Take 1 tablet by mouth 2 (Two) Times a Day With Meals.  Dispense: 60 tablet; Refill: 0  -     Insulin Disposable Pump (OmniPod Dash System) kit; 1 Device Daily.  Dispense: 1 kit; Refill: 0  -     Insulin Disposable Pump (OmniPod Dash 5 Pack Pods) misc; 1 Device Every Other Day.  Dispense: 15 each; Refill: 0  -     Continuous Blood Gluc Transmit (Dexcom G6 Transmitter) misc; 1 Device Daily.  Dispense: 1 each; Refill: 0  -     Continuous Blood Gluc Sensor (Dexcom G6 Sensor); Every 10 (Ten) Days.  Dispense: 9  each; Refill: 0  -     Continuous Blood Gluc  (Dexcom G6 ) device; 1 Device Daily.  Dispense: 1 each; Refill: 0    2. Essential hypertension  Comments:  Cardiovascular risk reduction modifications including nutrition and activity recommendations.     3. Mixed dyslipidemia  Comments:  Continue Crestor and Zetia. May consider Vascepa due to Triglcyerides.       Follow Up: In May    Findings and recommendations discussed with Soheila.  Reviewed treatment options with her. Cardiovascular risk reduction modifications reinforced including nutrition and activity recommendations. Encouraged smoking cessation. She will follow up in May sooner if problems concerns occur.  Soheila was given instructions and counseling regarding her condition or for health maintenance advice. Please see specific information pulled into the AVS if appropriate      This document has been electronically signed by:

## 2021-04-25 RX ORDER — PROCHLORPERAZINE 25 MG/1
SUPPOSITORY RECTAL
Qty: 9 EACH | Refills: 0 | Status: SHIPPED | OUTPATIENT
Start: 2021-04-25 | End: 2021-05-11 | Stop reason: SDUPTHER

## 2021-04-25 RX ORDER — INSULIN PUMP CONTROLLER
1 EACH MISCELLANEOUS DAILY
Qty: 1 KIT | Refills: 0 | Status: SHIPPED | OUTPATIENT
Start: 2021-04-25 | End: 2021-05-11 | Stop reason: SDUPTHER

## 2021-04-25 RX ORDER — PROCHLORPERAZINE 25 MG/1
1 SUPPOSITORY RECTAL DAILY
Qty: 1 EACH | Refills: 0 | Status: SHIPPED | OUTPATIENT
Start: 2021-04-25 | End: 2021-05-11 | Stop reason: SDUPTHER

## 2021-04-25 RX ORDER — INSULIN PUMP CONTROLLER
1 EACH MISCELLANEOUS EVERY OTHER DAY
Qty: 15 EACH | Refills: 0 | Status: SHIPPED | OUTPATIENT
Start: 2021-04-25 | End: 2021-05-11 | Stop reason: SDUPTHER

## 2021-04-25 NOTE — ASSESSMENT & PLAN NOTE
40 mg.  Advised to avoid known GI triggers such as spicy foods.  Upright 30 minutes after meals and avoid eating large meals.  Several small meals daily as able to avoid overfilling stomach.

## 2021-04-25 NOTE — ASSESSMENT & PLAN NOTE
Continue as needed albuterol for shortness of breath.  Continue Trelegy as directed.  Continue Singulair 10 mg.  Avoid known allergy and respiratory triggers.  May use Saline spray PRN as desired

## 2021-04-25 NOTE — ASSESSMENT & PLAN NOTE
Continued elevation with elevated A1c.  Will make patient an appointment with CDE for evaluation of her diabetic medications and recommendations to better aid with glycemic control

## 2021-04-28 ENCOUNTER — TELEPHONE (OUTPATIENT)
Dept: FAMILY MEDICINE CLINIC | Facility: CLINIC | Age: 51
End: 2021-04-28

## 2021-04-28 NOTE — TELEPHONE ENCOUNTER
Pt was informed that it would be a week or so before she would hear anything from the [employer] company.

## 2021-04-28 NOTE — TELEPHONE ENCOUNTER
"PATIENT SAID THAT SHE WAS SUPPOSED TO HEAR FROM A MAIL SERVICE ABOUT A \"DEXCOM\" SHE HAS NOT HEARD ANYTHING YET AND WOULD LIKE A CALL BACK AS SOON AS POSSIBLE.    CONTACT: [phone]  "

## 2021-05-06 ENCOUNTER — OFFICE VISIT (OUTPATIENT)
Dept: FAMILY MEDICINE CLINIC | Facility: CLINIC | Age: 51
End: 2021-05-06

## 2021-05-06 VITALS
HEART RATE: 74 BPM | SYSTOLIC BLOOD PRESSURE: 120 MMHG | HEIGHT: 71 IN | DIASTOLIC BLOOD PRESSURE: 64 MMHG | WEIGHT: 264 LBS | BODY MASS INDEX: 36.96 KG/M2 | OXYGEN SATURATION: 100 % | TEMPERATURE: 96.8 F

## 2021-05-06 DIAGNOSIS — IMO0002 DM TYPE 2, UNCONTROLLED, WITH NEUROPATHY: Primary | Chronic | ICD-10-CM

## 2021-05-06 DIAGNOSIS — E78.5 DYSLIPIDEMIA: Chronic | ICD-10-CM

## 2021-05-06 DIAGNOSIS — I10 ESSENTIAL HYPERTENSION: Chronic | ICD-10-CM

## 2021-05-06 PROCEDURE — 99214 OFFICE O/P EST MOD 30 MIN: CPT | Performed by: NURSE PRACTITIONER

## 2021-05-06 RX ORDER — INSULIN LISPRO 100 [IU]/ML
INJECTION, SOLUTION INTRAVENOUS; SUBCUTANEOUS
Qty: 10 PEN | Refills: 2 | Status: SHIPPED | OUTPATIENT
Start: 2021-05-06 | End: 2021-06-24

## 2021-05-06 RX ORDER — LANCETS 28 GAUGE
1 EACH MISCELLANEOUS 3 TIMES DAILY
Qty: 100 EACH | Refills: 3 | Status: SHIPPED | OUTPATIENT
Start: 2021-05-06 | End: 2023-01-20

## 2021-05-06 RX ORDER — SITAGLIPTIN AND METFORMIN HYDROCHLORIDE 500; 50 MG/1; MG/1
1 TABLET, FILM COATED ORAL 2 TIMES DAILY WITH MEALS
Qty: 60 TABLET | Refills: 2 | Status: SHIPPED | OUTPATIENT
Start: 2021-05-06 | End: 2021-09-10

## 2021-05-09 RX ORDER — INSULIN DEGLUDEC 200 U/ML
90 INJECTION, SOLUTION SUBCUTANEOUS DAILY
Qty: 5 PEN | Refills: 0
Start: 2021-05-09 | End: 2021-06-24

## 2021-05-11 ENCOUNTER — TELEPHONE (OUTPATIENT)
Dept: FAMILY MEDICINE CLINIC | Facility: CLINIC | Age: 51
End: 2021-05-11

## 2021-05-11 DIAGNOSIS — IMO0002 DM TYPE 2, UNCONTROLLED, WITH NEUROPATHY: Chronic | ICD-10-CM

## 2021-05-11 RX ORDER — PROCHLORPERAZINE 25 MG/1
1 SUPPOSITORY RECTAL DAILY
Qty: 1 EACH | Refills: 0 | Status: SHIPPED | OUTPATIENT
Start: 2021-05-11 | End: 2021-07-08 | Stop reason: SDUPTHER

## 2021-05-11 RX ORDER — PROCHLORPERAZINE 25 MG/1
SUPPOSITORY RECTAL
Qty: 9 EACH | Refills: 0 | Status: SHIPPED | OUTPATIENT
Start: 2021-05-11 | End: 2021-07-08 | Stop reason: SDUPTHER

## 2021-05-11 RX ORDER — INSULIN PUMP CONTROLLER
1 EACH MISCELLANEOUS EVERY OTHER DAY
Qty: 15 EACH | Refills: 0 | Status: SHIPPED | OUTPATIENT
Start: 2021-05-11 | End: 2021-09-16

## 2021-05-11 RX ORDER — INSULIN PUMP CONTROLLER
1 EACH MISCELLANEOUS DAILY
Qty: 1 KIT | Refills: 0 | Status: SHIPPED | OUTPATIENT
Start: 2021-05-11

## 2021-05-11 NOTE — TELEPHONE ENCOUNTER
Caller: LAMAR     Relationship: Other    Best call back number: [phone]    LAMAR WITH [facility] CALLED AND STATED THEY RECEIVED A ORDER FOR A DEXCON G6 SENSOR BUT THEY ARE OUT OF THE PATIENT NETWORK

## 2021-05-12 RX ORDER — LEVOTHYROXINE SODIUM 0.1 MG/1
TABLET ORAL
Qty: 30 TABLET | Refills: 12 | Status: SHIPPED | OUTPATIENT
Start: 2021-05-12 | End: 2022-05-18

## 2021-05-13 ENCOUNTER — OFFICE VISIT (OUTPATIENT)
Dept: FAMILY MEDICINE CLINIC | Facility: CLINIC | Age: 51
End: 2021-05-13

## 2021-05-13 VITALS
SYSTOLIC BLOOD PRESSURE: 120 MMHG | WEIGHT: 261 LBS | DIASTOLIC BLOOD PRESSURE: 72 MMHG | OXYGEN SATURATION: 99 % | HEIGHT: 71 IN | HEART RATE: 80 BPM | TEMPERATURE: 97.3 F | BODY MASS INDEX: 36.54 KG/M2

## 2021-05-13 DIAGNOSIS — Z79.899 LONG-TERM USE OF HIGH-RISK MEDICATION: ICD-10-CM

## 2021-05-13 DIAGNOSIS — M51.27 PROTRUSION OF INTERVERTEBRAL DISC OF LUMBOSACRAL REGION: Primary | ICD-10-CM

## 2021-05-13 DIAGNOSIS — R11.0 NAUSEA: ICD-10-CM

## 2021-05-13 DIAGNOSIS — I10 ESSENTIAL HYPERTENSION: ICD-10-CM

## 2021-05-13 DIAGNOSIS — IMO0002 DM (DIABETES MELLITUS) TYPE II UNCONTROLLED, PERIPH VASCULAR DISORDER: ICD-10-CM

## 2021-05-13 PROCEDURE — 96372 THER/PROPH/DIAG INJ SC/IM: CPT | Performed by: NURSE PRACTITIONER

## 2021-05-13 PROCEDURE — 99214 OFFICE O/P EST MOD 30 MIN: CPT | Performed by: NURSE PRACTITIONER

## 2021-05-13 RX ORDER — KETOROLAC TROMETHAMINE 30 MG/ML
60 INJECTION, SOLUTION INTRAMUSCULAR; INTRAVENOUS ONCE
Status: COMPLETED | OUTPATIENT
Start: 2021-05-13 | End: 2021-05-13

## 2021-05-13 RX ORDER — NAPROXEN 500 MG/1
500 TABLET ORAL 2 TIMES DAILY PRN
Qty: 60 TABLET | Refills: 5 | Status: SHIPPED | OUTPATIENT
Start: 2021-05-13 | End: 2021-11-08

## 2021-05-13 RX ADMIN — KETOROLAC TROMETHAMINE 60 MG: 30 INJECTION, SOLUTION INTRAMUSCULAR; INTRAVENOUS at 13:09

## 2021-05-20 ENCOUNTER — PRIOR AUTHORIZATION (OUTPATIENT)
Dept: FAMILY MEDICINE CLINIC | Facility: CLINIC | Age: 51
End: 2021-05-20

## 2021-05-23 NOTE — ASSESSMENT & PLAN NOTE
Improvement values.  Continue under the care of CDE for insulin adjustment.  Encouraged low CHO high-protein diet.

## 2021-05-23 NOTE — ASSESSMENT & PLAN NOTE
Continue enalapril as directed.  Ambulatory BP monitoring either at home or random community checks.  Patient to report continued elevations >140/90.  Patient may come by office for checks if needed.

## 2021-06-02 RX ORDER — MEDROXYPROGESTERONE ACETATE 150 MG/ML
150 INJECTION, SUSPENSION INTRAMUSCULAR
Qty: 1 ML | Refills: 3 | Status: SHIPPED | OUTPATIENT
Start: 2021-06-02 | End: 2022-04-04 | Stop reason: SDUPTHER

## 2021-06-24 ENCOUNTER — OFFICE VISIT (OUTPATIENT)
Dept: FAMILY MEDICINE CLINIC | Facility: CLINIC | Age: 51
End: 2021-06-24

## 2021-06-24 DIAGNOSIS — I10 ESSENTIAL HYPERTENSION: Chronic | ICD-10-CM

## 2021-06-24 DIAGNOSIS — Z46.81 COUNSELING FOR INSULIN PUMP: ICD-10-CM

## 2021-06-24 DIAGNOSIS — E78.5 DYSLIPIDEMIA: Chronic | ICD-10-CM

## 2021-06-24 DIAGNOSIS — IMO0002 DM TYPE 2, UNCONTROLLED, WITH NEUROPATHY: Primary | Chronic | ICD-10-CM

## 2021-06-24 PROCEDURE — 99214 OFFICE O/P EST MOD 30 MIN: CPT | Performed by: NURSE PRACTITIONER

## 2021-06-24 RX ORDER — DICLOFENAC SODIUM 75 MG/1
TABLET, DELAYED RELEASE ORAL
COMMUNITY
Start: 2021-06-02 | End: 2021-07-14

## 2021-06-24 NOTE — PATIENT INSTRUCTIONS
Type 2 Diabetes Mellitus, Self Care, Adult  Caring for yourself after you have been diagnosed with type 2 diabetes (type 2 diabetes mellitus) means keeping your blood sugar (glucose) under control with a balance of:  · Nutrition.  · Exercise.  · Lifestyle changes.  · Medicines or insulin, if necessary.  · Support from your team of health care providers and others.  The following information explains what you need to know to manage your diabetes at home.  What are the risks?  Having diabetes can put you at risk for other long-term (chronic) conditions, such as heart disease and kidney disease. Your health care provider may prescribe medicines to help prevent complications from diabetes. These medicines may include:  · Aspirin.  · Medicine to lower cholesterol.  · Medicine to control blood pressure.  How to monitor blood glucose    · Check your blood glucose every day, as often as told by your health care provider.  · Have your A1c (hemoglobin A1c) level checked two or more times a year, or as often as told by your health care provider.  Your health care provider will set individualized treatment goals for you. Generally, the goal of treatment is to maintain the following blood glucose levels:  · Before meals (preprandial):  mg/dL (4.4-7.2 mmol/L).  · After meals (postprandial): below 180 mg/dL (10 mmol/L).  · A1c level: less than 7%.  How to manage hyperglycemia and hypoglycemia  Hyperglycemia symptoms  Hyperglycemia, also called high blood glucose, occurs when blood glucose is too high. Make sure you know the early signs of hyperglycemia, such as:  · Increased thirst.  · Hunger.  · Feeling very tired.  · Needing to urinate more often than usual.  · Blurry vision.  Hypoglycemia symptoms  Hypoglycemia, also called low blood glucose, occurs with a blood glucose level at or below 70 mg/dL (3.9 mmol/L). The risk for hypoglycemia increases during or after exercise, during sleep, during illness, and when skipping  meals or not eating for a long time (fasting).  It is important to know the symptoms of hypoglycemia and treat it right away. Always have a 15-gram rapid-acting carbohydrate snack with you to treat low blood glucose. Family members and close friends should also know the symptoms and should understand how to treat hypoglycemia, in case you are not able to treat yourself. Symptoms may include:  · Hunger.  · Anxiety.  · Sweating and feeling clammy.  · Confusion.  · Dizziness or feeling light-headed.  · Sleepiness.  · Nausea.  · Increased heart rate.  · Headache.  · Blurry vision.  · Irritability.  · A change in coordination.  · Tingling or numbness around the mouth, lips, or tongue.  · Restless sleep.  · Fainting.  · Seizure.  Treating hypoglycemia  If you are alert and able to swallow safely, follow the 15:15 rule:  · Take 15 grams of a rapid-acting carbohydrate. Talk with your health care provider about how much you should take.  · Rapid-acting options include:  ? Glucose pills (take 15 grams).  ? 6-8 pieces of hard candy.  ? 4-6 oz (120-150 mL) of fruit juice.  ? 4-6 oz (120-150 mL) of regular (not diet) soda.  ? 1 Tbsp (15 mL) honey or sugar.  · Check your blood glucose 15 minutes after you take the carbohydrate.  · If the repeat blood glucose level is still at or below 70 mg/dL (3.9 mmol/L), take 15 grams of a carbohydrate again.  · If your blood glucose level does not increase above 70 mg/dL (3.9 mmol/L) after 3 tries, seek emergency medical care.  · After your blood glucose level returns to normal, eat a meal or a snack within 1 hour.  Treating severe hypoglycemia  Severe hypoglycemia is when your blood glucose level is at or below 54 mg/dL (3 mmol/L). Severe hypoglycemia is an emergency. Do not wait to see if the symptoms will go away. Get medical help right away. Call your local emergency services (911 in the [place]  If you have severe hypoglycemia and you cannot eat or drink, you may need an injection of  glucagon. A family member or close friend should learn how to check your blood glucose and how to give you a glucagon injection. Ask your health care provider if you need to have an emergency glucagon injection kit available.  Severe hypoglycemia may need to be treated in a hospital. The treatment may include getting glucose through an IV. You may also need treatment for the cause of your hypoglycemia.  Follow these instructions at home:  Take diabetes medicines as told  · If your health care provider prescribed insulin or diabetes medicines, take them every day.  · Do not run out of insulin or other diabetes medicines that you take. Plan ahead so you always have these available.  · If you use insulin, adjust your dosage based on how physically active you are and what foods you eat. Your health care provider will tell you how to adjust your dosage.  Make healthy food choices    The things that you eat and drink affect your blood glucose and your insulin dosage. Making good choices helps to control your diabetes and prevent other health problems. A healthy meal plan includes eating lean proteins, complex carbohydrates, fresh fruits and vegetables, low-fat dairy products, and healthy fats.  Make an appointment to see a diet and nutrition specialist (registered dietitian) to help you create an eating plan that is right for you. Make sure that you:  · Follow instructions from your health care provider about eating or drinking restrictions.  · Drink enough fluid to keep your urine pale yellow.  · Keep a record of the carbohydrates that you eat. Do this by reading food labels and learning the standard serving sizes of foods.  · Follow your sick day plan whenever you cannot eat or drink as usual. Make this plan in advance with your health care provider.    Stay active  Exercise regularly, as told by your health care provider. This may include:  · Stretching and doing strength exercises, such as yoga or weightlifting, 2 or  more times a week.  · Doing 150 minutes or more of moderate-intensity or vigorous-intensity exercise each week. This could be brisk walking, biking, or water aerobics.  ? Spread out your activity over 3 or more days of the week.  ? Do not go more than 2 days in a row without doing some kind of physical activity.  When you start a new exercise or activity, work with your health care provider to adjust your insulin, medicines, or food intake as needed.  Make healthy lifestyle choices  · Do not use any tobacco products, such as cigarettes, chewing tobacco, and e-cigarettes. If you need help quitting, ask your health care provider.  · If your health care provider says that alcohol is safe for you, limit alcohol intake to no more than 1 drink per day for nonpregnant women and 2 drinks per day for men. One drink equals 12 oz of beer (355 mL), 5 oz of wine (148 mL), or 1½ oz of hard liquor (44 mL).  · Learn to manage stress. If you need help with this, ask your health care provider.  Care for your body    · Keep your immunizations up to date. In addition to getting vaccinations as told by your health care provider, it is recommended that you get vaccinated against the following illnesses:  ? The flu (influenza). Get a flu shot every year.  ? Pneumonia.  ? Hepatitis B.  · Schedule an eye exam soon after your diagnosis, and then one time every year after that.  · Check your skin and feet every day for cuts, bruises, redness, blisters, or sores. Schedule a foot exam with your health care provider once every year.  · Brush your teeth and gums two times a day, and floss one or more times a day. Visit your dentist one or more times every 6 months.  · Maintain a healthy weight.  General instructions  · Take over-the-counter and prescription medicines only as told by your health care provider.  · Share your diabetes management plan with people in your workplace, school, and household.  · Carry a medical alert card or wear medical  alert amol.  · Keep all follow-up visits as told by your health care provider. This is important.  Questions to ask your health care provider  · Do I need to meet with a diabetes educator?  · Where can I find a support group for people with diabetes?  Where to find more information  For more information about diabetes, visit:  · American Diabetes Association (ADA): www.diabetes.org  · American Association of Diabetes Educators (AADE): www.diabeteseducator.org  Summary  · Caring for yourself after you have been diagnosed with (type 2 diabetes mellitus) means keeping your blood sugar (glucose) under control with a balance of nutrition, exercise, lifestyle changes, and medicine.  · Check your blood glucose every day, as often as told by your health care provider.  · Having diabetes can put you at risk for other long-term (chronic) conditions, such as heart disease and kidney disease. Your health care provider may prescribe medicines to help prevent complications from diabetes.  · Keep all follow-up visits as told by your health care provider. This is important.  This information is not intended to replace advice given to you by your health care provider. Make sure you discuss any questions you have with your health care provider.  Document Revised: 06/10/2019 Document Reviewed: 01/20/2017  Elsevier Patient Education © 2020 [employer]

## 2021-06-24 NOTE — PROGRESS NOTES
History of Present Illness  Soheila Brewster is a 51 y.o. female who presents to the clinic today pertaining to her DM, type 2 which is uncontrolled with a recent A1C of over 10. In addition, she has HTN, Dyslipidemia and DDD. She has received her Omnipod insulin pump which she will be trained on today.     Diabetes  She presents for her follow up diabetes visit. She has type 2 diabetes mellitus. Her disease course has been worsening. Associated symptoms include foot paresthesias, polydipsia, polyuria and visual change. Diabetic complications include peripheral neuropathy. Risk factors for coronary artery disease include hypertension, diabetes mellitus, dyslipidemia, stress and tobacco exposure. Current diabetic treatment includes Trulicity, oral agent (dual therapy) and intensive insulin program.  An ACE inhibitor/angiotensin II receptor blocker is being taken. She does not see a podiatrist.Eye exam is current.   Lab Results   Component Value Date    HGBA1C 10.70 (H) 04/13/2021     Dyslipidemia  Current antihyperlipidemic treatment includes ezetimibe and statins. Risk factors for coronary artery disease include diabetes mellitus, dyslipidemia, hypertension, obesity and a sedentary lifestyle.   Lab Results   Component Value Date    CHOL 90 04/13/2021    CHOL 93 10/19/2020    CHOL 110 11/22/2019     Lab Results   Component Value Date    TRIG 382 (H) 04/13/2021    TRIG 223 (H) 10/19/2020    TRIG 244 (H) 11/22/2019     Lab Results   Component Value Date    HDL 27 (L) 04/13/2021    HDL 31 (L) 10/19/2020    HDL 30 (L) 11/22/2019     Lab Results   Component Value Date    LDL 11 04/13/2021    LDL 27 10/19/2020    LDL 31 11/22/2019     Hypertension  The current episode started more than 1 year ago. Risk factors for coronary artery disease include diabetes mellitus, dyslipidemia, obesity, sedentary lifestyle and smoking/tobacco exposure. Current antihypertension treatment includes ACE inhibitors and diuretics.   Lab Results  "  Component Value Date    GLUCOSE 382 (H) 04/13/2021    BUN 13 04/13/2021    CREATININE 0.74 04/13/2021    EGFRIFNONA 83 04/13/2021    BCR 17.6 04/13/2021    K 4.8 04/13/2021    CO2 22.9 04/13/2021    CALCIUM 9.6 04/13/2021    ALBUMIN 3.70 04/13/2021    AST 45 (H) 04/13/2021    ALT 56 (H) 04/13/2021     The following portions of the patient's history were reviewed and updated as appropriate: allergies, current medications, past family history, past medical history, past social history, past surgical history and problem list.    Review of Systems   Constitutional: Positive for fatigue. Negative for activity change, appetite change, chills, fever and unexpected weight change.   HENT: Negative.    Eyes: Positive for visual disturbance.   Respiratory: Negative for cough, shortness of breath and wheezing.    Cardiovascular: Positive for leg swelling. Negative for chest pain and palpitations.   Gastrointestinal: Negative for constipation, diarrhea, nausea and vomiting.   Endocrine: Negative for cold intolerance, heat intolerance, polydipsia, polyphagia and polyuria.   Musculoskeletal: Positive for arthralgias, back pain and gait problem.   Skin: Negative for color change and rash.   Neurological: Positive for weakness and numbness. Negative for dizziness, tremors, speech difficulty, light-headedness and headaches.   Hematological: Negative for adenopathy.   Psychiatric/Behavioral: Positive for sleep disturbance. Negative for confusion, decreased concentration and suicidal ideas. The patient is not nervous/anxious.    All other systems reviewed and are negative.    Vital signs:  /70   Pulse 88   Temp 97.1 °F (36.2 °C) (Tympanic)   Resp 14   Ht 180.3 cm (70.98\")   Wt 120 kg (264 lb)   SpO2 93%   BMI 36.84 kg/m²     Physical Exam  Vitals and nursing note reviewed.   Constitutional:       General: She is not in acute distress.     Appearance: She is well-developed.   HENT:      Head: Normocephalic.      Nose: " Nose normal.   Eyes:      General:         Right eye: No discharge.         Left eye: No discharge.      Conjunctiva/sclera: Conjunctivae normal.   Neck:      Thyroid: No thyromegaly.      Vascular: No JVD.   Cardiovascular:      Rate and Rhythm: Normal rate and regular rhythm.      Heart sounds: Normal heart sounds. No murmur heard.   No friction rub.   Pulmonary:      Effort: Pulmonary effort is normal. No respiratory distress.      Breath sounds: Examination of the right-upper field reveals wheezing. Examination of the left-upper field reveals wheezing. Decreased breath sounds and wheezing present. No rhonchi or rales.   Abdominal:      General: There is no distension.      Palpations: Abdomen is soft.      Tenderness: There is no abdominal tenderness. There is no guarding or rebound.   Musculoskeletal:      Cervical back: Neck supple.      Right lower leg: Edema (Mild) present.      Left lower leg: Edema (Mild) present.   Lymphadenopathy:      Cervical: No cervical adenopathy.   Skin:     General: Skin is warm.      Capillary Refill: Capillary refill takes less than 2 seconds.   Neurological:      Mental Status: She is alert and oriented to person, place, and time.      Cranial Nerves: Cranial nerves are intact.   Psychiatric:         Mood and Affect: Mood normal.         Speech: Speech normal.         Behavior: Behavior normal.         Thought Content: Thought content normal.         Cognition and Memory: Cognition normal.     Patient's Body mass index is 36.84 kg/m². indicating that she is obese (BMI >30). Obesity-related health conditions include the following: hypertension, diabetes mellitus, dyslipidemias and osteoarthritis. Obesity is unchanged. BMI is is above average; BMI management plan is completed.Provided information on portion control and increasing exercise..     Assessment/Plan     Diagnoses and all orders for this visit:    1. DM type 2, uncontrolled, with neuropathy (CMS/HCC)  (Primary)  Comments:  Continue to encourage glycemic control with lifestyle modifications  Orders:  -     insulin lispro (HumaLOG) 100 UNIT/ML injection; Maximum daily dose of 100 units per insulin pump  Dispense: 30 mL; Refill: 2    2. Counseling for insulin pump  Comments:  Counseled regarding OmniPod     3. Essential hypertension  Comments:  Continue Vasotec    4. Dyslipidemia  Comments:  Continue Crestor 40 mg and Zetia       Follow Up: July 2 nd   Findings and recommendations discussed with Soheila.  Reviewed the correct use of insulin pump therapy using the OmniPod. Insulin pump parameters include:   Basal Rate: 12:00am-12:00am 1.55 units/hr  Insulin/Carb Ratio 6 Units   Correction Factor 22 with target of 120 mg/dL  PreSet Meals: 12 Units  Snacks: 4 Units  Continue to encourage lifestyle modifications including nutrition and activity recommendations. She will follow up on July 2 nd sooner if problems/concerns occur.  Soheila was given instructions and counseling regarding her condition or for health maintenance advice. Please see specific information pulled into the AVS if appropriate    This document has been electronically signed by:

## 2021-06-26 VITALS
TEMPERATURE: 97.1 F | OXYGEN SATURATION: 93 % | HEART RATE: 88 BPM | RESPIRATION RATE: 14 BRPM | BODY MASS INDEX: 36.96 KG/M2 | HEIGHT: 71 IN | WEIGHT: 264 LBS | DIASTOLIC BLOOD PRESSURE: 70 MMHG | SYSTOLIC BLOOD PRESSURE: 150 MMHG

## 2021-06-26 PROBLEM — Z46.81 COUNSELING FOR INSULIN PUMP: Status: ACTIVE | Noted: 2021-06-26

## 2021-06-26 PROBLEM — E78.5 DYSLIPIDEMIA: Status: ACTIVE | Noted: 2021-06-26

## 2021-07-08 ENCOUNTER — OFFICE VISIT (OUTPATIENT)
Dept: FAMILY MEDICINE CLINIC | Facility: CLINIC | Age: 51
End: 2021-07-08

## 2021-07-08 VITALS
HEART RATE: 75 BPM | OXYGEN SATURATION: 99 % | DIASTOLIC BLOOD PRESSURE: 78 MMHG | WEIGHT: 269 LBS | BODY MASS INDEX: 37.66 KG/M2 | HEIGHT: 71 IN | TEMPERATURE: 96.9 F | SYSTOLIC BLOOD PRESSURE: 120 MMHG

## 2021-07-08 DIAGNOSIS — Z96.41 INSULIN PUMP IN PLACE: ICD-10-CM

## 2021-07-08 DIAGNOSIS — J30.9 CHRONIC ALLERGIC RHINITIS: Chronic | ICD-10-CM

## 2021-07-08 DIAGNOSIS — IMO0002 DM TYPE 2, UNCONTROLLED, WITH NEUROPATHY: Primary | Chronic | ICD-10-CM

## 2021-07-08 DIAGNOSIS — E78.5 DYSLIPIDEMIA: Chronic | ICD-10-CM

## 2021-07-08 DIAGNOSIS — I10 ESSENTIAL HYPERTENSION: Chronic | ICD-10-CM

## 2021-07-08 PROCEDURE — 95251 CONT GLUC MNTR ANALYSIS I&R: CPT | Performed by: NURSE PRACTITIONER

## 2021-07-08 PROCEDURE — 99214 OFFICE O/P EST MOD 30 MIN: CPT | Performed by: NURSE PRACTITIONER

## 2021-07-08 RX ORDER — PROCHLORPERAZINE 25 MG/1
1 SUPPOSITORY RECTAL DAILY
Qty: 1 EACH | Refills: 0 | Status: SHIPPED | OUTPATIENT
Start: 2021-07-08 | End: 2021-07-14 | Stop reason: SDUPTHER

## 2021-07-08 RX ORDER — ONDANSETRON 4 MG/1
TABLET, ORALLY DISINTEGRATING ORAL
Qty: 20 TABLET | Refills: 0 | Status: SHIPPED | OUTPATIENT
Start: 2021-07-08 | End: 2022-11-08

## 2021-07-08 RX ORDER — PROCHLORPERAZINE 25 MG/1
1 SUPPOSITORY RECTAL DAILY
Qty: 1 EACH | Refills: 0 | Status: SHIPPED | OUTPATIENT
Start: 2021-07-08 | End: 2021-10-12 | Stop reason: SDUPTHER

## 2021-07-08 RX ORDER — PROCHLORPERAZINE 25 MG/1
SUPPOSITORY RECTAL
Qty: 9 EACH | Refills: 0 | Status: SHIPPED | OUTPATIENT
Start: 2021-07-08 | End: 2021-08-05 | Stop reason: SDUPTHER

## 2021-07-08 NOTE — PROGRESS NOTES
History of Present Illness  Soheila Brewster is a 51 y.o. female who presents to the clinic today pertaining to her DM, type 2 which is uncontrolled with a recent A1C of over 10. In addition, she has HTN, Dyslipidemia and DDD. She has recently been instructed and had started her Omnipod insulin pump. She is c/o upper respiratory symptoms which started several days ago.     Diabetes  She presents for her follow up diabetes visit. She has type 2 diabetes mellitus. Her disease course has been worsening. Associated symptoms include foot paresthesias, polydipsia, polyuria and visual change. Diabetic complications include peripheral neuropathy. Risk factors for coronary artery disease include hypertension, diabetes mellitus, dyslipidemia, stress and tobacco exposure. Current diabetic treatment includes Trulicity, oral agent (dual therapy) and intensive insulin program.  An ACE inhibitor/angiotensin II receptor blocker is being taken. She does not see a podiatrist.Eye exam is current.   Lab Results   Component Value Date    HGBA1C 10.70 (H) 04/13/2021     Dyslipidemia  Current antihyperlipidemic treatment includes ezetimibe and statins. Risk factors for coronary artery disease include diabetes mellitus, dyslipidemia, hypertension, obesity and a sedentary lifestyle.   Lab Results   Component Value Date    CHOL 90 04/13/2021    CHOL 93 10/19/2020    CHOL 110 11/22/2019     Lab Results   Component Value Date    TRIG 382 (H) 04/13/2021    TRIG 223 (H) 10/19/2020    TRIG 244 (H) 11/22/2019     Lab Results   Component Value Date    HDL 27 (L) 04/13/2021    HDL 31 (L) 10/19/2020    HDL 30 (L) 11/22/2019     Lab Results   Component Value Date    LDL 11 04/13/2021    LDL 27 10/19/2020    LDL 31 11/22/2019     Hypertension  The current episode started more than 1 year ago. Risk factors for coronary artery disease include diabetes mellitus, dyslipidemia, obesity, sedentary lifestyle and smoking/tobacco exposure. Current  antihypertension treatment includes ACE inhibitors and diuretics.   Lab Results   Component Value Date    GLUCOSE 382 (H) 04/13/2021    BUN 13 04/13/2021    CREATININE 0.74 04/13/2021    EGFRIFNONA 83 04/13/2021    BCR 17.6 04/13/2021    K 4.8 04/13/2021    CO2 22.9 04/13/2021    CALCIUM 9.6 04/13/2021    ALBUMIN 3.70 04/13/2021    AST 45 (H) 04/13/2021    ALT 56 (H) 04/13/2021     The following portions of the patient's history were reviewed and updated as appropriate: allergies, current medications, past family history, past medical history, past social history, past surgical history and problem list.    Review of Systems   Constitutional: Negative for activity change, appetite change, chills, fatigue, fever and unexpected weight change.   HENT: Positive for congestion, postnasal drip, rhinorrhea, sinus pressure and sneezing. Negative for ear discharge, facial swelling, tinnitus and trouble swallowing. Ear pain: Pressure.    Eyes: Negative for visual disturbance.   Respiratory: Negative for cough, chest tightness, shortness of breath and wheezing.    Cardiovascular: Negative for chest pain, palpitations and leg swelling.   Gastrointestinal: Positive for nausea. Negative for abdominal pain, constipation, diarrhea and vomiting.   Endocrine: Negative for cold intolerance, heat intolerance, polydipsia, polyphagia and polyuria.   Musculoskeletal: Positive for arthralgias, back pain and gait problem.   Skin: Negative for color change and rash.   Allergic/Immunologic: Positive for environmental allergies.   Neurological: Positive for dizziness and numbness. Negative for tremors, speech difficulty, weakness, light-headedness and headaches.   Hematological: Negative for adenopathy.   Psychiatric/Behavioral: Positive for sleep disturbance. Negative for confusion, decreased concentration and suicidal ideas. The patient is not nervous/anxious.    All other systems reviewed and are negative.    Vital signs:  /78 (BP  "Location: Left arm, Patient Position: Sitting, Cuff Size: Adult)   Pulse 75   Temp 96.9 °F (36.1 °C) (Temporal)   Ht 180.3 cm (70.98\")   Wt 122 kg (269 lb)   SpO2 99%   BMI 37.53 kg/m²     Physical Exam  Vitals and nursing note reviewed.   Constitutional:       General: She is not in acute distress.     Appearance: She is well-developed.      Comments: Pleasant; reports she is feeling so much better with increase in energy.    HENT:      Head: Normocephalic.      Right Ear: A middle ear effusion is present. Tympanic membrane is bulging. Tympanic membrane is not erythematous.      Left Ear: A middle ear effusion is present. Tympanic membrane is bulging. Tympanic membrane is not erythematous.      Nose: Mucosal edema, congestion and rhinorrhea present.      Right Turbinates: Enlarged and swollen.      Left Turbinates: Enlarged and swollen.      Right Sinus: Maxillary sinus tenderness and frontal sinus tenderness present.      Left Sinus: Maxillary sinus tenderness and frontal sinus tenderness present.   Eyes:      General:         Right eye: No discharge.         Left eye: No discharge.      Conjunctiva/sclera: Conjunctivae normal.      Pupils: Pupils are equal, round, and reactive to light.   Neck:      Thyroid: No thyromegaly.      Vascular: No JVD.   Cardiovascular:      Rate and Rhythm: Normal rate and regular rhythm.      Heart sounds: Normal heart sounds. No murmur heard.   No friction rub.   Pulmonary:      Effort: Pulmonary effort is normal. No respiratory distress.      Breath sounds: Normal breath sounds. No wheezing or rales.   Abdominal:      General: Bowel sounds are normal. There is no distension.      Palpations: Abdomen is soft.      Tenderness: There is no abdominal tenderness. There is no guarding or rebound.   Musculoskeletal:         General: No tenderness.      Cervical back: Normal range of motion and neck supple.   Lymphadenopathy:      Cervical: No cervical adenopathy.   Skin:     " General: Skin is warm and dry.      Findings: No erythema or rash.   Neurological:      Mental Status: She is alert and oriented to person, place, and time.   Psychiatric:         Behavior: Behavior normal. Behavior is cooperative.         Thought Content: Thought content normal.         Judgment: Judgment normal.       Patient's Body mass index is 37.53 kg/m². indicating that she is obese (BMI >30). Obesity-related health conditions include the following: hypertension, diabetes mellitus, dyslipidemias, GERD and osteoarthritis. Obesity is worsening. BMI is is above average; BMI management plan is completed. We discussed portion control and increasing exercise..    Assessment/Plan     Diagnoses and all orders for this visit:    1. DM type 2, uncontrolled, with neuropathy (CMS/HCC) (Primary)  Comments:  Findings and treatment options reviewed. She has not received her CMG will continue to attempt to facilitate this which I feel she would benefit  Orders:  -     Continuous Blood Gluc Transmit (Dexcom G6 Transmitter) misc; 1 Device Daily.  Dispense: 1 each; Refill: 0  -     Continuous Blood Gluc Sensor (Dexcom G6 Sensor); Every 10 (Ten) Days.  Dispense: 9 each; Refill: 0  -     Continuous Blood Gluc  (Dexcom G6 ) device; 1 Device Daily.  Dispense: 1 each; Refill: 0    2. Insulin pump in place  Comments:  OmniPod uploaded and parameters changed Increased her basal to 1.9 U/hr Turned on her calculator lauren she can do corrections     3. Essential hypertension  Comments:  Well controlled. Continue Vasotec and Bumex    4. Dyslipidemia  Comments:  Continue Zetia and Crestor     5. Chronic allergic rhinitis  Comments:  She has not been using her Flonase which I encouraged her to restart. If symptoms persist, she can call the clinic and I will prescribe an antibiotic    Other orders  -     ondansetron ODT (Zofran ODT) 4 MG disintegrating tablet; 1 to 2 tablets every 8 hours if needed for nausea  Dispense: 20  tablet; Refill: 0    Follow Up July 29 th, 2021  Findings and treatment options reviewed. She has not received her CMG will continue to attempt to facilitate this which I feel she would benefit  Soheila was given instructions and counseling regarding her condition or for health maintenance advice. Please see specific information pulled into the AVS if appropriate  This document has been electronically signed by:

## 2021-07-08 NOTE — PATIENT INSTRUCTIONS
Type 2 Diabetes Mellitus, Self-Care, Adult  Caring for yourself after you have been diagnosed with type 2 diabetes (type 2 diabetes mellitus) means keeping your blood sugar (glucose) under control with a balance of:  · Nutrition.  · Exercise.  · Lifestyle changes.  · Medicines or insulin, if needed.  · Support from your team of health care providers and others.  The following information explains what you need to know to manage your diabetes at home.  What are the risks?  Having diabetes can put you at risk for other long-term (chronic) conditions, such as heart disease and kidney disease. Your health care provider may prescribe medicines to help prevent complications from diabetes.  How to monitor blood glucose    · Check your blood glucose every day, as often as told by your health care provider.  · Have your A1C (hemoglobin A1C) level checked two or more times a year, or as often as told by your health care provider.  · Your health care provider will set personalized treatment goals for you. Generally, the goal of treatment is to maintain the following blood glucose levels:  ? Before meals:  mg/dL (4.4-7.2 mmol/L).  ? After meals: below 180 mg/dL (10 mmol/L).  ? A1C level: less than 7%.  How to manage hyperglycemia and hypoglycemia  Hyperglycemia symptoms  Hyperglycemia, also called high blood glucose, occurs when blood glucose is too high. Make sure you know the early signs of hyperglycemia, such as:  · Increased thirst.  · Hunger.  · Feeling very tired.  · Needing to urinate more often than usual.  · Blurry vision.  Hypoglycemia symptoms  Hypoglycemia, also called low blood glucose, occurs with a blood glucose level at or below 70 mg/dL (3.9 mmol/L). Diabetes medicines lower your blood glucose and can cause hypoglycemia. The risk for hypoglycemia increases during or after exercise, during sleep, during illness, and when skipping meals or not eating for a long time (fasting).  It is important to know the  symptoms of hypoglycemia and treat it right away. Always have a 15-gram rapid-acting carbohydrate snack with you to treat low blood glucose. Family members and close friends should also know the symptoms and understand how to treat hypoglycemia, in case you are not able to treat yourself. Symptoms may include:  · Hunger.  · Anxiety.  · Sweating and feeling clammy.  · Dizziness or feeling light-headed.  · Sleepiness.  · Increased heart rate.  · Irritability.  · Tingling or numbness around the mouth, lips, or tongue.  · Restless sleep.  Severe hypoglycemia is when your blood glucose level is at or below 54 mg/dL (3 mmol/L). Severe hypoglycemia is an emergency. Do not wait to see if the symptoms will go away. Get medical help right away. Call your local emergency services (911 in the [place] Do not drive yourself to the hospital.  If you have severe hypoglycemia and you cannot eat or drink, you may need glucagon. A family member or close friend should learn how to check your blood glucose and how to give you glucagon. Ask your health care provider if you need to have an emergency glucagon kit available.  Follow these instructions at home:  Medicines  · Take diabetes medicines as told by your health care provider. If your health care provider prescribed insulin or diabetes medicines, take them every day.  · Do not run out of insulin or other diabetes medicines. Plan ahead so you always have these available.  · If you use insulin, adjust your dosage based on your physical activity and what foods you eat. Your health care provider will tell you how to adjust your dosage.  · Take over-the-counter and prescription medicines only as told by your health care provider.  Eating and drinking    What you eat and drink affects your blood glucose and your insulin dosage. Making good choices helps to control your diabetes and prevent other health problems. A healthy meal plan includes eating lean proteins, complex carbohydrates,  fresh fruits and vegetables, low-fat dairy products, and healthy fats.  Make an appointment to see a registered dietitian to help you create an eating plan that is right for you. Make sure that you:  · Follow instructions from your health care provider about eating or drinking restrictions.  · Drink enough fluid to keep your urine pale yellow.  · Keep a record of the carbohydrates that you eat. Do this by reading food labels and learning the standard serving sizes of foods.  · Follow your sick-day plan whenever you cannot eat or drink as usual. Make this plan in advance with your health care provider.    Activity  · Stay active. Exercise regularly, as told by your health care provider. This may include:  ? Stretching and doing strength exercises, such as yoga or weight lifting, 2 or more times a week.  ? Doing 150 minutes or more of moderate-intensity or vigorous-intensity exercise each week. This could be brisk walking, biking, or water aerobics.  § Spread out your activity over 3 or more days of the week.  § Do not go more than 2 days in a row without doing some kind of physical activity.  · When you start a new exercise or activity, work with your health care provider to adjust your insulin, medicines, or food intake as needed.  Lifestyle  · Do not use any products that contain nicotine or tobacco, such as cigarettes, e-cigarettes, and chewing tobacco. If you need help quitting, ask your health care provider.  · If your health care provider says that alcohol is safe for you, limit how much you use to no more than 1 drink a day for women who are not pregnant and 2 drinks a day for men. In the [place] one drink equals one 12 oz bottle of beer (355 mL), one 5 oz glass of wine (148 mL), or one 1½ oz glass of hard liquor (44 mL).  · Learn to manage stress. If you need help with this, ask your health care provider.  Take care of your body    · Keep your immunizations up to date. In addition to getting vaccinations as  told by your health care provider, it is recommended that you get vaccinated against the following illnesses:  ? The flu (influenza). Get a flu shot every year.  ? Pneumonia.  ? Hepatitis B.  · Schedule an eye exam soon after your diagnosis, and then one time every year after that.  · Check your skin and feet every day for cuts, bruises, redness, blisters, or sores. Schedule a foot exam with your health care provider once every year.  · Brush your teeth and gums two times a day, and floss one or more times a day. Visit your dentist one or more times every 6 months.  · Maintain a healthy weight.  General instructions  · Share your diabetes management plan with people in your workplace, school, and household.  · Carry a medical alert card or wear medical alert jewelry.  · Keep all follow-up visits as told by your health care provider. This is important.  Questions to ask your health care provider  · Should I meet with a certified diabetes care and ?  · Where can I find a support group for people with diabetes?  Where to find more information  · American Diabetes Association (ADA): www.diabetes.org  · American Association of Diabetes Care and Education Specialists (ADCES): www.diabeteseducator.org  · International Diabetes Federation (IDF): www.idf.org  Summary  · Caring for yourself after you have been diagnosed with type 2 diabetes (type 2 diabetes mellitus) means keeping your blood sugar (glucose) under control with a balance of nutrition, exercise, lifestyle changes, and medicine.  · Check your blood glucose every day, as often as told by your health care provider.  · Having diabetes can put you at risk for other long-term (chronic) conditions, such as heart disease and kidney disease. Your health care provider may prescribe medicines to help prevent complications from diabetes.  · Share your diabetes management plan with people in your workplace, school, and household.  · Keep all follow-up  visits as told by your health care provider. This is important.  This information is not intended to replace advice given to you by your health care provider. Make sure you discuss any questions you have with your health care provider.  Document Revised: 01/25/2021 Document Reviewed: 01/26/2021  [employer] Patient Education © 2021 [employer]

## 2021-07-09 DIAGNOSIS — E04.1 LEFT THYROID NODULE: Primary | ICD-10-CM

## 2021-07-10 PROBLEM — Z96.41 INSULIN PUMP IN PLACE: Status: ACTIVE | Noted: 2021-06-26

## 2021-07-14 ENCOUNTER — OFFICE VISIT (OUTPATIENT)
Dept: FAMILY MEDICINE CLINIC | Facility: CLINIC | Age: 51
End: 2021-07-14

## 2021-07-14 VITALS
DIASTOLIC BLOOD PRESSURE: 80 MMHG | BODY MASS INDEX: 37.1 KG/M2 | OXYGEN SATURATION: 98 % | WEIGHT: 265 LBS | HEIGHT: 71 IN | TEMPERATURE: 97.2 F | HEART RATE: 86 BPM | SYSTOLIC BLOOD PRESSURE: 124 MMHG

## 2021-07-14 DIAGNOSIS — J30.9 CHRONIC ALLERGIC RHINITIS: ICD-10-CM

## 2021-07-14 DIAGNOSIS — R34 DECREASED URINE OUTPUT: ICD-10-CM

## 2021-07-14 DIAGNOSIS — IMO0002 DM (DIABETES MELLITUS) TYPE II UNCONTROLLED, PERIPH VASCULAR DISORDER: ICD-10-CM

## 2021-07-14 DIAGNOSIS — IMO0002 DM TYPE 2, UNCONTROLLED, WITH NEUROPATHY: Primary | Chronic | ICD-10-CM

## 2021-07-14 DIAGNOSIS — M51.27 PROTRUSION OF INTERVERTEBRAL DISC OF LUMBOSACRAL REGION: ICD-10-CM

## 2021-07-14 DIAGNOSIS — J32.0 CHRONIC MAXILLARY SINUSITIS: ICD-10-CM

## 2021-07-14 LAB
BILIRUB BLD-MCNC: NEGATIVE MG/DL
CLARITY, POC: CLEAR
COLOR UR: YELLOW
GLUCOSE UR STRIP-MCNC: NEGATIVE MG/DL
KETONES UR QL: NEGATIVE
LEUKOCYTE EST, POC: NEGATIVE
NITRITE UR-MCNC: NEGATIVE MG/ML
PH UR: 6 [PH] (ref 5–8)
PROT UR STRIP-MCNC: NEGATIVE MG/DL
RBC # UR STRIP: NEGATIVE /UL
SP GR UR: 1.01 (ref 1–1.03)
UROBILINOGEN UR QL: NORMAL

## 2021-07-14 PROCEDURE — 99214 OFFICE O/P EST MOD 30 MIN: CPT | Performed by: NURSE PRACTITIONER

## 2021-07-14 PROCEDURE — 96372 THER/PROPH/DIAG INJ SC/IM: CPT | Performed by: NURSE PRACTITIONER

## 2021-07-14 PROCEDURE — 81003 URINALYSIS AUTO W/O SCOPE: CPT | Performed by: NURSE PRACTITIONER

## 2021-07-14 RX ORDER — FLUCONAZOLE 150 MG/1
150 TABLET ORAL DAILY
Qty: 5 TABLET | Refills: 0 | Status: SHIPPED | OUTPATIENT
Start: 2021-07-14 | End: 2022-01-04

## 2021-07-14 RX ORDER — METHYLPREDNISOLONE ACETATE 40 MG/ML
40 INJECTION, SUSPENSION INTRA-ARTICULAR; INTRALESIONAL; INTRAMUSCULAR; SOFT TISSUE ONCE
Status: COMPLETED | OUTPATIENT
Start: 2021-07-14 | End: 2021-07-14

## 2021-07-14 RX ORDER — PROCHLORPERAZINE 25 MG/1
1 SUPPOSITORY RECTAL DAILY
Qty: 1 EACH | Refills: 0 | Status: SHIPPED | OUTPATIENT
Start: 2021-07-14 | End: 2023-02-15 | Stop reason: SDUPTHER

## 2021-07-14 RX ORDER — GABAPENTIN 800 MG/1
800 TABLET ORAL 4 TIMES DAILY
Qty: 120 TABLET | Refills: 2 | Status: SHIPPED | OUTPATIENT
Start: 2021-07-14 | End: 2021-11-13

## 2021-07-14 RX ORDER — KETOROLAC TROMETHAMINE 30 MG/ML
60 INJECTION, SOLUTION INTRAMUSCULAR; INTRAVENOUS ONCE
Status: COMPLETED | OUTPATIENT
Start: 2021-07-14 | End: 2021-07-14

## 2021-07-14 RX ORDER — SULFAMETHOXAZOLE AND TRIMETHOPRIM 800; 160 MG/1; MG/1
1 TABLET ORAL EVERY 12 HOURS
Qty: 60 TABLET | Refills: 0 | Status: SHIPPED | OUTPATIENT
Start: 2021-07-14 | End: 2021-07-24

## 2021-07-14 RX ORDER — PROCHLORPERAZINE 25 MG/1
1 SUPPOSITORY RECTAL DAILY
Qty: 1 EACH | Refills: 0 | Status: SHIPPED | OUTPATIENT
Start: 2021-07-14 | End: 2021-07-14 | Stop reason: SDUPTHER

## 2021-07-14 RX ADMIN — KETOROLAC TROMETHAMINE 60 MG: 30 INJECTION, SOLUTION INTRAMUSCULAR; INTRAVENOUS at 13:54

## 2021-07-14 RX ADMIN — METHYLPREDNISOLONE ACETATE 40 MG: 40 INJECTION, SUSPENSION INTRA-ARTICULAR; INTRALESIONAL; INTRAMUSCULAR; SOFT TISSUE at 13:54

## 2021-07-14 NOTE — PROGRESS NOTES
"Subjective   Soheila Brewster is a 51 y.o. female.     Chief Complaint   Patient presents with   • Hypertension       History of Present Illness     Hypertension-chronic and ongoing.  Patient is stable today.  She is currently taking enalapril 10 mg daily and Bumex 2 mg due to chronic BLE edema.  No negative side effects.  Diabetes-chronic and ongoing.  She has been under the care of CDE at this office.  She is currently wearing a continuous glucose monitor.  She is on Trulicity 1.5 mg weekly, and Janumet  twice daily.  She is also receiving Humalog via OmniPod.  She reports the blood sugars are doing better.  She reports 180's on most days.  She reports she did not get the  for the Dexcom.  She reports she is wearing her omnipod.  She reports that she has not had any low blood sugars.  She reports some increased energy and less fatigue.    Sinus and cough-reports productive at times but is other times thick and hard to get up.  She reports she has had some recent visit with CDE with some dizziness and mild nausea.  She reports it has persists.  She has noted some ears ringing.  SOA with cough.  She reports she is taking meds as directed.    Back and joint pain-has been under care of Rheumatology (Dr Edward)  She was on Humira and reports when she started with chronic sinusitis she was put on hold with DMARD's due to infection and she has not been able to go back due to COVID.   She reports chronic congestion and pressure.  Worse over her nose.    Decreased urine-for approx 2 days.  She reports not burning but is \"dark\".     The following portions of the patient's history were reviewed and updated as appropriate: CC, ROS, allergies, current medications, past family history, past medical history, past social history, past surgical history and problem list.    Review of Systems   Constitutional: Negative for activity change, appetite change, fatigue and fever.   HENT: Positive for congestion, ear pain, " "postnasal drip and sinus pressure. Negative for nosebleeds, rhinorrhea, sore throat, tinnitus, trouble swallowing and voice change.    Eyes: Negative for blurred vision, photophobia and visual disturbance.   Respiratory: Negative for cough, chest tightness, shortness of breath and wheezing.    Cardiovascular: Positive for leg swelling. Negative for chest pain and palpitations.   Gastrointestinal: Negative for abdominal pain, blood in stool, constipation, diarrhea, nausea and GERD.        Occasional gas sensation at epigastric area   Endocrine: Negative for cold intolerance, heat intolerance and polydipsia.   Genitourinary: Positive for decreased urine volume. Negative for difficulty urinating, dysuria and hematuria.   Musculoskeletal: Positive for arthralgias, back pain, myalgias and neck pain. Negative for gait problem.   Skin: Negative for color change, pallor and bruise.   Allergic/Immunologic: Negative.    Neurological: Positive for dizziness. Negative for syncope, numbness and headache.   Hematological: Negative.    Psychiatric/Behavioral: Negative for decreased concentration, self-injury, sleep disturbance, suicidal ideas and depressed mood. The patient is not nervous/anxious.    All other systems reviewed and are negative.      Objective     /80   Pulse 86   Temp 97.2 °F (36.2 °C)   Ht 180.3 cm (70.98\")   Wt 120 kg (265 lb)   SpO2 98%   BMI 36.98 kg/m²     Physical Exam  Vitals reviewed.   Constitutional:       General: She is not in acute distress.     Appearance: She is well-developed. She is not diaphoretic.   HENT:      Head: Normocephalic and atraumatic.      Comments: Patient is presently wearing a face covering/mask due to COVID-19 pandemic.  Bilateral TM's dull, poor cone of light.  Injected and retracted.  EAC clear.   Bilateral nares with erythematous turbinates.  Poor patency.  Rhinorrhea present with boggy mucosa.   Throat moderately injected with thick, white PND present.  " Cobblestoning noted.        Right Ear: Ear canal and external ear normal. Decreased hearing noted.      Left Ear: Ear canal and external ear normal. Decreased hearing noted.      Nose:      Right Sinus: Maxillary sinus tenderness and frontal sinus tenderness present.      Left Sinus: Maxillary sinus tenderness and frontal sinus tenderness present.   Eyes:      General: Lids are normal. No scleral icterus.     Extraocular Movements:      Right eye: Normal extraocular motion and no nystagmus.      Left eye: Normal extraocular motion and no nystagmus.      Conjunctiva/sclera: Conjunctivae normal.      Pupils: Pupils are equal, round, and reactive to light.   Neck:      Thyroid: No thyromegaly.      Vascular: No carotid bruit or JVD.      Trachea: No tracheal tenderness.   Cardiovascular:      Rate and Rhythm: Normal rate and regular rhythm.      Heart sounds: Normal heart sounds, S1 normal and S2 normal. No murmur heard.     Pulmonary:      Effort: Pulmonary effort is normal.      Breath sounds: Normal breath sounds.   Chest:      Chest wall: No tenderness.   Abdominal:      General: Bowel sounds are normal.      Palpations: Abdomen is soft. There is no mass.      Tenderness: There is no abdominal tenderness.      Hernia: No hernia is present.   Musculoskeletal:      Cervical back: Neck supple. Tenderness present. Decreased range of motion.      Thoracic back: Spasms and tenderness present.      Lumbar back: Spasms and tenderness present. Decreased range of motion. Positive right straight leg raise test and positive left straight leg raise test.      Right knee: Decreased range of motion. Tenderness present.      Left knee: Tenderness present.      Right lower leg: Tenderness present. Edema (mild) present.      Left lower leg: Tenderness present. Edema (mild) present.      Comments: No muscular atrophy or flaccidity.   Lymphadenopathy:      Cervical: Cervical adenopathy present.      Right cervical: Superficial  cervical adenopathy present.      Left cervical: Superficial cervical adenopathy present.   Skin:     General: Skin is warm and dry.      Capillary Refill: Capillary refill takes less than 2 seconds.      Coloration: Skin is not pale.      Findings: No erythema.      Nails: There is no clubbing.   Neurological:      Mental Status: She is alert and oriented to person, place, and time.      Cranial Nerves: No cranial nerve deficit or facial asymmetry.      Sensory: Sensory deficit (lower 2/3 portion of BLE) present.      Motor: No tremor, atrophy or abnormal muscle tone.      Coordination: Coordination normal.      Deep Tendon Reflexes: Reflexes are normal and symmetric.   Psychiatric:         Attention and Perception: She is attentive.         Mood and Affect: Mood is depressed. Mood is not anxious. Affect is not tearful.         Speech: Speech normal.         Behavior: Behavior normal. Behavior is cooperative.         Thought Content: Thought content normal.         Cognition and Memory: Cognition normal.         Judgment: Judgment normal.       Assessment/Plan     Diagnoses and all orders for this visit:    1. DM type 2, uncontrolled, with neuropathy (CMS/HCC) (Primary)  Comments:  Findings and treatment options reviewed. She has not received her CMG will continue to attempt to facilitate this which I feel she would benefit  Orders:  -     Discontinue: Continuous Blood Gluc  (Dexcom G6 ) device; 1 Device Daily.  Dispense: 1 each; Refill: 0  -     Discontinue: Continuous Blood Gluc  (Dexcom G6 ) device; 1 Device Daily.  Dispense: 1 each; Refill: 0  -     Continuous Blood Gluc  (Dexcom G6 ) device; 1 Device Daily.  Dispense: 1 each; Refill: 0    2. Chronic maxillary sinusitis  -     Ambulatory Referral to ENT (Otolaryngology)  -     sulfamethoxazole-trimethoprim (BACTRIM DS,SEPTRA DS) 800-160 MG per tablet; Take 1 tablet by mouth Every 12 (Twelve) Hours for 10 days.   Dispense: 60 tablet; Refill: 0  -     fluconazole (DIFLUCAN) 150 MG tablet; Take 1 tablet by mouth Daily.  Dispense: 5 tablet; Refill: 0    3. DM (diabetes mellitus) type II uncontrolled, periph vascular disorder (CMS/HCC)  Assessment & Plan:  Continue to work on blood sugar control.  Continue gabapentin as directed  Continue to be active as physically able    Orders:  -     gabapentin (NEURONTIN) 800 MG tablet; Take 1 tablet by mouth 4 (Four) Times a Day.  Dispense: 120 tablet; Refill: 2    4. Protrusion of intervertebral disc of lumbosacral region  Assessment & Plan:  Continue under the care of pain management    Orders:  -     gabapentin (NEURONTIN) 800 MG tablet; Take 1 tablet by mouth 4 (Four) Times a Day.  Dispense: 120 tablet; Refill: 2  -     methylPREDNISolone acetate (DEPO-medrol) injection 40 mg  -     ketorolac (TORADOL) injection 60 mg    5. Decreased urine output  -     POC Urinalysis Dipstick, Automated    6. Chronic allergic rhinitis  Assessment & Plan:  Continue as needed albuterol for shortness of breath.  Continue Trelegy as directed.  Continue Singulair 10 mg.  Avoid known allergy and respiratory triggers.  May use Saline spray PRN as desired        Understands disease processes and need for medications.  Understands reasons for urgent and emergent care.  Patient (& family) verbalized agreement for treatment plan.   Emotional support and active listening provided.  Patient provided time to verbalize feelings.    IVETTE/PMDP reviewed today and consistent.  Will refill prescribed controlled medication today.  Patient is aware they cannot receive narcotics from any other provider except if under care of pain management or speciality clinic.  Risk and benefits of medication use has been reviewed.  History and physical exam exhibit continued safe and appropriate use of controlled substances.  The patient is aware of the potential for addiction and dependence.  This patient has been made aware of the  appropriate use of such medications, including potential risk of somnolence, limited ability to drive and / or work safely, and potential for overdose.    It has also been made clear that these medications are for use by this patient only, without concomitant use of alcohol or other substances unless prescribed/advised by medical provider.  Patient understands they may be subject to UDS and pill counts at random.      Patient considered to be moderate risk for addiction due to use of multiple controlled medications.  Patient understands and accepts these risks.  Patient need for medication will be reassessed at each visit.  Doses will be adjusted according to patient need and findings.    Goal of TX: Patient will not have any adverse reactions of medication.  Patient will have reduction in neuropathic symptoms of pain with use of gabapentin as directed.  Patient will not have any drug drug interactions.    Medication Dispense Information    Gabapentin   Dispensed: 7/8/2021 12:00 AM   Written:  4/15/2021   Unit strength: 800MG   Days supply: 30   Dispense Note: GivenName=Wero=DAVISERBirthDate=[id number]=[address] [place], [place], [id number]   Quantity: 90 each   Pharmacy: [facility]   Authorizing provider: JOSEY MICHAEL   Received from: [facility] (Fill History)   Brand or Generic:       Medication Dispense Information    Oxycodone/Acetaminophen   Dispensed: 7/1/2021 12:00 AM   Written:  6/12/2021   Unit strength: 325MG/5MG   Days supply: 30   Dispense Note: GivenName=Wero=DAVISERBirthDate=[id number]=[address] [place], [place], [id number]   Quantity: 120 each   Pharmacy: [facility]   Authorizing provider: MILTON ONTIVEROS   Received from: [facility] (Fill History)   Brand or Generic:  Unknown     RTC 1 month, sooner if needed.             This document has been electronically signed by:  JOAQUÍN Ross FNP-C Dragon disclaimer:  Much of this encounter  note is an electronic transcription/translation of spoken language to printed text. The electronic translation of spoken language may permit erroneous, or at times, nonsensical words or phrases to be inadvertently transcribed; Although I have reviewed the note for such errors, some may still exist.

## 2021-07-20 ENCOUNTER — APPOINTMENT (OUTPATIENT)
Dept: ULTRASOUND IMAGING | Facility: HOSPITAL | Age: 51
End: 2021-07-20

## 2021-07-23 NOTE — ASSESSMENT & PLAN NOTE
Continue to work on blood sugar control.  Continue gabapentin as directed  Continue to be active as physically able

## 2021-07-30 DIAGNOSIS — J30.1 CHRONIC SEASONAL ALLERGIC RHINITIS DUE TO POLLEN: ICD-10-CM

## 2021-07-30 DIAGNOSIS — I10 ESSENTIAL HYPERTENSION: ICD-10-CM

## 2021-07-30 DIAGNOSIS — K21.9 GASTROESOPHAGEAL REFLUX DISEASE WITHOUT ESOPHAGITIS: ICD-10-CM

## 2021-07-30 DIAGNOSIS — R60.1 GENERALIZED EDEMA: ICD-10-CM

## 2021-07-30 DIAGNOSIS — E78.2 MIXED HYPERLIPIDEMIA: ICD-10-CM

## 2021-07-31 RX ORDER — MONTELUKAST SODIUM 10 MG/1
10 TABLET ORAL NIGHTLY
Qty: 30 TABLET | Refills: 5 | Status: SHIPPED | OUTPATIENT
Start: 2021-07-31 | End: 2022-01-04 | Stop reason: SDUPTHER

## 2021-07-31 RX ORDER — BUMETANIDE 2 MG/1
TABLET ORAL
Qty: 60 TABLET | Refills: 5 | Status: SHIPPED | OUTPATIENT
Start: 2021-07-31 | End: 2022-01-04 | Stop reason: SDUPTHER

## 2021-07-31 RX ORDER — ENALAPRIL MALEATE 10 MG/1
TABLET ORAL
Qty: 30 TABLET | Refills: 5 | Status: SHIPPED | OUTPATIENT
Start: 2021-07-31 | End: 2022-01-04 | Stop reason: SDUPTHER

## 2021-07-31 RX ORDER — ESOMEPRAZOLE MAGNESIUM 40 MG/1
40 CAPSULE, DELAYED RELEASE ORAL
Qty: 30 CAPSULE | Refills: 5 | Status: SHIPPED | OUTPATIENT
Start: 2021-07-31 | End: 2022-01-04

## 2021-07-31 RX ORDER — LEVOCETIRIZINE DIHYDROCHLORIDE 5 MG/1
5 TABLET, FILM COATED ORAL EVERY EVENING
Qty: 30 TABLET | Refills: 5 | Status: SHIPPED | OUTPATIENT
Start: 2021-07-31 | End: 2022-02-11

## 2021-07-31 RX ORDER — EZETIMIBE 10 MG/1
10 TABLET ORAL DAILY
Qty: 30 TABLET | Refills: 5 | Status: SHIPPED | OUTPATIENT
Start: 2021-07-31 | End: 2022-01-04 | Stop reason: SDUPTHER

## 2021-08-05 ENCOUNTER — OFFICE VISIT (OUTPATIENT)
Dept: FAMILY MEDICINE CLINIC | Facility: CLINIC | Age: 51
End: 2021-08-05

## 2021-08-05 VITALS
HEIGHT: 71 IN | HEART RATE: 86 BPM | BODY MASS INDEX: 37.66 KG/M2 | DIASTOLIC BLOOD PRESSURE: 80 MMHG | OXYGEN SATURATION: 98 % | SYSTOLIC BLOOD PRESSURE: 124 MMHG | TEMPERATURE: 96.9 F | WEIGHT: 269 LBS

## 2021-08-05 DIAGNOSIS — IMO0002 DM TYPE 2, UNCONTROLLED, WITH NEUROPATHY: Primary | Chronic | ICD-10-CM

## 2021-08-05 DIAGNOSIS — Z96.41 INSULIN PUMP IN PLACE: ICD-10-CM
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
## 2021-08-05 DIAGNOSIS — IMO0002 DM TYPE 2, UNCONTROLLED, WITH NEUROPATHY: Chronic | ICD-10-CM

## 2021-08-05 DIAGNOSIS — I10 ESSENTIAL HYPERTENSION: Chronic | ICD-10-CM

## 2021-08-05 DIAGNOSIS — E78.5 DYSLIPIDEMIA: Chronic | ICD-10-CM

## 2021-08-05 PROCEDURE — 99214 OFFICE O/P EST MOD 30 MIN: CPT | Performed by: NURSE PRACTITIONER

## 2021-08-05 RX ORDER — DULAGLUTIDE 1.5 MG/.5ML
1.5 INJECTION, SOLUTION SUBCUTANEOUS WEEKLY
Qty: 4 PEN | Refills: 3 | Status: SHIPPED | OUTPATIENT
Start: 2021-08-05 | End: 2021-12-28 | Stop reason: SDUPTHER

## 2021-08-05 RX ORDER — PROCHLORPERAZINE 25 MG/1
SUPPOSITORY RECTAL
Qty: 9 EACH | Refills: 5 | Status: SHIPPED | OUTPATIENT
Start: 2021-08-05 | End: 2021-12-28 | Stop reason: SDUPTHER

## 2021-08-05 RX ORDER — DOXYCYCLINE HYCLATE 100 MG/1
100 CAPSULE ORAL 2 TIMES DAILY
Qty: 20 CAPSULE | Refills: 0 | Status: SHIPPED | OUTPATIENT
Start: 2021-08-05 | End: 2021-08-15

## 2021-08-05 NOTE — TELEPHONE ENCOUNTER
Caller: Narcisa, Soheila    Relationship: Self    Best call back number: [phone]    Medication needed:   Requested Prescriptions     Pending Prescriptions Disp Refills   • Continuous Blood Gluc Sensor (Dexcom G6 Sensor) 9 each 0     Sig: Every 10 (Ten) Days.       When do you need the refill by: 8/5/21    What additional details did the patient provide when requesting the medication: PATIENT IS CURRENTLY USING HER LAST SENSOR    Does the patient have less than a 3 day supply:  [x] Yes  [] No    What is the patient's preferred pharmacy: [id number] [place] [id number] AT [facility] [address] - [phone] PH - [phone] FX

## 2021-08-17 ENCOUNTER — OFFICE VISIT (OUTPATIENT)
Dept: FAMILY MEDICINE CLINIC | Facility: CLINIC | Age: 51
End: 2021-08-17

## 2021-08-17 VITALS
TEMPERATURE: 97.1 F | DIASTOLIC BLOOD PRESSURE: 82 MMHG | OXYGEN SATURATION: 95 % | HEART RATE: 96 BPM | SYSTOLIC BLOOD PRESSURE: 132 MMHG | BODY MASS INDEX: 37.63 KG/M2 | HEIGHT: 71 IN | WEIGHT: 268.8 LBS

## 2021-08-17 DIAGNOSIS — E78.2 MIXED HYPERLIPIDEMIA: ICD-10-CM

## 2021-08-17 DIAGNOSIS — IMO0002 DM TYPE 2, UNCONTROLLED, WITH NEUROPATHY: ICD-10-CM

## 2021-08-17 DIAGNOSIS — M51.27 PROTRUSION OF INTERVERTEBRAL DISC OF LUMBOSACRAL REGION: Primary | ICD-10-CM

## 2021-08-17 DIAGNOSIS — M51.34 BULGE OF THORACIC DISC WITHOUT MYELOPATHY: ICD-10-CM

## 2021-08-17 DIAGNOSIS — E55.9 VITAMIN D DEFICIENCY: ICD-10-CM

## 2021-08-17 DIAGNOSIS — E78.5 DYSLIPIDEMIA: ICD-10-CM

## 2021-08-17 DIAGNOSIS — J32.0 CHRONIC MAXILLARY SINUSITIS: ICD-10-CM

## 2021-08-17 DIAGNOSIS — M51.9 DISORDER OF INTERVERTEBRAL DISC OF THORACIC SPINE: ICD-10-CM

## 2021-08-17 DIAGNOSIS — E53.8 VITAMIN B12 DEFICIENCY: ICD-10-CM

## 2021-08-17 DIAGNOSIS — K59.04 CHRONIC IDIOPATHIC CONSTIPATION: ICD-10-CM

## 2021-08-17 DIAGNOSIS — I10 ESSENTIAL HYPERTENSION: ICD-10-CM

## 2021-08-17 PROCEDURE — 99214 OFFICE O/P EST MOD 30 MIN: CPT | Performed by: NURSE PRACTITIONER

## 2021-08-17 PROCEDURE — 96372 THER/PROPH/DIAG INJ SC/IM: CPT | Performed by: NURSE PRACTITIONER

## 2021-08-17 RX ORDER — ALBUTEROL SULFATE 90 UG/1
2 AEROSOL, METERED RESPIRATORY (INHALATION) EVERY 4 HOURS PRN
Qty: 18 G | Refills: 5 | Status: SHIPPED | OUTPATIENT
Start: 2021-08-17 | End: 2022-01-04 | Stop reason: SDUPTHER

## 2021-08-17 RX ORDER — KETOROLAC TROMETHAMINE 30 MG/ML
60 INJECTION, SOLUTION INTRAMUSCULAR; INTRAVENOUS ONCE
Status: COMPLETED | OUTPATIENT
Start: 2021-08-17 | End: 2021-08-17

## 2021-08-17 RX ORDER — PLECANATIDE 3 MG/1
1 TABLET ORAL DAILY
Qty: 30 TABLET | Refills: 5 | Status: SHIPPED | OUTPATIENT
Start: 2021-08-17 | End: 2022-01-04 | Stop reason: SDUPTHER

## 2021-08-17 RX ORDER — LANOLIN ALCOHOL/MO/W.PET/CERES
1000 CREAM (GRAM) TOPICAL DAILY
Qty: 30 TABLET | Refills: 5 | Status: SHIPPED | OUTPATIENT
Start: 2021-08-17 | End: 2022-01-04 | Stop reason: SDUPTHER

## 2021-08-17 RX ORDER — ORPHENADRINE CITRATE 30 MG/ML
60 INJECTION INTRAMUSCULAR; INTRAVENOUS ONCE
Status: COMPLETED | OUTPATIENT
Start: 2021-08-17 | End: 2021-08-17

## 2021-08-17 RX ADMIN — KETOROLAC TROMETHAMINE 60 MG: 30 INJECTION, SOLUTION INTRAMUSCULAR; INTRAVENOUS at 17:40

## 2021-08-17 RX ADMIN — ORPHENADRINE CITRATE 60 MG: 30 INJECTION INTRAMUSCULAR; INTRAVENOUS at 17:41

## 2021-08-17 NOTE — PROGRESS NOTES
Subjective   Soheila Brewster is a 51 y.o. female.     Chief Complaint   Patient presents with   • Hypertension       History of Present Illness     HTN-stable with meds.  She is taking Vasotec 10 mg and Bumex 2 mg.  She does have chronic lower extremity edema.  She denies any palpitations or chest pain.  Diabetes-doing a lot better.  More energy.  126 this morning.   She reports at times less than 100.she is not having blood sugars greater than 200.  Lower back concern-has been encouraged to have MRI at [place].  She reports is staying so sore and painful in her low back.  She reports she is having a knife like pain in her lateral right leg that is like getting stung and can last up to 30 minutes.  She is followed by pain management in [place].  She reports that tingling and numbness of legs has increased.  No injury prior to onset.   She reports that she is noting an increased dull ache across her lumbar back.  She reports lateral thighs and the outer part of her anterior thigh  Is very tingling.  She reports she is having more difficulty with sit to stand positions.   She has had an increase in Gabapentin to QID but has only just picked the RX and has not been on but a few days.     Chronic sinus-reports she has some antibiotic for a tick bite.   She is having some production of cough.  She questions of the antibiotic will be good for her sinus areas.  She has not been able to see ENT as she has been ill.  She reports that that appointment has been rescheduled.  She continues to use tobacco.  She is currently taking Singulair 10 mg.  She has persistent cough, PND, rhinorrhea, and sinus pressure.  She has been on multiple different antibiotics without much change in symptoms.  She is currently on albuterol as needed and Trelegy inhaler.  Constipation-has done well with Trulance.  She reports she would like to have some more samples if possible.     The following portions of the patient's history were reviewed  "and updated as appropriate: CC, ROS, allergies, current medications, past family history, past medical history, past social history, past surgical history and problem list.      Review of Systems   Constitutional: Negative for appetite change, fatigue and fever.   HENT: Positive for sinus pressure. Negative for congestion, ear pain, nosebleeds, postnasal drip, rhinorrhea, sore throat, tinnitus, trouble swallowing and voice change.    Eyes: Negative for blurred vision, photophobia and visual disturbance.   Respiratory: Negative for cough, chest tightness, shortness of breath and wheezing.    Cardiovascular: Negative for chest pain, palpitations and leg swelling.   Gastrointestinal: Negative for abdominal pain, blood in stool, constipation, diarrhea, nausea and GERD.   Endocrine: Negative for cold intolerance, heat intolerance and polydipsia.   Genitourinary: Negative for decreased urine volume, difficulty urinating, dysuria and hematuria.   Musculoskeletal: Positive for arthralgias, back pain, gait problem, myalgias and neck pain.   Skin: Negative for color change, pallor and bruise.   Allergic/Immunologic: Negative.    Neurological: Negative for dizziness, syncope, numbness and headache.   Hematological: Negative.    Psychiatric/Behavioral: Negative for decreased concentration, self-injury, sleep disturbance, suicidal ideas and depressed mood. The patient is not nervous/anxious.    All other systems reviewed and are negative.      Objective     /82   Pulse 96   Temp 97.1 °F (36.2 °C)   Ht 180.3 cm (70.98\")   Wt 122 kg (268 lb 12.8 oz)   SpO2 95%   BMI 37.51 kg/m²     Physical Exam  Vitals reviewed.   Constitutional:       General: She is not in acute distress.     Appearance: She is well-developed. She is not diaphoretic.   HENT:      Head: Normocephalic and atraumatic.      Comments: Oropharynx not examined.  Patient is presently wearing a face covering/mask due to COVID-19 pandemic.     Right Ear: " Hearing, ear canal and external ear normal. Tympanic membrane is erythematous. Tympanic membrane is not retracted or bulging.      Left Ear: Hearing, ear canal and external ear normal. Tympanic membrane is erythematous. Tympanic membrane is not retracted or bulging.      Nose: Nasal tenderness and congestion present.      Right Sinus: Maxillary sinus tenderness and frontal sinus tenderness present.      Left Sinus: Maxillary sinus tenderness and frontal sinus tenderness present.   Eyes:      General: Lids are normal. Allergic shiner present. No scleral icterus.     Extraocular Movements:      Right eye: Normal extraocular motion and no nystagmus.      Left eye: Normal extraocular motion and no nystagmus.      Conjunctiva/sclera: Conjunctivae normal.      Pupils: Pupils are equal, round, and reactive to light.   Neck:      Thyroid: No thyromegaly.      Vascular: No carotid bruit or JVD.      Trachea: No tracheal tenderness.   Cardiovascular:      Rate and Rhythm: Normal rate and regular rhythm.      Heart sounds: Normal heart sounds, S1 normal and S2 normal. No murmur heard.     Pulmonary:      Effort: Pulmonary effort is normal.      Breath sounds: Normal breath sounds.   Chest:      Chest wall: No tenderness.   Abdominal:      General: Bowel sounds are normal.      Palpations: Abdomen is soft. There is no mass.      Tenderness: There is no abdominal tenderness.   Musculoskeletal:      Cervical back: Neck supple. Spasms and tenderness present. Pain with movement present. Decreased range of motion.      Thoracic back: Spasms and tenderness present.      Lumbar back: Spasms and tenderness present. Decreased range of motion. Positive right straight leg raise test and positive left straight leg raise test.      Right lower leg: No edema.      Left lower leg: No edema.      Comments: No muscular atrophy or flaccidity.   Lymphadenopathy:      Cervical: Cervical adenopathy present.      Right cervical: Superficial cervical  adenopathy present.      Left cervical: Superficial cervical adenopathy present.   Skin:     General: Skin is warm and dry.      Capillary Refill: Capillary refill takes less than 2 seconds.      Coloration: Skin is not pale.      Findings: No erythema.      Nails: There is no clubbing.   Neurological:      Mental Status: She is alert and oriented to person, place, and time.      Cranial Nerves: No cranial nerve deficit or facial asymmetry.      Sensory: No sensory deficit.      Motor: No tremor, atrophy or abnormal muscle tone.      Coordination: Coordination normal.      Gait: Gait abnormal (antalgic).      Deep Tendon Reflexes: Reflexes are normal and symmetric.   Psychiatric:         Attention and Perception: She is attentive.         Mood and Affect: Mood normal.         Speech: Speech normal.         Behavior: Behavior normal.         Thought Content: Thought content normal.         Judgment: Judgment normal.       Assessment/Plan     Diagnoses and all orders for this visit:    1. Protrusion of intervertebral disc of lumbosacral region (Primary)  Assessment & Plan:  Continue under the care of pain management  Will plan for updated MRI's.     Orders:  -     MRI Lumbar Spine Without Contrast; Future  -     CBC & Differential  -     Comprehensive Metabolic Panel  -     Lipid Panel  -     TSH  -     Hemoglobin A1c  -     Vitamin D 25 Hydroxy  -     T4, Free  -     Vitamin B12  -     ketorolac (TORADOL) injection 60 mg  -     orphenadrine (NORFLEX) injection 60 mg    2. DM type 2, uncontrolled, with neuropathy (CMS/HCC)  -     CBC & Differential  -     Comprehensive Metabolic Panel  -     Lipid Panel  -     TSH  -     Hemoglobin A1c  -     Vitamin D 25 Hydroxy  -     T4, Free  -     Vitamin B12    3. Dyslipidemia  Assessment & Plan:  Continue rosuvastatin 40 mg.  Continue to work on low-cholesterol diet.  Continue Zetia 10 mg.    Orders:  -     CBC & Differential  -     Comprehensive Metabolic Panel  -     Lipid  Panel  -     TSH  -     Hemoglobin A1c  -     Vitamin D 25 Hydroxy  -     T4, Free  -     Vitamin B12    4. Essential hypertension  Assessment & Plan:  Continue enalapril as directed.  Ambulatory BP monitoring either at home or random community checks.  Patient to report continued elevations >140/90.  Patient may come by office for checks if needed.     Orders:  -     CBC & Differential  -     Comprehensive Metabolic Panel  -     Lipid Panel  -     TSH  -     Hemoglobin A1c  -     Vitamin D 25 Hydroxy  -     T4, Free  -     Vitamin B12    5. Mixed hyperlipidemia  -     CBC & Differential  -     Comprehensive Metabolic Panel  -     Lipid Panel  -     TSH  -     Hemoglobin A1c  -     Vitamin D 25 Hydroxy  -     T4, Free  -     Vitamin B12    6. Vitamin D deficiency  Assessment & Plan:  Continue vitamin D as directed.  Report any negative side effects.  We will continue to monitor vitamin D labs and adjust supplement if necessary.        Orders:  -     CBC & Differential  -     Comprehensive Metabolic Panel  -     Lipid Panel  -     TSH  -     Hemoglobin A1c  -     Vitamin D 25 Hydroxy  -     T4, Free  -     Vitamin B12    7. Chronic idiopathic constipation  -     Plecanatide 3 MG tablet; Take 1 tablet by mouth Daily. Lot [id number] EXP10/2022  Dispense: 6 tablet; Refill: 0    8. Chronic maxillary sinusitis  -     albuterol sulfate HFA (Ventolin HFA) 108 (90 Base) MCG/ACT inhaler; Inhale 2 puffs Every 4 (Four) Hours As Needed for Wheezing.  Dispense: 18 g; Refill: 5    9. Vitamin B12 deficiency  -     vitamin B-12 (CYANOCOBALAMIN) 1000 MCG tablet; Take 1 tablet by mouth Daily.  Dispense: 30 tablet; Refill: 5    10. Disorder of intervertebral disc of thoracic spine  -     MRI Thoracic Spine Without Contrast; Future    11. Bulge of thoracic disc without myelopathy  -     MRI Thoracic Spine Without Contrast; Future    Other orders  -     Plecanatide (Trulance) 3 MG tablet; Take 1 tablet by mouth Daily.  Dispense: 30 tablet;  Refill: 5         Understands disease processes and need for medications.  Understands reasons for urgent and emergent care.  Patient (& family) verbalized agreement for treatment plan.   Emotional support and active listening provided.  Patient provided time to verbalize feelings.     IVETTE/JUVE reviewed today and consistent.  Will refill prescribed controlled medication today.  Patient is aware they cannot receive narcotics from any other provider except if under care of pain management or speciality clinic.  Risk and benefits of medication use has been reviewed.  History and physical exam exhibit continued safe and appropriate use of controlled substances.  The patient is aware of the potential for addiction and dependence.  This patient has been made aware of the appropriate use of such medications, including potential risk of somnolence, limited ability to drive and / or work safely, and potential for overdose.    It has also been made clear that these medications are for use by this patient only, without concomitant use of alcohol or other substances unless prescribed/advised by medical provider.  Patient understands they may be subject to UDS and pill counts at random.      Patient considered to be moderate risk for addiction due to use of multiple controlled medications.  Patient understands and accepts these risks.  Patient need for medication will be reassessed at each visit.  Doses will be adjusted according to patient need and findings.    Goal of TX: Patient will not have any adverse reactions of medication.  Patient will have reduction in neuropathic symptoms of pain with use of gabapentin as directed.  Patient will not have any drug drug interactions.    Medication Dispense Information    Gabapentin   Dispensed: 8/13/2021 12:00 AM   Written:  7/14/2021   Unit strength: 800MG   Days supply: 30   Dispense Note: GivenName=Wero=ADOLFOBirthDate=[id number]=[address]  [place], [id number]   Quantity: 120 each   Pharmacy: [facility]   Authorizing provider: JOSEY MICHAEL   Received from: [facility] (Fill History)   Brand or Generic:       RTC 2 months, sooner if needed.           This document has been electronically signed by:  JOAQUÍN Ross, KEISHAC    Dragon disclaimer:  Part of this note may be an electronic transcription/translation of spoken language to printed text using the Dragon Dictation System.

## 2021-08-26 NOTE — ASSESSMENT & PLAN NOTE
Continue vitamin D as directed.  Report any negative side effects.  We will continue to monitor vitamin D labs and adjust supplement if necessary.

## 2021-09-09 DIAGNOSIS — IMO0002 DM TYPE 2, UNCONTROLLED, WITH NEUROPATHY: Chronic | ICD-10-CM

## 2021-09-10 RX ORDER — SITAGLIPTIN AND METFORMIN HYDROCHLORIDE 500; 50 MG/1; MG/1
TABLET, FILM COATED ORAL
Qty: 60 TABLET | Refills: 1 | Status: SHIPPED | OUTPATIENT
Start: 2021-09-10 | End: 2021-10-12 | Stop reason: SDUPTHER

## 2021-09-16 DIAGNOSIS — IMO0002 DM TYPE 2, UNCONTROLLED, WITH NEUROPATHY: Chronic | ICD-10-CM

## 2021-09-16 RX ORDER — INSULIN PUMP CONTROLLER
EACH MISCELLANEOUS
Qty: 45 EACH | Refills: 0 | Status: SHIPPED | OUTPATIENT
Start: 2021-09-16 | End: 2021-10-12 | Stop reason: SDUPTHER

## 2021-10-12 ENCOUNTER — OFFICE VISIT (OUTPATIENT)
Dept: FAMILY MEDICINE CLINIC | Facility: CLINIC | Age: 51
End: 2021-10-12

## 2021-10-12 VITALS
BODY MASS INDEX: 38.08 KG/M2 | DIASTOLIC BLOOD PRESSURE: 70 MMHG | TEMPERATURE: 97.1 F | WEIGHT: 272 LBS | HEART RATE: 80 BPM | OXYGEN SATURATION: 98 % | SYSTOLIC BLOOD PRESSURE: 120 MMHG | HEIGHT: 71 IN | RESPIRATION RATE: 14 BRPM

## 2021-10-12 DIAGNOSIS — IMO0002 DM TYPE 2, UNCONTROLLED, WITH NEUROPATHY: Chronic | ICD-10-CM

## 2021-10-12 DIAGNOSIS — I10 ESSENTIAL HYPERTENSION: Chronic | ICD-10-CM

## 2021-10-12 DIAGNOSIS — E78.5 DYSLIPIDEMIA: Primary | Chronic | ICD-10-CM

## 2021-10-12 DIAGNOSIS — Z46.81 COUNSELING FOR INSULIN PUMP: ICD-10-CM

## 2021-10-12 PROCEDURE — 90686 IIV4 VACC NO PRSV 0.5 ML IM: CPT | Performed by: NURSE PRACTITIONER

## 2021-10-12 PROCEDURE — 99214 OFFICE O/P EST MOD 30 MIN: CPT | Performed by: NURSE PRACTITIONER

## 2021-10-12 PROCEDURE — G0008 ADMIN INFLUENZA VIRUS VAC: HCPCS | Performed by: NURSE PRACTITIONER

## 2021-10-12 RX ORDER — PROCHLORPERAZINE 25 MG/1
1 SUPPOSITORY RECTAL DAILY
Qty: 1 EACH | Refills: 0 | Status: SHIPPED | OUTPATIENT
Start: 2021-10-12 | End: 2021-12-28 | Stop reason: SDUPTHER

## 2021-10-12 RX ORDER — INSULIN PUMP CONTROLLER
1 EACH MISCELLANEOUS EVERY OTHER DAY
Qty: 45 EACH | Refills: 0 | Status: SHIPPED | OUTPATIENT
Start: 2021-10-12 | End: 2021-12-28 | Stop reason: SDUPTHER

## 2021-10-12 RX ORDER — SITAGLIPTIN AND METFORMIN HYDROCHLORIDE 500; 50 MG/1; MG/1
1 TABLET, FILM COATED ORAL 2 TIMES DAILY WITH MEALS
Qty: 60 TABLET | Refills: 2 | Status: SHIPPED | OUTPATIENT
Start: 2021-10-12 | End: 2021-12-28 | Stop reason: SDUPTHER

## 2021-10-12 NOTE — PROGRESS NOTES
History of Present Illness  Soheila Brewster is a 51 y.o. female who presents to the clinic today pertaining to her DM, type 2 which is uncontrolled with a recent A1C of over 10. In addition, she has HTN, Dyslipidemia and DDD. .    Diabetes  She presents for her follow up diabetes visit. She has type 2 diabetes mellitus. Her disease course has been improving with the use of her Omni Pod insulin pump. Associated symptoms include foot paresthesias, polydipsia, polyuria and visual change which are improving. Diabetic complications include peripheral neuropathy. Risk factors for coronary artery disease include hypertension, diabetes mellitus, dyslipidemia, stress and tobacco exposure. Current diabetic treatment includes oral agents (dual therapy) and insulin pump program.   An ACE inhibitor/angiotensin II receptor blocker is being taken. She does not see a podiatrist.Eye exam is current.   Lab Results   Component Value Date    HGBA1C 10.70 (H) 04/13/2021     Dyslipidemia  Current antihyperlipidemic treatment includes ezetimibe and statins. Risk factors for coronary artery disease include diabetes mellitus, dyslipidemia, hypertension, obesity and a sedentary lifestyle.   Lab Results   Component Value Date    CHOL 90 04/13/2021    CHOL 93 10/19/2020    CHOL 110 11/22/2019     Lab Results   Component Value Date    TRIG 382 (H) 04/13/2021    TRIG 223 (H) 10/19/2020    TRIG 244 (H) 11/22/2019     Lab Results   Component Value Date    HDL 27 (L) 04/13/2021    HDL 31 (L) 10/19/2020    HDL 30 (L) 11/22/2019     Lab Results   Component Value Date    LDL 11 04/13/2021    LDL 27 10/19/2020    LDL 31 11/22/2019     Hypertension  The current episode started more than 1 year ago. Risk factors for coronary artery disease include diabetes mellitus, dyslipidemia, obesity, sedentary lifestyle and smoking/tobacco exposure. Current antihypertension treatment includes ACE inhibitors and diuretics.   Lab Results   Component Value Date     "GLUCOSE 382 (H) 04/13/2021    BUN 13 04/13/2021    CREATININE 0.74 04/13/2021    EGFRIFNONA 83 04/13/2021    BCR 17.6 04/13/2021    K 4.8 04/13/2021    CO2 22.9 04/13/2021    CALCIUM 9.6 04/13/2021    ALBUMIN 3.70 04/13/2021    AST 45 (H) 04/13/2021    ALT 56 (H) 04/13/2021     The following portions of the patient's history were reviewed and updated as appropriate: allergies, current medications, past family history, past medical history, past social history, past surgical history and problem list.    Review of Systems   Constitutional: Positive for fatigue (Improving). Negative for activity change, appetite change, chills, fever and unexpected weight change.   HENT: Negative.    Eyes: Negative for visual disturbance.   Respiratory: Negative for cough, shortness of breath and wheezing.    Cardiovascular: Positive for leg swelling. Negative for chest pain and palpitations.   Gastrointestinal: Negative for nausea and vomiting.   Endocrine: Negative for cold intolerance, heat intolerance, polydipsia, polyphagia and polyuria.   Musculoskeletal: Positive for arthralgias, back pain and myalgias.   Skin: Negative for color change and rash.   Neurological: Positive for numbness. Negative for dizziness, tremors, speech difficulty, weakness, light-headedness and headaches.   Hematological: Negative for adenopathy.   Psychiatric/Behavioral: Negative for confusion and decreased concentration. The patient is not nervous/anxious.    All other systems reviewed and are negative.    Vital signs:  /70 (BP Location: Left arm, Patient Position: Sitting, Cuff Size: Adult)   Pulse 80   Temp 97.1 °F (36.2 °C) (Temporal)   Resp 14   Ht 180.3 cm (71\")   Wt 123 kg (272 lb)   SpO2 98%   BMI 37.94 kg/m²     Physical Exam  Vitals and nursing note reviewed.   Constitutional:       General: She is not in acute distress.     Appearance: She is well-developed.      Interventions: Face mask in place.   HENT:      Head: Normocephalic. "      Nose: Nose normal.   Eyes:      General: No scleral icterus.        Right eye: No discharge.         Left eye: No discharge.      Conjunctiva/sclera: Conjunctivae normal.   Neck:      Thyroid: No thyromegaly.      Vascular: No JVD.   Cardiovascular:      Rate and Rhythm: Normal rate and regular rhythm.      Heart sounds: Normal heart sounds. No murmur heard.      Pulmonary:      Effort: Pulmonary effort is normal. No respiratory distress.      Breath sounds: Decreased breath sounds present. No wheezing, rhonchi or rales.   Abdominal:      General: Bowel sounds are normal.      Palpations: Abdomen is soft.      Tenderness: There is no abdominal tenderness. There is no guarding.   Musculoskeletal:      Cervical back: Neck supple.      Right lower leg: No edema.      Left lower leg: No edema.   Lymphadenopathy:      Cervical: No cervical adenopathy.   Skin:     General: Skin is warm and dry.      Capillary Refill: Capillary refill takes less than 2 seconds.   Neurological:      Mental Status: She is alert and oriented to person, place, and time.      Cranial Nerves: Cranial nerves are intact.      Gait: Gait abnormal.   Psychiatric:         Mood and Affect: Mood and affect normal.         Speech: Speech normal.         Behavior: Behavior normal. Behavior is cooperative.         Thought Content: Thought content normal.         Cognition and Memory: Cognition and memory normal.       Result Review :OmniPod upload  Patient's Body mass index is 37.94 kg/m². indicating that she is obese (BMI >30). Obesity-related health conditions include the following: diabetes mellitus, dyslipidemias, GERD and osteoarthritis. Obesity is unchanged. BMI is is above average; BMI management plan is completed. Information provided on  portion control and increasing exercise..       Assessment/Plan     Diagnoses and all orders for this visit:    1. Dyslipidemia (Primary)  Comments:  Cardiovascular risk reduction modifications including  nutrition and exercise recommendations Continue Zetia and Rosuvastatin.     2. DM type 2, uncontrolled, with neuropathy (HCC)  Comments:  Findings and treatment options reviewed. Continue insulin pump therapy, CMG and JanuMet  Orders:  -     Insulin Disposable Pump (OmniPod Dash 5 Pack Pods) misc; 1 Device by Subcutaneous Infusion route Every Other Day.  Dispense: 45 each; Refill: 0  -     Continuous Blood Gluc Transmit (Dexcom G6 Transmitter) misc; 1 Device Daily for 90 days.  Dispense: 1 each; Refill: 0  -     insulin lispro (HumaLOG) 100 UNIT/ML injection; Maximum daily dose of 100 units per insulin pump  Dispense: 30 mL; Refill: 2  -     sitaGLIPtin-metFORMIN (Janumet)  MG per tablet; Take 1 tablet by mouth 2 (Two) Times a Day With Meals.  Dispense: 60 tablet; Refill: 2    3. Counseling for insulin pump  Comments:  Counseled regarding meal bolus and correction bolus issues. Omni Pod upload reviewed with Soheila    4. Essential hypertension  Comments:  Adequate control. Continue Bumex, Vasotec    Other orders  -     FluLaval/Fluarix >6 Months (5315-8909)      Follow Up She will f/u with her PCP in October and with me in November     Findings and recommendations discussed with Soheila. Reviewed her OmniPod upload and discussed with Soheila. Noted she had not been doing meal bolus. Reviewed how to do this. She will begin doing premeal bolus at [facility] parameters. Counseled she could do correction bolus as well if her blood glucose is above target.  Lifestyle modifications reinforced including nutrition and activity recommendations. She will follow up in November  sooner if problems/concerns occur.    Soheila was given instructions and counseling regarding her condition or for health maintenance advice. Please see specific information pulled into the AVS if appropriate      This document has been electronically signed by:

## 2021-10-14 ENCOUNTER — LAB (OUTPATIENT)
Dept: FAMILY MEDICINE CLINIC | Facility: CLINIC | Age: 51
End: 2021-10-14

## 2021-10-14 DIAGNOSIS — J30.9 CHRONIC ALLERGIC RHINITIS: ICD-10-CM

## 2021-10-14 PROBLEM — Z46.81 COUNSELING FOR INSULIN PUMP: Status: ACTIVE | Noted: 2021-10-14

## 2021-10-14 PROCEDURE — 85025 COMPLETE CBC W/AUTO DIFF WBC: CPT | Performed by: NURSE PRACTITIONER

## 2021-10-14 PROCEDURE — 82306 VITAMIN D 25 HYDROXY: CPT | Performed by: NURSE PRACTITIONER

## 2021-10-14 PROCEDURE — 84443 ASSAY THYROID STIM HORMONE: CPT | Performed by: NURSE PRACTITIONER

## 2021-10-14 PROCEDURE — 82607 VITAMIN B-12: CPT | Performed by: NURSE PRACTITIONER

## 2021-10-14 PROCEDURE — 83036 HEMOGLOBIN GLYCOSYLATED A1C: CPT | Performed by: NURSE PRACTITIONER

## 2021-10-14 PROCEDURE — 36415 COLL VENOUS BLD VENIPUNCTURE: CPT | Performed by: NURSE PRACTITIONER

## 2021-10-14 PROCEDURE — 84439 ASSAY OF FREE THYROXINE: CPT | Performed by: NURSE PRACTITIONER

## 2021-10-14 PROCEDURE — 80061 LIPID PANEL: CPT | Performed by: NURSE PRACTITIONER

## 2021-10-14 PROCEDURE — 80053 COMPREHEN METABOLIC PANEL: CPT | Performed by: NURSE PRACTITIONER

## 2021-10-15 LAB
25(OH)D3 SERPL-MCNC: 42.9 NG/ML (ref 30–100)
ALBUMIN SERPL-MCNC: 4.1 G/DL (ref 3.5–5.2)
ALBUMIN/GLOB SERPL: 1.7 G/DL
ALP SERPL-CCNC: 45 U/L (ref 39–117)
ALT SERPL W P-5'-P-CCNC: 17 U/L (ref 1–33)
ANION GAP SERPL CALCULATED.3IONS-SCNC: 12 MMOL/L (ref 5–15)
AST SERPL-CCNC: 15 U/L (ref 1–32)
BASOPHILS # BLD AUTO: 0.06 10*3/MM3 (ref 0–0.2)
BASOPHILS NFR BLD AUTO: 0.7 % (ref 0–1.5)
BILIRUB SERPL-MCNC: 0.2 MG/DL (ref 0–1.2)
BUN SERPL-MCNC: 9 MG/DL (ref 6–20)
BUN/CREAT SERPL: 15 (ref 7–25)
CALCIUM SPEC-SCNC: 9.4 MG/DL (ref 8.6–10.5)
CHLORIDE SERPL-SCNC: 107 MMOL/L (ref 98–107)
CHOLEST SERPL-MCNC: 95 MG/DL (ref 0–200)
CO2 SERPL-SCNC: 22 MMOL/L (ref 22–29)
CREAT SERPL-MCNC: 0.6 MG/DL (ref 0.57–1)
DEPRECATED RDW RBC AUTO: 44.3 FL (ref 37–54)
EOSINOPHIL # BLD AUTO: 0.45 10*3/MM3 (ref 0–0.4)
EOSINOPHIL NFR BLD AUTO: 5.2 % (ref 0.3–6.2)
ERYTHROCYTE [DISTWIDTH] IN BLOOD BY AUTOMATED COUNT: 13.9 % (ref 12.3–15.4)
GFR SERPL CREATININE-BSD FRML MDRD: 106 ML/MIN/1.73
GLOBULIN UR ELPH-MCNC: 2.4 GM/DL
GLUCOSE SERPL-MCNC: 228 MG/DL (ref 65–99)
HBA1C MFR BLD: 8.2 % (ref 4.8–5.6)
HCT VFR BLD AUTO: 37.6 % (ref 34–46.6)
HDLC SERPL-MCNC: 35 MG/DL (ref 40–60)
HGB BLD-MCNC: 12.2 G/DL (ref 12–15.9)
IMM GRANULOCYTES # BLD AUTO: 0.02 10*3/MM3 (ref 0–0.05)
IMM GRANULOCYTES NFR BLD AUTO: 0.2 % (ref 0–0.5)
LDLC SERPL CALC-MCNC: 34 MG/DL (ref 0–100)
LDLC/HDLC SERPL: 0.83 {RATIO}
LYMPHOCYTES # BLD AUTO: 2.15 10*3/MM3 (ref 0.7–3.1)
LYMPHOCYTES NFR BLD AUTO: 24.8 % (ref 19.6–45.3)
MCH RBC QN AUTO: 28.1 PG (ref 26.6–33)
MCHC RBC AUTO-ENTMCNC: 32.4 G/DL (ref 31.5–35.7)
MCV RBC AUTO: 86.6 FL (ref 79–97)
MONOCYTES # BLD AUTO: 0.51 10*3/MM3 (ref 0.1–0.9)
MONOCYTES NFR BLD AUTO: 5.9 % (ref 5–12)
NEUTROPHILS NFR BLD AUTO: 5.48 10*3/MM3 (ref 1.7–7)
NEUTROPHILS NFR BLD AUTO: 63.2 % (ref 42.7–76)
NRBC BLD AUTO-RTO: 0.2 /100 WBC (ref 0–0.2)
PLATELET # BLD AUTO: 176 10*3/MM3 (ref 140–450)
PMV BLD AUTO: 12.7 FL (ref 6–12)
POTASSIUM SERPL-SCNC: 5.1 MMOL/L (ref 3.5–5.2)
PROT SERPL-MCNC: 6.5 G/DL (ref 6–8.5)
RBC # BLD AUTO: 4.34 10*6/MM3 (ref 3.77–5.28)
SODIUM SERPL-SCNC: 141 MMOL/L (ref 136–145)
T4 FREE SERPL-MCNC: 1.6 NG/DL (ref 0.93–1.7)
TRIGL SERPL-MCNC: 154 MG/DL (ref 0–150)
TSH SERPL DL<=0.05 MIU/L-ACNC: 1.16 UIU/ML (ref 0.27–4.2)
VIT B12 BLD-MCNC: 279 PG/ML (ref 211–946)
VLDLC SERPL-MCNC: 26 MG/DL (ref 5–40)
WBC # BLD AUTO: 8.67 10*3/MM3 (ref 3.4–10.8)

## 2021-10-18 ENCOUNTER — OFFICE VISIT (OUTPATIENT)
Dept: FAMILY MEDICINE CLINIC | Facility: CLINIC | Age: 51
End: 2021-10-18

## 2021-10-18 VITALS
WEIGHT: 272 LBS | TEMPERATURE: 97.1 F | BODY MASS INDEX: 38.08 KG/M2 | DIASTOLIC BLOOD PRESSURE: 80 MMHG | SYSTOLIC BLOOD PRESSURE: 160 MMHG | HEIGHT: 71 IN | OXYGEN SATURATION: 100 % | HEART RATE: 86 BPM

## 2021-10-18 DIAGNOSIS — Z12.31 ENCOUNTER FOR SCREENING MAMMOGRAM FOR MALIGNANT NEOPLASM OF BREAST: ICD-10-CM

## 2021-10-18 DIAGNOSIS — M51.27 PROTRUSION OF INTERVERTEBRAL DISC OF LUMBOSACRAL REGION: ICD-10-CM

## 2021-10-18 DIAGNOSIS — R05.9 COUGH: ICD-10-CM

## 2021-10-18 DIAGNOSIS — IMO0002 DM (DIABETES MELLITUS) TYPE II UNCONTROLLED, PERIPH VASCULAR DISORDER: Primary | ICD-10-CM

## 2021-10-18 DIAGNOSIS — Z98.890 STATUS POST THYROID SURGERY: ICD-10-CM

## 2021-10-18 DIAGNOSIS — J32.0 CHRONIC MAXILLARY SINUSITIS: ICD-10-CM

## 2021-10-18 DIAGNOSIS — K21.9 GASTROESOPHAGEAL REFLUX DISEASE WITHOUT ESOPHAGITIS: ICD-10-CM

## 2021-10-18 DIAGNOSIS — R07.89 XIPHOID PAIN: ICD-10-CM

## 2021-10-18 PROCEDURE — 99214 OFFICE O/P EST MOD 30 MIN: CPT | Performed by: NURSE PRACTITIONER

## 2021-10-18 RX ORDER — FAMOTIDINE 40 MG/1
40 TABLET, FILM COATED ORAL NIGHTLY
Qty: 30 TABLET | Refills: 1 | Status: SHIPPED | OUTPATIENT
Start: 2021-10-18 | End: 2021-12-13

## 2021-10-18 RX ORDER — DOXYCYCLINE 100 MG/1
100 CAPSULE ORAL EVERY 12 HOURS SCHEDULED
Qty: 60 CAPSULE | Refills: 0 | Status: SHIPPED | OUTPATIENT
Start: 2021-10-18 | End: 2021-12-28

## 2021-10-18 NOTE — ASSESSMENT & PLAN NOTE
Continue gabapentin as directed for neuropathic and radiculopathy pain.  Continue with diabetic educator for further diabetes management.  Consistent use of OmniPod and CGM.  Continue oral medications and Trulicity as directed.  Encourage low CHO high-protein diet

## 2021-10-18 NOTE — PROGRESS NOTES
"Subjective   Soheila Brewster is a 51 y.o. female.     Chief Complaint   Patient presents with   • Diabetes       History of Present Illness     Leg pain-reports some improvement with increase in her Gabapentin.  She continues to have her worsening back pain.  She reports that she is continuing to have pretty bad low back pain.  She has not taken her mid day dose of pain medication due to needing to drive.  She reports that she feels her pain is progressed over the last several months and is becoming more more intolerable.  HTN-has taken meds today.  She is 160/80 on triage.   She has some mild SOA.  She reports that she is hurting more than usual  Mid chest wall pain-near her xhyphoid process.   She reports tender to touch and feels like it \"goes thru me\".  Bending over is painful but as soon as pressure decreases the pain decreases.  Pain is sharp in quality.  She reports at times with rising from the commode she notes a popping sound.  She has not had any blunt force trauma.  She does not feel she is having more GERD and is currently taking Nexium 40 mg for her reflux symptoms.  Sinus-reports \"not good\".  She reports thick yellow production that is hard to get out.  She reports sinus pressure is present.  No sore throat.  She has taken Tylenol sinus HA yesterday.  She does have chronic sinusitis.  She has not been treated with antibiotics Since July.  She has not kept her appointment for ENT.  She does plan to reschedule.  She is on Singulair 10 mg as well as Xyzal 5 mg.  History of thyroid nodule and hypothyroidism-chronic and ongoing.  Patient is currently taking Synthroid 100 mcg.  No negative side effects.  No negative side effects of medication.  Patient denies any hair or skin changes.  No reports of heat or cold intolerance.  Diabetes-has been under the care of the CDE.  She currently is wearing a continuous glucose monitor as well is an Omni pod insulin system.  She reports that she continues to see " improvement in her blood sugars.  She is also taking Janumet  twice daily.  She continues her Trulicity 1.5 mg weekly.  No negative side effects of medication.  She reports that since her blood sugars have been more controlled she is having less fatigue    The following portions of the patient's history were reviewed and updated as appropriate: CC, ROS, allergies, current medications, past family history, past medical history, past social history, past surgical history and problem list.      Review of Systems   Constitutional: Positive for fatigue. Negative for appetite change and fever.   HENT: Positive for congestion, postnasal drip, rhinorrhea, sinus pressure and sore throat. Negative for ear pain, nosebleeds, tinnitus, trouble swallowing and voice change.    Eyes: Negative for blurred vision, photophobia and visual disturbance.   Respiratory: Negative for cough, chest tightness, shortness of breath and wheezing.    Cardiovascular: Negative for chest pain and palpitations.   Gastrointestinal: Positive for abdominal pain. Negative for blood in stool, constipation, diarrhea, nausea and GERD.   Endocrine: Negative for cold intolerance, heat intolerance and polydipsia.   Genitourinary: Negative for decreased urine volume, difficulty urinating, dysuria and hematuria.   Musculoskeletal: Positive for arthralgias, back pain, gait problem, myalgias and neck stiffness.   Skin: Negative for color change, pallor and bruise.   Allergic/Immunologic: Negative.    Neurological: Negative for dizziness, syncope, numbness and headache.   Hematological: Negative.    Psychiatric/Behavioral: Positive for stress. Negative for decreased concentration, sleep disturbance, suicidal ideas and depressed mood. The patient is not nervous/anxious.    All other systems reviewed and are negative.      Objective     /80 (BP Location: Right arm, Patient Position: Sitting, Cuff Size: Adult)   Pulse 86   Temp 97.1 °F (36.2 °C)  "(Temporal)   Ht 180.3 cm (70.98\")   Wt 123 kg (272 lb)   SpO2 100%   BMI 37.95 kg/m²     Physical Exam  Vitals reviewed.   Constitutional:       General: She is not in acute distress.     Appearance: She is well-developed. She is not diaphoretic.   HENT:      Head: Normocephalic and atraumatic.      Comments: Oropharynx not examined.  Patient is presently wearing a face covering/mask due to COVID-19 pandemic.     Right Ear: Hearing, tympanic membrane, ear canal and external ear normal.      Left Ear: Hearing, tympanic membrane, ear canal and external ear normal.   Eyes:      General: Lids are normal. No scleral icterus.     Extraocular Movements:      Right eye: Normal extraocular motion and no nystagmus.      Left eye: Normal extraocular motion and no nystagmus.      Conjunctiva/sclera: Conjunctivae normal.      Pupils: Pupils are equal, round, and reactive to light.   Neck:      Thyroid: No thyromegaly.      Vascular: No carotid bruit or JVD.      Trachea: No tracheal tenderness.   Cardiovascular:      Rate and Rhythm: Normal rate and regular rhythm.      Heart sounds: Normal heart sounds, S1 normal and S2 normal. No murmur heard.      Pulmonary:      Effort: Pulmonary effort is normal.      Breath sounds: Normal breath sounds.   Chest:      Chest wall: No tenderness.   Abdominal:      General: Bowel sounds are normal.      Palpations: Abdomen is soft. There is no mass.      Tenderness: There is no abdominal tenderness.   Musculoskeletal:         General: No tenderness.      Cervical back: Normal range of motion and neck supple.      Right lower leg: No edema.      Left lower leg: No edema.      Comments: No muscular atrophy or flaccidity.   Lymphadenopathy:      Cervical: No cervical adenopathy.      Right cervical: No superficial cervical adenopathy.     Left cervical: No superficial cervical adenopathy.   Skin:     General: Skin is warm and dry.      Capillary Refill: Capillary refill takes less than 2 " seconds.      Coloration: Skin is not pale.      Findings: No erythema.      Nails: There is no clubbing.   Neurological:      Mental Status: She is alert and oriented to person, place, and time.      Cranial Nerves: No cranial nerve deficit or facial asymmetry.      Sensory: No sensory deficit.      Motor: No tremor, atrophy or abnormal muscle tone.      Coordination: Coordination normal.      Deep Tendon Reflexes: Reflexes are normal and symmetric.   Psychiatric:         Attention and Perception: She is attentive.         Mood and Affect: Mood normal.         Speech: Speech normal.         Behavior: Behavior normal.         Thought Content: Thought content normal.         Judgment: Judgment normal.       Assessment/Plan     Diagnoses and all orders for this visit:    1. DM (diabetes mellitus) type II uncontrolled, periph vascular disorder (HCC) (Primary)  Assessment & Plan:  Continue gabapentin as directed for neuropathic and radiculopathy pain.  Continue with diabetic educator for further diabetes management.  Consistent use of OmniPod and CGM.  Continue oral medications and Trulicity as directed.  Encourage low CHO high-protein diet        2. Cough  -     XR Ribs 2 View Left; Future  -     XR Ribs 2 View Right; Future  -     XR Chest 2 View; Future    3. Xiphoid pain  -     XR Ribs 2 View Left; Future  -     XR Ribs 2 View Right; Future  -     XR Chest 2 View; Future    4. Encounter for screening mammogram for malignant neoplasm of breast  -     Mammo Screening Digital Tomosynthesis Bilateral With CAD; Future    5. Chronic maxillary sinusitis  Comments:  Encourage patient to make ENT appointment.  1 month of doxycycline ordered  Orders:  -     doxycycline (MONODOX) 100 MG capsule; Take 1 capsule by mouth Every 12 (Twelve) Hours.  Dispense: 60 capsule; Refill: 0    6. Gastroesophageal reflux disease without esophagitis  -     famotidine (Pepcid) 40 MG tablet; Take 1 tablet by mouth Every Night.  Dispense: 30  tablet; Refill: 1    7. Protrusion of intervertebral disc of lumbosacral region  Assessment & Plan:  Continue under the care of pain management.  Avoid overuse activities of back      8. Status post thyroid surgery  Assessment & Plan:  Continue Synthroid as directed.         Patient's Body mass index is 37.95 kg/m². indicating that she is morbidly obese (BMI > 40 or > 35 with obesity - related health condition). Obesity-related health conditions include the following: hypertension, diabetes mellitus and dyslipidemias. Obesity is unchanged. BMI is is above average. We discussed portion control and increasing exercise..       Understands disease processes and need for medications.  Understands reasons for urgent and emergent care.  Patient (& family) verbalized agreement for treatment plan.   Emotional support and active listening provided.  Patient provided time to verbalize feelings.    IVETTE/JUVE reviewed today and consistent.  Will refill prescribed controlled medication today.  Patient is aware they cannot receive narcotics from any other provider except if under care of pain management or speciality clinic.  Risk and benefits of medication use has been reviewed.  History and physical exam exhibit continued safe and appropriate use of controlled substances.  The patient is aware of the potential for addiction and dependence.  This patient has been made aware of the appropriate use of such medications, including potential risk of somnolence, limited ability to drive and / or work safely, and potential for overdose.    It has also been made clear that these medications are for use by this patient only, without concomitant use of alcohol or other substances unless prescribed/advised by medical provider.  Patient understands they may be subject to UDS and pill counts at random.      Patient considered to be moderate risk for addiction due to use of multiple controlled medications.  Patient understands and accepts  these risks.  Patient need for medication will be reassessed at each visit.  Doses will be adjusted according to patient need and findings.    Goal of [place]: Patient will not have any adverse reactions of medication.  Patient will have reduction in neuropathic symptoms of pain with use of gabapentin as directed.  Patient will not have any drug drug interactions.      Continue under care of Pain management.     Medication Dispense Information    Oxycodone/Acetaminophen   Dispensed: 9/30/2021 12:00 AM   Written:  8/12/2021   Unit strength: 325MG/5MG   Days supply: 30   Dispense Note: GivenName=Wero=DAVISERBirthDate=[id number]=[address] [place], [place], [id number]   Quantity: 120 each   Pharmacy: [facility]   Authorizing provider: MILTON ONTIVEROS   Received from: [facility] (Fill History)   Brand or Generic:         Medication Dispense Information    Gabapentin   Dispensed: 10/12/2021 12:00 AM   Written:  7/14/2021   Unit strength: 800MG   Days supply: 30   Dispense Note: GivenName=Wero=DAVISERBirthDate=[id number]=[address] [place], [place], [id number]   Quantity: 120 each   [email or web address]   Authorizing provider: JOSEY MICHAEL   Received from: [facility] (Fill History)   Brand or Generic:         RTC 3 months, sooner if needed.             This document has been electronically signed by:  JOAQUÍN Ross FNP-C Dragon disclaimer:  Part of this note may be an electronic transcription/translation of spoken language to printed text using the Dragon Dictation System.

## 2021-10-26 ENCOUNTER — TELEPHONE (OUTPATIENT)
Dept: FAMILY MEDICINE CLINIC | Facility: CLINIC | Age: 51
End: 2021-10-26

## 2021-10-26 NOTE — TELEPHONE ENCOUNTER
Caller: Soheila Brewster    Relationship: Self    Best call back number: [phone]     Caller requesting test results: SELF    What test was performed: XRAY    When was the test performed:LAST WILL    Where was the test performed: [facility]  Additional notes:

## 2021-10-27 NOTE — PROGRESS NOTES
I advised her to do some heat and ice and try to support the area if possible. She could possibly have a strain in the cartilage that holds her ribs together. Some ibuprofen or naproxen may also help with the discomfort. She can also try some muscle creams. If the pain is not relieved beyond that then we may think about who else may need to examine her for further input.

## 2021-11-08 ENCOUNTER — OFFICE VISIT (OUTPATIENT)
Dept: FAMILY MEDICINE CLINIC | Facility: CLINIC | Age: 51
End: 2021-11-08

## 2021-11-08 VITALS
TEMPERATURE: 97.4 F | BODY MASS INDEX: 38.3 KG/M2 | HEIGHT: 71 IN | DIASTOLIC BLOOD PRESSURE: 82 MMHG | HEART RATE: 82 BPM | WEIGHT: 273.6 LBS | OXYGEN SATURATION: 98 % | SYSTOLIC BLOOD PRESSURE: 140 MMHG

## 2021-11-08 DIAGNOSIS — R05.9 COUGH: ICD-10-CM

## 2021-11-08 DIAGNOSIS — R07.89 OTHER CHEST PAIN: ICD-10-CM

## 2021-11-08 DIAGNOSIS — IMO0002 DM (DIABETES MELLITUS) TYPE II UNCONTROLLED, PERIPH VASCULAR DISORDER: Primary | ICD-10-CM

## 2021-11-08 DIAGNOSIS — R06.02 SHORTNESS OF BREATH: ICD-10-CM

## 2021-11-08 DIAGNOSIS — Z51.81 ENCOUNTER FOR THERAPEUTIC DRUG LEVEL MONITORING: ICD-10-CM

## 2021-11-08 DIAGNOSIS — R10.13 EPIGASTRIC PAIN: ICD-10-CM

## 2021-11-08 DIAGNOSIS — M51.27 PROTRUSION OF INTERVERTEBRAL DISC OF LUMBOSACRAL REGION: ICD-10-CM

## 2021-11-08 DIAGNOSIS — R07.89 XIPHOID PAIN: ICD-10-CM

## 2021-11-08 DIAGNOSIS — K59.04 CHRONIC IDIOPATHIC CONSTIPATION: ICD-10-CM

## 2021-11-08 PROCEDURE — 93000 ELECTROCARDIOGRAM COMPLETE: CPT | Performed by: NURSE PRACTITIONER

## 2021-11-08 PROCEDURE — 99214 OFFICE O/P EST MOD 30 MIN: CPT | Performed by: NURSE PRACTITIONER

## 2021-11-08 RX ORDER — LACTULOSE 10 G/15ML
15 SOLUTION ORAL 2 TIMES DAILY
Qty: 946 ML | Refills: 5 | Status: SHIPPED | OUTPATIENT
Start: 2021-11-08 | End: 2022-12-30

## 2021-11-08 RX ORDER — NAPROXEN 500 MG/1
TABLET ORAL
Qty: 60 TABLET | Refills: 5 | Status: SHIPPED | OUTPATIENT
Start: 2021-11-08 | End: 2022-04-04

## 2021-11-08 NOTE — PROGRESS NOTES
"Subjective   Soheila Brewster is a 51 y.o. female.     Chief Complaint   Patient presents with   • Joint Pain   • Foot Swelling       History of Present Illness     Abdomen pain-ongoing.  Continues to have pain in her abdomen \"straight thru my back\".  Can linger at times for up to 30 minutes or more.  Chest xray and rib xray are normal.  She reports an episode this morning that is \"probably one of the worse I have had\".  She does not feel it gets over her left side.  She is not followed by cardiology.  She has also noted some increased foot and leg swelling in the AM.   She did not have Trulance due to needing a PA.  She has not been able to her a refill on lactulose.  She reports she has had some bowel difficulties.  She had a scope in 2013 but has not had a repeat.   She did have polyps at that time.   She does feel she has been more tired and SOA in the evenings.  She is not sleeping with more than one pillow.   Foot swelling-since she got up this morning.  She has taken her Bumex.  She reports her feet feel tight.   Diabetes-chronic and ongoing.  Patient has been more stable since receiving OmniPod insulin pump and Dexcom   CGM.  She is also taking Janumet  twice daily.  No negative side effects.  No signs or symptoms of hyper or hypoglycemia are reported.  Patient has not had any emergency room visits related to blood sugars since last appointment.  Hyperlipidemia-chronic and ongoing.  Patient is currently taking rosuvastatin 40 mg and Zetia 10 mg.  No negative side effects.  Patient denies any negative side effects of cholesterol medication.  No reported myalgia or myopathies.  Irregular menses-patient is currently using Depo-Provera injection.  She is not having any abnormal bleeding.  No negative side effects of injection.  No injection site reactions.    The following portions of the patient's history were reviewed and updated as appropriate: CC, ROS, allergies, current medications, past family history, " "past medical history, past social history, past surgical history and problem list.      Review of Systems   Constitutional: Positive for fatigue. Negative for activity change, appetite change and fever.   HENT: Negative for congestion, ear pain, nosebleeds, postnasal drip, rhinorrhea, sore throat, tinnitus, trouble swallowing and voice change.    Eyes: Negative for blurred vision, photophobia and visual disturbance.   Respiratory: Negative for cough, chest tightness, shortness of breath and wheezing.    Cardiovascular: Negative for chest pain, palpitations and leg swelling.   Gastrointestinal: Positive for abdominal pain and constipation. Negative for blood in stool, diarrhea, nausea and GERD.   Endocrine: Negative for cold intolerance, heat intolerance and polydipsia.   Genitourinary: Negative for decreased urine volume, difficulty urinating, dysuria and hematuria.   Musculoskeletal: Positive for back pain, gait problem, myalgias and neck stiffness. Negative for arthralgias.   Skin: Negative for color change, pallor and bruise.   Allergic/Immunologic: Negative.    Neurological: Negative for dizziness, syncope, numbness and headache.   Hematological: Negative.    Psychiatric/Behavioral: Positive for stress. Negative for decreased concentration, self-injury, sleep disturbance, suicidal ideas and depressed mood. The patient is not nervous/anxious.    All other systems reviewed and are negative.      Objective     /82   Pulse 82   Temp 97.4 °F (36.3 °C)   Ht 180.3 cm (70.98\")   Wt 124 kg (273 lb 9.6 oz)   SpO2 98%   BMI 38.18 kg/m²     Physical Exam  Vitals reviewed.   Constitutional:       General: She is not in acute distress.     Appearance: She is well-developed. She is obese. She is not diaphoretic.   HENT:      Head: Normocephalic and atraumatic.      Jaw: No tenderness.      Comments: Oropharynx not examined.  Patient is presently wearing a face covering/mask due to COVID-19 pandemic.     Right Ear: " Hearing, ear canal and external ear normal. A middle ear effusion is present. Tympanic membrane is retracted.      Left Ear: Hearing, ear canal and external ear normal. A middle ear effusion is present. Tympanic membrane is retracted.      Nose: Nasal tenderness and congestion present.      Right Sinus: Maxillary sinus tenderness and frontal sinus tenderness present.      Left Sinus: Maxillary sinus tenderness and frontal sinus tenderness present.      Mouth/Throat:      Pharynx: Posterior oropharyngeal erythema present. No pharyngeal swelling.      Tonsils: No tonsillar exudate.   Eyes:      General: Lids are normal. No scleral icterus.     Extraocular Movements:      Right eye: Normal extraocular motion and no nystagmus.      Left eye: Normal extraocular motion and no nystagmus.      Conjunctiva/sclera: Conjunctivae normal.      Pupils: Pupils are equal, round, and reactive to light.   Neck:      Thyroid: No thyromegaly or thyroid tenderness.      Vascular: No carotid bruit or JVD.      Trachea: No tracheal tenderness.   Cardiovascular:      Rate and Rhythm: Normal rate and regular rhythm.      Pulses:           Dorsalis pedis pulses are 2+ on the right side and 2+ on the left side.        Posterior tibial pulses are 2+ on the right side and 2+ on the left side.      Heart sounds: Normal heart sounds, S1 normal and S2 normal. No murmur heard.      Pulmonary:      Effort: Pulmonary effort is normal. No accessory muscle usage, prolonged expiration or respiratory distress.      Breath sounds: Normal breath sounds.   Chest:      Chest wall: No tenderness.   Abdominal:      General: Abdomen is protuberant. Bowel sounds are normal.      Palpations: Abdomen is soft. There is no mass.      Tenderness: There is no abdominal tenderness.       Musculoskeletal:      Cervical back: Neck supple. Tenderness present. Pain with movement present. Decreased range of motion.      Thoracic back: Tenderness present. Decreased range of  motion.      Lumbar back: Tenderness present. Decreased range of motion. Positive right straight leg raise test and positive left straight leg raise test.      Right lower leg: No edema.      Left lower leg: No edema.      Comments: No muscular atrophy or flaccidity.   Lymphadenopathy:      Head:      Right side of head: No submental or submandibular adenopathy.      Left side of head: No submental or submandibular adenopathy.      Cervical: Cervical adenopathy present.      Right cervical: Superficial cervical adenopathy present.      Left cervical: Superficial cervical adenopathy present.   Skin:     General: Skin is warm and dry.      Capillary Refill: Capillary refill takes less than 2 seconds.      Coloration: Skin is not jaundiced or pale.      Findings: No erythema.      Nails: There is no clubbing.   Neurological:      Mental Status: She is alert and oriented to person, place, and time.      Cranial Nerves: No cranial nerve deficit or facial asymmetry.      Sensory: No sensory deficit.      Motor: No tremor, atrophy or abnormal muscle tone.      Coordination: Coordination normal.      Deep Tendon Reflexes: Reflexes are normal and symmetric.   Psychiatric:         Attention and Perception: She is attentive.         Mood and Affect: Mood normal.         Speech: Speech normal.         Behavior: Behavior normal. Behavior is cooperative.         Thought Content: Thought content normal.         Judgment: Judgment normal.       Assessment/Plan     Diagnoses and all orders for this visit:    1. DM (diabetes mellitus) type II uncontrolled, periph vascular disorder (HCC) (Primary)  Assessment & Plan:  Continue with CGM and insulin pump.  Continue Trulicity weekly and Janumet as directed  Encouraged a low CHO high-protein diet      2. Chronic idiopathic constipation  -     Constulose 10 GM/15ML solution; Take 15 mL by mouth 2 (Two) Times a Day. Per pain management  Dispense: 946 mL; Refill: 5  -     Ambulatory Referral  to Gastroenterology    3. Epigastric pain  Comments:  Referrals to GI and cardiology to rule out pathology.  Orders:  -     Ambulatory Referral to Gastroenterology    4. Shortness of breath  Comments:  Increased from baseline.  Chest x-ray negative for findings.  Orders:  -     Adult Transthoracic Echo Complete W/ Cont if Necessary Per Protocol    5. Xiphoid pain  -     Ambulatory Referral to Cardiology    6. Other chest pain  -     ECG 12 Lead    7. Protrusion of intervertebral disc of lumbosacral region  Comments:  Continue under the care of pain management.  Reviewed last MRIs  Orders:  -     Urine Drug Screen - Urine, Clean Catch; Future    8. Encounter for therapeutic drug level monitoring   -     Urine Drug Screen - Urine, Clean Catch; Future    9. Cough  -     HYDROcod Polst-CPM Polst ER (Tussionex Pennkinetic ER) 10-8 MG/5ML ER suspension; Take 5 mL by mouth Every 12 (Twelve) Hours As Needed for Cough.  Dispense: 120 mL; Refill: 0       Patient's Body mass index is 38.18 kg/m². indicating that she is morbidly obese (BMI > 40 or > 35 with obesity - related health condition). Obesity-related health conditions include the following: hypertension, coronary heart disease, diabetes mellitus, dyslipidemias, GERD and osteoarthritis. Obesity is unchanged. BMI is is above average. We discussed portion control and increasing exercise..       Understands disease processes and need for medications.  Understands reasons for urgent and emergent care.  Patient (& family) verbalized agreement for treatment plan.   Emotional support and active listening provided.  Patient provided time to verbalize feelings.    IVETTE/PMDP reviewed today and consistent.  Will refill prescribed controlled medication today.  Patient is aware they cannot receive narcotics from any other provider except if under care of pain management or speciality clinic.  Risk and benefits of medication use has been reviewed.  History and physical exam exhibit  continued safe and appropriate use of controlled substances.  The patient is aware of the potential for addiction and dependence.  This patient has been made aware of the appropriate use of such medications, including potential risk of somnolence, limited ability to drive and / or work safely, and potential for overdose.    It has also been made clear that these medications are for use by this patient only, without concomitant use of alcohol or other substances unless prescribed/advised by medical provider.  Patient understands they may be subject to UDS and pill counts at random.      Patient considered to be moderate risk for addiction due to use of multiple controlled medications.  Patient understands and accepts these risks.  Patient need for medication will be reassessed at each visit.  Doses will be adjusted according to patient need and findings.    Goal of TX: Patient will not have any adverse reactions of medication.  Patient will have reduction in neuropathic symptoms of pain with use of gabapentin as directed.  Patient will not have any drug drug interactions.  Medication Dispense Information    Oxycodone/Acetaminophen   Dispensed: 9/30/2021 12:00 AM   Written:  8/12/2021   Unit strength: 325MG/5MG   Days supply: 30   Dispense Note: GivenName=Wero=DAVISERBirthDate=[id number]=[address] [place], [place], [id number]   Quantity: 120 each   Pharmacy: [facility]   Authorizing provider: MILTON ONTIVEROS   Received from: IVETTE PDM (Fill History)   Brand or Generic:       Medication Dispense Information    Gabapentin   Dispensed: 10/12/2021 12:00 AM   Written:  7/14/2021   Unit strength: 800MG   Days supply: 30   Dispense Note: GivenName=Artieame=MESSERBirthDate=[id number]=[address] [place], [place], [id number]   Quantity: 120 each   Pharmacy: [facility]   Authorizing provider: JOSEY MICHAEL   Received from: [facility] PDM (Fill History)   Brand or Generic:        UDS requested per [facility] while patient in office today.  Buccal swab or blood sample will be obtained if patient is unable to provide specimen.     RTC 1 month, sooner if needed.           This document has been electronically signed by:  JOAQUÍN Ross FNP-C Dragon disclaimer:  Part of this note may be an electronic transcription/translation of spoken language to printed text using the Dragon Dictation System.

## 2021-11-12 DIAGNOSIS — IMO0002 DM (DIABETES MELLITUS) TYPE II UNCONTROLLED, PERIPH VASCULAR DISORDER: ICD-10-CM

## 2021-11-12 DIAGNOSIS — M51.27 PROTRUSION OF INTERVERTEBRAL DISC OF LUMBOSACRAL REGION: ICD-10-CM

## 2021-11-13 RX ORDER — GABAPENTIN 800 MG/1
TABLET ORAL
Qty: 120 TABLET | Refills: 2 | Status: SHIPPED | OUTPATIENT
Start: 2021-11-13 | End: 2022-01-04 | Stop reason: SDUPTHER

## 2021-11-17 NOTE — ASSESSMENT & PLAN NOTE
Continue with CGM and insulin pump.  Continue Trulicity weekly and Janumet as directed  Encouraged a low CHO high-protein diet

## 2021-12-13 DIAGNOSIS — R60.1 GENERALIZED EDEMA: ICD-10-CM

## 2021-12-13 DIAGNOSIS — E55.9 VITAMIN D DEFICIENCY: ICD-10-CM

## 2021-12-13 DIAGNOSIS — K21.9 GASTROESOPHAGEAL REFLUX DISEASE WITHOUT ESOPHAGITIS: ICD-10-CM

## 2021-12-13 RX ORDER — POTASSIUM CHLORIDE 750 MG/1
TABLET, FILM COATED, EXTENDED RELEASE ORAL
Qty: 60 TABLET | Refills: 6 | Status: SHIPPED | OUTPATIENT
Start: 2021-12-13 | End: 2022-10-07

## 2021-12-13 RX ORDER — FAMOTIDINE 40 MG/1
40 TABLET, FILM COATED ORAL NIGHTLY
Qty: 30 TABLET | Refills: 1 | Status: SHIPPED | OUTPATIENT
Start: 2021-12-13 | End: 2022-01-04 | Stop reason: SDUPTHER

## 2021-12-13 RX ORDER — ERGOCALCIFEROL 1.25 MG/1
CAPSULE ORAL
Qty: 4 CAPSULE | Refills: 7 | Status: SHIPPED | OUTPATIENT
Start: 2021-12-13 | End: 2022-04-04 | Stop reason: SDUPTHER

## 2021-12-28 ENCOUNTER — OFFICE VISIT (OUTPATIENT)
Dept: FAMILY MEDICINE CLINIC | Facility: CLINIC | Age: 51
End: 2021-12-28

## 2021-12-28 VITALS
BODY MASS INDEX: 38.36 KG/M2 | OXYGEN SATURATION: 97 % | HEIGHT: 71 IN | WEIGHT: 274 LBS | HEART RATE: 68 BPM | SYSTOLIC BLOOD PRESSURE: 130 MMHG | DIASTOLIC BLOOD PRESSURE: 70 MMHG | TEMPERATURE: 97.3 F

## 2021-12-28 DIAGNOSIS — IMO0002 DM TYPE 2, UNCONTROLLED, WITH NEUROPATHY: Primary | Chronic | ICD-10-CM

## 2021-12-28 DIAGNOSIS — E78.5 DYSLIPIDEMIA: Chronic | ICD-10-CM

## 2021-12-28 DIAGNOSIS — Z46.81 INSULIN PUMP TITRATION: ICD-10-CM

## 2021-12-28 DIAGNOSIS — I10 ESSENTIAL HYPERTENSION: Chronic | ICD-10-CM

## 2021-12-28 PROCEDURE — 99214 OFFICE O/P EST MOD 30 MIN: CPT | Performed by: NURSE PRACTITIONER

## 2021-12-28 RX ORDER — SITAGLIPTIN AND METFORMIN HYDROCHLORIDE 500; 50 MG/1; MG/1
1 TABLET, FILM COATED ORAL 2 TIMES DAILY WITH MEALS
Qty: 60 TABLET | Refills: 3 | Status: SHIPPED | OUTPATIENT
Start: 2021-12-28 | End: 2022-02-11

## 2021-12-28 RX ORDER — DULAGLUTIDE 1.5 MG/.5ML
1.5 INJECTION, SOLUTION SUBCUTANEOUS WEEKLY
Qty: 4 PEN | Refills: 3 | Status: SHIPPED | OUTPATIENT
Start: 2021-12-28 | End: 2022-02-11

## 2021-12-28 RX ORDER — PROCHLORPERAZINE 25 MG/1
1 SUPPOSITORY RECTAL DAILY
Qty: 1 EACH | Refills: 3 | Status: SHIPPED | OUTPATIENT
Start: 2021-12-28 | End: 2022-03-28

## 2021-12-28 RX ORDER — PROCHLORPERAZINE 25 MG/1
SUPPOSITORY RECTAL
Qty: 9 EACH | Refills: 3 | Status: SHIPPED | OUTPATIENT
Start: 2021-12-28 | End: 2023-02-15 | Stop reason: SDUPTHER

## 2021-12-28 RX ORDER — INSULIN PUMP CONTROLLER
1 EACH MISCELLANEOUS EVERY OTHER DAY
Qty: 45 EACH | Refills: 3 | Status: SHIPPED | OUTPATIENT
Start: 2021-12-28 | End: 2022-09-27 | Stop reason: SDUPTHER

## 2021-12-28 NOTE — PROGRESS NOTES
History of Present Illness  Soheila Brewster is a 51 y.o. female who presents to the clinic today pertaining to her DM, type 2 which is managed with an insulin pump and multiple oral agents.  In addition, she has HTN and Dyslipidemia.     Diabetes  She presents for her follow up diabetes visit. She has type 2 diabetes mellitus. Her disease course has been improving with the use of her Omni Pod insulin pump. Associated symptoms include foot paresthesias, polydipsia, polyuria and visual change which are improving. Diabetic complications include peripheral neuropathy. Risk factors for coronary artery disease include hypertension, diabetes mellitus, dyslipidemia, stress and tobacco exposure. Current diabetic treatment includes oral agents (dual therapy), Trulicity and insulin pump program.   An ACE inhibitor/angiotensin II receptor blocker is being taken. She does not see a podiatrist.Eye exam is current.   Lab Results   Component Value Date    HGBA1C 10.70 (H) 04/13/2021     Lab Results   Component Value Date    HGBA1C 8.20 (H) 10/14/2021     Dyslipidemia  Current antihyperlipidemic treatment includes ezetimibe and statins. Risk factors for coronary artery disease include diabetes mellitus, dyslipidemia, hypertension, obesity and a sedentary lifestyle.   Lab Results   Component Value Date    CHOL 95 10/14/2021    CHOL 90 04/13/2021    CHOL 93 10/19/2020     Lab Results   Component Value Date    TRIG 154 (H) 10/14/2021    TRIG 382 (H) 04/13/2021    TRIG 223 (H) 10/19/2020     Lab Results   Component Value Date    HDL 35 (L) 10/14/2021    HDL 27 (L) 04/13/2021    HDL 31 (L) 10/19/2020     Lab Results   Component Value Date    LDL 34 10/14/2021    LDL 11 04/13/2021    LDL 27 10/19/2020       Hypertension  The current episode started more than 1 year ago. Risk factors for coronary artery disease include diabetes mellitus, dyslipidemia, obesity, sedentary lifestyle and smoking/tobacco exposure. Current antihypertension  "treatment includes ACE inhibitors and diuretics.     Lab Results   Component Value Date    GLUCOSE 228 (H) 10/14/2021    BUN 9 10/14/2021    CREATININE 0.60 10/14/2021    EGFRIFNONA 106 10/14/2021    BCR 15.0 10/14/2021    K 5.1 10/14/2021    CO2 22.0 10/14/2021    CALCIUM 9.4 10/14/2021    ALBUMIN 4.10 10/14/2021    AST 15 10/14/2021    ALT 17 10/14/2021       The following portions of the patient's history were reviewed and updated as appropriate: allergies, current medications, past family history, past medical history, past social history, past surgical history and problem list.    Review of Systems   Constitutional: Negative for activity change, appetite change, chills, fatigue, fever and unexpected weight change.   HENT: Negative.    Eyes: Negative for visual disturbance.   Respiratory: Negative for cough, chest tightness, shortness of breath and wheezing.    Cardiovascular: Negative for chest pain, palpitations and leg swelling.   Gastrointestinal: Negative for abdominal pain, constipation, diarrhea, nausea and vomiting.   Endocrine: Negative for cold intolerance, heat intolerance, polydipsia, polyphagia and polyuria.   Musculoskeletal: Positive for arthralgias, back pain and myalgias.   Skin: Negative for color change and rash.   Neurological: Positive for numbness. Negative for dizziness, tremors, speech difficulty, weakness, light-headedness and headaches.   Hematological: Negative for adenopathy.   Psychiatric/Behavioral: Negative for confusion, decreased concentration and suicidal ideas. The patient is not nervous/anxious.    All other systems reviewed and are negative.    Vital signs:  /70 (BP Location: Left arm, Patient Position: Sitting, Cuff Size: Adult)   Pulse 68   Temp 97.3 °F (36.3 °C) (Temporal)   Ht 180.3 cm (70.98\")   Wt 124 kg (274 lb)   SpO2 97%   BMI 38.23 kg/m²     Physical Exam  Vitals and nursing note reviewed.   Constitutional:       General: She is not in acute distress.    "  Appearance: She is well-developed.      Interventions: Face mask in place.   HENT:      Head: Normocephalic.      Nose: Nose normal.   Eyes:      General: No scleral icterus.        Right eye: No discharge.         Left eye: No discharge.      Conjunctiva/sclera: Conjunctivae normal.   Neck:      Thyroid: No thyromegaly.      Vascular: No JVD.   Cardiovascular:      Rate and Rhythm: Normal rate and regular rhythm.      Heart sounds: Normal heart sounds. No murmur heard.  No friction rub.   Pulmonary:      Effort: Pulmonary effort is normal. No respiratory distress.      Breath sounds: Normal breath sounds. No wheezing or rales.   Abdominal:      General: Bowel sounds are normal. There is no distension.      Palpations: Abdomen is soft.      Tenderness: There is no abdominal tenderness. There is no guarding or rebound.   Musculoskeletal:         General: Tenderness present.      Cervical back: Neck supple.      Right lower leg: No edema.      Left lower leg: No edema.   Lymphadenopathy:      Cervical: No cervical adenopathy.   Skin:     General: Skin is warm and dry.      Capillary Refill: Capillary refill takes less than 2 seconds.   Neurological:      Mental Status: She is alert and oriented to person, place, and time.      Cranial Nerves: Cranial nerves are intact.   Psychiatric:         Mood and Affect: Mood and affect normal.         Speech: Speech normal.         Behavior: Behavior is cooperative.         Thought Content: Thought content normal.         Cognition and Memory: Cognition and memory normal.         Result Review :Insulin pump upload  Patient's Body mass index is 38.23 kg/m². indicating that she is obese (BMI >30). Obesity-related health conditions include the following: hypertension, diabetes mellitus, dyslipidemias and osteoarthritis. Obesity is unchanged. BMI is is above average; BMI management plan is completed. We discussed portion control and increasing exercise..     Assessment/Plan      Diagnoses and all orders for this visit:    1. DM type 2, uncontrolled, with neuropathy (HCC) (Primary)  Comments:  Findings and treatment options reviewed. Continue insulin pump therapy, CMG, Trulicity JanuMet  Orders:  -     Insulin Disposable Pump (OmniPod Dash 5 Pack Pods) misc; 1 Device by Subcutaneous Infusion route Every Other Day.  Dispense: 45 each; Refill: 3  -     insulin lispro (HumaLOG) 100 UNIT/ML injection; Maximum daily dose of 100 units per insulin pump  Dispense: 30 mL; Refill: 3  -     Continuous Blood Gluc Transmit (Dexcom G6 Transmitter) misc; 1 Device Daily for 90 days.  Dispense: 1 each; Refill: 3  -     Continuous Blood Gluc Sensor (Dexcom G6 Sensor); Every 10 (Ten) Days.  Dispense: 9 each; Refill: 3  -     Dulaglutide (Trulicity) 1.5 MG/0.5ML solution pen-injector; Inject 1.5 mg under the skin into the appropriate area as directed 1 (One) Time Per Week.  Dispense: 4 pen; Refill: 3  -     sitaGLIPtin-metFORMIN (Janumet)  MG per tablet; Take 1 tablet by mouth 2 (Two) Times a Day With Meals.  Dispense: 60 tablet; Refill: 3    2. Insulin pump titration  Comments:  Increased basal rate to 2.1 units I/C ratio to 10 and Sensitivity to 30     3. Essential hypertension  Comments:  Adequate control. Continue Enalapril and Bumex    4. Dyslipidemia  Comments:  Continue Rosuvastatin    Follow Up She follows up with her PCP. At that time, I will upload her pump and schedule her appt.     Findings and recommendations discussed with Soheila. Reviewed insulin pump upload and changes were made. I reviewed with her the process of performing a correction with her meal bolus. Had her to return demonstration several times. She has f/u with her PCP on January 4th and I will upload her pump at that time and schedule a f/u with me. Encouraged her to call me if she had any issues.  Lifestyle modifications reinforced including nutrition and activity recommendations.   Soheila was given instructions and  counseling regarding her condition or for health maintenance advice. Please see specific information pulled into the AVS if appropriate    This document has been electronically signed by:

## 2022-01-04 ENCOUNTER — OFFICE VISIT (OUTPATIENT)
Dept: FAMILY MEDICINE CLINIC | Facility: CLINIC | Age: 52
End: 2022-01-04

## 2022-01-04 VITALS
HEART RATE: 107 BPM | WEIGHT: 272 LBS | OXYGEN SATURATION: 96 % | SYSTOLIC BLOOD PRESSURE: 150 MMHG | TEMPERATURE: 97.3 F | BODY MASS INDEX: 38.08 KG/M2 | DIASTOLIC BLOOD PRESSURE: 70 MMHG | HEIGHT: 71 IN

## 2022-01-04 DIAGNOSIS — E78.2 MIXED HYPERLIPIDEMIA: ICD-10-CM

## 2022-01-04 DIAGNOSIS — K21.9 GASTROESOPHAGEAL REFLUX DISEASE WITHOUT ESOPHAGITIS: ICD-10-CM

## 2022-01-04 DIAGNOSIS — J32.0 CHRONIC MAXILLARY SINUSITIS: ICD-10-CM

## 2022-01-04 DIAGNOSIS — I10 ESSENTIAL HYPERTENSION: ICD-10-CM

## 2022-01-04 DIAGNOSIS — IMO0002 DM (DIABETES MELLITUS) TYPE II UNCONTROLLED, PERIPH VASCULAR DISORDER: Primary | ICD-10-CM

## 2022-01-04 DIAGNOSIS — M51.27 PROTRUSION OF INTERVERTEBRAL DISC OF LUMBOSACRAL REGION: ICD-10-CM

## 2022-01-04 DIAGNOSIS — E53.8 VITAMIN B12 DEFICIENCY: ICD-10-CM

## 2022-01-04 DIAGNOSIS — J30.1 CHRONIC SEASONAL ALLERGIC RHINITIS DUE TO POLLEN: ICD-10-CM

## 2022-01-04 DIAGNOSIS — K59.04 CHRONIC IDIOPATHIC CONSTIPATION: ICD-10-CM

## 2022-01-04 DIAGNOSIS — R60.1 GENERALIZED EDEMA: ICD-10-CM

## 2022-01-04 DIAGNOSIS — R06.02 SHORTNESS OF BREATH: ICD-10-CM

## 2022-01-04 PROCEDURE — 99214 OFFICE O/P EST MOD 30 MIN: CPT | Performed by: NURSE PRACTITIONER

## 2022-01-04 RX ORDER — GABAPENTIN 800 MG/1
800 TABLET ORAL 4 TIMES DAILY
Qty: 120 TABLET | Refills: 2 | Status: SHIPPED | OUTPATIENT
Start: 2022-01-04 | End: 2022-04-04 | Stop reason: SDUPTHER

## 2022-01-04 RX ORDER — ENALAPRIL MALEATE 10 MG/1
10 TABLET ORAL DAILY
Qty: 30 TABLET | Refills: 5 | Status: SHIPPED | OUTPATIENT
Start: 2022-01-04 | End: 2022-04-04 | Stop reason: DRUGHIGH

## 2022-01-04 RX ORDER — PLECANATIDE 3 MG/1
1 TABLET ORAL DAILY
Qty: 30 TABLET | Refills: 5 | Status: SHIPPED | OUTPATIENT
Start: 2022-01-04 | End: 2022-07-11

## 2022-01-04 RX ORDER — EZETIMIBE 10 MG/1
10 TABLET ORAL DAILY
Qty: 30 TABLET | Refills: 5 | Status: SHIPPED | OUTPATIENT
Start: 2022-01-04 | End: 2022-04-04 | Stop reason: SDUPTHER

## 2022-01-04 RX ORDER — NEEDLES, SAFETY 18GX1 1/2"
1 NEEDLE, DISPOSABLE MISCELLANEOUS
Qty: 1 EACH | Refills: 3 | Status: SHIPPED | OUTPATIENT
Start: 2022-01-04 | End: 2022-04-04 | Stop reason: SDUPTHER

## 2022-01-04 RX ORDER — LANOLIN ALCOHOL/MO/W.PET/CERES
1000 CREAM (GRAM) TOPICAL DAILY
Qty: 30 TABLET | Refills: 5 | Status: SHIPPED | OUTPATIENT
Start: 2022-01-04 | End: 2022-06-29 | Stop reason: SDUPTHER

## 2022-01-04 RX ORDER — PANTOPRAZOLE SODIUM 40 MG/1
40 TABLET, DELAYED RELEASE ORAL DAILY
Qty: 30 TABLET | Refills: 1 | Status: SHIPPED | OUTPATIENT
Start: 2022-01-04 | End: 2022-04-04 | Stop reason: SDUPTHER

## 2022-01-04 RX ORDER — FAMOTIDINE 40 MG/1
40 TABLET, FILM COATED ORAL NIGHTLY
Qty: 30 TABLET | Refills: 5 | Status: SHIPPED | OUTPATIENT
Start: 2022-01-04 | End: 2022-06-29 | Stop reason: SDUPTHER

## 2022-01-04 RX ORDER — MONTELUKAST SODIUM 10 MG/1
10 TABLET ORAL NIGHTLY
Qty: 30 TABLET | Refills: 5 | Status: SHIPPED | OUTPATIENT
Start: 2022-01-04 | End: 2022-06-29 | Stop reason: SDUPTHER

## 2022-01-04 RX ORDER — ALBUTEROL SULFATE 90 UG/1
2 AEROSOL, METERED RESPIRATORY (INHALATION) EVERY 4 HOURS PRN
Qty: 18 G | Refills: 5 | Status: SHIPPED | OUTPATIENT
Start: 2022-01-04 | End: 2022-06-29 | Stop reason: SDUPTHER

## 2022-01-04 RX ORDER — SULFAMETHOXAZOLE AND TRIMETHOPRIM 800; 160 MG/1; MG/1
1 TABLET ORAL EVERY 12 HOURS
Qty: 20 TABLET | Refills: 0 | Status: SHIPPED | OUTPATIENT
Start: 2022-01-04 | End: 2022-01-14

## 2022-01-04 RX ORDER — OXYCODONE AND ACETAMINOPHEN 7.5; 325 MG/1; MG/1
1 TABLET ORAL 4 TIMES DAILY
COMMUNITY
Start: 2021-12-30

## 2022-01-04 RX ORDER — BUMETANIDE 2 MG/1
2 TABLET ORAL 2 TIMES DAILY
Qty: 60 TABLET | Refills: 5 | Status: SHIPPED | OUTPATIENT
Start: 2022-01-04 | End: 2022-06-29 | Stop reason: SDUPTHER

## 2022-01-04 NOTE — PROGRESS NOTES
Subjective   Soheila Brewster is a 51 y.o. female.     Chief Complaint   Patient presents with   • Diabetes       History of Present Illness     Diabetes-chronic and ongoing.  Reports she has been having elevations but got an adjustment of meds per S JOAQUÍN Lee.  She reports that she is doing well.   She is tolerating meds well and is rotating her pump sites.   HTN-mild elevation today.  She reports that she has noted some borderline   Pain management-reports that she has had increase in meds to 7.5 mg.   She will be having an epidural in her upper back.  She has multiple disc herniations.  She has had a recent Scan of T and L spine.   Sinus complaint-has been using OTC meds such as Tylenol Sinus. She has also been using Nyquil at times.  She reports that she continues to have thick drainage.  Left ear is bothersome.  She reports that later in the day she has noted some being short winded.      The following portions of the patient's history were reviewed and updated as appropriate: CC, ROS, allergies, current medications, past family history, past medical history, past social history, past surgical history and problem list.      Review of Systems   Constitutional: Positive for fatigue. Negative for appetite change and fever.   HENT: Positive for congestion, ear pain, postnasal drip, sore throat and trouble swallowing. Negative for nosebleeds, rhinorrhea, tinnitus and voice change.    Eyes: Positive for itching. Negative for blurred vision, photophobia and visual disturbance.   Respiratory: Positive for cough, chest tightness and shortness of breath. Negative for wheezing.    Cardiovascular: Negative for chest pain and palpitations.   Gastrointestinal: Positive for constipation (reports her last BM was yesterday morning) and nausea. Negative for abdominal pain, blood in stool, diarrhea, vomiting and GERD.   Endocrine: Negative for cold intolerance, heat intolerance and polydipsia.   Genitourinary: Negative for  "decreased urine volume, difficulty urinating, dysuria and hematuria.   Musculoskeletal: Positive for arthralgias, back pain, gait problem and myalgias.   Skin: Negative for color change, pallor and bruise.   Allergic/Immunologic: Negative.    Neurological: Negative for dizziness, syncope, numbness and headache.   Hematological: Negative.    Psychiatric/Behavioral: Positive for stress. Negative for decreased concentration, sleep disturbance, suicidal ideas and depressed mood. The patient is not nervous/anxious.    All other systems reviewed and are negative.      Objective     /70   Pulse 107   Temp 97.3 °F (36.3 °C) (Temporal)   Ht 180.3 cm (70.98\")   Wt 123 kg (272 lb)   SpO2 96%   BMI 37.96 kg/m²     Physical Exam  Vitals reviewed.   Constitutional:       General: She is not in acute distress.     Appearance: She is well-developed. She is not diaphoretic.   HENT:      Head: Normocephalic and atraumatic.      Jaw: No tenderness.      Comments: Brief oral exam.  Patient is presently wearing a face covering/mask due to COVID-19 pandemic.     Right Ear: Hearing, ear canal and external ear normal. A middle ear effusion is present.      Left Ear: Hearing, ear canal and external ear normal. A middle ear effusion is present.      Nose: Congestion present.      Right Sinus: Maxillary sinus tenderness and frontal sinus tenderness present.      Left Sinus: Maxillary sinus tenderness and frontal sinus tenderness present.      Mouth/Throat:      Pharynx: Posterior oropharyngeal erythema (with thick PND present) present.   Eyes:      General: Lids are normal. No scleral icterus.     Extraocular Movements:      Right eye: Normal extraocular motion and no nystagmus.      Left eye: Normal extraocular motion and no nystagmus.      Conjunctiva/sclera: Conjunctivae normal.      Pupils: Pupils are equal, round, and reactive to light.   Neck:      Thyroid: No thyromegaly or thyroid tenderness.      Vascular: No carotid " bruit or JVD.      Trachea: No tracheal tenderness.   Cardiovascular:      Rate and Rhythm: Normal rate and regular rhythm.      Pulses:           Dorsalis pedis pulses are 2+ on the right side and 2+ on the left side.        Posterior tibial pulses are 2+ on the right side and 2+ on the left side.      Heart sounds: Normal heart sounds, S1 normal and S2 normal. No murmur heard.      Pulmonary:      Effort: Pulmonary effort is normal. No accessory muscle usage, prolonged expiration or respiratory distress.      Breath sounds: Normal breath sounds.   Chest:      Chest wall: No tenderness.   Abdominal:      General: Bowel sounds are normal.      Palpations: Abdomen is soft. There is no mass.      Tenderness: There is no abdominal tenderness.   Musculoskeletal:      Cervical back: Neck supple. Tenderness present. Pain with movement present. Decreased range of motion.      Thoracic back: Spasms and tenderness present. Decreased range of motion.      Lumbar back: Spasms, tenderness and bony tenderness present. Decreased range of motion. Positive right straight leg raise test and positive left straight leg raise test.      Right lower leg: No edema.      Left lower leg: No edema.      Comments: No muscular atrophy or flaccidity.   Lymphadenopathy:      Head:      Right side of head: No submental or submandibular adenopathy.      Left side of head: No submental or submandibular adenopathy.      Cervical: Cervical adenopathy present.      Right cervical: Superficial cervical adenopathy present.      Left cervical: Superficial cervical adenopathy present.   Skin:     General: Skin is warm and dry.      Capillary Refill: Capillary refill takes less than 2 seconds.      Coloration: Skin is not jaundiced or pale.      Findings: No erythema.      Nails: There is no clubbing.   Neurological:      Mental Status: She is alert and oriented to person, place, and time.      Cranial Nerves: No cranial nerve deficit or facial asymmetry.       Sensory: No sensory deficit.      Motor: No tremor, atrophy or abnormal muscle tone.      Coordination: Coordination normal.      Deep Tendon Reflexes: Reflexes are normal and symmetric.   Psychiatric:         Attention and Perception: She is attentive.         Mood and Affect: Mood and affect normal.         Speech: Speech normal.         Behavior: Behavior normal. Behavior is cooperative.         Thought Content: Thought content normal.         Judgment: Judgment normal.        Assessment/Plan     Diagnoses and all orders for this visit:    1. DM (diabetes mellitus) type II uncontrolled, periph vascular disorder (HCC) (Primary)  Assessment & Plan:  Continue with CGM and insulin pump.  Continue Trulicity weekly and Janumet as directed  Encouraged a low CHO high-protein diet    Orders:  -     gabapentin (NEURONTIN) 800 MG tablet; Take 1 tablet by mouth 4 (Four) Times a Day.  Dispense: 120 tablet; Refill: 2    2. Essential hypertension  Assessment & Plan:  Updated RX for Enalapril 10 mg written.   Ambulatory BP monitoring either at home or random community checks.  Patient to report continued elevations >140/90.  Patient may come by office for checks if needed.       Orders:  -     enalapril (VASOTEC) 10 MG tablet; Take 1 tablet by mouth Daily.  Dispense: 30 tablet; Refill: 5    3. Protrusion of intervertebral disc of lumbosacral region  -     gabapentin (NEURONTIN) 800 MG tablet; Take 1 tablet by mouth 4 (Four) Times a Day.  Dispense: 120 tablet; Refill: 2    4. Gastroesophageal reflux disease without esophagitis  Assessment & Plan:  Stop Nexium due to concerns of efficacy.   Will start Protonix 40 mg.   Ambulatory BP monitoring either at home or random community checks.  Patient to report continued elevations >140/90.  Patient may come by office for checks if needed.       Orders:  -     famotidine (PEPCID) 40 MG tablet; Take 1 tablet by mouth Every Night.  Dispense: 30 tablet; Refill: 5  -     pantoprazole  "(Protonix) 40 MG EC tablet; Take 1 tablet by mouth Daily.  Dispense: 30 tablet; Refill: 1    5. Vitamin B12 deficiency  Comments:  continue B12 supplement  Orders:  -     vitamin B-12 (CYANOCOBALAMIN) 1000 MCG tablet; Take 1 tablet by mouth Daily.  Dispense: 30 tablet; Refill: 5    6. Generalized edema  -     bumetanide (BUMEX) 2 MG tablet; Take 1 tablet by mouth 2 (Two) Times a Day.  Dispense: 60 tablet; Refill: 5    7. Mixed hyperlipidemia  -     ezetimibe (ZETIA) 10 MG tablet; Take 1 tablet by mouth Daily.  Dispense: 30 tablet; Refill: 5    8. Chronic seasonal allergic rhinitis due to pollen  -     montelukast (SINGULAIR) 10 MG tablet; Take 1 tablet by mouth Every Night.  Dispense: 30 tablet; Refill: 5    9. Chronic maxillary sinusitis  -     albuterol sulfate HFA (Ventolin HFA) 108 (90 Base) MCG/ACT inhaler; Inhale 2 puffs Every 4 (Four) Hours As Needed for Wheezing.  Dispense: 18 g; Refill: 5  -     sulfamethoxazole-trimethoprim (BACTRIM DS,SEPTRA DS) 800-160 MG per tablet; Take 1 tablet by mouth Every 12 (Twelve) Hours for 10 days.  Dispense: 20 tablet; Refill: 0    10. Chronic idiopathic constipation  -     Plecanatide (Trulance) 3 MG tablet; Take 1 tablet by mouth Daily.  Dispense: 30 tablet; Refill: 5    11. Shortness of breath  -     Fluticasone-Umeclidin-Vilant (Trelegy Ellipta) 100-62.5-25 MCG/INH inhaler; Inhale 1 puff Daily.  Dispense: 28 each; Refill: 2    Other orders  -     Syringe/Needle, Disp, (BD Eclipse Syringe) 25G X 1\" 3 ML misc; 1 each Every 3 (Three) Months. For depo injection  Dispense: 1 each; Refill: 3       Patient's Body mass index is 37.96 kg/m². indicating that she is morbidly obese (BMI > 40 or > 35 with obesity - related health condition). Obesity-related health conditions include the following: hypertension, diabetes mellitus and dyslipidemias. Obesity is unchanged. BMI is is above average. We discussed portion control and increasing exercise     Understands disease processes and " need for medications.  Understands reasons for urgent and emergent care.  Patient (& family) verbalized agreement for treatment plan.   Emotional support and active listening provided.  Patient provided time to verbalize feelings.    IVETTE/PMMERISSA reviewed today and consistent.  Will refill prescribed controlled medication today.  Patient is aware they cannot receive narcotics from any other provider except if under care of pain management or speciality clinic.  Risk and benefits of medication use has been reviewed.  History and physical exam exhibit continued safe and appropriate use of controlled substances.  The patient is aware of the potential for addiction and dependence.  This patient has been made aware of the appropriate use of such medications, including potential risk of somnolence, limited ability to drive and / or work safely, and potential for overdose.    It has also been made clear that these medications are for use by this patient only, without concomitant use of alcohol or other substances unless prescribed/advised by medical provider.  Patient understands they may be subject to UDS and pill counts at random.      Patient considered to be moderate risk for addiction due to use of multiple controlled medications.  Patient understands and accepts these risks.  Patient need for medication will be reassessed at each visit.  Doses will be adjusted according to patient need and findings.    Goal of TX: Patient will not have any adverse reactions of medication.  Patient will have reduction in neuropathic symptoms of pain with use of gabapentin as directed.  Patient will not have any drug drug interactions.    Medication Dispense Information    Gabapentin   Dispensed: 12/13/2021 12:00 AM   Written:  11/13/2021   Unit strength: 800MG   Days supply: 30   Dispense Note: GivenName=AUTUMNJames=ADOLFOBirthDate=[id number]=[address], [place], [place], [id number]   Quantity: 120 each   [facility]   Authorizing provider: JOSEY MICHAEL   Received from: [facility] (Fill History)   Brand or Generic:       Continue under the care of pain management for back pain.      RTC 3 months, sooner if needed.             This document has been electronically signed by:  JOAQUÍN Ross FNP-C Dragon disclaimer:  Part of this note may be an electronic transcription/translation of spoken language to printed text using the Dragon Dictation System.

## 2022-01-10 NOTE — ASSESSMENT & PLAN NOTE
Updated RX for Enalapril 10 mg written.   Ambulatory BP monitoring either at home or random community checks.  Patient to report continued elevations >140/90.  Patient may come by office for checks if needed.

## 2022-01-20 ENCOUNTER — PRIOR AUTHORIZATION (OUTPATIENT)
Dept: FAMILY MEDICINE CLINIC | Facility: CLINIC | Age: 52
End: 2022-01-20

## 2022-01-28 ENCOUNTER — PRIOR AUTHORIZATION (OUTPATIENT)
Dept: FAMILY MEDICINE CLINIC | Facility: CLINIC | Age: 52
End: 2022-01-28

## 2022-02-11 DIAGNOSIS — K21.9 GASTROESOPHAGEAL REFLUX DISEASE WITHOUT ESOPHAGITIS: ICD-10-CM

## 2022-02-11 DIAGNOSIS — J30.1 CHRONIC SEASONAL ALLERGIC RHINITIS DUE TO POLLEN: ICD-10-CM

## 2022-02-11 DIAGNOSIS — IMO0002 DM TYPE 2, UNCONTROLLED, WITH NEUROPATHY: Chronic | ICD-10-CM

## 2022-02-11 RX ORDER — DULAGLUTIDE 1.5 MG/.5ML
INJECTION, SOLUTION SUBCUTANEOUS
Qty: 2 ML | Refills: 3 | Status: SHIPPED | OUTPATIENT
Start: 2022-02-11 | End: 2022-04-04 | Stop reason: SDUPTHER

## 2022-02-11 RX ORDER — ESOMEPRAZOLE MAGNESIUM 40 MG/1
40 CAPSULE, DELAYED RELEASE ORAL
Qty: 30 CAPSULE | Refills: 5 | OUTPATIENT
Start: 2022-02-11

## 2022-02-11 RX ORDER — SITAGLIPTIN AND METFORMIN HYDROCHLORIDE 500; 50 MG/1; MG/1
TABLET, FILM COATED ORAL
Qty: 60 TABLET | Refills: 2 | Status: SHIPPED | OUTPATIENT
Start: 2022-02-11 | End: 2022-04-04 | Stop reason: SDUPTHER

## 2022-02-11 RX ORDER — LEVOCETIRIZINE DIHYDROCHLORIDE 5 MG/1
5 TABLET, FILM COATED ORAL EVERY EVENING
Qty: 30 TABLET | Refills: 5 | Status: SHIPPED | OUTPATIENT
Start: 2022-02-11 | End: 2022-06-29 | Stop reason: SDUPTHER

## 2022-02-22 ENCOUNTER — OFFICE VISIT (OUTPATIENT)
Dept: FAMILY MEDICINE CLINIC | Facility: CLINIC | Age: 52
End: 2022-02-22

## 2022-02-22 VITALS
HEART RATE: 85 BPM | WEIGHT: 277 LBS | TEMPERATURE: 97.1 F | SYSTOLIC BLOOD PRESSURE: 110 MMHG | DIASTOLIC BLOOD PRESSURE: 62 MMHG | HEIGHT: 71 IN | BODY MASS INDEX: 38.78 KG/M2 | OXYGEN SATURATION: 99 %

## 2022-02-22 DIAGNOSIS — H66.006 RECURRENT ACUTE SUPPURATIVE OTITIS MEDIA WITHOUT SPONTANEOUS RUPTURE OF TYMPANIC MEMBRANE OF BOTH SIDES: ICD-10-CM

## 2022-02-22 DIAGNOSIS — I10 ESSENTIAL HYPERTENSION: Chronic | ICD-10-CM

## 2022-02-22 DIAGNOSIS — E78.5 DYSLIPIDEMIA: Chronic | ICD-10-CM

## 2022-02-22 DIAGNOSIS — Z79.4 TYPE 2 DIABETES MELLITUS WITH HYPERGLYCEMIA, WITH LONG-TERM CURRENT USE OF INSULIN: Primary | Chronic | ICD-10-CM

## 2022-02-22 DIAGNOSIS — J45.50 SEVERE PERSISTENT ASTHMA, UNSPECIFIED WHETHER COMPLICATED: Chronic | ICD-10-CM

## 2022-02-22 DIAGNOSIS — E11.65 TYPE 2 DIABETES MELLITUS WITH HYPERGLYCEMIA, WITH LONG-TERM CURRENT USE OF INSULIN: Primary | Chronic | ICD-10-CM

## 2022-02-22 PROCEDURE — 99215 OFFICE O/P EST HI 40 MIN: CPT | Performed by: NURSE PRACTITIONER

## 2022-02-22 RX ORDER — GUAIFENESIN 600 MG/1
1200 TABLET, EXTENDED RELEASE ORAL 2 TIMES DAILY
Qty: 30 TABLET | Refills: 1 | Status: SHIPPED | OUTPATIENT
Start: 2022-02-22 | End: 2022-08-25

## 2022-02-22 RX ORDER — ALBUTEROL SULFATE 1.25 MG/3ML
1 SOLUTION RESPIRATORY (INHALATION) EVERY 6 HOURS PRN
Qty: 150 ML | Refills: 3 | Status: SHIPPED | OUTPATIENT
Start: 2022-02-22 | End: 2022-07-11

## 2022-02-22 RX ORDER — OFLOXACIN 3 MG/ML
5 SOLUTION AURICULAR (OTIC) 2 TIMES DAILY
Qty: 10 ML | Refills: 0 | Status: SHIPPED | OUTPATIENT
Start: 2022-02-22 | End: 2022-03-01

## 2022-02-22 RX ORDER — SULFAMETHOXAZOLE AND TRIMETHOPRIM 800; 160 MG/1; MG/1
1 TABLET ORAL 2 TIMES DAILY
Qty: 20 TABLET | Refills: 0 | Status: SHIPPED | OUTPATIENT
Start: 2022-02-22 | End: 2022-03-04

## 2022-02-22 NOTE — PROGRESS NOTES
History of Present Illness  Soheila Brewster is a 51 y.o. female who presents to the clinic today pertaining to her DM, type 2 which is managed with an insulin pump and multiple oral agents. Unfortunately, she is having issues with her supplies for her DexCom.   In addition, she has HTN and Dyslipidemia. She is c/o upper respiratory symptoms which started over a week ago and worsening.     Diabetes  She presents for her follow up diabetes visit. She has type 2 diabetes mellitus. Her disease course has been improving with the use of her Omni Pod insulin pump. Diabetic complications include peripheral neuropathy. Risk factors for coronary artery disease include hypertension, diabetes mellitus, dyslipidemia, stress and tobacco exposure. Current diabetic treatment includes oral agents (dual therapy), Trulicity and insulin pump program.   An ACE inhibitor/angiotensin II receptor blocker is being taken. She does not see a podiatrist.Eye exam is current.   Lab Results   Component Value Date    HGBA1C 10.70 (H) 04/13/2021     Lab Results   Component Value Date    HGBA1C 8.20 (H) 10/14/2021     Dyslipidemia  Current antihyperlipidemic treatment includes ezetimibe and statins. Risk factors for coronary artery disease include diabetes mellitus, dyslipidemia, hypertension, obesity and a sedentary lifestyle.   Lab Results   Component Value Date    CHOL 95 10/14/2021    CHOL 90 04/13/2021    CHOL 93 10/19/2020     Lab Results   Component Value Date    TRIG 154 (H) 10/14/2021    TRIG 382 (H) 04/13/2021    TRIG 223 (H) 10/19/2020     Lab Results   Component Value Date    HDL 35 (L) 10/14/2021    HDL 27 (L) 04/13/2021    HDL 31 (L) 10/19/2020     Lab Results   Component Value Date    LDL 34 10/14/2021    LDL 11 04/13/2021    LDL 27 10/19/2020       Hypertension  The current episode started more than 1 year ago. Risk factors for coronary artery disease include diabetes mellitus, dyslipidemia, obesity, sedentary lifestyle and  "smoking/tobacco exposure. Current antihypertension treatment includes ACE inhibitors and diuretics.     Lab Results   Component Value Date    GLUCOSE 228 (H) 10/14/2021    BUN 9 10/14/2021    CREATININE 0.60 10/14/2021    EGFRIFNONA 106 10/14/2021    BCR 15.0 10/14/2021    K 5.1 10/14/2021    CO2 22.0 10/14/2021    CALCIUM 9.4 10/14/2021    ALBUMIN 4.10 10/14/2021    AST 15 10/14/2021    ALT 17 10/14/2021     The following portions of the patient's history were reviewed and updated as appropriate: allergies, current medications, past family history, past medical history, past social history, past surgical history and problem list.    Review of Systems   Constitutional: Positive for fatigue. Negative for activity change, appetite change, chills, fever and unexpected weight change.   HENT: Positive for congestion.    Eyes: Negative for visual disturbance.   Respiratory: Positive for cough and wheezing. Negative for shortness of breath.    Cardiovascular: Negative for chest pain, palpitations and leg swelling.   Gastrointestinal: Negative for abdominal pain, constipation, diarrhea, nausea and vomiting.   Endocrine: Negative for cold intolerance, heat intolerance, polydipsia, polyphagia and polyuria.   Musculoskeletal: Positive for arthralgias.   Skin: Negative for color change and rash.   Neurological: Positive for numbness. Negative for dizziness, tremors, speech difficulty, weakness, light-headedness and headaches.   Hematological: Negative for adenopathy.   Psychiatric/Behavioral: Negative for confusion, decreased concentration and sleep disturbance. The patient is not nervous/anxious.    All other systems reviewed and are negative.      Vital signs:  /62 (BP Location: Left arm, Patient Position: Sitting, Cuff Size: Adult)   Pulse 85   Temp 97.1 °F (36.2 °C) (Temporal)   Ht 180.3 cm (70.98\")   Wt 126 kg (277 lb)   SpO2 99%   BMI 38.65 kg/m²     Physical Exam  Vitals and nursing note reviewed. "   Constitutional:       General: She is not in acute distress.     Appearance: She is well-developed.      Interventions: Face mask in place.   HENT:      Head: Normocephalic.      Right Ear: A middle ear effusion is present. Tympanic membrane is bulging. Tympanic membrane is not erythematous.      Left Ear: A middle ear effusion is present. Tympanic membrane is erythematous and bulging.      Nose: Nose normal.   Eyes:      General: No scleral icterus.        Right eye: No discharge.         Left eye: No discharge.      Conjunctiva/sclera: Conjunctivae normal.   Neck:      Thyroid: No thyromegaly.      Vascular: No JVD.   Cardiovascular:      Rate and Rhythm: Normal rate and regular rhythm.      Heart sounds: Normal heart sounds. No murmur heard.  No friction rub.   Pulmonary:      Effort: Pulmonary effort is normal. No respiratory distress.      Breath sounds: Normal breath sounds. No wheezing or rales.   Abdominal:      General: Bowel sounds are normal. There is no distension.      Palpations: Abdomen is soft.      Tenderness: There is no abdominal tenderness. There is no guarding or rebound.   Musculoskeletal:      Cervical back: Neck supple.      Right lower leg: No edema.      Left lower leg: No edema.   Lymphadenopathy:      Cervical: No cervical adenopathy.   Skin:     General: Skin is warm and dry.      Capillary Refill: Capillary refill takes less than 2 seconds.   Neurological:      Mental Status: She is alert and oriented to person, place, and time.      Cranial Nerves: Cranial nerves are intact.   Psychiatric:         Mood and Affect: Mood and affect normal.         Speech: Speech normal.         Behavior: Behavior is cooperative.         Thought Content: Thought content normal.         Cognition and Memory: Cognition and memory normal.       Patient's Body mass index is 38.65 kg/m². indicating that she is obese (BMI >30). Obesity-related health conditions include the following: hypertension, diabetes  mellitus, dyslipidemias and osteoarthritis. Obesity is Flucuating . BMI  is above average; BMI management plan is completed. We discussed portion control and increasing exercise..     Assessment/Plan     Diagnoses and all orders for this visit:    1. Type 2 diabetes mellitus with hyperglycemia, with long-term current use of insulin (HCC) (Primary)  Comments:  Will continue OmniPod, Trulicity and Janumet     2. Essential hypertension  Comments:  Well controlled. Continue Enalapril     3. Dyslipidemia  Comments:  Continue Rosuvastatin     4. Severe persistent asthma, unspecified whether complicated  Comments:  Home Nebulizer ordered and Albuterol neb solution Smoking cessation encouraged  Orders:  -     albuterol (ACCUNEB) 1.25 MG/3ML nebulizer solution; Take 3 mL by nebulization Every 6 (Six) Hours As Needed for Wheezing.  Dispense: 150 mL; Refill: 3  -     guaiFENesin (Mucinex) 600 MG 12 hr tablet; Take 2 tablets by mouth 2 (Two) Times a Day.  Dispense: 30 tablet; Refill: 1    5. Recurrent acute suppurative otitis media without spontaneous rupture of tympanic membrane of both sides  Comments:  Bactrim and Floxin Otic drops.   Orders:  -     sulfamethoxazole-trimethoprim (BACTRIM DS,SEPTRA DS) 800-160 MG per tablet; Take 1 tablet by mouth 2 (Two) Times a Day for 10 days.  Dispense: 20 tablet; Refill: 0  -     ofloxacin (Floxin Otic) 0.3 % otic solution; Administer 5 drops into both ears 2 (Two) Times a Day for 7 days.  Dispense: 10 mL; Refill: 0      I spent 40 minutes caring for Soheila on this date of service. This time includes time spent by me in the following activities:preparing for the visit, obtaining and/or reviewing a separately obtained history, performing a medically appropriate examination and/or evaluation , counseling and educating the patient/family/caregiver, ordering medications, tests, or procedures, documenting information in the medical record and care coordination  Follow Up In March   Findings  and recommendations discussed with Soheila.Spent time discussing her insulin pump and DexCom supplies. Provided her information to contact DME. Encouraged her if she has any issues to call me. Lifestyle modifications reinforced including nutrition and activity recommendations. She follow up in March sooner if problems/concerns occur.  Soheila was given instructions and counseling regarding her condition or for health maintenance advice. Please see specific information pulled into the AVS if appropriate      This document has been electronically signed by:

## 2022-02-23 NOTE — PATIENT INSTRUCTIONS
Type 2 Diabetes Mellitus, Self-Care, Adult  When you have type 2 diabetes (type 2 diabetes mellitus), you must make sure your blood sugar (glucose) stays in a healthy range. You can do this with:  · Nutrition.  · Exercise.  · Lifestyle changes.  · Medicines or insulin, if needed.  · Support from your doctors and others.  What are the risks?  Having diabetes can raise your risk for other long-term (chronic) health problems. You may get medicines to help prevent these problems.  How to stay aware of blood sugar    · Check your blood sugar level every day, as often as told.  · Have your A1C (hemoglobin A1C) level checked two or more times a year. Have it checked more often if told.  · Your doctor will set personal treatment goals for you. In general, you should have these blood sugar levels:  ? Before meals:  mg/dL (4.4-7.2 mmol/L).  ? After meals: below 180 mg/dL (10 mmol/L).  ? A1C: less than 7%.  How to manage high and low blood sugar  Symptoms of high blood sugar  High blood sugar is also called hyperglycemia. Know the symptoms of high blood sugar. These may include:  · More thirst.  · Hunger.  · Feeling very tired.  · Needing to pee (urinate) more often than normal.  · Seeing things blurry.  Symptoms of low blood sugar  Low blood sugar is also called hypoglycemia. This is when blood sugar is at or below 70 mg/dL (3.9 mmol/L). Symptoms may include:  · Hunger.  · Feeling worried or nervous (anxious).  · Feeling sweaty and cold to the touch (clammy).  · Being dizzy or light-headed.  · Feeling sleepy.  · A fast heartbeat.  · Feeling grouchy (irritable).  · Tingling or loss of feeling (numbness) around your mouth, lips, or tongue.  · Restless sleep.  Diabetes medicines can cause low blood sugar. You are more at risk:  · While you exercise.  · After exercise.  · During sleep.  · When you are sick.  · When you skip meals or do not eat for a long time.  Treating low blood sugar  If you think you have low blood  sugar, eat or drink something sugary right away. Keep 15 grams of a fast-acting carb (carbohydrate) with you all the time. Make sure your family and friends know how to treat you if you cannot treat yourself.  Treating very low blood sugar  Severe hypoglycemia is when your blood sugar is at or below 54 mg/dL (3 mmol/L). Severe hypoglycemia is an emergency. Do not wait to see if the symptoms will go away. Get medical help right away. Call your local emergency services (911 in the [place] Do not drive yourself to the hospital.  You may need a glucagon shot if you have very low blood sugar and you cannot eat or drink. Have a family member or friend learn how to check your blood sugar and how to give you a glucagon shot. Ask your doctor if you should have a kit for glucagon shots.  Follow these instructions at home:  Medicines  · Take diabetes medicines as told. If your doctor prescribed insulin or diabetes medicines, take them each day.  · Do not run out of insulin or other medicines. Plan ahead.  · If you use insulin, change the amount you take based on how active you are and what foods you eat. Your doctor will tell you how to do this.  · Take over-the-counter and prescription medicines only as told by your doctor.  Eating and drinking    · Eat healthy foods. These include:  ? Low-fat (lean) proteins.  ? Complex carbs, such as whole grains.  ? Fresh fruits and vegetables.  ? Low-fat dairy products.  ? Healthy fats.  · Meet with a food expert (dietitian) to make an eating plan.  · Follow instructions from your doctor about what you cannot eat or drink.  · Drink enough fluid to keep your pee (urine) pale yellow.  · Keep track of carbs that you eat. Read food labels and learn serving sizes of foods.  · Follow your sick-day plan when you cannot eat or drink as normal. Make this plan with your doctor so it is ready to use.    Activity  · Exercise as told by your doctor. You may need to:  ? Do stretching and strength  exercises 2 or more times a week.  ? Do 150 minutes or more of exercise each week that makes your heart beat faster and makes you sweat.  § Spread out your exercise over 3 or more days a week.  § Do not go more than 2 days in a row without exercise.  · Talk with your doctor before you start a new exercise. Your doctor may tell you to change:  ? How much insulin or medicines you take.  ? How much food you eat.  Lifestyle  · Do not use any products that contain nicotine or tobacco, such as cigarettes, e-cigarettes, and chewing tobacco. If you need help quitting, ask your doctor.  · If you drink alcohol and your doctor says alcohol is safe for you:  ? Limit how much you use to:  § 0-1 drink a day for women who are not pregnant.  § 0-2 drinks a day for men.  ? Be aware of how much alcohol is in your drink. In the [place] one drink equals one 12 oz bottle of beer (355 mL), one 5 oz glass of wine (148 mL), or one 1½ oz glass of hard liquor (44 mL).  · Learn to deal with stress. If you need help, ask your doctor.  Body care    · Stay up to date with your shots (immunizations).  · Have your eyes and feet checked by a doctor as often as told.  · Check your skin and feet every day. Check for cuts, bruises, redness, blisters, or sores.  · Brush your teeth and gums two times a day. Floss one or more times a day.  · Go to the dentist one or more times every 6 months.  · Stay at a healthy weight.    General instructions  · Share your diabetes care plan with:  ? Your work or school.  ? People you live with.  · Carry a card or wear jewelry that says you have diabetes.  · Keep all follow-up visits as told by your doctor. This is important.  Questions to ask your doctor  · Do I need to meet with a certified expert in diabetes education and care?  · Where can I find a support group?  Where to find more information  · American Diabetes Association: www.diabetes.org  · American Association of Diabetes Care and Education Specialists:  www.diabeteseducator.org  · International Diabetes Federation: www.idf.org  Summary  · When you have type 2 diabetes, you must make sure your blood sugar (glucose) stays in a healthy range. You can do this with nutrition, exercise, medicines and insulin, and support from doctors and others.  · Check your blood sugar every day, as often as told.  · Having diabetes can raise your risk for other long-term health problems. You may get medicines to help prevent these problems.  · Share your diabetes management plan with people at work, school, and home.  · Keep all follow-up visits as told by your doctor. This is important.  This information is not intended to replace advice given to you by your health care provider. Make sure you discuss any questions you have with your health care provider.  Document Revised: 01/26/2021 Document Reviewed: 01/26/2021  [employer]vier Patient Education © 2021 [employer]

## 2022-02-28 ENCOUNTER — TELEPHONE (OUTPATIENT)
Dept: FAMILY MEDICINE CLINIC | Facility: CLINIC | Age: 52
End: 2022-02-28

## 2022-02-28 NOTE — TELEPHONE ENCOUNTER
Patient called and said that she spoke to [facility] and they are needing prescriptions for her dexcom supplies and a referral. I let her know that Anish wouldn't be back into the the office until Wednesday and she said that will be fine. Referral has been submitted on their website.

## 2022-03-02 ENCOUNTER — TELEPHONE (OUTPATIENT)
Dept: FAMILY MEDICINE CLINIC | Facility: CLINIC | Age: 52
End: 2022-03-02

## 2022-03-02 NOTE — TELEPHONE ENCOUNTER
Caller: [facility]         Best call back number: [phone]    What form or medical record are you requesting: REQUEST FOR CERTIFICATE OF MEDICAL NECESSITY AND LATEST OFFICE NOTE FOR THE PATIENT RELATED TO DEXCOM     Who is requesting this form or medical record from you: [facility]     How would you like to receive the form or medical records (pick-up, mail, fax): FAX TO [facility] [phone]  Timeframe paperwork needed: AS SOON AS POSSIBLE     Additional notes: THE CALLER STATES THAT THEY FAXED THE PAPERWORK TO THE OFFICE 02/28/2022 PLEASE COLTON TO LET HER KNOW IF THE DOCUMENT WAS RECEIVED

## 2022-04-04 ENCOUNTER — OFFICE VISIT (OUTPATIENT)
Dept: FAMILY MEDICINE CLINIC | Facility: CLINIC | Age: 52
End: 2022-04-04

## 2022-04-04 VITALS
HEART RATE: 96 BPM | DIASTOLIC BLOOD PRESSURE: 74 MMHG | TEMPERATURE: 97.7 F | WEIGHT: 284 LBS | BODY MASS INDEX: 39.76 KG/M2 | SYSTOLIC BLOOD PRESSURE: 116 MMHG | OXYGEN SATURATION: 98 % | HEIGHT: 71 IN

## 2022-04-04 DIAGNOSIS — Z12.31 ENCOUNTER FOR SCREENING MAMMOGRAM FOR MALIGNANT NEOPLASM OF BREAST: ICD-10-CM

## 2022-04-04 DIAGNOSIS — E78.5 DYSLIPIDEMIA: ICD-10-CM

## 2022-04-04 DIAGNOSIS — Z98.890 STATUS POST THYROID SURGERY: ICD-10-CM

## 2022-04-04 DIAGNOSIS — E89.0 POSTOPERATIVE HYPOTHYROIDISM: ICD-10-CM

## 2022-04-04 DIAGNOSIS — Z79.4 TYPE 2 DIABETES MELLITUS WITH DIABETIC NEUROPATHY, WITH LONG-TERM CURRENT USE OF INSULIN: Primary | ICD-10-CM

## 2022-04-04 DIAGNOSIS — E11.65 TYPE 2 DIABETES MELLITUS WITH HYPERGLYCEMIA, WITH LONG-TERM CURRENT USE OF INSULIN: ICD-10-CM

## 2022-04-04 DIAGNOSIS — E53.8 VITAMIN B12 DEFICIENCY: ICD-10-CM

## 2022-04-04 DIAGNOSIS — Z79.4 TYPE 2 DIABETES MELLITUS WITH HYPERGLYCEMIA, WITH LONG-TERM CURRENT USE OF INSULIN: ICD-10-CM

## 2022-04-04 DIAGNOSIS — E11.40 TYPE 2 DIABETES MELLITUS WITH DIABETIC NEUROPATHY, WITH LONG-TERM CURRENT USE OF INSULIN: Primary | ICD-10-CM

## 2022-04-04 DIAGNOSIS — E78.2 MIXED HYPERLIPIDEMIA: ICD-10-CM

## 2022-04-04 DIAGNOSIS — J30.89 CHRONIC NONSEASONAL ALLERGIC RHINITIS DUE TO POLLEN: ICD-10-CM

## 2022-04-04 DIAGNOSIS — E66.01 CLASS 2 SEVERE OBESITY DUE TO EXCESS CALORIES WITH SERIOUS COMORBIDITY AND BODY MASS INDEX (BMI) OF 39.0 TO 39.9 IN ADULT: ICD-10-CM

## 2022-04-04 DIAGNOSIS — I10 ESSENTIAL HYPERTENSION: ICD-10-CM

## 2022-04-04 DIAGNOSIS — E55.9 VITAMIN D DEFICIENCY: ICD-10-CM

## 2022-04-04 DIAGNOSIS — K59.04 CHRONIC IDIOPATHIC CONSTIPATION: ICD-10-CM

## 2022-04-04 DIAGNOSIS — K21.9 GASTROESOPHAGEAL REFLUX DISEASE WITHOUT ESOPHAGITIS: ICD-10-CM

## 2022-04-04 DIAGNOSIS — J32.0 CHRONIC MAXILLARY SINUSITIS: ICD-10-CM

## 2022-04-04 DIAGNOSIS — R06.02 SHORTNESS OF BREATH: ICD-10-CM

## 2022-04-04 DIAGNOSIS — M51.27 PROTRUSION OF INTERVERTEBRAL DISC OF LUMBOSACRAL REGION: ICD-10-CM

## 2022-04-04 PROCEDURE — 82043 UR ALBUMIN QUANTITATIVE: CPT | Performed by: NURSE PRACTITIONER

## 2022-04-04 PROCEDURE — 82306 VITAMIN D 25 HYDROXY: CPT | Performed by: NURSE PRACTITIONER

## 2022-04-04 PROCEDURE — 36415 COLL VENOUS BLD VENIPUNCTURE: CPT | Performed by: NURSE PRACTITIONER

## 2022-04-04 PROCEDURE — 83036 HEMOGLOBIN GLYCOSYLATED A1C: CPT | Performed by: NURSE PRACTITIONER

## 2022-04-04 PROCEDURE — 84439 ASSAY OF FREE THYROXINE: CPT | Performed by: NURSE PRACTITIONER

## 2022-04-04 PROCEDURE — 84443 ASSAY THYROID STIM HORMONE: CPT | Performed by: NURSE PRACTITIONER

## 2022-04-04 PROCEDURE — 80061 LIPID PANEL: CPT | Performed by: NURSE PRACTITIONER

## 2022-04-04 PROCEDURE — 82607 VITAMIN B-12: CPT | Performed by: NURSE PRACTITIONER

## 2022-04-04 PROCEDURE — 85025 COMPLETE CBC W/AUTO DIFF WBC: CPT | Performed by: NURSE PRACTITIONER

## 2022-04-04 PROCEDURE — 99214 OFFICE O/P EST MOD 30 MIN: CPT | Performed by: NURSE PRACTITIONER

## 2022-04-04 PROCEDURE — 80053 COMPREHEN METABOLIC PANEL: CPT | Performed by: NURSE PRACTITIONER

## 2022-04-04 RX ORDER — ERGOCALCIFEROL 1.25 MG/1
50000 CAPSULE ORAL WEEKLY
Qty: 4 CAPSULE | Refills: 5 | Status: SHIPPED | OUTPATIENT
Start: 2022-04-04 | End: 2022-10-07

## 2022-04-04 RX ORDER — ROSUVASTATIN CALCIUM 40 MG/1
40 TABLET, COATED ORAL DAILY
Qty: 90 TABLET | Refills: 3 | Status: SHIPPED | OUTPATIENT
Start: 2022-04-04 | End: 2022-11-08 | Stop reason: SDUPTHER

## 2022-04-04 RX ORDER — MEDROXYPROGESTERONE ACETATE 150 MG/ML
150 INJECTION, SUSPENSION INTRAMUSCULAR
Qty: 1 ML | Refills: 3 | Status: SHIPPED | OUTPATIENT
Start: 2022-04-04 | End: 2022-06-20

## 2022-04-04 RX ORDER — ENALAPRIL MALEATE 5 MG/1
5 TABLET ORAL DAILY
Qty: 90 TABLET | Refills: 2 | Status: SHIPPED | OUTPATIENT
Start: 2022-04-04 | End: 2022-06-29 | Stop reason: SDUPTHER

## 2022-04-04 RX ORDER — DULAGLUTIDE 1.5 MG/.5ML
1.5 INJECTION, SOLUTION SUBCUTANEOUS WEEKLY
Qty: 2 ML | Refills: 3 | Status: SHIPPED | OUTPATIENT
Start: 2022-04-04 | End: 2022-10-07

## 2022-04-04 RX ORDER — GABAPENTIN 800 MG/1
800 TABLET ORAL 4 TIMES DAILY
Qty: 120 TABLET | Refills: 2 | Status: SHIPPED | OUTPATIENT
Start: 2022-04-04 | End: 2022-06-29 | Stop reason: SDUPTHER

## 2022-04-04 RX ORDER — PANTOPRAZOLE SODIUM 40 MG/1
40 TABLET, DELAYED RELEASE ORAL DAILY
Qty: 90 TABLET | Refills: 2 | Status: SHIPPED | OUTPATIENT
Start: 2022-04-04 | End: 2022-04-11

## 2022-04-04 RX ORDER — ETODOLAC 500 MG/1
500 TABLET, FILM COATED ORAL 2 TIMES DAILY
Qty: 60 TABLET | Refills: 2 | Status: SHIPPED | OUTPATIENT
Start: 2022-04-04 | End: 2022-06-29

## 2022-04-04 RX ORDER — NEEDLES, SAFETY 18GX1 1/2"
1 NEEDLE, DISPOSABLE MISCELLANEOUS
Qty: 1 EACH | Refills: 3 | Status: SHIPPED | OUTPATIENT
Start: 2022-04-04 | End: 2022-11-08

## 2022-04-04 RX ORDER — SITAGLIPTIN AND METFORMIN HYDROCHLORIDE 500; 50 MG/1; MG/1
1 TABLET, FILM COATED ORAL 2 TIMES DAILY WITH MEALS
Qty: 60 TABLET | Refills: 2 | Status: SHIPPED | OUTPATIENT
Start: 2022-04-04 | End: 2022-06-29 | Stop reason: SDUPTHER

## 2022-04-04 RX ORDER — AZELASTINE 1 MG/ML
SPRAY, METERED NASAL
Qty: 1 EACH | Refills: 5 | Status: SHIPPED | OUTPATIENT
Start: 2022-04-04 | End: 2022-10-07

## 2022-04-04 RX ORDER — EZETIMIBE 10 MG/1
10 TABLET ORAL DAILY
Qty: 30 TABLET | Refills: 5 | Status: SHIPPED | OUTPATIENT
Start: 2022-04-04 | End: 2022-11-08 | Stop reason: SDUPTHER

## 2022-04-04 RX ORDER — NALDEMEDINE 0.2 MG/1
1 TABLET ORAL DAILY
Qty: 28 TABLET | Refills: 0 | Status: SHIPPED | OUTPATIENT
Start: 2022-04-04 | End: 2022-05-11

## 2022-04-04 RX ORDER — SULFAMETHOXAZOLE AND TRIMETHOPRIM 800; 160 MG/1; MG/1
1 TABLET ORAL EVERY 12 HOURS
Qty: 60 TABLET | Refills: 0 | Status: SHIPPED | OUTPATIENT
Start: 2022-04-04 | End: 2022-06-29

## 2022-04-04 NOTE — ASSESSMENT & PLAN NOTE
Continue Trulicity 1.5 mg weekly and Janumet  mg.  Encouraged a low CHO diet.  Encouraged to make a follow-up appointment with diabetic educator

## 2022-04-04 NOTE — ASSESSMENT & PLAN NOTE
Will resend Pepcid 40 mg  Continue Protonix 40 mg.  Advised to avoid known GI triggers such as spicy foods.  Upright 30 minutes after meals and avoid eating large meals.  Several small meals daily as able to avoid overfilling stomach.

## 2022-04-04 NOTE — PROGRESS NOTES
Subjective   Soheila Brewster is a 51 y.o. female.     Chief Complaint   Patient presents with   • Diabetes   • Hypertension       History of Present Illness     Diabetes-chronic and ongoing.  Patient is currently using CGM for monitoring and OmniPod insulin pump.  She continues to be on Trulicity 1.5 mg weekly and Janumet  twice daily with meals.  She reports  Doing better since she has gotten supplies.  She has an occasional elevation over 200.   She is followed by the diabetic educator.    Chronic back and joint pain-ongoing.  Patient is under the care of pain management she is receiving oxycodone there.  She is also on gabapentin 800 mg 4 times daily for diabetic neuropathy and radicular nerve pain from her back.  She is taking Robaxin 500 mg.  Back pain continues to be chronic and daily.  She does have difficulty sitting or standing prolonged periods of time.  She has been under the care of rheumatology in the past and has been on DMARDs but they were stopped due to her recurrent sinus problems  Hyperlipidemia-chronic and ongoing.  Patient is currently taking atorvastatin 40 mg and Zetia 10 mg.  No negative side effects.  Patient denies any negative side effects of cholesterol medication.  No reported myalgia or myopathies.  Chronic allergic rhinitis-ongoing.  Patient continues to struggle with rhinorrhea and sinus concerns.  She is using Astelin nasal spray, montelukast 10 mg, and Xyzal 5 mg.  She reports congestion and ear pain for at least 3 weeks.  She reports some intermittent HA.  Sore throat is present.    Cough is productive.  Thick and hard to expectorate.    Hypertension-chronic and ongoing.  Patient is currently taking Vasotec 10 mg but is taking only 1/2.  She reports that her BP was getting very low and she was feeling terrible.  No negative side effects.  No associated symptoms such as CP, SOA, HA, or dizziness.  Hypothyroidism-chronic and ongoing.  Patient is currently taking Synthroid 100  "mcg.  No negative side effects.  No negative side effects of medication.  Patient denies any hair or skin changes.  No reports of heat or cold intolerance.  Left hip pain/concern-for about a month.  Pain is constant and sharp in nature.  She reports no falls.  She cannot stand to lay on her left side due to the discomfort.  She reports radiates into her buttock and down to the side of her knee.   She reports that it feels like a knot on the side of her hip and even leaning minimally toward her right feels \"like a rubber band pulling\". She has noted more LBP than her usual.  No bruising.  No jerking injury.  She reports burning and stinging in the area.      The following portions of the patient's history were reviewed and updated as appropriate: CC, ROS, allergies, current medications, past family history, past medical history, past social history, past surgical history and problem list.    Review of Systems   Constitutional: Negative for activity change, appetite change, fatigue and fever.   HENT: Positive for sinus pressure. Negative for congestion, ear pain, nosebleeds, postnasal drip, rhinorrhea, sore throat, tinnitus, trouble swallowing and voice change.    Eyes: Negative for blurred vision, photophobia and visual disturbance.   Respiratory: Negative for cough, chest tightness, shortness of breath and wheezing.    Cardiovascular: Negative for chest pain, palpitations and leg swelling.   Gastrointestinal: Positive for constipation (Despite lactulose use). Negative for abdominal pain, blood in stool, diarrhea, nausea and GERD.   Endocrine: Negative for cold intolerance, heat intolerance and polydipsia.   Genitourinary: Negative for decreased urine volume, difficulty urinating, dysuria and hematuria.   Musculoskeletal: Positive for back pain and neck pain. Negative for arthralgias, gait problem and myalgias.   Skin: Negative for color change, pallor and wound.   Allergic/Immunologic: Negative.    Neurological: " "Negative for dizziness, syncope, numbness and headache.   Hematological: Negative.    Psychiatric/Behavioral: Negative for decreased concentration, self-injury, sleep disturbance, suicidal ideas and depressed mood. The patient is not nervous/anxious.    All other systems reviewed and are negative.      Objective     /74   Pulse 96   Temp 97.7 °F (36.5 °C)   Ht 180.3 cm (70.98\")   Wt 129 kg (284 lb)   SpO2 98%   BMI 39.63 kg/m²     Physical Exam  Vitals reviewed.   Constitutional:       General: She is not in acute distress.     Appearance: She is well-developed. She is not diaphoretic.   HENT:      Head: Normocephalic and atraumatic.      Jaw: No tenderness.      Comments: Brief oral exam.  Patient is presently wearing a face covering/mask due to COVID-19 pandemic.     Right Ear: Hearing, ear canal and external ear normal. A middle ear effusion is present.      Left Ear: Hearing, ear canal and external ear normal. A middle ear effusion is present.      Nose: Congestion present.      Right Sinus: Maxillary sinus tenderness and frontal sinus tenderness present.      Left Sinus: Maxillary sinus tenderness and frontal sinus tenderness present.      Mouth/Throat:      Pharynx: Posterior oropharyngeal erythema present.   Eyes:      General: Lids are normal. No scleral icterus.     Extraocular Movements:      Right eye: Normal extraocular motion and no nystagmus.      Left eye: Normal extraocular motion and no nystagmus.      Conjunctiva/sclera: Conjunctivae normal.      Pupils: Pupils are equal, round, and reactive to light.   Neck:      Thyroid: No thyromegaly or thyroid tenderness.      Vascular: No carotid bruit or JVD.      Trachea: No tracheal tenderness.   Cardiovascular:      Rate and Rhythm: Normal rate and regular rhythm.      Pulses:           Dorsalis pedis pulses are 2+ on the right side and 2+ on the left side.        Posterior tibial pulses are 2+ on the right side and 2+ on the left side.      " Heart sounds: Normal heart sounds, S1 normal and S2 normal. No murmur heard.  Pulmonary:      Effort: Pulmonary effort is normal. No accessory muscle usage, prolonged expiration or respiratory distress.      Breath sounds: Normal breath sounds.   Chest:      Chest wall: No tenderness.   Abdominal:      General: Bowel sounds are normal. There is no distension.      Palpations: Abdomen is soft. There is no mass.      Tenderness: There is no abdominal tenderness.   Musculoskeletal:      Cervical back: Neck supple. Tenderness present. Decreased range of motion.      Thoracic back: Tenderness present. Decreased range of motion.      Lumbar back: Spasms and tenderness present. Decreased range of motion. Positive right straight leg raise test and positive left straight leg raise test.      Right lower leg: No edema.      Left lower leg: No edema.        Legs:       Comments: No muscular atrophy or flaccidity.  Requires assist of 1 to steady herself to climb on exam table   Lymphadenopathy:      Head:      Right side of head: No submental or submandibular adenopathy.      Left side of head: No submental or submandibular adenopathy.      Cervical: No cervical adenopathy.      Right cervical: No superficial cervical adenopathy.     Left cervical: No superficial cervical adenopathy.   Skin:     General: Skin is warm and dry.      Capillary Refill: Capillary refill takes less than 2 seconds.      Coloration: Skin is not jaundiced or pale.      Findings: No erythema.      Nails: There is no clubbing.   Neurological:      Mental Status: She is alert and oriented to person, place, and time.      Cranial Nerves: No cranial nerve deficit or facial asymmetry.      Sensory: No sensory deficit.      Motor: No weakness, tremor, atrophy or abnormal muscle tone.      Coordination: Coordination normal.      Deep Tendon Reflexes: Reflexes are normal and symmetric.   Psychiatric:         Attention and Perception: She is attentive.          Mood and Affect: Mood normal.         Speech: Speech normal.         Behavior: Behavior normal. Behavior is cooperative.         Thought Content: Thought content normal.         Judgment: Judgment normal.       Assessment/Plan     Diagnoses and all orders for this visit:    1. Type 2 diabetes mellitus with diabetic neuropathy, with long-term current use of insulin (HCC) (Primary)  Comments:  Continue gabapentin as directed.  Orders:  -     sitaGLIPtin-metFORMIN (Janumet)  MG per tablet; Take 1 tablet by mouth 2 (Two) Times a Day With Meals.  Dispense: 60 tablet; Refill: 2  -     Dulaglutide (Trulicity) 1.5 MG/0.5ML solution pen-injector; Inject 1.5 mg under the skin into the appropriate area as directed 1 (One) Time Per Week.  Dispense: 2 mL; Refill: 3  -     Hemoglobin A1c  -     MicroAlbumin, Urine, Random - Urine, Random Void  -     Ambulatory Referral for Diabetic Eye Exam-Ophthalmology    2. Type 2 diabetes mellitus with hyperglycemia, with long-term current use of insulin ([facility])  Comments:  Continue with CGM and OmniPod insulin pump.  Overview:  Continue with CGM and OmniPod insulin pump.    Assessment & Plan:  Continue Trulicity 1.5 mg weekly and Janumet  mg.  Encouraged a low CHO diet.  Encouraged to make a follow-up appointment with diabetic educator    Orders:  -     gabapentin (NEURONTIN) 800 MG tablet; Take 1 tablet by mouth 4 (Four) Times a Day.  Dispense: 120 tablet; Refill: 2  -     Hemoglobin A1c  -     MicroAlbumin, Urine, Random - Urine, Random Void  -     Ambulatory Referral for Diabetic Eye Exam-Ophthalmology    3. Protrusion of intervertebral disc of lumbosacral region  Comments:  Continue under the care of pain management  Orders:  -     gabapentin (NEURONTIN) 800 MG tablet; Take 1 tablet by mouth 4 (Four) Times a Day.  Dispense: 120 tablet; Refill: 2  -     etodolac (LODINE) 500 MG tablet; Take 1 tablet by mouth 2 (Two) Times a Day.  Dispense: 60 tablet; Refill: 2    4. Mixed  hyperlipidemia  -     rosuvastatin (CRESTOR) 40 MG tablet; Take 1 tablet by mouth Daily.  Dispense: 90 tablet; Refill: 3  -     ezetimibe (ZETIA) 10 MG tablet; Take 1 tablet by mouth Daily.  Dispense: 30 tablet; Refill: 5  -     CBC & Differential    5. Gastroesophageal reflux disease without esophagitis  Assessment & Plan:  Will resend Pepcid 40 mg  Continue Protonix 40 mg.  Advised to avoid known GI triggers such as spicy foods.  Upright 30 minutes after meals and avoid eating large meals.  Several small meals daily as able to avoid overfilling stomach.    Orders:  -     Discontinue: pantoprazole (Protonix) 40 MG EC tablet; Take 1 tablet by mouth Daily.  Dispense: 90 tablet; Refill: 2    6. Vitamin D deficiency  -     vitamin D (ERGOCALCIFEROL) 1.25 MG (78282 UT) capsule capsule; Take 1 capsule by mouth 1 (One) Time Per Week.  Dispense: 4 capsule; Refill: 5  -     Vitamin D 25 Hydroxy    7. Chronic nonseasonal allergic rhinitis due to pollen  -     azelastine (ASTELIN) 0.1 % nasal spray; 2 sprays both nostrils twice daily as needed  Dispense: 1 each; Refill: 5  -     sulfamethoxazole-trimethoprim (BACTRIM DS,SEPTRA DS) 800-160 MG per tablet; Take 1 tablet by mouth Every 12 (Twelve) Hours.  Dispense: 60 tablet; Refill: 0    8. Shortness of breath  -     Fluticasone-Umeclidin-Vilant (Trelegy Ellipta) 100-62.5-25 MCG/INH inhaler; Inhale 1 puff Daily.  Dispense: 28 each; Refill: 2    9. Encounter for screening mammogram for malignant neoplasm of breast  -     Mammo Screening Digital Tomosynthesis Bilateral With CAD; Future    10. Essential hypertension  -     enalapril (Vasotec) 5 MG tablet; Take 1 tablet by mouth Daily.  Dispense: 90 tablet; Refill: 2  -     Comprehensive Metabolic Panel    11. Dyslipidemia  -     Comprehensive Metabolic Panel  -     Lipid Panel    12. Vitamin B12 deficiency  -     Vitamin B12    13. Status post thyroid surgery  Assessment & Plan:  Continue Synthroid 100 mcg.  We will plan for an  "updated TSH and free T4 today    Orders:  -     TSH  -     T4, Free    14. Postoperative hypothyroidism  -     TSH  -     T4, Free    15. Chronic maxillary sinusitis  -     sulfamethoxazole-trimethoprim (BACTRIM DS,SEPTRA DS) 800-160 MG per tablet; Take 1 tablet by mouth Every 12 (Twelve) Hours.  Dispense: 60 tablet; Refill: 0    16. Chronic idiopathic constipation  Comments:  Trial of simple work.  Patient may continue lactulose as needed.  Orders:  -     Naldemedine Tosylate (Symproic) 0.2 MG tablet; Take 1 tablet by mouth Daily.  Dispense: 28 tablet; Refill: 0    17. Class 2 severe obesity due to excess calories with serious comorbidity and body mass index (BMI) of 39.0 to 39.9 in adult (HCC)  Assessment & Plan:  Dietary counseling provided:  Healthy food choices including fruits and vegetables, limit soda and junk foods, adequate water intake.        Other orders  -     medroxyPROGESTERone (Depo-Provera) 150 MG/ML injection; Inject 1 mL into the appropriate muscle as directed by prescriber Every 3 (Three) Months.  Dispense: 1 mL; Refill: 3  -     Syringe/Needle, Disp, (BD Eclipse Syringe) 25G X 1\" 3 ML misc; 1 each Every 3 (Three) Months. For depo injection  Dispense: 1 each; Refill: 3       Patient's Body mass index is 39.63 kg/m². indicating that she is morbidly obese (BMI > 40 or > 35 with obesity - related health condition). Obesity-related health conditions include the following: hypertension, coronary heart disease, diabetes mellitus, dyslipidemias, GERD and osteoarthritis. Obesity is unchanged. BMI is is above average. We discussed portion control and increasing exercise..     Understands disease processes and need for medications.  Understands reasons for urgent and emergent care.  Patient (& family) verbalized agreement for treatment plan.   Emotional support and active listening provided.  Patient provided time to verbalize feelings.    IV[facility] reviewed today and consistent.  Will refill prescribed " controlled medication today.  Patient is aware they cannot receive narcotics from any other provider except if under care of pain management or speciality clinic.  Risk and benefits of medication use has been reviewed.  History and physical exam exhibit continued safe and appropriate use of controlled substances.  The patient is aware of the potential for addiction and dependence.  This patient has been made aware of the appropriate use of such medications, including potential risk of somnolence, limited ability to drive and / or work safely, and potential for overdose.  Patient understands not to take medication and drive until they know how medication may affect their cognition/decision making.   It has also been made clear that these medications are for use by this patient only, without concomitant use of alcohol or other substances unless prescribed/advised by medical provider.  Patient understands they may be subject to UDS and pill counts at random.    Patient considered to be moderate risk for addiction due to use of multiple controlled medications.  Patient understands and accepts these risks.  Patient need for medication will be reassessed at each visit.  Doses will be adjusted according to patient need and findings.    Goal of TX: Patient will not have any adverse reactions of medication.  Patient will have reduction in neuropathic symptoms of pain with use of gabapentin as directed.  Patient will not have any drug drug interactions.  Continue under pain management provider for chronic back pain  Medication Dispense Information    Gabapentin   Dispensed: 3/11/2022 12:00 AM   Written:  1/4/2022   Unit strength: 800MG   Days supply: 30   Dispense Note: GivenName=Wero=ADOLFOBirthDate=[id number]=[address] [place] [address], [address]   Quantity: 120 each   Pharmacy: [facility]   Authorizing provider: JOSEY MICHAEL   Received from: [facility] PDMP (Fill History)   Brand or Generic:   Unknown     Will reorder mammogram.  Labs today.  We will notify patient of results    RTC 3 months, sooner if needed.             This document has been electronically signed by:  JOAQUÍN Ross FNP-C Dragon disclaimer:  Part of this note may be an electronic transcription/translation of spoken language to printed text using the Dragon Dictation System.

## 2022-04-05 LAB
25(OH)D3 SERPL-MCNC: 24.3 NG/ML (ref 30–100)
ALBUMIN SERPL-MCNC: 4.2 G/DL (ref 3.5–5.2)
ALBUMIN UR-MCNC: 10.5 MG/DL
ALBUMIN/GLOB SERPL: 1.6 G/DL
ALP SERPL-CCNC: 57 U/L (ref 39–117)
ALT SERPL W P-5'-P-CCNC: 18 U/L (ref 1–33)
ANION GAP SERPL CALCULATED.3IONS-SCNC: 9 MMOL/L (ref 5–15)
AST SERPL-CCNC: 18 U/L (ref 1–32)
BASOPHILS # BLD AUTO: 0.08 10*3/MM3 (ref 0–0.2)
BASOPHILS NFR BLD AUTO: 0.7 % (ref 0–1.5)
BILIRUB SERPL-MCNC: 0.2 MG/DL (ref 0–1.2)
BUN SERPL-MCNC: 12 MG/DL (ref 6–20)
BUN/CREAT SERPL: 19 (ref 7–25)
CALCIUM SPEC-SCNC: 9.8 MG/DL (ref 8.6–10.5)
CHLORIDE SERPL-SCNC: 106 MMOL/L (ref 98–107)
CHOLEST SERPL-MCNC: 100 MG/DL (ref 0–200)
CO2 SERPL-SCNC: 23 MMOL/L (ref 22–29)
CREAT SERPL-MCNC: 0.63 MG/DL (ref 0.57–1)
DEPRECATED RDW RBC AUTO: 44.3 FL (ref 37–54)
EGFRCR SERPLBLD CKD-EPI 2021: 108.2 ML/MIN/1.73
EOSINOPHIL # BLD AUTO: 0.62 10*3/MM3 (ref 0–0.4)
EOSINOPHIL NFR BLD AUTO: 5.5 % (ref 0.3–6.2)
ERYTHROCYTE [DISTWIDTH] IN BLOOD BY AUTOMATED COUNT: 14.9 % (ref 12.3–15.4)
GLOBULIN UR ELPH-MCNC: 2.6 GM/DL
GLUCOSE SERPL-MCNC: 194 MG/DL (ref 65–99)
HBA1C MFR BLD: 9.2 % (ref 4.8–5.6)
HCT VFR BLD AUTO: 38 % (ref 34–46.6)
HDLC SERPL-MCNC: 35 MG/DL (ref 40–60)
HGB BLD-MCNC: 12 G/DL (ref 12–15.9)
IMM GRANULOCYTES # BLD AUTO: 0.04 10*3/MM3 (ref 0–0.05)
IMM GRANULOCYTES NFR BLD AUTO: 0.4 % (ref 0–0.5)
LDLC SERPL CALC-MCNC: 27 MG/DL (ref 0–100)
LDLC/HDLC SERPL: 0.45 {RATIO}
LYMPHOCYTES # BLD AUTO: 2.86 10*3/MM3 (ref 0.7–3.1)
LYMPHOCYTES NFR BLD AUTO: 25.2 % (ref 19.6–45.3)
MCH RBC QN AUTO: 25.9 PG (ref 26.6–33)
MCHC RBC AUTO-ENTMCNC: 31.6 G/DL (ref 31.5–35.7)
MCV RBC AUTO: 82.1 FL (ref 79–97)
MONOCYTES # BLD AUTO: 0.57 10*3/MM3 (ref 0.1–0.9)
MONOCYTES NFR BLD AUTO: 5 % (ref 5–12)
NEUTROPHILS NFR BLD AUTO: 63.2 % (ref 42.7–76)
NEUTROPHILS NFR BLD AUTO: 7.18 10*3/MM3 (ref 1.7–7)
NRBC BLD AUTO-RTO: 0.1 /100 WBC (ref 0–0.2)
PLATELET # BLD AUTO: 177 10*3/MM3 (ref 140–450)
PMV BLD AUTO: 11.9 FL (ref 6–12)
POTASSIUM SERPL-SCNC: 4.4 MMOL/L (ref 3.5–5.2)
PROT SERPL-MCNC: 6.8 G/DL (ref 6–8.5)
RBC # BLD AUTO: 4.63 10*6/MM3 (ref 3.77–5.28)
SODIUM SERPL-SCNC: 138 MMOL/L (ref 136–145)
T4 FREE SERPL-MCNC: 1.42 NG/DL (ref 0.93–1.7)
TRIGL SERPL-MCNC: 247 MG/DL (ref 0–150)
TSH SERPL DL<=0.05 MIU/L-ACNC: 2.88 UIU/ML (ref 0.27–4.2)
VIT B12 BLD-MCNC: 241 PG/ML (ref 211–946)
VLDLC SERPL-MCNC: 38 MG/DL (ref 5–40)
WBC NRBC COR # BLD: 11.35 10*3/MM3 (ref 3.4–10.8)

## 2022-04-11 DIAGNOSIS — K21.9 GASTROESOPHAGEAL REFLUX DISEASE WITHOUT ESOPHAGITIS: ICD-10-CM

## 2022-04-11 RX ORDER — PANTOPRAZOLE SODIUM 40 MG/1
40 TABLET, DELAYED RELEASE ORAL DAILY
Qty: 30 TABLET | Refills: 1 | Status: SHIPPED | OUTPATIENT
Start: 2022-04-11 | End: 2022-06-29 | Stop reason: SDUPTHER

## 2022-04-11 NOTE — PROGRESS NOTES
Her blood sugar was 194.  Kidney and liver function were good.  Her vitamin D has gone down from 42 to 24.  Vitamin B12 is also gradually decreased.  She is not taking some supplements she can her thyroid is normal.  Her cholesterol is good but her triglycerides have increased from 154 and are now 247.  Cut down on her foods that are fried and fatty.  And be monitoring her blood sugar closer.  Her A1c is also increased from 8.2-9.2.

## 2022-05-11 DIAGNOSIS — J30.89 CHRONIC NONSEASONAL ALLERGIC RHINITIS DUE TO POLLEN: ICD-10-CM

## 2022-05-11 DIAGNOSIS — K59.04 CHRONIC IDIOPATHIC CONSTIPATION: ICD-10-CM

## 2022-05-11 DIAGNOSIS — J32.0 CHRONIC MAXILLARY SINUSITIS: ICD-10-CM

## 2022-05-11 RX ORDER — SULFAMETHOXAZOLE AND TRIMETHOPRIM 800; 160 MG/1; MG/1
TABLET ORAL
Qty: 60 TABLET | Refills: 0 | OUTPATIENT
Start: 2022-05-11

## 2022-05-11 RX ORDER — NALDEMEDINE 0.2 MG/1
TABLET ORAL
Qty: 30 TABLET | Refills: 0 | Status: SHIPPED | OUTPATIENT
Start: 2022-05-11 | End: 2022-11-08

## 2022-05-11 RX ORDER — BLOOD SUGAR DIAGNOSTIC
STRIP MISCELLANEOUS
Qty: 100 EACH | Refills: 11 | Status: SHIPPED | OUTPATIENT
Start: 2022-05-11 | End: 2022-11-08

## 2022-05-18 RX ORDER — LEVOTHYROXINE SODIUM 0.1 MG/1
TABLET ORAL
Qty: 30 TABLET | Refills: 11 | Status: SHIPPED | OUTPATIENT
Start: 2022-05-18 | End: 2022-11-08 | Stop reason: SDUPTHER

## 2022-05-18 NOTE — TELEPHONE ENCOUNTER
Caller: Soheila Brewster    Relationship: Self    Best call back number: [phone]    Requested Prescriptions:   Requested Prescriptions     Pending Prescriptions Disp Refills   • levothyroxine (SYNTHROID, LEVOTHROID) 100 MCG tablet [Pharmacy Med Name: LEVOTHYROXINE SODIUM 100  Tablet] 30 tablet 11     Sig: TAKE 1 TABLET BY MOUTH ONCE DAILY        Pharmacy where request should be sent: [facility] [address] [facility] [phone] [facility] [phone] [facility]     Additional details provided by patient: WILL BE OUT AFTER TODAY. HAS AN APPOINTMENT COMING UP ON 05/25/22    Does the patient have less than a 3 day supply:  [x] Yes  [] No    Holli Mckeon MA   05/18/22 14:03 EDT

## 2022-05-25 ENCOUNTER — OFFICE VISIT (OUTPATIENT)
Dept: FAMILY MEDICINE CLINIC | Facility: CLINIC | Age: 52
End: 2022-05-25

## 2022-05-25 VITALS
DIASTOLIC BLOOD PRESSURE: 85 MMHG | WEIGHT: 276.8 LBS | TEMPERATURE: 97.4 F | HEIGHT: 71 IN | OXYGEN SATURATION: 91 % | HEART RATE: 86 BPM | BODY MASS INDEX: 38.75 KG/M2 | SYSTOLIC BLOOD PRESSURE: 140 MMHG

## 2022-05-25 DIAGNOSIS — Z51.81 ENCOUNTER FOR THERAPEUTIC DRUG LEVEL MONITORING: ICD-10-CM

## 2022-05-25 DIAGNOSIS — M51.27 PROTRUSION OF INTERVERTEBRAL DISC OF LUMBOSACRAL REGION: Primary | ICD-10-CM

## 2022-05-25 PROCEDURE — 99213 OFFICE O/P EST LOW 20 MIN: CPT | Performed by: NURSE PRACTITIONER

## 2022-05-25 PROCEDURE — 96372 THER/PROPH/DIAG INJ SC/IM: CPT | Performed by: NURSE PRACTITIONER

## 2022-05-25 RX ORDER — ORPHENADRINE CITRATE 30 MG/ML
60 INJECTION INTRAMUSCULAR; INTRAVENOUS EVERY 12 HOURS
Status: DISCONTINUED | OUTPATIENT
Start: 2022-05-25 | End: 2022-11-08

## 2022-05-25 RX ORDER — INSULIN LISPRO 100 [IU]/ML
INJECTION, SOLUTION INTRAVENOUS; SUBCUTANEOUS
COMMUNITY
Start: 2022-05-11 | End: 2022-08-26 | Stop reason: SDUPTHER

## 2022-05-25 RX ORDER — KETOROLAC TROMETHAMINE 30 MG/ML
60 INJECTION, SOLUTION INTRAMUSCULAR; INTRAVENOUS ONCE
Status: COMPLETED | OUTPATIENT
Start: 2022-05-25 | End: 2022-05-25

## 2022-05-25 RX ORDER — DEXAMETHASONE SODIUM PHOSPHATE 4 MG/ML
8 INJECTION, SOLUTION INTRA-ARTICULAR; INTRALESIONAL; INTRAMUSCULAR; INTRAVENOUS; SOFT TISSUE ONCE
Status: COMPLETED | OUTPATIENT
Start: 2022-05-25 | End: 2022-05-25

## 2022-05-25 RX ORDER — ENALAPRIL MALEATE 10 MG/1
5 TABLET ORAL DAILY
COMMUNITY
Start: 2022-05-11 | End: 2022-05-25 | Stop reason: SDUPTHER

## 2022-05-25 RX ADMIN — KETOROLAC TROMETHAMINE 60 MG: 30 INJECTION, SOLUTION INTRAMUSCULAR; INTRAVENOUS at 14:18

## 2022-05-25 RX ADMIN — ORPHENADRINE CITRATE 60 MG: 30 INJECTION INTRAMUSCULAR; INTRAVENOUS at 14:20

## 2022-05-25 RX ADMIN — DEXAMETHASONE SODIUM PHOSPHATE 8 MG: 4 INJECTION, SOLUTION INTRA-ARTICULAR; INTRALESIONAL; INTRAMUSCULAR; INTRAVENOUS; SOFT TISSUE at 14:18

## 2022-06-10 ENCOUNTER — OFFICE VISIT (OUTPATIENT)
Dept: FAMILY MEDICINE CLINIC | Facility: CLINIC | Age: 52
End: 2022-06-10

## 2022-06-10 VITALS
SYSTOLIC BLOOD PRESSURE: 138 MMHG | TEMPERATURE: 97.4 F | BODY MASS INDEX: 38.36 KG/M2 | HEART RATE: 81 BPM | HEIGHT: 71 IN | OXYGEN SATURATION: 100 % | DIASTOLIC BLOOD PRESSURE: 80 MMHG | WEIGHT: 274 LBS

## 2022-06-10 DIAGNOSIS — M51.34 DEGENERATION OF INTERVERTEBRAL DISC OF THORACIC REGION WITH OSTEOPHYTE: Chronic | ICD-10-CM

## 2022-06-10 DIAGNOSIS — M54.42 BILATERAL LOW BACK PAIN WITH BILATERAL SCIATICA, UNSPECIFIED CHRONICITY: Primary | Chronic | ICD-10-CM

## 2022-06-10 DIAGNOSIS — M54.41 BILATERAL LOW BACK PAIN WITH BILATERAL SCIATICA, UNSPECIFIED CHRONICITY: Primary | Chronic | ICD-10-CM

## 2022-06-10 DIAGNOSIS — M25.78 DEGENERATION OF INTERVERTEBRAL DISC OF THORACIC REGION WITH OSTEOPHYTE: Chronic | ICD-10-CM

## 2022-06-10 PROCEDURE — 96372 THER/PROPH/DIAG INJ SC/IM: CPT | Performed by: NURSE PRACTITIONER

## 2022-06-10 PROCEDURE — 99214 OFFICE O/P EST MOD 30 MIN: CPT | Performed by: NURSE PRACTITIONER

## 2022-06-10 RX ORDER — KETOROLAC TROMETHAMINE 30 MG/ML
60 INJECTION, SOLUTION INTRAMUSCULAR; INTRAVENOUS ONCE
Status: COMPLETED | OUTPATIENT
Start: 2022-06-10 | End: 2022-06-10

## 2022-06-10 RX ADMIN — KETOROLAC TROMETHAMINE 60 MG: 30 INJECTION, SOLUTION INTRAMUSCULAR; INTRAVENOUS at 11:23

## 2022-06-10 NOTE — PROGRESS NOTES
History of Present Illness  Soheila Brewster is a 52 y.o. female who presents to the clinic today complaining of worsening back pain.  Soheila reports on May 26, she fell on a slippery ramp.  She landed on her right hip and twisted her back.  She rates her pain as an 8 with bilateral sciatica..     Back Pain  The current episode started 1 to 4 weeks ago. The problem has been gradually worsening since onset. The pain is present in the thoracic spine and lumbar spine. The quality of the pain is described as stabbing. The pain radiates to the right thigh and left thigh. Pain severity now: Moderate to severe. The symptoms are aggravated by position, coughing and twisting. Associated symptoms include numbness and tingling. Pertinent negatives include no bladder incontinence, bowel incontinence, dysuria, paresis, paresthesias, pelvic pain, perianal numbness or weakness. Risk factors include menopause, obesity, poor posture and recent trauma. She has tried ice, muscle relaxant, analgesics and heat for the symptoms. The treatment provided mild relief. Soheila does see pain management for chronic pain and has an appointment later this month.    The following portions of the patient's history were reviewed and updated as appropriate: allergies, current medications, past family history, past medical history, past social history, past surgical history and problem list.    Review of Systems   Constitutional: Positive for fatigue. Negative for activity change, appetite change, chills and unexpected weight change.   Eyes: Negative for visual disturbance.   Respiratory: Positive for cough and wheezing. Negative for shortness of breath.    Cardiovascular: Negative for palpitations and leg swelling.   Gastrointestinal: Negative for bowel incontinence, constipation, diarrhea, nausea and vomiting.   Endocrine: Negative for cold intolerance, heat intolerance, polydipsia, polyphagia and polyuria.   Genitourinary: Negative for bladder  "incontinence, dysuria and pelvic pain.   Musculoskeletal: Positive for arthralgias and back pain.   Skin: Negative for color change and rash.   Neurological: Positive for tingling and numbness. Negative for dizziness, tremors, speech difficulty, weakness, light-headedness and paresthesias.   Hematological: Negative for adenopathy.   Psychiatric/Behavioral: Negative for confusion, decreased concentration and sleep disturbance. The patient is not nervous/anxious.    All other systems reviewed and are negative.    Vital signs:  /80 (BP Location: Right arm, Patient Position: Sitting, Cuff Size: Adult)   Pulse 81   Temp 97.4 °F (36.3 °C) (Temporal)   Ht 180.3 cm (70.98\")   Wt 124 kg (274 lb)   SpO2 100%   BMI 38.23 kg/m²     Physical Exam  Vitals and nursing note reviewed.   Constitutional:       General: She is not in acute distress.     Appearance: She is well-developed.      Interventions: Face mask in place.   HENT:      Head: Normocephalic.      Nose: Nose normal.   Eyes:      General: No scleral icterus.        Right eye: No discharge.         Left eye: No discharge.      Conjunctiva/sclera: Conjunctivae normal.   Neck:      Thyroid: No thyromegaly.      Vascular: No JVD.   Cardiovascular:      Rate and Rhythm: Normal rate and regular rhythm.      Heart sounds: Normal heart sounds. No murmur heard.    No friction rub.   Pulmonary:      Effort: Pulmonary effort is normal. No respiratory distress.      Breath sounds: Normal breath sounds. No wheezing or rales.   Abdominal:      General: Bowel sounds are normal. There is no distension.      Palpations: Abdomen is soft.      Tenderness: There is no abdominal tenderness.   Musculoskeletal:         General: Tenderness present.      Cervical back: Neck supple.      Thoracic back: Spasms and tenderness present.      Lumbar back: Spasms and tenderness present. Positive right straight leg raise test and positive left straight leg raise test.      Right lower leg: " No edema.      Left lower leg: No edema.   Lymphadenopathy:      Cervical: No cervical adenopathy.   Skin:     General: Skin is warm and dry.      Capillary Refill: Capillary refill takes less than 2 seconds.   Neurological:      Mental Status: She is alert and oriented to person, place, and time.      Cranial Nerves: Cranial nerves are intact.   Psychiatric:         Mood and Affect: Mood and affect normal.         Speech: Speech normal.         Behavior: Behavior is cooperative.         Thought Content: Thought content normal.         Cognition and Memory: Cognition and memory normal.       Result Review : MRIs of her thoracic and lumbar spine dated 10/4/2021 the [facility] in [place]  Class 2 Severe Obesity (BMI >=35 and <=39.9). Obesity-related health conditions include the following: hypertension, diabetes mellitus, dyslipidemias, GERD and osteoarthritis. Obesity is improving with lifestyle modifications. BMI  is above average; BMI management plan is completed.  Provided information on portion control and increasing exercise.     Assessment & Plan     Diagnoses and all orders for this visit:    1. Bilateral low back pain with bilateral sciatica, unspecified chronicity (Primary)  Comments:  Lumbar MRI ordered  Orders:  -     ketorolac (TORADOL) injection 60 mg    2. 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 Reviewed treatment options. Will request a repeat MRI on Thoracic and Lumbar Spine due to worsening symptoms. .A prescription for a tens unit was faxed to [facility] earlier and she has not heard anything about obtaining this.  My assistant phoned  to check the status and was informed this was no longer covered under her insurance.  Counseled regarding supportive care measures.  Signs and symptoms of concern reviewed  with her and if occur to seek immediate medical attention or if problems/concerns occur.  Soheila  was given instructions and counseling regarding her condition or for health maintenance advice. Please see specific information pulled into the AVS if appropriate    This document has been electronically signed by:

## 2022-06-10 NOTE — PROGRESS NOTES
Injection  Injection performed in right dorsogluteal by Temi Méndez MA. Patient tolerated the procedure well without complications.  06/10/22   Temi Méndez MA

## 2022-06-20 RX ORDER — MEDROXYPROGESTERONE ACETATE 150 MG/ML
INJECTION, SUSPENSION INTRAMUSCULAR
Qty: 1 ML | Refills: 3 | Status: SHIPPED | OUTPATIENT
Start: 2022-06-20

## 2022-06-20 NOTE — ASSESSMENT & PLAN NOTE
Acute on chronic pain today   Can take tylenol every 6hrs as needed for pain.    Take antibiotics as prescribed.     Stay well hydrated.    Return for fevers, persistent vomit, uncontrolled pain, worsening breathing, worsening lightheaded, unable to urinate, spreading redness, worsening penis/testicle pain.    Follow up with primary doctor within 1-2 days.     Follow up with urologist at your appointment tomorrow. (Bring copy of results).     What is urinary retention?   Urinary retention is a condition that develops when your bladder does not empty completely when you urinate.    What causes urinary retention?     An enlarged prostate  Blockages, such as a stone, growth, or narrowing of your urethra  A weak bladder muscle  Nerve damage from diabetes, stroke, or spinal cord injury  Bladder diverticula, which are pockets of urine that form in your bladder and do not empty  Certain medicines, such as narcotics, antihistamines, or antidepressants    What are the signs and symptoms of urinary retention?     Frequent urination, or the urge to urinate right after you finish  An urge to urinate, but your urine does not come out or dribbles out slowly and weakly  Frequent urine leaks that happen during the day or while you sleep  Pain or pressure when you urinate  Pain or stiffness in your abdomen, lower back, hips, or upper thighs  Blood in your urine    How is urinary retention diagnosed? Your healthcare provider will ask about your health history and the medicines you take. He will press or tap on your lower abdomen. You may need any of the following tests:   A digital rectal exam is when healthcare providers carefully feel the size of your prostate.  A post void residual test will show how much urine is left in your bladder after you urinate. You will be asked to urinate and then healthcare providers will use a small ultrasound machine to check how much urine is left in your bladder.  Blood or urine tests may show infection or prostate specific antigen (PSA) levels. PSA may be elevated in prostate cancer.  An ultrasound uses sound waves to show pictures on a monitor. An ultrasound may be done to show bladder stones, infection, or other problems.  A CT scan, or CAT scan, is a type of x-ray that is taken of your prostate, kidneys, and bladder. The pictures may show what is causing your urinary retention. You may be given a dye before the pictures are taken to help healthcare providers see the pictures better. Tell the healthcare provider if you have ever had an allergic reaction to contrast dye.    How is urinary retention treated?     A Dawkins catheter is a tube put into your bladder to drain urine into a bag. Keep the bag below your waist. This will prevent urine from flowing back into your bladder and causing an infection or other problems. Also, keep the tube free of kinks so the urine will drain properly. Do not pull on the catheter. This can cause pain and bleeding, and may cause the catheter to come out.     Medicines can help decrease the size of your prostate, fight infection, and help you urinate more easily.  Surgery may be needed to treat the condition that is causing your urinary retention.     When should I contact my healthcare provider?     You have a fever.  You have pain when you urinate.  You have blood in your urine.  You have problems with your catheter.  You have questions or concerns about your condition or care.    When should I seek immediate care or call 911?   You have severe abdominal pain.  You are breathing faster than usual.  Your heartbeat is faster than usual.  Your face, hands, feet, or ankles are swollen.    Urinary Tract Infection    A urinary tract infection (UTI) is an infection of any part of the urinary tract, which includes the kidneys, ureters, bladder, and urethra. Risk factors include ignoring your need to urinate, wiping back to front if female, being an uncircumcised male, and having diabetes or a weak immune system. Symptoms include frequent urination, pain or burning with urination, foul smelling urine, cloudy urine, pain in the lower abdomen, blood in the urine, and fever. If you were prescribed an antibiotic medicine, take it as told by your health care provider. Do not stop taking the antibiotic even if you start to feel better.    SEEK IMMEDIATE MEDICAL CARE IF YOU HAVE ANY OF THE FOLLOWING SYMPTOMS: severe back or abdominal pain, fever, inability to keep fluids or medicine down, dizziness/lightheadedness, or a change in mental status. Can take tylenol every 6hrs as needed for pain.    Take antibiotics as prescribed.     Stay well hydrated.    Return for fevers, persistent vomit, uncontrolled pain, worsening breathing, worsening lightheaded, unable to urinate, spreading redness, worsening penis/testicle pain.    Follow up with primary doctor within 1-2 days (Bring copy of results).     Follow up with urologist at your appointment tomorrow. (Bring copy of results).     Increase fruit/juice intake.  If you feel like your stool is hard or you are straining to have bowel movements, you can take over the counter stool softeners like colace, senna or miralax.       Constipation    Constipation is when a person has fewer than three bowel movements a week, has difficulty having a bowel movement, or has stools that are dry, hard, or larger than normal. Other symptoms can include abdominal pain or bloating. As people grow older, constipation is more common. A low-fiber diet, not taking in enough fluids, and taking certain medicines, including opioid painkillers, may make constipation worse. Treatment varies but may include dietary modifications (more fiber-rich foods), lifestyle modifications, and possible medications.     SEEK IMMEDIATE MEDICAL CARE IF YOU HAVE ANY OF THE FOLLOWING SYMPTOMS: bright red blood in your stool, constipation for longer than 4 days, abdominal or rectal pain, unexplained weight loss, or inability to pass gas.     What is urinary retention?   Urinary retention is a condition that develops when your bladder does not empty completely when you urinate.    What causes urinary retention?     An enlarged prostate  Blockages, such as a stone, growth, or narrowing of your urethra  A weak bladder muscle  Nerve damage from diabetes, stroke, or spinal cord injury  Bladder diverticula, which are pockets of urine that form in your bladder and do not empty  Certain medicines, such as narcotics, antihistamines, or antidepressants    What are the signs and symptoms of urinary retention?     Frequent urination, or the urge to urinate right after you finish  An urge to urinate, but your urine does not come out or dribbles out slowly and weakly  Frequent urine leaks that happen during the day or while you sleep  Pain or pressure when you urinate  Pain or stiffness in your abdomen, lower back, hips, or upper thighs  Blood in your urine    How is urinary retention diagnosed? Your healthcare provider will ask about your health history and the medicines you take. He will press or tap on your lower abdomen. You may need any of the following tests:   A digital rectal exam is when healthcare providers carefully feel the size of your prostate.  A post void residual test will show how much urine is left in your bladder after you urinate. You will be asked to urinate and then healthcare providers will use a small ultrasound machine to check how much urine is left in your bladder.  Blood or urine tests may show infection or prostate specific antigen (PSA) levels. PSA may be elevated in prostate cancer.  An ultrasound uses sound waves to show pictures on a monitor. An ultrasound may be done to show bladder stones, infection, or other problems.  A CT scan, or CAT scan, is a type of x-ray that is taken of your prostate, kidneys, and bladder. The pictures may show what is causing your urinary retention. You may be given a dye before the pictures are taken to help healthcare providers see the pictures better. Tell the healthcare provider if you have ever had an allergic reaction to contrast dye.    How is urinary retention treated?     A Dawkins catheter is a tube put into your bladder to drain urine into a bag. Keep the bag below your waist. This will prevent urine from flowing back into your bladder and causing an infection or other problems. Also, keep the tube free of kinks so the urine will drain properly. Do not pull on the catheter. This can cause pain and bleeding, and may cause the catheter to come out.     Medicines can help decrease the size of your prostate, fight infection, and help you urinate more easily.  Surgery may be needed to treat the condition that is causing your urinary retention.     When should I contact my healthcare provider?     You have a fever.  You have pain when you urinate.  You have blood in your urine.  You have problems with your catheter.  You have questions or concerns about your condition or care.    When should I seek immediate care or call 911?   You have severe abdominal pain.  You are breathing faster than usual.  Your heartbeat is faster than usual.  Your face, hands, feet, or ankles are swollen.    Urinary Tract Infection    A urinary tract infection (UTI) is an infection of any part of the urinary tract, which includes the kidneys, ureters, bladder, and urethra. Risk factors include ignoring your need to urinate, wiping back to front if female, being an uncircumcised male, and having diabetes or a weak immune system. Symptoms include frequent urination, pain or burning with urination, foul smelling urine, cloudy urine, pain in the lower abdomen, blood in the urine, and fever. If you were prescribed an antibiotic medicine, take it as told by your health care provider. Do not stop taking the antibiotic even if you start to feel better.    SEEK IMMEDIATE MEDICAL CARE IF YOU HAVE ANY OF THE FOLLOWING SYMPTOMS: severe back or abdominal pain, fever, inability to keep fluids or medicine down, dizziness/lightheadedness, or a change in mental status.

## 2022-06-29 ENCOUNTER — OFFICE VISIT (OUTPATIENT)
Dept: FAMILY MEDICINE CLINIC | Facility: CLINIC | Age: 52
End: 2022-06-29

## 2022-06-29 VITALS
OXYGEN SATURATION: 98 % | HEART RATE: 78 BPM | BODY MASS INDEX: 37.99 KG/M2 | WEIGHT: 271.4 LBS | HEIGHT: 71 IN | SYSTOLIC BLOOD PRESSURE: 137 MMHG | DIASTOLIC BLOOD PRESSURE: 75 MMHG | TEMPERATURE: 97.4 F

## 2022-06-29 DIAGNOSIS — M54.42 BILATERAL LOW BACK PAIN WITH BILATERAL SCIATICA, UNSPECIFIED CHRONICITY: ICD-10-CM

## 2022-06-29 DIAGNOSIS — T40.2X5A CONSTIPATION DUE TO OPIOID THERAPY: Primary | ICD-10-CM

## 2022-06-29 DIAGNOSIS — J32.0 CHRONIC MAXILLARY SINUSITIS: ICD-10-CM

## 2022-06-29 DIAGNOSIS — Z79.4 TYPE 2 DIABETES MELLITUS WITH HYPERGLYCEMIA, WITH LONG-TERM CURRENT USE OF INSULIN: ICD-10-CM

## 2022-06-29 DIAGNOSIS — Z79.4 TYPE 2 DIABETES MELLITUS WITH DIABETIC NEUROPATHY, WITH LONG-TERM CURRENT USE OF INSULIN: ICD-10-CM

## 2022-06-29 DIAGNOSIS — J30.1 CHRONIC SEASONAL ALLERGIC RHINITIS DUE TO POLLEN: ICD-10-CM

## 2022-06-29 DIAGNOSIS — E53.8 VITAMIN B12 DEFICIENCY: ICD-10-CM

## 2022-06-29 DIAGNOSIS — E55.9 VITAMIN D DEFICIENCY: ICD-10-CM

## 2022-06-29 DIAGNOSIS — E11.65 TYPE 2 DIABETES MELLITUS WITH HYPERGLYCEMIA, WITH LONG-TERM CURRENT USE OF INSULIN: ICD-10-CM

## 2022-06-29 DIAGNOSIS — K59.03 CONSTIPATION DUE TO OPIOID THERAPY: Primary | ICD-10-CM

## 2022-06-29 DIAGNOSIS — M51.27 PROTRUSION OF INTERVERTEBRAL DISC OF LUMBOSACRAL REGION: ICD-10-CM

## 2022-06-29 DIAGNOSIS — M54.41 BILATERAL LOW BACK PAIN WITH BILATERAL SCIATICA, UNSPECIFIED CHRONICITY: ICD-10-CM

## 2022-06-29 DIAGNOSIS — E11.40 TYPE 2 DIABETES MELLITUS WITH DIABETIC NEUROPATHY, WITH LONG-TERM CURRENT USE OF INSULIN: ICD-10-CM

## 2022-06-29 DIAGNOSIS — K21.9 GASTROESOPHAGEAL REFLUX DISEASE WITHOUT ESOPHAGITIS: ICD-10-CM

## 2022-06-29 DIAGNOSIS — R60.1 GENERALIZED EDEMA: ICD-10-CM

## 2022-06-29 DIAGNOSIS — I10 ESSENTIAL HYPERTENSION: ICD-10-CM

## 2022-06-29 PROCEDURE — 96372 THER/PROPH/DIAG INJ SC/IM: CPT | Performed by: NURSE PRACTITIONER

## 2022-06-29 PROCEDURE — 99214 OFFICE O/P EST MOD 30 MIN: CPT | Performed by: NURSE PRACTITIONER

## 2022-06-29 RX ORDER — BISACODYL 5 MG
20 TABLET, DELAYED RELEASE (ENTERIC COATED) ORAL ONCE
Qty: 4 TABLET | Refills: 0 | Status: SHIPPED | OUTPATIENT
Start: 2022-06-29 | End: 2022-06-29

## 2022-06-29 RX ORDER — GABAPENTIN 800 MG/1
800 TABLET ORAL 4 TIMES DAILY
Qty: 120 TABLET | Refills: 2 | Status: SHIPPED | OUTPATIENT
Start: 2022-06-29 | End: 2022-08-25 | Stop reason: SDUPTHER

## 2022-06-29 RX ORDER — PANTOPRAZOLE SODIUM 40 MG/1
40 TABLET, DELAYED RELEASE ORAL DAILY
Qty: 30 TABLET | Refills: 5 | Status: SHIPPED | OUTPATIENT
Start: 2022-06-29 | End: 2022-12-30

## 2022-06-29 RX ORDER — ENALAPRIL MALEATE 5 MG/1
5 TABLET ORAL DAILY
Qty: 90 TABLET | Refills: 2 | Status: SHIPPED | OUTPATIENT
Start: 2022-06-29 | End: 2022-11-08 | Stop reason: SDUPTHER

## 2022-06-29 RX ORDER — LEVOCETIRIZINE DIHYDROCHLORIDE 5 MG/1
5 TABLET, FILM COATED ORAL EVERY EVENING
Qty: 30 TABLET | Refills: 5 | Status: SHIPPED | OUTPATIENT
Start: 2022-06-29 | End: 2022-12-30 | Stop reason: SDUPTHER

## 2022-06-29 RX ORDER — FAMOTIDINE 40 MG/1
40 TABLET, FILM COATED ORAL NIGHTLY
Qty: 30 TABLET | Refills: 5 | Status: SHIPPED | OUTPATIENT
Start: 2022-06-29 | End: 2022-11-08 | Stop reason: SDUPTHER

## 2022-06-29 RX ORDER — SITAGLIPTIN AND METFORMIN HYDROCHLORIDE 500; 50 MG/1; MG/1
1 TABLET, FILM COATED ORAL 2 TIMES DAILY WITH MEALS
Qty: 60 TABLET | Refills: 5 | Status: SHIPPED | OUTPATIENT
Start: 2022-06-29 | End: 2022-11-08 | Stop reason: SDUPTHER

## 2022-06-29 RX ORDER — MONTELUKAST SODIUM 10 MG/1
10 TABLET ORAL NIGHTLY
Qty: 30 TABLET | Refills: 5 | Status: SHIPPED | OUTPATIENT
Start: 2022-06-29 | End: 2022-11-08 | Stop reason: SDUPTHER

## 2022-06-29 RX ORDER — ORPHENADRINE CITRATE 30 MG/ML
60 INJECTION INTRAMUSCULAR; INTRAVENOUS ONCE
Status: COMPLETED | OUTPATIENT
Start: 2022-06-29 | End: 2022-06-29

## 2022-06-29 RX ORDER — LANOLIN ALCOHOL/MO/W.PET/CERES
1000 CREAM (GRAM) TOPICAL DAILY
Qty: 30 TABLET | Refills: 5 | Status: SHIPPED | OUTPATIENT
Start: 2022-06-29 | End: 2023-02-06 | Stop reason: SDUPTHER

## 2022-06-29 RX ORDER — KETOROLAC TROMETHAMINE 30 MG/ML
60 INJECTION, SOLUTION INTRAMUSCULAR; INTRAVENOUS ONCE
Status: COMPLETED | OUTPATIENT
Start: 2022-06-29 | End: 2022-06-29

## 2022-06-29 RX ORDER — BUMETANIDE 2 MG/1
2 TABLET ORAL 2 TIMES DAILY
Qty: 60 TABLET | Refills: 5 | Status: SHIPPED | OUTPATIENT
Start: 2022-06-29 | End: 2022-11-08 | Stop reason: SDUPTHER

## 2022-06-29 RX ORDER — ALBUTEROL SULFATE 90 UG/1
2 AEROSOL, METERED RESPIRATORY (INHALATION) EVERY 4 HOURS PRN
Qty: 18 G | Refills: 5 | Status: SHIPPED | OUTPATIENT
Start: 2022-06-29 | End: 2022-11-08 | Stop reason: SDUPTHER

## 2022-06-29 RX ORDER — ENALAPRIL MALEATE 10 MG/1
TABLET ORAL
COMMUNITY
Start: 2022-06-10 | End: 2022-06-29

## 2022-06-29 RX ADMIN — KETOROLAC TROMETHAMINE 60 MG: 30 INJECTION, SOLUTION INTRAMUSCULAR; INTRAVENOUS at 09:44

## 2022-06-29 RX ADMIN — ORPHENADRINE CITRATE 60 MG: 30 INJECTION INTRAMUSCULAR; INTRAVENOUS at 09:43

## 2022-07-11 DIAGNOSIS — J45.50 SEVERE PERSISTENT ASTHMA, UNSPECIFIED WHETHER COMPLICATED: Chronic | ICD-10-CM

## 2022-07-11 PROBLEM — T40.2X5A THERAPEUTIC OPIOID INDUCED CONSTIPATION: Status: ACTIVE | Noted: 2022-07-11

## 2022-07-11 PROBLEM — K59.03 THERAPEUTIC OPIOID INDUCED CONSTIPATION: Status: ACTIVE | Noted: 2022-07-11

## 2022-07-11 RX ORDER — CALCIUM POLYCARBOPHIL 625 MG
625 TABLET ORAL DAILY
Qty: 30 TABLET | Refills: 5 | Status: SHIPPED | OUTPATIENT
Start: 2022-07-11 | End: 2022-11-08

## 2022-07-11 RX ORDER — ALBUTEROL SULFATE 1.25 MG/3ML
SOLUTION RESPIRATORY (INHALATION)
Qty: 150 ML | Refills: 3 | Status: SHIPPED | OUTPATIENT
Start: 2022-07-11 | End: 2022-10-07

## 2022-07-11 NOTE — ASSESSMENT & PLAN NOTE
Continue enalapril 5 mg daily.  Continue Bumex 2 mg twice daily.  Ambulatory BP monitoring either at home or random community checks.  Patient to report continued elevations >140/90.  Patient may come by office for checks if needed.

## 2022-07-11 NOTE — ASSESSMENT & PLAN NOTE
Continue to work hard on glycemic control.  Continue under the care of CDE at this office.  Continue medications as directed

## 2022-07-11 NOTE — ASSESSMENT & PLAN NOTE
Injections for acute relief today.  Continue under the care of pain management provider  Continue to be active as she is physically able

## 2022-08-24 ENCOUNTER — TELEPHONE (OUTPATIENT)
Dept: FAMILY MEDICINE CLINIC | Facility: CLINIC | Age: 52
End: 2022-08-24

## 2022-08-24 NOTE — TELEPHONE ENCOUNTER
Pt has an apt tomorrow and is going to wait.      ----- Message from JOAQUÍN Ross sent at 8/23/2022 12:13 PM EDT -----  Regarding: RE: Patient with Back Pain   She can come and get Toradol 60 mg, norflex 60 mg and Dexa 4 mg    ----- Message -----  From: Radha Jenkins MA  Sent: 8/23/2022   9:10 AM EDT  To: JOAQUÍN Ross  Subject: FW: Patient with Back Pain                         ----- Message -----  From: Tashia Glass RegSched Rep  Sent: 8/22/2022  12:04 PM EDT  To: Radha Jenkins MA, Iman Solomon MA  Subject: Patient with Back Pain                           Patient came in asking to see CG because she has bent over and heard her back pop. She said she is in a lot of back pain and it has started to go down into her right leg. She was wondering what Donna may advise for her to do, like shots for the pain. I told her that it may be better for her to go on to the ER and let them check her out, but told her I'd send a message just to ask. Thanks.

## 2022-08-25 ENCOUNTER — OFFICE VISIT (OUTPATIENT)
Dept: FAMILY MEDICINE CLINIC | Facility: CLINIC | Age: 52
End: 2022-08-25

## 2022-08-25 VITALS
OXYGEN SATURATION: 97 % | SYSTOLIC BLOOD PRESSURE: 130 MMHG | HEIGHT: 71 IN | WEIGHT: 270 LBS | TEMPERATURE: 98 F | HEART RATE: 92 BPM | BODY MASS INDEX: 37.8 KG/M2 | DIASTOLIC BLOOD PRESSURE: 82 MMHG | RESPIRATION RATE: 18 BRPM

## 2022-08-25 DIAGNOSIS — E11.65 TYPE 2 DIABETES MELLITUS WITH HYPERGLYCEMIA, WITH LONG-TERM CURRENT USE OF INSULIN: Primary | ICD-10-CM

## 2022-08-25 DIAGNOSIS — Z79.899 LONG-TERM USE OF HIGH-RISK MEDICATION: ICD-10-CM

## 2022-08-25 DIAGNOSIS — E11.40 TYPE 2 DIABETES MELLITUS WITH DIABETIC NEUROPATHY, WITH LONG-TERM CURRENT USE OF INSULIN: ICD-10-CM

## 2022-08-25 DIAGNOSIS — Z79.4 TYPE 2 DIABETES MELLITUS WITH HYPERGLYCEMIA, WITH LONG-TERM CURRENT USE OF INSULIN: Primary | ICD-10-CM

## 2022-08-25 DIAGNOSIS — I10 ESSENTIAL HYPERTENSION: ICD-10-CM

## 2022-08-25 DIAGNOSIS — E55.9 VITAMIN D DEFICIENCY: ICD-10-CM

## 2022-08-25 DIAGNOSIS — E53.8 VITAMIN B12 DEFICIENCY: ICD-10-CM

## 2022-08-25 DIAGNOSIS — M51.27 PROTRUSION OF INTERVERTEBRAL DISC OF LUMBOSACRAL REGION: ICD-10-CM

## 2022-08-25 DIAGNOSIS — Z79.4 TYPE 2 DIABETES MELLITUS WITH DIABETIC NEUROPATHY, WITH LONG-TERM CURRENT USE OF INSULIN: ICD-10-CM

## 2022-08-25 DIAGNOSIS — N61.1 ABSCESS OF RIGHT BREAST: ICD-10-CM

## 2022-08-25 DIAGNOSIS — E66.01 CLASS 2 SEVERE OBESITY DUE TO EXCESS CALORIES WITH SERIOUS COMORBIDITY AND BODY MASS INDEX (BMI) OF 39.0 TO 39.9 IN ADULT: ICD-10-CM

## 2022-08-25 DIAGNOSIS — E78.2 MIXED HYPERLIPIDEMIA: ICD-10-CM

## 2022-08-25 PROCEDURE — 96372 THER/PROPH/DIAG INJ SC/IM: CPT | Performed by: NURSE PRACTITIONER

## 2022-08-25 PROCEDURE — 99214 OFFICE O/P EST MOD 30 MIN: CPT | Performed by: NURSE PRACTITIONER

## 2022-08-25 RX ORDER — ORPHENADRINE CITRATE 30 MG/ML
60 INJECTION INTRAMUSCULAR; INTRAVENOUS ONCE
Status: COMPLETED | OUTPATIENT
Start: 2022-08-25 | End: 2022-08-25

## 2022-08-25 RX ORDER — KETOROLAC TROMETHAMINE 30 MG/ML
60 INJECTION, SOLUTION INTRAMUSCULAR; INTRAVENOUS ONCE
Status: COMPLETED | OUTPATIENT
Start: 2022-08-25 | End: 2022-08-25

## 2022-08-25 RX ORDER — SULFAMETHOXAZOLE AND TRIMETHOPRIM 800; 160 MG/1; MG/1
1 TABLET ORAL EVERY 12 HOURS
Qty: 20 TABLET | Refills: 0 | Status: SHIPPED | OUTPATIENT
Start: 2022-08-25 | End: 2022-09-04

## 2022-08-25 RX ORDER — GABAPENTIN 800 MG/1
800 TABLET ORAL 4 TIMES DAILY
Qty: 120 TABLET | Refills: 2 | Status: SHIPPED | OUTPATIENT
Start: 2022-08-25 | End: 2022-11-07 | Stop reason: SDUPTHER

## 2022-08-25 RX ORDER — DEXAMETHASONE SODIUM PHOSPHATE 4 MG/ML
4 INJECTION, SOLUTION INTRA-ARTICULAR; INTRALESIONAL; INTRAMUSCULAR; INTRAVENOUS; SOFT TISSUE ONCE
Status: COMPLETED | OUTPATIENT
Start: 2022-08-25 | End: 2022-08-25

## 2022-08-25 RX ADMIN — DEXAMETHASONE SODIUM PHOSPHATE 4 MG: 4 INJECTION, SOLUTION INTRA-ARTICULAR; INTRALESIONAL; INTRAMUSCULAR; INTRAVENOUS; SOFT TISSUE at 13:03

## 2022-08-25 RX ADMIN — KETOROLAC TROMETHAMINE 60 MG: 30 INJECTION, SOLUTION INTRAMUSCULAR; INTRAVENOUS at 13:04

## 2022-08-25 RX ADMIN — ORPHENADRINE CITRATE 60 MG: 30 INJECTION INTRAMUSCULAR; INTRAVENOUS at 13:05

## 2022-08-25 NOTE — PROGRESS NOTES
Subjective   Sohiela Brewster is a 52 y.o. female.     Chief Complaint   Patient presents with   • Hypertension   • Diabetes       History of Present Illness     Back pain-reports occurred Tuesday.   She reports that she was washing her feet and legs and was bent over.  She felt a popping sensation in her back instantly.  She reports that it is in the center of her back.  She reports goes down right leg and she has burning in the side of her lateral knee.  Burning sensation in her foot.   She reports that the burning is constant.  She she is having difficulty getting up and down.  Leg has increased numbness.  She reports that her back feels hot and swollen.  She did not have any falls.   No changes in bowel or bladder habits.   She reports pain radiates down into her  cleft.  She reports pain is sharp in her back.  She reports she has been letting her S/O drive.  She reports she cannot tolerate standing.   She reports her pain provider would like this provider to take over the Robaxin full time.    Abscess on breast-right and reoccurring.  She reports that today is 3rd day.  No drainage.  She reports sharp pain shooting in her breast.  She reports very red.  She reports that the area is hot.   Diabetes-reports that she is having difficulty getting insulin.  She needs a updated refill on pods and she ask that her insulin be sent to [facility].    Cpap-reports her Pap is making noise and does not feel it is working appropriately.  Her current carrier is neena-rite       The following portions of the patient's history were reviewed and updated as appropriate: CC, ROS, allergies, current medications, past family history, past medical history, past social history, past surgical history and problem list.    Review of Systems   Constitutional: Positive for fatigue. Negative for activity change, appetite change and fever.   HENT: Positive for congestion, postnasal drip, rhinorrhea and sore throat. Negative for ear pain,  "nosebleeds, tinnitus, trouble swallowing and voice change.    Eyes: Negative for blurred vision, double vision, photophobia and visual disturbance.   Respiratory: Positive for cough and shortness of breath. Negative for chest tightness and wheezing.    Cardiovascular: Positive for leg swelling. Negative for chest pain and palpitations.   Gastrointestinal: Negative for abdominal pain, blood in stool, constipation, diarrhea, nausea and GERD.   Endocrine: Negative for cold intolerance, heat intolerance and polydipsia.   Genitourinary: Negative for decreased urine volume, difficulty urinating, dysuria and hematuria.   Musculoskeletal: Positive for arthralgias, back pain, gait problem and neck pain. Negative for myalgias.   Skin: Negative for color change, pallor and wound.   Allergic/Immunologic: Negative.    Neurological: Negative for dizziness, syncope, numbness and headache.   Hematological: Negative.    Psychiatric/Behavioral: Positive for stress. Negative for decreased concentration, self-injury, sleep disturbance, suicidal ideas and depressed mood. The patient is not nervous/anxious.    All other systems reviewed and are negative.      Objective     /82   Pulse 92   Temp 98 °F (36.7 °C)   Resp 18   Ht 180.3 cm (70.98\")   Wt 122 kg (270 lb)   SpO2 97%   BMI 37.67 kg/m²     Physical Exam  Vitals reviewed.   Constitutional:       General: She is not in acute distress.     Appearance: She is well-developed. She is obese. She is not diaphoretic.      Comments: Changing positions in the chair multiple times.  Rubbing of the right side of her back and lateral right knee   HENT:      Head: Normocephalic and atraumatic.      Jaw: No tenderness.      Comments: Oropharynx not examined.  Patient is presently wearing a face covering/mask due to COVID-19 pandemic.     Right Ear: Hearing, tympanic membrane, ear canal and external ear normal.      Left Ear: Hearing, tympanic membrane, ear canal and external ear " normal.   Eyes:      General: Lids are normal. No scleral icterus.     Extraocular Movements:      Right eye: Normal extraocular motion and no nystagmus.      Left eye: Normal extraocular motion and no nystagmus.      Conjunctiva/sclera: Conjunctivae normal.      Pupils: Pupils are equal, round, and reactive to light.   Neck:      Thyroid: No thyromegaly or thyroid tenderness.      Vascular: No carotid bruit or JVD.      Trachea: No tracheal tenderness.   Cardiovascular:      Rate and Rhythm: Normal rate and regular rhythm.      Pulses:           Dorsalis pedis pulses are 2+ on the right side and 2+ on the left side.        Posterior tibial pulses are 2+ on the right side and 2+ on the left side.      Heart sounds: Normal heart sounds, S1 normal and S2 normal. No murmur heard.  Pulmonary:      Effort: Pulmonary effort is normal. No accessory muscle usage, prolonged expiration or respiratory distress.      Breath sounds: Normal breath sounds.   Chest:      Chest wall: No tenderness.      Comments: Abscess on low outer lower quadrant of her right breast.  Erythema and tenderness noted.  Mild induration  Abdominal:      General: Bowel sounds are normal. There is no distension.      Palpations: Abdomen is soft. There is no mass.      Tenderness: There is no abdominal tenderness.   Musculoskeletal:      Cervical back: Neck supple. Tenderness present. Decreased range of motion.      Thoracic back: Tenderness present. Decreased range of motion.      Lumbar back: Spasms, tenderness and bony tenderness present. Decreased range of motion. Positive right straight leg raise test and positive left straight leg raise test.      Right lower leg: No edema.      Left lower leg: No edema.      Comments: No muscular atrophy or flaccidity.  Pain with palpation over right sciatic region.  Radiation to lateral thigh and across tib fib to medial ankle   Lymphadenopathy:      Head:      Right side of head: No submental or submandibular  adenopathy.      Left side of head: No submental or submandibular adenopathy.      Cervical: No cervical adenopathy.      Right cervical: No superficial cervical adenopathy.     Left cervical: No superficial cervical adenopathy.   Skin:     General: Skin is warm and dry.      Capillary Refill: Capillary refill takes less than 2 seconds.      Coloration: Skin is not jaundiced or pale.      Findings: No erythema.      Nails: There is no clubbing.   Neurological:      Mental Status: She is alert and oriented to person, place, and time.      Cranial Nerves: No cranial nerve deficit or facial asymmetry.      Sensory: No sensory deficit.      Motor: No weakness, tremor, atrophy or abnormal muscle tone.      Coordination: Coordination normal.      Gait: Gait abnormal (moderately antalgic today).      Deep Tendon Reflexes: Reflexes are normal and symmetric.   Psychiatric:         Attention and Perception: She is attentive.         Mood and Affect: Mood normal.         Speech: Speech normal.         Behavior: Behavior normal. Behavior is cooperative.         Thought Content: Thought content normal.         Judgment: Judgment normal.           Diagnoses and all orders for this visit:    1. Type 2 diabetes mellitus with hyperglycemia, with long-term current use of insulin ([facility]) (Primary)  Comments:  Continue with CGM and OmniPod insulin pump.  Overview:  Continue with CGM and OmniPod insulin pump.    Assessment & Plan:  Refill on insulin today.  Patient asked that her insulin be sent to [facility] instead of [facility].  We will refax her order for OmniPod as she reports she needs supplies and there is no current prescription on file.  Continue wearing her Dexcom as directed.  Continue Trulicity injection      Orders:  -     gabapentin (NEURONTIN) 800 MG tablet; Take 1 tablet by mouth 4 (Four) Times a Day.  Dispense: 120 tablet; Refill: 2    2. Long-term use of high-risk medication  -     Urine Drug Screen - Urine, Clean Catch;  Future  -     insulin lispro (HumaLOG) 100 UNIT/ML injection; Maximum daily dose of 100 units per insulin pump  Dispense: 30 mL; Refill: 3    3. Protrusion of intervertebral disc of lumbosacral region  Comments:  Continue under the care of pain management  Orders:  -     dexamethasone (DECADRON) injection 4 mg  -     orphenadrine (NORFLEX) injection 60 mg  -     ketorolac (TORADOL) injection 60 mg  -     gabapentin (NEURONTIN) 800 MG tablet; Take 1 tablet by mouth 4 (Four) Times a Day.  Dispense: 120 tablet; Refill: 2    4. Abscess of right breast  -     sulfamethoxazole-trimethoprim (BACTRIM DS,SEPTRA DS) 800-160 MG per tablet; Take 1 tablet by mouth Every 12 (Twelve) Hours for 10 days.  Dispense: 20 tablet; Refill: 0    5. Essential hypertension  Assessment & Plan:  Continue enalapril 5 mg daily.  Continue Bumex 2 mg twice daily.  Ambulatory BP monitoring either at home or random community checks.  Patient to report continued elevations >140/90.  Patient may come by office for checks if needed.       6. Class 2 severe obesity due to excess calories with serious comorbidity and body mass index (BMI) of 39.0 to 39.9 in adult (HCC)  Assessment & Plan:  Continue to work on diet  Be active as able.         Class 2 Severe Obesity (BMI >=35 and <=39.9). Obesity-related health conditions include the following: hypertension, diabetes mellitus, GERD and osteoarthritis. Obesity is unchanged. BMI is is above average. We discussed portion control and increasing exercise.     Understands disease processes and need for medications.  Understands reasons for urgent and emergent care.  Patient (& family) verbalized agreement for treatment plan.   Emotional support and active listening provided.  Patient provided time to verbalize feelings.    IVETTE/PMMERISSA reviewed today and consistent.  Will refill prescribed controlled medication today.  Patient is aware they cannot receive narcotics from any other provider except if under care of  pain management or speciality clinic.  Risk and benefits of medication use has been reviewed.  History and physical exam exhibit continued safe and appropriate use of controlled substances.  The patient is aware of the potential for addiction and dependence.  This patient has been made aware of the appropriate use of such medications, including potential risk of somnolence, limited ability to drive and / or work safely, and potential for overdose.    It has also been made clear that these medications are for use by this patient only, without concomitant use of alcohol or other substances unless prescribed/advised by medical provider.  Patient understands they may be subject to UDS and pill counts at random.      Patient considered to be moderate risk for addiction due to use of multiple controlled medications.  Patient understands and accepts these risks.  Patient need for medication will be reassessed at each visit.  Doses will be adjusted according to patient need and findings.    Goal of TX: Patient will not have any adverse reactions of medication.  Patient will have reduction in neuropathic symptoms of pain with use of gabapentin as directed.  Patient will not have any drug drug interactions.    Continue under the care of pain management.   Medication Dispense Information    Gabapentin   Dispensed: 8/10/2022 12:00 AM   Written:  6/29/2022   Unit strength: 800MG   Days supply: 30   Dispense Note: GivenName=SHARONSurName=MESSERBirthDate=[id number]=[address] [place], [place], [address]   Quantity: 120 each   Pharmacy: [facility]   Authorizing provider: JOSEY MICHAEL   Received from: [facility]MP (Fill History)   Brand or Generic:       Medication Dispense Information    Oxycodone/Acetaminophen   Dispensed: 8/18/2022 12:00 AM   Written:  8/17/2022   Unit strength: 325MG/7.5MG   Days supply: 30   Dispense Note: GivenName=SHARONSurName=MESSERBirthDate=[id number]=[address] [facility]  [place], [address]   Quantity: 120 each   Pharmacy: [facility]   Authorizing provider: MILTON ONTIVEROS   Received from: [facility] (Fill History)   Brand or Generic:       RTC for updated fasting labs.      RTC 3 months, sooner if needed.             This document has been electronically signed by:  JOAQUÍN Ross, KEISHAC    Dragon disclaimer:  Part of this note may be an electronic transcription/translation of spoken language to printed text using the Dragon Dictation System.

## 2022-08-25 NOTE — ASSESSMENT & PLAN NOTE
Refill on insulin today.  Patient asked that her insulin be sent to [email or web address] instead of [facility].  We will refax her order for OmniPod as she reports she needs supplies and there is no current prescription on file.  Continue wearing her Dexcom as directed.  Continue Trulicity injection

## 2022-08-26 ENCOUNTER — TELEPHONE (OUTPATIENT)
Dept: FAMILY MEDICINE CLINIC | Facility: CLINIC | Age: 52
End: 2022-08-26

## 2022-08-26 DIAGNOSIS — E11.40 TYPE 2 DIABETES MELLITUS WITH DIABETIC NEUROPATHY, WITH LONG-TERM CURRENT USE OF INSULIN: Primary | ICD-10-CM

## 2022-08-26 DIAGNOSIS — Z79.4 TYPE 2 DIABETES MELLITUS WITH DIABETIC NEUROPATHY, WITH LONG-TERM CURRENT USE OF INSULIN: Primary | ICD-10-CM

## 2022-08-26 RX ORDER — INSULIN LISPRO 100 [IU]/ML
INJECTION, SOLUTION INTRAVENOUS; SUBCUTANEOUS
Qty: 30 ML | Refills: 3 | Status: SHIPPED | OUTPATIENT
Start: 2022-08-26 | End: 2022-11-08

## 2022-08-26 NOTE — TELEPHONE ENCOUNTER
Spoke with patient to see what was going on with her Omnipods. She said that [facility] hasn't sent her any new supplies. I called them to see what the problem was and it was because she needs to call them to authorize the shipment. I gave her the number to the pharmacy to call them. She also wanted me to ask about having her insulin filled with them as well but they can't do it because of her insurance. Anish wanted me to ask her if she wants her to send it to [facility] and she said that would be fine.

## 2022-09-01 ENCOUNTER — LAB (OUTPATIENT)
Dept: FAMILY MEDICINE CLINIC | Facility: CLINIC | Age: 52
End: 2022-09-01

## 2022-09-01 DIAGNOSIS — Z79.4 TYPE 2 DIABETES MELLITUS WITH DIABETIC NEUROPATHY, WITH LONG-TERM CURRENT USE OF INSULIN: ICD-10-CM

## 2022-09-01 DIAGNOSIS — F33.8 SEASONAL AFFECTIVE DISORDER: ICD-10-CM

## 2022-09-01 DIAGNOSIS — J45.50 SEVERE PERSISTENT ASTHMA, UNSPECIFIED WHETHER COMPLICATED: ICD-10-CM

## 2022-09-01 DIAGNOSIS — M54.6 CHRONIC MIDLINE THORACIC BACK PAIN: ICD-10-CM

## 2022-09-01 DIAGNOSIS — K59.03 THERAPEUTIC OPIOID INDUCED CONSTIPATION: ICD-10-CM

## 2022-09-01 DIAGNOSIS — T40.2X5A THERAPEUTIC OPIOID INDUCED CONSTIPATION: ICD-10-CM

## 2022-09-01 DIAGNOSIS — E66.01 CLASS 2 SEVERE OBESITY DUE TO EXCESS CALORIES WITH SERIOUS COMORBIDITY AND BODY MASS INDEX (BMI) OF 39.0 TO 39.9 IN ADULT: ICD-10-CM

## 2022-09-01 DIAGNOSIS — Z72.0 TOBACCO ABUSE DISORDER: ICD-10-CM

## 2022-09-01 DIAGNOSIS — G89.29 CHRONIC MIDLINE THORACIC BACK PAIN: ICD-10-CM

## 2022-09-01 DIAGNOSIS — E11.65 TYPE 2 DIABETES MELLITUS WITH HYPERGLYCEMIA, WITH LONG-TERM CURRENT USE OF INSULIN: ICD-10-CM

## 2022-09-01 DIAGNOSIS — J30.9 CHRONIC ALLERGIC RHINITIS: ICD-10-CM

## 2022-09-01 DIAGNOSIS — M19.90 ARTHRITIS: ICD-10-CM

## 2022-09-01 DIAGNOSIS — K21.9 GASTROESOPHAGEAL REFLUX DISEASE WITHOUT ESOPHAGITIS: ICD-10-CM

## 2022-09-01 DIAGNOSIS — M25.78 DEGENERATION OF INTERVERTEBRAL DISC OF THORACIC REGION WITH OSTEOPHYTE: ICD-10-CM

## 2022-09-01 DIAGNOSIS — M51.34 DEGENERATION OF INTERVERTEBRAL DISC OF THORACIC REGION WITH OSTEOPHYTE: ICD-10-CM

## 2022-09-01 DIAGNOSIS — M53.9 MULTILEVEL DEGENERATIVE DISC DISEASE: ICD-10-CM

## 2022-09-01 DIAGNOSIS — R16.0 HEPATOMEGALY: ICD-10-CM

## 2022-09-01 DIAGNOSIS — M51.27 PROTRUSION OF INTERVERTEBRAL DISC OF LUMBOSACRAL REGION: ICD-10-CM

## 2022-09-01 DIAGNOSIS — Z79.4 TYPE 2 DIABETES MELLITUS WITH HYPERGLYCEMIA, WITH LONG-TERM CURRENT USE OF INSULIN: ICD-10-CM

## 2022-09-01 DIAGNOSIS — E11.40 TYPE 2 DIABETES MELLITUS WITH DIABETIC NEUROPATHY, WITH LONG-TERM CURRENT USE OF INSULIN: ICD-10-CM

## 2022-09-01 DIAGNOSIS — R59.1 LYMPHADENOPATHY: ICD-10-CM

## 2022-09-01 DIAGNOSIS — I10 ESSENTIAL HYPERTENSION: ICD-10-CM

## 2022-09-01 DIAGNOSIS — E78.5 DYSLIPIDEMIA: ICD-10-CM

## 2022-09-01 DIAGNOSIS — E55.9 VITAMIN D DEFICIENCY: ICD-10-CM

## 2022-09-01 PROCEDURE — 84443 ASSAY THYROID STIM HORMONE: CPT | Performed by: NURSE PRACTITIONER

## 2022-09-01 PROCEDURE — 84439 ASSAY OF FREE THYROXINE: CPT | Performed by: NURSE PRACTITIONER

## 2022-09-01 PROCEDURE — 80053 COMPREHEN METABOLIC PANEL: CPT | Performed by: NURSE PRACTITIONER

## 2022-09-01 PROCEDURE — 36415 COLL VENOUS BLD VENIPUNCTURE: CPT | Performed by: NURSE PRACTITIONER

## 2022-09-01 PROCEDURE — 80061 LIPID PANEL: CPT | Performed by: NURSE PRACTITIONER

## 2022-09-01 PROCEDURE — 85025 COMPLETE CBC W/AUTO DIFF WBC: CPT | Performed by: NURSE PRACTITIONER

## 2022-09-01 PROCEDURE — 82607 VITAMIN B-12: CPT | Performed by: NURSE PRACTITIONER

## 2022-09-01 PROCEDURE — 83036 HEMOGLOBIN GLYCOSYLATED A1C: CPT | Performed by: NURSE PRACTITIONER

## 2022-09-01 PROCEDURE — 82306 VITAMIN D 25 HYDROXY: CPT | Performed by: NURSE PRACTITIONER

## 2022-09-01 RX ORDER — METHOCARBAMOL 500 MG/1
1000 TABLET, FILM COATED ORAL 3 TIMES DAILY
Qty: 120 TABLET | Refills: 5 | Status: SHIPPED | OUTPATIENT
Start: 2022-09-01 | End: 2022-11-08 | Stop reason: SDUPTHER

## 2022-09-02 LAB
25(OH)D3 SERPL-MCNC: 52 NG/ML (ref 30–100)
ALBUMIN SERPL-MCNC: 3.7 G/DL (ref 3.5–5.2)
ALBUMIN/GLOB SERPL: 1.4 G/DL
ALP SERPL-CCNC: 56 U/L (ref 39–117)
ALT SERPL W P-5'-P-CCNC: 13 U/L (ref 1–33)
ANION GAP SERPL CALCULATED.3IONS-SCNC: 11 MMOL/L (ref 5–15)
AST SERPL-CCNC: 13 U/L (ref 1–32)
BASOPHILS # BLD AUTO: 0.05 10*3/MM3 (ref 0–0.2)
BASOPHILS NFR BLD AUTO: 0.4 % (ref 0–1.5)
BILIRUB SERPL-MCNC: 0.3 MG/DL (ref 0–1.2)
BUN SERPL-MCNC: 13 MG/DL (ref 6–20)
BUN/CREAT SERPL: 14.9 (ref 7–25)
CALCIUM SPEC-SCNC: 9.6 MG/DL (ref 8.6–10.5)
CHLORIDE SERPL-SCNC: 103 MMOL/L (ref 98–107)
CHOLEST SERPL-MCNC: 88 MG/DL (ref 0–200)
CO2 SERPL-SCNC: 23 MMOL/L (ref 22–29)
CREAT SERPL-MCNC: 0.87 MG/DL (ref 0.57–1)
DEPRECATED RDW RBC AUTO: 42 FL (ref 37–54)
EGFRCR SERPLBLD CKD-EPI 2021: 80.8 ML/MIN/1.73
EOSINOPHIL # BLD AUTO: 0.37 10*3/MM3 (ref 0–0.4)
EOSINOPHIL NFR BLD AUTO: 3.2 % (ref 0.3–6.2)
ERYTHROCYTE [DISTWIDTH] IN BLOOD BY AUTOMATED COUNT: 14.7 % (ref 12.3–15.4)
GLOBULIN UR ELPH-MCNC: 2.7 GM/DL
GLUCOSE SERPL-MCNC: 255 MG/DL (ref 65–99)
HBA1C MFR BLD: 8 % (ref 4.8–5.6)
HCT VFR BLD AUTO: 37.9 % (ref 34–46.6)
HDLC SERPL-MCNC: 37 MG/DL (ref 40–60)
HGB BLD-MCNC: 12.3 G/DL (ref 12–15.9)
IMM GRANULOCYTES # BLD AUTO: 0.04 10*3/MM3 (ref 0–0.05)
IMM GRANULOCYTES NFR BLD AUTO: 0.3 % (ref 0–0.5)
LDLC SERPL CALC-MCNC: 24 MG/DL (ref 0–100)
LDLC/HDLC SERPL: 0.5 {RATIO}
LYMPHOCYTES # BLD AUTO: 2.75 10*3/MM3 (ref 0.7–3.1)
LYMPHOCYTES NFR BLD AUTO: 23.4 % (ref 19.6–45.3)
MCH RBC QN AUTO: 26.2 PG (ref 26.6–33)
MCHC RBC AUTO-ENTMCNC: 32.5 G/DL (ref 31.5–35.7)
MCV RBC AUTO: 80.6 FL (ref 79–97)
MONOCYTES # BLD AUTO: 0.87 10*3/MM3 (ref 0.1–0.9)
MONOCYTES NFR BLD AUTO: 7.4 % (ref 5–12)
NEUTROPHILS NFR BLD AUTO: 65.3 % (ref 42.7–76)
NEUTROPHILS NFR BLD AUTO: 7.65 10*3/MM3 (ref 1.7–7)
NRBC BLD AUTO-RTO: 0 /100 WBC (ref 0–0.2)
PLATELET # BLD AUTO: 205 10*3/MM3 (ref 140–450)
PMV BLD AUTO: 12 FL (ref 6–12)
POTASSIUM SERPL-SCNC: 4.5 MMOL/L (ref 3.5–5.2)
PROT SERPL-MCNC: 6.4 G/DL (ref 6–8.5)
RBC # BLD AUTO: 4.7 10*6/MM3 (ref 3.77–5.28)
SODIUM SERPL-SCNC: 137 MMOL/L (ref 136–145)
T4 FREE SERPL-MCNC: 1.71 NG/DL (ref 0.93–1.7)
TRIGL SERPL-MCNC: 162 MG/DL (ref 0–150)
TSH SERPL DL<=0.05 MIU/L-ACNC: 1.64 UIU/ML (ref 0.27–4.2)
VIT B12 BLD-MCNC: 900 PG/ML (ref 211–946)
VLDLC SERPL-MCNC: 27 MG/DL (ref 5–40)
WBC NRBC COR # BLD: 11.73 10*3/MM3 (ref 3.4–10.8)

## 2022-09-27 DIAGNOSIS — Z79.4 TYPE 2 DIABETES MELLITUS WITH HYPERGLYCEMIA, WITH LONG-TERM CURRENT USE OF INSULIN: ICD-10-CM

## 2022-09-27 DIAGNOSIS — E11.65 TYPE 2 DIABETES MELLITUS WITH HYPERGLYCEMIA, WITH LONG-TERM CURRENT USE OF INSULIN: ICD-10-CM

## 2022-09-27 RX ORDER — INSULIN PUMP CONTROLLER
1 EACH MISCELLANEOUS EVERY OTHER DAY
Qty: 45 EACH | Refills: 3 | Status: SHIPPED | OUTPATIENT
Start: 2022-09-27

## 2022-10-05 DIAGNOSIS — E55.9 VITAMIN D DEFICIENCY: ICD-10-CM

## 2022-10-05 DIAGNOSIS — J30.89 CHRONIC NONSEASONAL ALLERGIC RHINITIS DUE TO POLLEN: ICD-10-CM

## 2022-10-05 DIAGNOSIS — E11.40 TYPE 2 DIABETES MELLITUS WITH DIABETIC NEUROPATHY, WITH LONG-TERM CURRENT USE OF INSULIN: ICD-10-CM

## 2022-10-05 DIAGNOSIS — Z79.4 TYPE 2 DIABETES MELLITUS WITH DIABETIC NEUROPATHY, WITH LONG-TERM CURRENT USE OF INSULIN: ICD-10-CM

## 2022-10-05 DIAGNOSIS — R06.02 SHORTNESS OF BREATH: ICD-10-CM

## 2022-10-05 DIAGNOSIS — J45.50 SEVERE PERSISTENT ASTHMA, UNSPECIFIED WHETHER COMPLICATED: Chronic | ICD-10-CM

## 2022-10-05 DIAGNOSIS — R60.1 GENERALIZED EDEMA: ICD-10-CM

## 2022-10-07 RX ORDER — POTASSIUM CHLORIDE 750 MG/1
TABLET, FILM COATED, EXTENDED RELEASE ORAL
Qty: 60 TABLET | Refills: 6 | Status: SHIPPED | OUTPATIENT
Start: 2022-10-07 | End: 2022-11-08 | Stop reason: SDUPTHER

## 2022-10-07 RX ORDER — ERGOCALCIFEROL 1.25 MG/1
CAPSULE ORAL
Qty: 4 CAPSULE | Refills: 5 | Status: SHIPPED | OUTPATIENT
Start: 2022-10-07

## 2022-10-07 RX ORDER — DULAGLUTIDE 1.5 MG/.5ML
INJECTION, SOLUTION SUBCUTANEOUS
Qty: 2 ML | Refills: 3 | Status: SHIPPED | OUTPATIENT
Start: 2022-10-07 | End: 2022-11-29

## 2022-10-07 RX ORDER — ALBUTEROL SULFATE 1.25 MG/3ML
SOLUTION RESPIRATORY (INHALATION)
Qty: 150 ML | Refills: 4 | Status: SHIPPED | OUTPATIENT
Start: 2022-10-07 | End: 2022-11-08 | Stop reason: SDUPTHER

## 2022-10-07 RX ORDER — AZELASTINE 1 MG/ML
SPRAY, METERED NASAL
Qty: 30 ML | Refills: 5 | Status: SHIPPED | OUTPATIENT
Start: 2022-10-07

## 2022-10-10 ENCOUNTER — TELEPHONE (OUTPATIENT)
Dept: FAMILY MEDICINE CLINIC | Facility: CLINIC | Age: 52
End: 2022-10-10

## 2022-10-10 NOTE — TELEPHONE ENCOUNTER
I called and let the patient know that you had sent her a [facility] message. I explained that [facility] doesn't do c-paps and that we would have to find somewhere local that could do them. I called [facility] to see if they had any in stock or if the patient would be due for a new machine. They said they do not have any in stock and will not be getting any in for a while. They told me to reach out to [facility] in [place]. I called them and they said they are getting them in periodically and they do have a waiting list. I called to let the patient know this and she hung up on me. Do you want me to send her information to them?

## 2022-10-10 NOTE — TELEPHONE ENCOUNTER
Caller: Soheila Brewster    Relationship: Self    Best call back number:[phone]    Equipment requested: CPAP MACHINE    Reason for the request: STATED THAT HER PREVIOUS MACHINE STOPPED WORKING AND THAT SHE HAS BEEN WAITING FOR NEW ONE ALMOST 4 MONTHS NOW.    Prescribing Provider: FERNANDA

## 2022-11-07 DIAGNOSIS — Z79.4 TYPE 2 DIABETES MELLITUS WITH HYPERGLYCEMIA, WITH LONG-TERM CURRENT USE OF INSULIN: ICD-10-CM

## 2022-11-07 DIAGNOSIS — M51.27 PROTRUSION OF INTERVERTEBRAL DISC OF LUMBOSACRAL REGION: ICD-10-CM

## 2022-11-07 DIAGNOSIS — E11.65 TYPE 2 DIABETES MELLITUS WITH HYPERGLYCEMIA, WITH LONG-TERM CURRENT USE OF INSULIN: ICD-10-CM

## 2022-11-07 RX ORDER — GABAPENTIN 800 MG/1
800 TABLET ORAL 4 TIMES DAILY
Qty: 120 TABLET | Refills: 2 | Status: SHIPPED | OUTPATIENT
Start: 2022-11-07

## 2022-11-07 NOTE — TELEPHONE ENCOUNTER
Caller: [facility] - [address] [phone] [facility] [phone] FX    Relationship: Pharmacy    Best call back number:[phone]    Requested Prescriptions:   Requested Prescriptions     Pending Prescriptions Disp Refills   • gabapentin (NEURONTIN) 800 MG tablet 120 tablet 2     Sig: Take 1 tablet by mouth 4 (Four) Times a Day.        Pharmacy where request should be sent: [facility] - [address] [phone] [facility] [phone] FX     Additional details provided by patient: OLD PRESCRIPTION OF THIS MEDICATION NEEDS TO BE CANCELED AT HER OTHER PHARMACY AND RE SENT INTO THIS ONE    Does the patient have less than a 3 day supply:  [] Yes  [] No    Carine Rooney Rep   11/07/22 12:24 EST

## 2022-11-08 ENCOUNTER — TELEPHONE (OUTPATIENT)
Dept: FAMILY MEDICINE CLINIC | Facility: CLINIC | Age: 52
End: 2022-11-08

## 2022-11-08 ENCOUNTER — OFFICE VISIT (OUTPATIENT)
Dept: FAMILY MEDICINE CLINIC | Facility: CLINIC | Age: 52
End: 2022-11-08

## 2022-11-08 VITALS
OXYGEN SATURATION: 98 % | TEMPERATURE: 97.7 F | HEIGHT: 71 IN | DIASTOLIC BLOOD PRESSURE: 78 MMHG | BODY MASS INDEX: 38.22 KG/M2 | SYSTOLIC BLOOD PRESSURE: 154 MMHG | HEART RATE: 102 BPM | WEIGHT: 273 LBS

## 2022-11-08 DIAGNOSIS — R60.1 GENERALIZED EDEMA: ICD-10-CM

## 2022-11-08 DIAGNOSIS — R22.42 HIP MASS, LEFT: ICD-10-CM

## 2022-11-08 DIAGNOSIS — G47.33 OSA (OBSTRUCTIVE SLEEP APNEA): Primary | ICD-10-CM

## 2022-11-08 DIAGNOSIS — F17.200 CURRENT EVERY DAY SMOKER: ICD-10-CM

## 2022-11-08 DIAGNOSIS — G47.33 OSA (OBSTRUCTIVE SLEEP APNEA): ICD-10-CM

## 2022-11-08 DIAGNOSIS — Z96.41 INSULIN PUMP IN PLACE: ICD-10-CM

## 2022-11-08 DIAGNOSIS — E11.65 POORLY CONTROLLED DIABETES MELLITUS: ICD-10-CM

## 2022-11-08 DIAGNOSIS — Z76.89 ENCOUNTER TO ESTABLISH CARE: Primary | ICD-10-CM

## 2022-11-08 DIAGNOSIS — J44.9 CHRONIC OBSTRUCTIVE PULMONARY DISEASE, UNSPECIFIED COPD TYPE: ICD-10-CM

## 2022-11-08 DIAGNOSIS — E03.9 ACQUIRED HYPOTHYROIDISM: ICD-10-CM

## 2022-11-08 DIAGNOSIS — J44.9 CHRONIC OBSTRUCTIVE PULMONARY DISEASE, UNSPECIFIED COPD TYPE: Primary | ICD-10-CM

## 2022-11-08 DIAGNOSIS — E78.2 MIXED HYPERLIPIDEMIA: ICD-10-CM

## 2022-11-08 DIAGNOSIS — I10 ESSENTIAL HYPERTENSION: ICD-10-CM

## 2022-11-08 DIAGNOSIS — R00.2 PALPITATIONS: ICD-10-CM

## 2022-11-08 DIAGNOSIS — K21.9 GASTROESOPHAGEAL REFLUX DISEASE WITHOUT ESOPHAGITIS: ICD-10-CM

## 2022-11-08 DIAGNOSIS — Z79.4 TYPE 2 DIABETES MELLITUS WITH DIABETIC NEUROPATHY, WITH LONG-TERM CURRENT USE OF INSULIN: ICD-10-CM

## 2022-11-08 DIAGNOSIS — E11.65 TYPE 2 DIABETES MELLITUS WITH HYPERGLYCEMIA, WITH LONG-TERM CURRENT USE OF INSULIN: ICD-10-CM

## 2022-11-08 DIAGNOSIS — E11.40 TYPE 2 DIABETES MELLITUS WITH DIABETIC NEUROPATHY, WITH LONG-TERM CURRENT USE OF INSULIN: ICD-10-CM

## 2022-11-08 DIAGNOSIS — G89.29 OTHER CHRONIC PAIN: ICD-10-CM

## 2022-11-08 DIAGNOSIS — R06.02 SHORTNESS OF BREATH: ICD-10-CM

## 2022-11-08 DIAGNOSIS — Z79.4 TYPE 2 DIABETES MELLITUS WITH HYPERGLYCEMIA, WITH LONG-TERM CURRENT USE OF INSULIN: ICD-10-CM

## 2022-11-08 DIAGNOSIS — J30.1 CHRONIC SEASONAL ALLERGIC RHINITIS DUE TO POLLEN: ICD-10-CM

## 2022-11-08 PROCEDURE — 84443 ASSAY THYROID STIM HORMONE: CPT | Performed by: FAMILY MEDICINE

## 2022-11-08 PROCEDURE — 99214 OFFICE O/P EST MOD 30 MIN: CPT | Performed by: FAMILY MEDICINE

## 2022-11-08 PROCEDURE — 85027 COMPLETE CBC AUTOMATED: CPT | Performed by: FAMILY MEDICINE

## 2022-11-08 PROCEDURE — 80053 COMPREHEN METABOLIC PANEL: CPT | Performed by: FAMILY MEDICINE

## 2022-11-08 PROCEDURE — 83036 HEMOGLOBIN GLYCOSYLATED A1C: CPT | Performed by: FAMILY MEDICINE

## 2022-11-08 PROCEDURE — 80061 LIPID PANEL: CPT | Performed by: FAMILY MEDICINE

## 2022-11-08 RX ORDER — METHOCARBAMOL 500 MG/1
1000 TABLET, FILM COATED ORAL 3 TIMES DAILY PRN
Qty: 120 TABLET | Refills: 1 | Status: SHIPPED | OUTPATIENT
Start: 2022-11-08

## 2022-11-08 RX ORDER — ENALAPRIL MALEATE 5 MG/1
5 TABLET ORAL DAILY
Qty: 90 TABLET | Refills: 0 | Status: SHIPPED | OUTPATIENT
Start: 2022-11-08 | End: 2022-12-30 | Stop reason: SDUPTHER

## 2022-11-08 RX ORDER — ALBUTEROL SULFATE 1.25 MG/3ML
1 SOLUTION RESPIRATORY (INHALATION) EVERY 6 HOURS PRN
Qty: 150 ML | Refills: 4 | Status: SHIPPED | OUTPATIENT
Start: 2022-11-08 | End: 2022-11-08 | Stop reason: ALTCHOICE

## 2022-11-08 RX ORDER — METFORMIN HYDROCHLORIDE 500 MG/1
500 TABLET, EXTENDED RELEASE ORAL
Qty: 90 TABLET | Refills: 0 | Status: SHIPPED | OUTPATIENT
Start: 2022-11-08 | End: 2022-12-30 | Stop reason: SDUPTHER

## 2022-11-08 RX ORDER — BUMETANIDE 2 MG/1
2 TABLET ORAL 2 TIMES DAILY
Qty: 60 TABLET | Refills: 5 | Status: SHIPPED | OUTPATIENT
Start: 2022-11-08 | End: 2022-12-30 | Stop reason: SDUPTHER

## 2022-11-08 RX ORDER — MONTELUKAST SODIUM 10 MG/1
10 TABLET ORAL NIGHTLY
Qty: 90 TABLET | Refills: 0 | Status: SHIPPED | OUTPATIENT
Start: 2022-11-08 | End: 2022-12-30 | Stop reason: SDUPTHER

## 2022-11-08 RX ORDER — SITAGLIPTIN AND METFORMIN HYDROCHLORIDE 500; 50 MG/1; MG/1
1 TABLET, FILM COATED ORAL 2 TIMES DAILY WITH MEALS
Qty: 180 TABLET | Refills: 0 | Status: SHIPPED | OUTPATIENT
Start: 2022-11-08 | End: 2022-11-08 | Stop reason: ALTCHOICE

## 2022-11-08 RX ORDER — POTASSIUM CHLORIDE 750 MG/1
10 TABLET, FILM COATED, EXTENDED RELEASE ORAL 2 TIMES DAILY
Qty: 180 TABLET | Refills: 0 | Status: SHIPPED | OUTPATIENT
Start: 2022-11-08 | End: 2022-12-30 | Stop reason: SDUPTHER

## 2022-11-08 RX ORDER — EZETIMIBE 10 MG/1
10 TABLET ORAL DAILY
Qty: 90 TABLET | Refills: 0 | Status: SHIPPED | OUTPATIENT
Start: 2022-11-08 | End: 2022-12-30 | Stop reason: SDUPTHER

## 2022-11-08 RX ORDER — FAMOTIDINE 40 MG/1
40 TABLET, FILM COATED ORAL NIGHTLY
Qty: 90 TABLET | Refills: 0 | Status: SHIPPED | OUTPATIENT
Start: 2022-11-08 | End: 2022-12-30 | Stop reason: SDUPTHER

## 2022-11-08 RX ORDER — ALBUTEROL SULFATE 2.5 MG/3ML
2.5 SOLUTION RESPIRATORY (INHALATION) EVERY 4 HOURS PRN
Qty: 300 ML | Refills: 5 | Status: SHIPPED | OUTPATIENT
Start: 2022-11-08 | End: 2023-01-20 | Stop reason: ALTCHOICE

## 2022-11-08 RX ORDER — ALBUTEROL SULFATE 90 UG/1
2 AEROSOL, METERED RESPIRATORY (INHALATION) EVERY 4 HOURS PRN
Qty: 18 G | Refills: 5 | Status: SHIPPED | OUTPATIENT
Start: 2022-11-08 | End: 2023-02-13 | Stop reason: SDUPTHER

## 2022-11-08 RX ORDER — ROSUVASTATIN CALCIUM 40 MG/1
40 TABLET, COATED ORAL DAILY
Qty: 90 TABLET | Refills: 0 | Status: SHIPPED | OUTPATIENT
Start: 2022-11-08 | End: 2022-12-30 | Stop reason: SDUPTHER

## 2022-11-08 RX ORDER — LEVOTHYROXINE SODIUM 0.1 MG/1
100 TABLET ORAL DAILY
Qty: 90 TABLET | Refills: 0 | Status: SHIPPED | OUTPATIENT
Start: 2022-11-08 | End: 2022-12-30 | Stop reason: SDUPTHER

## 2022-11-08 NOTE — TELEPHONE ENCOUNTER
----- Message from JOAQUÍN Ross sent at 11/8/2022  8:45 AM EST -----  Order placed  ----- Message -----  From: Klarissa Carolina MA  Sent: 11/7/2022   4:14 PM EST  To: JOAQUÍN Ross    Waiting on sleep study from [facility] before cpap order can be completed

## 2022-11-08 NOTE — PROGRESS NOTES
Subjective   Soheila Brewster is a 52 y.o. female.   Pt presents today with CC of Diabetes      History of Present Illness   History of Present Illness  1.  Patient is a 52-year-old female here to establish care.  She was previously following with a nearby family medicine office.  They had a falling out  #2 she is a current everyday smoker with a history of asthma.  She has COPD, unspecified type.  She has never had a pulmonary function test to her knowledge.  #3 she has chronic pain.  She follows with pain clinic.  She request that we continue to provide Robaxin.  She takes 1000 mg up to 3 times a day as needed.  She states that she has previously been taking this up to 3 times daily.  She had an echocardiogram ordered about a year ago, she never went for it.  She has history of peripheral edema.  #4 she is poorly controlled diabetic with an insulin pump.  She reports that her insulin pump has not been helping adequately.  She also takes metformin, Januvia, Trulicity.  #5 she complains today of left hip pain.  There is a knot that is tender to palpation approximately 3 inches distal to her greater trochanter bursa.  #6 she has peripheral edema for which she takes Bumex 2 mg twice a day.  She is been stable on this regimen.  She also takes potassium 20 mill equivalent daily  #7 she reports palpitations occasionally when she wakes up at night(PND?),  Denies orthopnea.  Takes high-dose methocarbamol regularly.  #8 she has hypertension for which she takes Vasotec 5 mg daily.  Blood pressure is not well controlled today.  #9 she has hyperlipidemia associated with uncontrolled diabetes.  She takes Crestor and Zetia.  #10 she has GERD for which she takes both famotidine and Protonix.  She states that she requires both  #11 she has seasonal allergies for which he takes Xyzal and Singulair.  #12 she has hypothyroidism.  She takes 100 mcg daily.  #13 she complains of sore throat.  She would like evaluation of this.            The following portions of the patient's history were reviewed and updated as appropriate: allergies, current medications, past family history, past medical history, past social history, past surgical history and problem list.    Review of Systems   Constitutional: Negative for chills, fever and unexpected weight loss.   HENT: Positive for sore throat. Negative for congestion.    Eyes: Negative for blurred vision and visual disturbance.   Respiratory: Negative for cough and wheezing.    Cardiovascular: Negative for chest pain and palpitations.   Gastrointestinal: Negative for abdominal pain and diarrhea.   Endocrine: Negative for cold intolerance and heat intolerance.   Genitourinary: Negative for dysuria.   Musculoskeletal: Positive for arthralgias, back pain and gait problem. Negative for neck stiffness.   Neurological: Negative for dizziness, seizures and syncope.   Psychiatric/Behavioral: Negative for self-injury, suicidal ideas and depressed mood.       Objective   Physical Exam  Vitals and nursing note reviewed.   Constitutional:       Appearance: She is well-developed.   HENT:      Head: Normocephalic and atraumatic.      Right Ear: External ear normal.      Left Ear: External ear normal.      Ears:      Comments: Ears were normal.     Nose: Nose normal.      Mouth/Throat:      Pharynx: Oropharynx is clear. No oropharyngeal exudate or posterior oropharyngeal erythema.   Eyes:      Conjunctiva/sclera: Conjunctivae normal.      Pupils: Pupils are equal, round, and reactive to light.   Cardiovascular:      Rate and Rhythm: Normal rate and regular rhythm.      Heart sounds: Normal heart sounds.   Pulmonary:      Effort: Pulmonary effort is normal.      Breath sounds: Normal breath sounds.   Abdominal:      General: Bowel sounds are normal.      Palpations: Abdomen is soft.   Musculoskeletal:      Cervical back: Normal range of motion and neck supple.      Comments: Left soft tissue fullness in her left hip,  mildly tender.   Skin:     General: Skin is warm and dry.   Neurological:      Mental Status: She is alert and oriented to person, place, and time.   Psychiatric:         Behavior: Behavior normal.           Assessment & Plan   Diagnoses and all orders for this visit:    1. Encounter to establish care (Primary)  Here to establish care.  She is very medically complicated.  Will require multiple follow-ups, perhaps some specialist.  I am referring to endocrinology for now, sending for pulmonary function test and echocardiogram as she has not had these done yet.  She was encouraged to remain compliant with medical treatment.  Her medical history shows that there have been problems with compliance and keeping appointments in the past.  She is agreeable to be better.  2. Current every day smoker  -     Full Pulmonary Function Test With Bronchodilator; Future    3. Other chronic pain  -     methocarbamol (ROBAXIN) 500 MG tablet; Take 2 tablets by mouth 3 (Three) Times a Day As Needed for Muscle Spasms.  Dispense: 120 tablet; Refill: 1  Recommend she decrease the amount of methocarbamol that she takes regularly.  3000 mg daily is not optimal, particularly until we know what her echocardiogram looks like.  She is going to take it just as needed.  4. JUAN (obstructive sleep apnea)  -     Full Pulmonary Function Test With Bronchodilator; Future  She is working with local provider, [facility] to get CPAP.  When orders arrive, will send.  If she needs a new sleep study, it will be ordered.  5. Poorly controlled diabetes mellitus (HCC)  -     Ambulatory Referral to Endocrinology    6. Insulin pump in place  -     Ambulatory Referral to Endocrinology    7. Type 2 diabetes mellitus with hyperglycemia, with long-term current use of insulin (HCC)  -     Ambulatory Referral to Endocrinology  -     Comprehensive metabolic panel; Future  -     Hemoglobin A1c; Future  -     Lipid panel; Future  -     CBC No Differential;  Future  -     metFORMIN ER (GLUCOPHAGE-XR) 500 MG 24 hr tablet; Take 1 tablet by mouth Daily With Breakfast.  Dispense: 90 tablet; Refill: 0    8. Chronic obstructive pulmonary disease, unspecified COPD type (HCC)  -     albuterol (ACCUNEB) 1.25 MG/3ML nebulizer solution; Take 3 mL by nebulization Every 6 (Six) Hours As Needed for Wheezing.  Dispense: 150 mL; Refill: 4  -     albuterol sulfate HFA (Ventolin HFA) 108 (90 Base) MCG/ACT inhaler; Inhale 2 puffs Every 4 (Four) Hours As Needed for Wheezing.  Dispense: 18 g; Refill: 5  -     Fluticasone-Umeclidin-Vilant (Trelegy Ellipta) 100-62.5-25 MCG/ACT inhaler; Inhale 1 puff Daily.  Dispense: 60 each; Refill: 3    9. Generalized edema  -     bumetanide (BUMEX) 2 MG tablet; Take 1 tablet by mouth 2 (Two) Times a Day.  Dispense: 60 tablet; Refill: 5  -     Adult Transthoracic Echo Complete W/ Cont if Necessary Per Protocol; Future  -     potassium chloride 10 MEQ CR tablet; Take 1 tablet by mouth 2 (Two) Times a Day.  Dispense: 180 tablet; Refill: 0    10. Palpitations  -     Adult Transthoracic Echo Complete W/ Cont if Necessary Per Protocol; Future    11. Essential hypertension  -     enalapril (Vasotec) 5 MG tablet; Take 1 tablet by mouth Daily.  Dispense: 90 tablet; Refill: 0    12. Mixed hyperlipidemia  -     ezetimibe (ZETIA) 10 MG tablet; Take 1 tablet by mouth Daily.  Dispense: 90 tablet; Refill: 0  -     rosuvastatin (CRESTOR) 40 MG tablet; Take 1 tablet by mouth Daily.  Dispense: 90 tablet; Refill: 0    13. Gastroesophageal reflux disease without esophagitis  -     famotidine (PEPCID) 40 MG tablet; Take 1 tablet by mouth Every Night.  Dispense: 90 tablet; Refill: 0    14. Chronic seasonal allergic rhinitis due to pollen  -     montelukast (SINGULAIR) 10 MG tablet; Take 1 tablet by mouth Every Night.  Dispense: 90 tablet; Refill: 0  Is my opinion that her sore throat is due to seasonal allergies.  Continue Singulair, Xyzal, steroids are not an option.  15. Type  2 diabetes mellitus with diabetic neuropathy, with long-term current use of insulin (HCC)  Discontinue Januvia as it has questionable benefit when taken with Trulicity.  16. Shortness of breath  -     Full Pulmonary Function Test With Bronchodilator; Future    17. Acquired hypothyroidism  -     levothyroxine (SYNTHROID, LEVOTHROID) 100 MCG tablet; Take 1 tablet by mouth Daily.  Dispense: 90 tablet; Refill: 0  -     Comprehensive metabolic panel; Future  -     TSH; Future  -     CBC No Differential; Future    18. Hip mass, left  We will recheck at follow-up in 2 weeks soft tissue injury suspected.  If persistent, will consider imaging.               Soheila Brewster  reports that she has been smoking cigarettes. She has a 15.00 pack-year smoking history. She has never used smokeless tobacco.. I have educated her on the risk of diseases from using tobacco products such as cancer, COPD and heart disease.     I advised her to quit and she is not willing to quit.    I spent 3  minutes counseling the patient.         Class 2 Severe Obesity (BMI >=35 and <=39.9). Obesity-related health conditions include the following: obstructive sleep apnea, hypertension, diabetes mellitus, dyslipidemias, GERD, osteoarthritis and lower extremity venous stasis disease. Obesity is unchanged. BMI is is above average; BMI management plan is completed. We discussed portion control and increasing exercise.

## 2022-11-08 NOTE — TELEPHONE ENCOUNTER
Pharmacy Name:      [facility]    Pharmacy representative name:    EHSAN    Pharmacy representative phone number:     [phone]    What medication are you calling in regards to:     albuterol (ACCUNEB) 1.25 MG/3ML nebulizer solution    What question does the pharmacy have:     PHARMACY STATED THEY ARE HAVING DIFFICULTY FINDING THE 1.25 PER 3 ML OF SOLUTION INCLUDED ABOVE    PHARMACY REQUESTED A NEW PRESCRIPTION AS CONFIRMATION FROM DR ONTIVEROS TO GO WITH A 2.5 PER 3 ML WITH DIRECTION FOR PATIENT TO USE 1/2 OF THE VIAL    Who is the provider that prescribed the medication:    DR ONTIVEROS

## 2022-11-09 ENCOUNTER — TELEPHONE (OUTPATIENT)
Dept: FAMILY MEDICINE CLINIC | Facility: CLINIC | Age: 52
End: 2022-11-09

## 2022-11-09 LAB
ALBUMIN SERPL-MCNC: 3.9 G/DL (ref 3.5–5.2)
ALBUMIN/GLOB SERPL: 1.3 G/DL
ALP SERPL-CCNC: 62 U/L (ref 39–117)
ALT SERPL W P-5'-P-CCNC: 12 U/L (ref 1–33)
ANION GAP SERPL CALCULATED.3IONS-SCNC: 7.7 MMOL/L (ref 5–15)
AST SERPL-CCNC: 14 U/L (ref 1–32)
BILIRUB SERPL-MCNC: <0.2 MG/DL (ref 0–1.2)
BUN SERPL-MCNC: 12 MG/DL (ref 6–20)
BUN/CREAT SERPL: 16.2 (ref 7–25)
CALCIUM SPEC-SCNC: 9.6 MG/DL (ref 8.6–10.5)
CHLORIDE SERPL-SCNC: 104 MMOL/L (ref 98–107)
CHOLEST SERPL-MCNC: 117 MG/DL (ref 0–200)
CO2 SERPL-SCNC: 26.3 MMOL/L (ref 22–29)
CREAT SERPL-MCNC: 0.74 MG/DL (ref 0.57–1)
DEPRECATED RDW RBC AUTO: 41.8 FL (ref 37–54)
EGFRCR SERPLBLD CKD-EPI 2021: 98.1 ML/MIN/1.73
ERYTHROCYTE [DISTWIDTH] IN BLOOD BY AUTOMATED COUNT: 14.8 % (ref 12.3–15.4)
GLOBULIN UR ELPH-MCNC: 3 GM/DL
GLUCOSE SERPL-MCNC: 156 MG/DL (ref 65–99)
HBA1C MFR BLD: 8.3 % (ref 4.8–5.6)
HCT VFR BLD AUTO: 37.2 % (ref 34–46.6)
HDLC SERPL-MCNC: 43 MG/DL (ref 40–60)
HGB BLD-MCNC: 12.5 G/DL (ref 12–15.9)
LDLC SERPL CALC-MCNC: 38 MG/DL (ref 0–100)
LDLC/HDLC SERPL: 0.66 {RATIO}
MCH RBC QN AUTO: 26.1 PG (ref 26.6–33)
MCHC RBC AUTO-ENTMCNC: 33.6 G/DL (ref 31.5–35.7)
MCV RBC AUTO: 77.7 FL (ref 79–97)
PLATELET # BLD AUTO: 205 10*3/MM3 (ref 140–450)
PMV BLD AUTO: 12.3 FL (ref 6–12)
POTASSIUM SERPL-SCNC: 4.4 MMOL/L (ref 3.5–5.2)
PROT SERPL-MCNC: 6.9 G/DL (ref 6–8.5)
RBC # BLD AUTO: 4.79 10*6/MM3 (ref 3.77–5.28)
SODIUM SERPL-SCNC: 138 MMOL/L (ref 136–145)
TRIGL SERPL-MCNC: 228 MG/DL (ref 0–150)
TSH SERPL DL<=0.05 MIU/L-ACNC: 1.45 UIU/ML (ref 0.27–4.2)
VLDLC SERPL-MCNC: 36 MG/DL (ref 5–40)
WBC NRBC COR # BLD: 11.9 10*3/MM3 (ref 3.4–10.8)

## 2022-11-09 NOTE — TELEPHONE ENCOUNTER
----- Message from Walt Tyler DO sent at 11/9/2022  1:31 PM EST -----  Your hemoglobin A1c is still 8.3.  Actually little worse than 2 months ago.  We will look into adjusting your medications in a couple weeks at your follow-up with me, you had your first appointment with endocrinology in February.        Left a message to return call.    Patient notified & verbalized understanding.

## 2022-11-18 ENCOUNTER — HOSPITAL ENCOUNTER (OUTPATIENT)
Dept: RESPIRATORY THERAPY | Facility: HOSPITAL | Age: 52
Discharge: HOME | End: 2022-11-18

## 2022-11-18 ENCOUNTER — HOSPITAL ENCOUNTER (OUTPATIENT)
Dept: CARDIOLOGY | Facility: HOSPITAL | Age: 52
Discharge: HOME | End: 2022-11-18

## 2022-11-18 VITALS — OXYGEN SATURATION: 96 % | HEART RATE: 88 BPM | RESPIRATION RATE: 18 BRPM

## 2022-11-18 DIAGNOSIS — F17.200 CURRENT EVERY DAY SMOKER: ICD-10-CM

## 2022-11-18 DIAGNOSIS — G47.33 OSA (OBSTRUCTIVE SLEEP APNEA): ICD-10-CM

## 2022-11-18 DIAGNOSIS — R06.02 SHORTNESS OF BREATH: ICD-10-CM

## 2022-11-18 DIAGNOSIS — R00.2 PALPITATIONS: ICD-10-CM

## 2022-11-18 DIAGNOSIS — R60.1 GENERALIZED EDEMA: ICD-10-CM

## 2022-11-18 LAB
BH CV ECHO MEAS - ACS: 2.1 CM
BH CV ECHO MEAS - AO MAX PG: 9.1 MMHG
BH CV ECHO MEAS - AO MEAN PG: 4 MMHG
BH CV ECHO MEAS - AO ROOT DIAM: 3.3 CM
BH CV ECHO MEAS - AO V2 MAX: 151 CM/SEC
BH CV ECHO MEAS - AO V2 VTI: 30.6 CM
BH CV ECHO MEAS - EDV(CUBED): 148.9 ML
BH CV ECHO MEAS - EDV(MOD-SP4): 59.4 ML
BH CV ECHO MEAS - EF(MOD-BP): 73 %
BH CV ECHO MEAS - EF(MOD-SP4): 73.6 %
BH CV ECHO MEAS - ESV(CUBED): 59.3 ML
BH CV ECHO MEAS - ESV(MOD-SP4): 15.7 ML
BH CV ECHO MEAS - FS: 26.4 %
BH CV ECHO MEAS - IVS/LVPW: 0.9 CM
BH CV ECHO MEAS - IVSD: 0.9 CM
BH CV ECHO MEAS - LA DIMENSION: 2.2 CM
BH CV ECHO MEAS - LAT PEAK E' VEL: 8.8 CM/SEC
BH CV ECHO MEAS - LV DIASTOLIC VOL/BSA (35-75): 24.9 CM2
BH CV ECHO MEAS - LV MASS(C)D: 187.3 GRAMS
BH CV ECHO MEAS - LV SYSTOLIC VOL/BSA (12-30): 6.6 CM2
BH CV ECHO MEAS - LVIDD: 5.3 CM
BH CV ECHO MEAS - LVIDS: 3.9 CM
BH CV ECHO MEAS - LVOT AREA: 3.1 CM2
BH CV ECHO MEAS - LVOT DIAM: 2 CM
BH CV ECHO MEAS - LVPWD: 1 CM
BH CV ECHO MEAS - MED PEAK E' VEL: 7.1 CM/SEC
BH CV ECHO MEAS - MV A MAX VEL: 104 CM/SEC
BH CV ECHO MEAS - MV E MAX VEL: 75.1 CM/SEC
BH CV ECHO MEAS - MV E/A: 0.72
BH CV ECHO MEAS - PA ACC TIME: 0.1 SEC
BH CV ECHO MEAS - PA PR(ACCEL): 36.3 MMHG
BH CV ECHO MEAS - SI(MOD-SP4): 18.3 ML/M2
BH CV ECHO MEAS - SV(MOD-SP4): 43.7 ML
BH CV ECHO MEAS - TAPSE (>1.6): 2.5 CM
BH CV ECHO MEASUREMENTS AVERAGE E/E' RATIO: 9.45
LEFT ATRIUM VOLUME INDEX: 14.7 ML/M2
MAXIMAL PREDICTED HEART RATE: 169 BPM
STRESS TARGET HR: 144 BPM

## 2022-11-18 PROCEDURE — 93306 TTE W/DOPPLER COMPLETE: CPT | Performed by: INTERNAL MEDICINE

## 2022-11-18 PROCEDURE — 94060 EVALUATION OF WHEEZING: CPT

## 2022-11-18 PROCEDURE — 94640 AIRWAY INHALATION TREATMENT: CPT

## 2022-11-18 PROCEDURE — 93306 TTE W/DOPPLER COMPLETE: CPT

## 2022-11-18 PROCEDURE — 94060 EVALUATION OF WHEEZING: CPT | Performed by: INTERNAL MEDICINE

## 2022-11-18 PROCEDURE — 94799 UNLISTED PULMONARY SVC/PX: CPT

## 2022-11-18 PROCEDURE — 94726 PLETHYSMOGRAPHY LUNG VOLUMES: CPT

## 2022-11-18 PROCEDURE — 94760 N-INVAS EAR/PLS OXIMETRY 1: CPT

## 2022-11-18 PROCEDURE — 94726 PLETHYSMOGRAPHY LUNG VOLUMES: CPT | Performed by: INTERNAL MEDICINE

## 2022-11-18 RX ORDER — ALBUTEROL SULFATE 2.5 MG/3ML
2.5 SOLUTION RESPIRATORY (INHALATION) ONCE
Status: COMPLETED | OUTPATIENT
Start: 2022-11-18 | End: 2022-11-18

## 2022-11-18 RX ADMIN — ALBUTEROL SULFATE 2.5 MG: 2.5 SOLUTION RESPIRATORY (INHALATION) at 13:32

## 2022-11-23 ENCOUNTER — TELEPHONE (OUTPATIENT)
Dept: FAMILY MEDICINE CLINIC | Facility: CLINIC | Age: 52
End: 2022-11-23

## 2022-11-23 NOTE — TELEPHONE ENCOUNTER
----- Message from Walt Tyler DO sent at 11/23/2022 11:14 AM EST -----  COPD proven on PFT.  Will discuss with her at her follow-up appointment

## 2022-11-23 NOTE — TELEPHONE ENCOUNTER
----- Message from Walt Tyler DO sent at 11/23/2022 11:15 AM EST -----  No major problems on echocardiogram.  Will discuss with her at her follow-up

## 2022-11-29 ENCOUNTER — OFFICE VISIT (OUTPATIENT)
Dept: FAMILY MEDICINE CLINIC | Facility: CLINIC | Age: 52
End: 2022-11-29

## 2022-11-29 VITALS
BODY MASS INDEX: 37.8 KG/M2 | OXYGEN SATURATION: 98 % | WEIGHT: 270 LBS | DIASTOLIC BLOOD PRESSURE: 80 MMHG | HEIGHT: 71 IN | TEMPERATURE: 97.5 F | SYSTOLIC BLOOD PRESSURE: 138 MMHG | HEART RATE: 98 BPM

## 2022-11-29 DIAGNOSIS — R22.42 LEG MASS, LEFT: ICD-10-CM

## 2022-11-29 DIAGNOSIS — M25.552 LEFT HIP PAIN: Primary | ICD-10-CM

## 2022-11-29 DIAGNOSIS — E78.2 MIXED HYPERLIPIDEMIA: ICD-10-CM

## 2022-11-29 DIAGNOSIS — R22.42 MASS OF THIGH, LEFT: ICD-10-CM

## 2022-11-29 DIAGNOSIS — J44.9 CHRONIC OBSTRUCTIVE PULMONARY DISEASE, UNSPECIFIED COPD TYPE: ICD-10-CM

## 2022-11-29 DIAGNOSIS — M25.552 LEFT HIP PAIN: ICD-10-CM

## 2022-11-29 DIAGNOSIS — Z23 NEED FOR INFLUENZA VACCINATION: Primary | ICD-10-CM

## 2022-11-29 DIAGNOSIS — Z79.4 TYPE 2 DIABETES MELLITUS WITH DIABETIC NEUROPATHY, WITH LONG-TERM CURRENT USE OF INSULIN: ICD-10-CM

## 2022-11-29 DIAGNOSIS — E11.40 TYPE 2 DIABETES MELLITUS WITH DIABETIC NEUROPATHY, WITH LONG-TERM CURRENT USE OF INSULIN: ICD-10-CM

## 2022-11-29 PROCEDURE — 99214 OFFICE O/P EST MOD 30 MIN: CPT | Performed by: FAMILY MEDICINE

## 2022-11-29 PROCEDURE — G0008 ADMIN INFLUENZA VIRUS VAC: HCPCS | Performed by: FAMILY MEDICINE

## 2022-11-29 PROCEDURE — 90688 IIV4 VACCINE SPLT 0.5 ML IM: CPT | Performed by: FAMILY MEDICINE

## 2022-11-29 RX ORDER — DULAGLUTIDE 3 MG/.5ML
3 INJECTION, SOLUTION SUBCUTANEOUS WEEKLY
Qty: 9 ML | Refills: 0 | Status: SHIPPED | OUTPATIENT
Start: 2022-11-29 | End: 2023-02-06 | Stop reason: SDUPTHER

## 2022-11-29 NOTE — PROGRESS NOTES
Subjective   Soheila Brewster is a 52 y.o. female.   Pt presents today with CC of Diabetes      History of Present Illness   History of Present Illness  1.  Patient is a 52-year-old female who is agreeable to flu shot today.  She would like to have this done before she leaves.  #2 she has COPD.  She was started on Trelegy and is doing well on this regimen.  She also uses albuterol.  She had a PFT confirming moderate COPD without significant response to bronchodilator.  #3 she has type 2 diabetes.  It is historically poorly controlled.  She has an insulin pump and has been stable on her current regimen.  She has a referral to endocrinology coming.  She has been taking Trulicity 1.5 weekly.  She is agreeable to an increase in her dose.  Of note, we stopped Januvia in the past because of similar mechanism of action  #4 she has a mass lateral to her left femur, it is compressible, but somewhat firm.  More firm than the lipoma I think.  It does seem to be part of the soft tissue.  It is not hard like bone.           The following portions of the patient's history were reviewed and updated as appropriate: allergies, current medications, past family history, past medical history, past social history, past surgical history and problem list.    Review of Systems   Constitutional: Negative for chills, fever and unexpected weight loss.   HENT: Negative for congestion and sore throat.    Eyes: Negative for blurred vision and visual disturbance.   Respiratory: Negative for cough and wheezing.    Cardiovascular: Negative for chest pain and palpitations.   Gastrointestinal: Negative for abdominal pain and diarrhea.   Endocrine: Negative for cold intolerance and heat intolerance.   Genitourinary: Negative for dysuria.   Musculoskeletal: Negative for arthralgias and neck stiffness.   Neurological: Negative for dizziness, seizures and syncope.   Psychiatric/Behavioral: Negative for self-injury, suicidal ideas and depressed mood.        Objective   Physical Exam  Vitals and nursing note reviewed.   Constitutional:       Appearance: She is well-developed.   HENT:      Head: Normocephalic and atraumatic.      Right Ear: External ear normal.      Left Ear: External ear normal.      Nose: Nose normal.   Eyes:      Conjunctiva/sclera: Conjunctivae normal.      Pupils: Pupils are equal, round, and reactive to light.   Cardiovascular:      Rate and Rhythm: Normal rate and regular rhythm.      Heart sounds: Normal heart sounds.   Pulmonary:      Effort: Pulmonary effort is normal.      Breath sounds: Normal breath sounds.   Abdominal:      General: Bowel sounds are normal.      Palpations: Abdomen is soft.   Musculoskeletal:      Cervical back: Normal range of motion and neck supple.      Comments: On her proximal femur, laterally, there is a softball sized mass.  Nontender, relatively soft.  Does not seem to be adhered to the bone, unclear if it is part of the muscle.  Lipoma not strongly suspected.   Skin:     General: Skin is warm and dry.   Neurological:      Mental Status: She is alert and oriented to person, place, and time.   Psychiatric:         Behavior: Behavior normal.           Assessment & Plan   Diagnoses and all orders for this visit:    1. Need for influenza vaccination (Primary)  Flu shot given  2. Chronic obstructive pulmonary disease, unspecified COPD type ([facility])  -     Fluticasone-Umeclidin-Vilant (Trelegy Ellipta) 100-62.5-25 MCG/ACT inhaler; Inhale 1 puff Daily.  Dispense: 60 each; Refill: 3    3. Type 2 diabetes mellitus with diabetic neuropathy, with long-term current use of insulin ([facility])  Comments:  Continue gabapentin as directed.  She is going to have pain management continue gabapentin if possible.  4. Mixed hyperlipidemia    5. Left hip pain  -     XR Femur 2 View Left (In Office)    6. Leg mass, left  -     XR Femur 2 View Left (In Office)  It is a soft tissue mass.  We will get an x-ray, if x-ray is normal would  recommend MRI of her leg.  Other orders  -     Dulaglutide (Trulicity) 3 MG/0.5ML solution pen-injector; Inject 0.5 mL under the skin into the appropriate area as directed 1 (One) Time Per Week.  Dispense: 9 mL; Refill: 0  -     Fluzone Quad >6 mos (Multi-dose) ([id number])    Increased Trulicity from 1.5 up to 3 mg.  We discussed the side effects of this medication.  She is going to follow-up in a few weeks.           Soheila Brewster  reports that she has been smoking cigarettes. She has a 15.00 pack-year smoking history. She has never used smokeless tobacco.. I have educated her on the risk of diseases from using tobacco products such as cancer, COPD and heart disease.     I advised her to quit and she is not willing to quit.    I spent 3  minutes counseling the patient.         Class 2 Severe Obesity (BMI >=35 and <=39.9). Obesity-related health conditions include the following: hypertension, coronary heart disease, diabetes mellitus, dyslipidemias, GERD and osteoarthritis. Obesity is unchanged. BMI is is above average; BMI management plan is completed. We discussed portion control and increasing exercise.

## 2022-11-30 ENCOUNTER — TELEPHONE (OUTPATIENT)
Dept: FAMILY MEDICINE CLINIC | Facility: CLINIC | Age: 52
End: 2022-11-30

## 2022-11-30 NOTE — TELEPHONE ENCOUNTER
----- Message from Walt Tyler DO sent at 11/29/2022  6:07 PM EST -----  X-ray was normal.  MRI ordered        Patient notified & verbalized understanding.

## 2022-12-06 ENCOUNTER — TELEPHONE (OUTPATIENT)
Dept: FAMILY MEDICINE CLINIC | Facility: CLINIC | Age: 52
End: 2022-12-06

## 2022-12-07 DIAGNOSIS — M25.552 LEFT HIP PAIN: Primary | ICD-10-CM

## 2022-12-07 DIAGNOSIS — R22.42 LEG MASS, LEFT: ICD-10-CM

## 2022-12-07 DIAGNOSIS — R22.42 MASS OF THIGH, LEFT: ICD-10-CM

## 2022-12-12 ENCOUNTER — LAB (OUTPATIENT)
Dept: FAMILY MEDICINE CLINIC | Facility: CLINIC | Age: 52
End: 2022-12-12

## 2022-12-12 DIAGNOSIS — M25.552 LEFT HIP PAIN: ICD-10-CM

## 2022-12-12 DIAGNOSIS — R22.42 LEG MASS, LEFT: ICD-10-CM

## 2022-12-12 DIAGNOSIS — R22.42 MASS OF THIGH, LEFT: ICD-10-CM

## 2022-12-12 PROCEDURE — 80053 COMPREHEN METABOLIC PANEL: CPT | Performed by: FAMILY MEDICINE

## 2022-12-13 LAB
ALBUMIN SERPL-MCNC: 4 G/DL (ref 3.5–5.2)
ALBUMIN/GLOB SERPL: 1.4 G/DL
ALP SERPL-CCNC: 71 U/L (ref 39–117)
ALT SERPL W P-5'-P-CCNC: 15 U/L (ref 1–33)
ANION GAP SERPL CALCULATED.3IONS-SCNC: 9.1 MMOL/L (ref 5–15)
AST SERPL-CCNC: 12 U/L (ref 1–32)
BILIRUB SERPL-MCNC: 0.3 MG/DL (ref 0–1.2)
BUN SERPL-MCNC: 11 MG/DL (ref 6–20)
BUN/CREAT SERPL: 15.1 (ref 7–25)
CALCIUM SPEC-SCNC: 9.8 MG/DL (ref 8.6–10.5)
CHLORIDE SERPL-SCNC: 100 MMOL/L (ref 98–107)
CO2 SERPL-SCNC: 26.9 MMOL/L (ref 22–29)
CREAT SERPL-MCNC: 0.73 MG/DL (ref 0.57–1)
EGFRCR SERPLBLD CKD-EPI 2021: 99.7 ML/MIN/1.73
GLOBULIN UR ELPH-MCNC: 2.8 GM/DL
GLUCOSE SERPL-MCNC: 243 MG/DL (ref 65–99)
POTASSIUM SERPL-SCNC: 4.5 MMOL/L (ref 3.5–5.2)
PROT SERPL-MCNC: 6.8 G/DL (ref 6–8.5)
SODIUM SERPL-SCNC: 136 MMOL/L (ref 136–145)

## 2022-12-22 ENCOUNTER — TELEPHONE (OUTPATIENT)
Dept: FAMILY MEDICINE CLINIC | Facility: CLINIC | Age: 52
End: 2022-12-22

## 2022-12-22 NOTE — TELEPHONE ENCOUNTER
----- Message from Walt Tyler, DO sent at 12/22/2022  1:09 PM EST -----  Please call and let her know that there are no major concerns on her MRI.  We can discuss at follow-up a plan of care.        Patient notified & verbalized understanding.

## 2022-12-30 ENCOUNTER — OFFICE VISIT (OUTPATIENT)
Dept: FAMILY MEDICINE CLINIC | Facility: CLINIC | Age: 52
End: 2022-12-30

## 2022-12-30 VITALS
DIASTOLIC BLOOD PRESSURE: 88 MMHG | WEIGHT: 270.4 LBS | HEIGHT: 71 IN | SYSTOLIC BLOOD PRESSURE: 142 MMHG | HEART RATE: 94 BPM | BODY MASS INDEX: 37.85 KG/M2 | OXYGEN SATURATION: 98 % | TEMPERATURE: 97.8 F

## 2022-12-30 DIAGNOSIS — E03.9 ACQUIRED HYPOTHYROIDISM: ICD-10-CM

## 2022-12-30 DIAGNOSIS — Z79.899 LONG-TERM USE OF HIGH-RISK MEDICATION: ICD-10-CM

## 2022-12-30 DIAGNOSIS — M70.62 TROCHANTERIC BURSITIS OF LEFT HIP: ICD-10-CM

## 2022-12-30 DIAGNOSIS — E11.65 TYPE 2 DIABETES MELLITUS WITH HYPERGLYCEMIA, WITH LONG-TERM CURRENT USE OF INSULIN: ICD-10-CM

## 2022-12-30 DIAGNOSIS — K21.9 GASTROESOPHAGEAL REFLUX DISEASE WITHOUT ESOPHAGITIS: ICD-10-CM

## 2022-12-30 DIAGNOSIS — M25.552 LEFT HIP PAIN: ICD-10-CM

## 2022-12-30 DIAGNOSIS — Z79.4 TYPE 2 DIABETES MELLITUS WITH HYPERGLYCEMIA, WITH LONG-TERM CURRENT USE OF INSULIN: ICD-10-CM

## 2022-12-30 DIAGNOSIS — R10.13 EPIGASTRIC PAIN: ICD-10-CM

## 2022-12-30 DIAGNOSIS — I10 ESSENTIAL HYPERTENSION: ICD-10-CM

## 2022-12-30 DIAGNOSIS — R60.1 GENERALIZED EDEMA: ICD-10-CM

## 2022-12-30 DIAGNOSIS — G89.29 OTHER CHRONIC PAIN: ICD-10-CM

## 2022-12-30 DIAGNOSIS — E78.2 MIXED HYPERLIPIDEMIA: ICD-10-CM

## 2022-12-30 DIAGNOSIS — M54.2 NECK PAIN ON LEFT SIDE: ICD-10-CM

## 2022-12-30 DIAGNOSIS — J30.1 CHRONIC SEASONAL ALLERGIC RHINITIS DUE TO POLLEN: ICD-10-CM

## 2022-12-30 DIAGNOSIS — R19.5 DARK STOOLS: Primary | ICD-10-CM

## 2022-12-30 PROCEDURE — 99214 OFFICE O/P EST MOD 30 MIN: CPT | Performed by: FAMILY MEDICINE

## 2022-12-30 PROCEDURE — 85018 HEMOGLOBIN: CPT | Performed by: FAMILY MEDICINE

## 2022-12-30 PROCEDURE — 85014 HEMATOCRIT: CPT | Performed by: FAMILY MEDICINE

## 2022-12-30 RX ORDER — FAMOTIDINE 40 MG/1
40 TABLET, FILM COATED ORAL NIGHTLY
Qty: 90 TABLET | Refills: 0 | Status: SHIPPED | OUTPATIENT
Start: 2022-12-30

## 2022-12-30 RX ORDER — METFORMIN HYDROCHLORIDE 500 MG/1
500 TABLET, EXTENDED RELEASE ORAL
Qty: 90 TABLET | Refills: 0 | Status: SHIPPED | OUTPATIENT
Start: 2022-12-30 | End: 2023-02-15 | Stop reason: SDUPTHER

## 2022-12-30 RX ORDER — METHYLNALTREXONE BROMIDE 150 MG/1
TABLET ORAL
COMMUNITY
Start: 2022-12-14

## 2022-12-30 RX ORDER — POTASSIUM CHLORIDE 750 MG/1
10 TABLET, FILM COATED, EXTENDED RELEASE ORAL 2 TIMES DAILY
Qty: 180 TABLET | Refills: 0 | Status: SHIPPED | OUTPATIENT
Start: 2022-12-30

## 2022-12-30 RX ORDER — MONTELUKAST SODIUM 10 MG/1
10 TABLET ORAL NIGHTLY
Qty: 90 TABLET | Refills: 0 | Status: SHIPPED | OUTPATIENT
Start: 2022-12-30

## 2022-12-30 RX ORDER — ROSUVASTATIN CALCIUM 40 MG/1
40 TABLET, COATED ORAL DAILY
Qty: 90 TABLET | Refills: 0 | Status: SHIPPED | OUTPATIENT
Start: 2022-12-30

## 2022-12-30 RX ORDER — LEVOCETIRIZINE DIHYDROCHLORIDE 5 MG/1
5 TABLET, FILM COATED ORAL EVERY EVENING
Qty: 30 TABLET | Refills: 5 | Status: SHIPPED | OUTPATIENT
Start: 2022-12-30

## 2022-12-30 RX ORDER — LEVOTHYROXINE SODIUM 0.1 MG/1
100 TABLET ORAL DAILY
Qty: 90 TABLET | Refills: 0 | Status: SHIPPED | OUTPATIENT
Start: 2022-12-30

## 2022-12-30 RX ORDER — ENALAPRIL MALEATE 5 MG/1
5 TABLET ORAL DAILY
Qty: 90 TABLET | Refills: 0 | Status: SHIPPED | OUTPATIENT
Start: 2022-12-30 | End: 2023-02-13 | Stop reason: SDUPTHER

## 2022-12-30 RX ORDER — EZETIMIBE 10 MG/1
10 TABLET ORAL DAILY
Qty: 90 TABLET | Refills: 0 | Status: SHIPPED | OUTPATIENT
Start: 2022-12-30

## 2022-12-30 RX ORDER — BUMETANIDE 2 MG/1
2 TABLET ORAL 2 TIMES DAILY
Qty: 180 TABLET | Refills: 0 | Status: SHIPPED | OUTPATIENT
Start: 2022-12-30 | End: 2023-02-13 | Stop reason: SDUPTHER

## 2022-12-30 NOTE — PROGRESS NOTES
Subjective   Soheila Brewster is a 52 y.o. female.   Pt presents today with CC of Mass (Left leg mass follow up )      History of Present Illness   History of Present Illness  1.  Patient is a 52-year-old female here to follow-up on left hip pain and leg mass.  MRI showed tendinitis and bursitis, no mass.  She reports that it still hurts, but may be getting a little better.  #2 she reports that about 3 weeks ago she had a couple days of dark stools associated with nausea.  This has stopped and she is feeling better.  She is agreeable to blood work.  Of note, she was on dual therapy with both H2 blocker and PPI.  PPI was discontinued around that time.  She is still not taking Protonix, and is doing better.  She denies frequent NSAID use.  #3 she has hypertension for which she takes enalapril 5 mg daily.  #4 she has mixed hyperlipidemia for which she takes Zetia and Crestor  #5 she has hypothyroidism.  Her last TSH was normal.  She takes 100 mcg daily.  #6 she has type 2 diabetes.  She follows with endocrinology.  #7 she complains of left-sided neck pain.  This is been a chronic problem.  She follows with pain management, patient report is that she has been diagnosed with a muscle spasm.  She is interested in manipulation.           The following portions of the patient's history were reviewed and updated as appropriate: allergies, current medications, past family history, past medical history, past social history, past surgical history and problem list.    Review of Systems   Constitutional: Negative for chills, fever and unexpected weight loss.   HENT: Negative for congestion and sore throat.    Eyes: Negative for blurred vision and visual disturbance.   Respiratory: Negative for cough and wheezing.    Cardiovascular: Negative for chest pain and palpitations.   Gastrointestinal: Negative for abdominal pain and diarrhea.   Endocrine: Negative for cold intolerance and heat intolerance.   Genitourinary: Negative for  dysuria.   Musculoskeletal: Negative for arthralgias and neck stiffness.   Neurological: Negative for dizziness, seizures and syncope.   Psychiatric/Behavioral: Negative for self-injury, suicidal ideas and depressed mood.       Objective   Physical Exam  Vitals and nursing note reviewed.   Constitutional:       Appearance: She is well-developed.   HENT:      Head: Normocephalic and atraumatic.      Right Ear: External ear normal.      Left Ear: External ear normal.      Nose: Nose normal.   Eyes:      Conjunctiva/sclera: Conjunctivae normal.      Pupils: Pupils are equal, round, and reactive to light.   Cardiovascular:      Rate and Rhythm: Normal rate and regular rhythm.      Heart sounds: Normal heart sounds.   Pulmonary:      Effort: Pulmonary effort is normal.      Breath sounds: Normal breath sounds.   Abdominal:      General: Bowel sounds are normal.      Palpations: Abdomen is soft.   Musculoskeletal:      Cervical back: Normal range of motion and neck supple.      Comments: Left scalene spasm,   Skin:     General: Skin is warm and dry.   Neurological:      Mental Status: She is alert and oriented to person, place, and time.   Psychiatric:         Behavior: Behavior normal.           Assessment & Plan   Diagnoses and all orders for this visit:    1. Dark stools (Primary)  -     Hemoglobin and hematocrit, blood; Future  -     Hemoglobin and hematocrit, blood  No signs on vital signs or on exam that would suggest anemia.  We will get H&H to be sure  2. Essential hypertension  -     enalapril (Vasotec) 5 MG tablet; Take 1 tablet by mouth Daily.  Dispense: 90 tablet; Refill: 0    3. Mixed hyperlipidemia  -     ezetimibe (ZETIA) 10 MG tablet; Take 1 tablet by mouth Daily.  Dispense: 90 tablet; Refill: 0  -     rosuvastatin (CRESTOR) 40 MG tablet; Take 1 tablet by mouth Daily.  Dispense: 90 tablet; Refill: 0    4. Gastroesophageal reflux disease without esophagitis  -     famotidine (PEPCID) 40 MG tablet; Take 1  tablet by mouth Every Night.  Dispense: 90 tablet; Refill: 0  -     Hemoglobin and hematocrit, blood; Future  -     Hemoglobin and hematocrit, blood    5. Long-term use of high-risk medication  -     insulin lispro (HumaLOG) 100 UNIT/ML injection; Maximum daily dose of 100 units per insulin pump  Dispense: 30 mL; Refill: 3    6. Chronic seasonal allergic rhinitis due to pollen  -     levocetirizine (XYZAL) 5 MG tablet; Take 1 tablet by mouth Every Evening.  Dispense: 30 tablet; Refill: 5  -     montelukast (SINGULAIR) 10 MG tablet; Take 1 tablet by mouth Every Night.  Dispense: 90 tablet; Refill: 0    7. Acquired hypothyroidism  -     levothyroxine (SYNTHROID, LEVOTHROID) 100 MCG tablet; Take 1 tablet by mouth Daily.  Dispense: 90 tablet; Refill: 0    8. Type 2 diabetes mellitus with hyperglycemia, with long-term current use of insulin (HCC)  -     metFORMIN ER (GLUCOPHAGE-XR) 500 MG 24 hr tablet; Take 1 tablet by mouth Daily With Breakfast.  Dispense: 90 tablet; Refill: 0    9. Other chronic pain    10. Generalized edema  -     potassium chloride 10 MEQ CR tablet; Take 1 tablet by mouth 2 (Two) Times a Day.  Dispense: 180 tablet; Refill: 0  -     bumetanide (BUMEX) 2 MG tablet; Take 1 tablet by mouth 2 (Two) Times a Day.  Dispense: 180 tablet; Refill: 0    11. Epigastric pain    12. Left hip pain  Reassurance given.  Recommend ice and rest  13. Trochanteric bursitis of left hip    14. Neck pain on left side  Follow-up in a few weeks to consider further evaluation of her neck and left shoulder pain.  Anterior scalene spasm strongly suspected.  She would likely benefit from manipulation             Soheila Brewster  reports that she has been smoking cigarettes. She has a 10.00 pack-year smoking history. She has never used smokeless tobacco.. I have educated her on the risk of diseases from using tobacco products such as cancer, COPD and heart disease.     I advised her to quit and she is not willing to quit.    I spent  3  minutes counseling the patient.         Class 2 Severe Obesity (BMI >=35 and <=39.9). Obesity-related health conditions include the following: diabetes mellitus. Obesity is unchanged. BMI is is above average; BMI management plan is completed. We discussed portion control and increasing exercise.

## 2022-12-31 LAB
HCT VFR BLD AUTO: 40.2 % (ref 34–46.6)
HGB BLD-MCNC: 13 G/DL (ref 12–15.9)

## 2023-01-01 DIAGNOSIS — J44.9 CHRONIC OBSTRUCTIVE PULMONARY DISEASE, UNSPECIFIED COPD TYPE: ICD-10-CM

## 2023-01-01 DIAGNOSIS — K21.9 GASTROESOPHAGEAL REFLUX DISEASE WITHOUT ESOPHAGITIS: ICD-10-CM

## 2023-01-03 RX ORDER — FAMOTIDINE 40 MG/1
TABLET, FILM COATED ORAL
Qty: 30 TABLET | Refills: 1 | OUTPATIENT
Start: 2023-01-03

## 2023-01-03 RX ORDER — CHOLECALCIFEROL (VITAMIN D3) 25 MCG
TABLET,CHEWABLE ORAL
Qty: 30 TABLET | Refills: 1 | OUTPATIENT
Start: 2023-01-03

## 2023-01-03 RX ORDER — FLUTICASONE FUROATE, UMECLIDINIUM BROMIDE AND VILANTEROL TRIFENATATE 100; 62.5; 25 UG/1; UG/1; UG/1
POWDER RESPIRATORY (INHALATION)
Qty: 60 EACH | Refills: 1 | OUTPATIENT
Start: 2023-01-03

## 2023-01-17 RX ORDER — CHOLECALCIFEROL (VITAMIN D3) 25 MCG
TABLET,CHEWABLE ORAL
Qty: 30 TABLET | Refills: 1 | OUTPATIENT
Start: 2023-01-17

## 2023-01-20 ENCOUNTER — OFFICE VISIT (OUTPATIENT)
Dept: FAMILY MEDICINE CLINIC | Facility: CLINIC | Age: 53
End: 2023-01-20
Payer: MEDICARE

## 2023-01-20 VITALS — TEMPERATURE: 98 F | OXYGEN SATURATION: 98 % | HEART RATE: 93 BPM

## 2023-01-20 DIAGNOSIS — R05.9 COUGH, UNSPECIFIED TYPE: Primary | ICD-10-CM

## 2023-01-20 DIAGNOSIS — F17.200 CURRENT EVERY DAY SMOKER: ICD-10-CM

## 2023-01-20 DIAGNOSIS — G47.33 OSA ON CPAP: ICD-10-CM

## 2023-01-20 DIAGNOSIS — Z99.89 OSA ON CPAP: ICD-10-CM

## 2023-01-20 DIAGNOSIS — J06.9 UPPER RESPIRATORY TRACT INFECTION, UNSPECIFIED TYPE: ICD-10-CM

## 2023-01-20 LAB
EXPIRATION DATE: NORMAL
FLUAV AG UPPER RESP QL IA.RAPID: NOT DETECTED
FLUBV AG UPPER RESP QL IA.RAPID: NOT DETECTED
INTERNAL CONTROL: NORMAL
Lab: NORMAL
SARS-COV-2 AG UPPER RESP QL IA.RAPID: NOT DETECTED

## 2023-01-20 PROCEDURE — 99213 OFFICE O/P EST LOW 20 MIN: CPT | Performed by: FAMILY MEDICINE

## 2023-01-20 PROCEDURE — 87428 SARSCOV & INF VIR A&B AG IA: CPT | Performed by: FAMILY MEDICINE

## 2023-01-20 RX ORDER — DULAGLUTIDE 3 MG/.5ML
INJECTION, SOLUTION SUBCUTANEOUS
Qty: 2 ML | Refills: 0 | OUTPATIENT
Start: 2023-01-20

## 2023-01-20 RX ORDER — DEXTROMETHORPHAN HYDROBROMIDE AND PROMETHAZINE HYDROCHLORIDE 15; 6.25 MG/5ML; MG/5ML
5 SYRUP ORAL 4 TIMES DAILY PRN
Qty: 240 ML | Refills: 0 | Status: SHIPPED | OUTPATIENT
Start: 2023-01-20 | End: 2023-02-13

## 2023-01-20 RX ORDER — IPRATROPIUM BROMIDE AND ALBUTEROL SULFATE 2.5; .5 MG/3ML; MG/3ML
3 SOLUTION RESPIRATORY (INHALATION) EVERY 4 HOURS PRN
Qty: 360 ML | Refills: 0 | Status: SHIPPED | OUTPATIENT
Start: 2023-01-20 | End: 2023-02-13 | Stop reason: SDUPTHER

## 2023-01-20 NOTE — PROGRESS NOTES
Subjective   Soheila Brewster is a 52 y.o. female.   Pt presents today with CC of Cough      History of Present Illness   History of Present Illness  Patient is a diabetic 52-year-old female with morbid obesity, COPD, current everyday smoker, and obstructive sleep apnea not currently using CPAP.  She complains of cough and congestion for the past 2 days.       The following portions of the patient's history were reviewed and updated as appropriate: allergies, current medications, past family history, past medical history, past social history, past surgical history and problem list.    Review of Systems   Constitutional: Positive for fatigue. Negative for chills, fever and unexpected weight loss.   HENT: Negative for congestion and sore throat.    Eyes: Negative for blurred vision and visual disturbance.   Respiratory: Positive for cough and wheezing. Negative for shortness of breath.    Cardiovascular: Negative for chest pain and palpitations.   Gastrointestinal: Negative for abdominal pain and diarrhea.   Endocrine: Negative for cold intolerance and heat intolerance.   Genitourinary: Negative for dysuria.   Musculoskeletal: Negative for arthralgias and neck stiffness.   Neurological: Negative for dizziness, seizures and syncope.   Psychiatric/Behavioral: Negative for self-injury, suicidal ideas and depressed mood.       Objective   Physical Exam  Vitals and nursing note reviewed.   Constitutional:       Appearance: She is well-developed.   HENT:      Head: Normocephalic and atraumatic.      Right Ear: External ear normal.      Left Ear: External ear normal.      Nose: Nose normal.   Eyes:      Conjunctiva/sclera: Conjunctivae normal.      Pupils: Pupils are equal, round, and reactive to light.   Cardiovascular:      Rate and Rhythm: Normal rate and regular rhythm.      Heart sounds: Normal heart sounds.   Pulmonary:      Effort: No respiratory distress.      Breath sounds: Normal breath sounds. No wheezing or rales.    Abdominal:      General: Bowel sounds are normal.      Palpations: Abdomen is soft.   Musculoskeletal:      Cervical back: Normal range of motion and neck supple.   Skin:     General: Skin is warm and dry.   Neurological:      Mental Status: She is alert and oriented to person, place, and time.   Psychiatric:         Behavior: Behavior normal.           Assessment & Plan   Diagnoses and all orders for this visit:    1. Cough, unspecified type (Primary)  -     POCT SARS-CoV-2 Antigen ELSIE + Flu  Unspecified viral illness suspected.  COVID and flu were negative.  Recommend supportive care.  Phenergan cough syrup sent.  She may take DayQuil during the day as she has already been taking this medication.  2. JUAN on CPAP  She has obstructive sleep apnea, she was previously on CPAP until the recall a couple years ago.  She has been without.  It is my opinion that she needs a CPAP.  3. Upper respiratory tract infection, unspecified type  Recommend follow-up next week if not improved  4. Current every day smoker    Other orders  -     ipratropium-albuterol (DUO-NEB) 0.5-2.5 mg/3 ml nebulizer; Take 3 mL by nebulization Every 4 (Four) Hours As Needed for Wheezing.  Dispense: 360 mL; Refill: 0    Backorder on albuterol nebulizer, reportedly.  We will send to her pharmacy as well as pharmacy up the street.           Soheila Brewster  reports that she has been smoking cigarettes. She has a 10.00 pack-year smoking history. She has never used smokeless tobacco.. I have educated her on the risk of diseases from using tobacco products such as cancer, COPD and heart disease.     I advised her to quit and she is not willing to quit.    I spent 3  minutes counseling the patient.         Class 2 Severe Obesity (BMI >=35 and <=39.9). Obesity-related health conditions include the following: hypertension and diabetes mellitus. Obesity is unchanged. BMI is is above average; BMI management plan is completed. We discussed portion control and  increasing exercise.

## 2023-01-23 RX ORDER — CHOLECALCIFEROL (VITAMIN D3) 25 MCG
TABLET,CHEWABLE ORAL
Qty: 30 TABLET | Refills: 1 | OUTPATIENT
Start: 2023-01-23

## 2023-01-30 DIAGNOSIS — M51.27 PROTRUSION OF INTERVERTEBRAL DISC OF LUMBOSACRAL REGION: ICD-10-CM

## 2023-01-30 DIAGNOSIS — E11.65 TYPE 2 DIABETES MELLITUS WITH HYPERGLYCEMIA, WITH LONG-TERM CURRENT USE OF INSULIN: ICD-10-CM

## 2023-01-30 DIAGNOSIS — E78.2 MIXED HYPERLIPIDEMIA: ICD-10-CM

## 2023-01-30 DIAGNOSIS — J30.1 CHRONIC SEASONAL ALLERGIC RHINITIS DUE TO POLLEN: ICD-10-CM

## 2023-01-30 DIAGNOSIS — Z79.4 TYPE 2 DIABETES MELLITUS WITH HYPERGLYCEMIA, WITH LONG-TERM CURRENT USE OF INSULIN: ICD-10-CM

## 2023-01-30 RX ORDER — EZETIMIBE 10 MG/1
TABLET ORAL
Qty: 30 TABLET | Refills: 2 | OUTPATIENT
Start: 2023-01-30

## 2023-01-30 RX ORDER — LEVOCETIRIZINE DIHYDROCHLORIDE 5 MG/1
TABLET, FILM COATED ORAL
Qty: 30 TABLET | Refills: 2 | OUTPATIENT
Start: 2023-01-30

## 2023-01-30 RX ORDER — GABAPENTIN 800 MG/1
TABLET ORAL
Qty: 120 TABLET | Refills: 2 | OUTPATIENT
Start: 2023-01-30

## 2023-01-30 RX ORDER — INSULIN LISPRO 100 [IU]/ML
INJECTION, SOLUTION INTRAVENOUS; SUBCUTANEOUS
Qty: 30 ML | Refills: 3 | OUTPATIENT
Start: 2023-01-30

## 2023-02-06 DIAGNOSIS — E53.8 VITAMIN B12 DEFICIENCY: ICD-10-CM

## 2023-02-09 RX ORDER — DULAGLUTIDE 3 MG/.5ML
3 INJECTION, SOLUTION SUBCUTANEOUS WEEKLY
Qty: 9 ML | Refills: 0 | Status: SHIPPED | OUTPATIENT
Start: 2023-02-09 | End: 2023-02-13 | Stop reason: SDUPTHER

## 2023-02-09 RX ORDER — LANOLIN ALCOHOL/MO/W.PET/CERES
1000 CREAM (GRAM) TOPICAL DAILY
Qty: 30 TABLET | Refills: 5 | Status: SHIPPED | OUTPATIENT
Start: 2023-02-09

## 2023-02-13 ENCOUNTER — OFFICE VISIT (OUTPATIENT)
Dept: FAMILY MEDICINE CLINIC | Facility: CLINIC | Age: 53
End: 2023-02-13
Payer: MEDICARE

## 2023-02-13 VITALS
SYSTOLIC BLOOD PRESSURE: 130 MMHG | WEIGHT: 272.4 LBS | HEART RATE: 110 BPM | DIASTOLIC BLOOD PRESSURE: 74 MMHG | OXYGEN SATURATION: 98 % | TEMPERATURE: 98 F | HEIGHT: 71 IN | BODY MASS INDEX: 38.14 KG/M2

## 2023-02-13 DIAGNOSIS — J44.9 CHRONIC OBSTRUCTIVE PULMONARY DISEASE, UNSPECIFIED COPD TYPE: ICD-10-CM

## 2023-02-13 DIAGNOSIS — R60.1 GENERALIZED EDEMA: ICD-10-CM

## 2023-02-13 DIAGNOSIS — Z79.4 TYPE 2 DIABETES MELLITUS WITH HYPERGLYCEMIA, WITH LONG-TERM CURRENT USE OF INSULIN: ICD-10-CM

## 2023-02-13 DIAGNOSIS — G89.29 CHRONIC LEFT SHOULDER PAIN: ICD-10-CM

## 2023-02-13 DIAGNOSIS — E11.65 TYPE 2 DIABETES MELLITUS WITH HYPERGLYCEMIA, WITH LONG-TERM CURRENT USE OF INSULIN: ICD-10-CM

## 2023-02-13 DIAGNOSIS — M54.2 CHRONIC NECK PAIN: ICD-10-CM

## 2023-02-13 DIAGNOSIS — I10 ESSENTIAL HYPERTENSION: ICD-10-CM

## 2023-02-13 DIAGNOSIS — Z00.00 MEDICARE ANNUAL WELLNESS VISIT, SUBSEQUENT: Primary | ICD-10-CM

## 2023-02-13 DIAGNOSIS — G89.29 CHRONIC NECK PAIN: ICD-10-CM

## 2023-02-13 DIAGNOSIS — F17.200 CURRENT EVERY DAY SMOKER: ICD-10-CM

## 2023-02-13 DIAGNOSIS — M25.512 CHRONIC LEFT SHOULDER PAIN: ICD-10-CM

## 2023-02-13 PROCEDURE — 85027 COMPLETE CBC AUTOMATED: CPT | Performed by: FAMILY MEDICINE

## 2023-02-13 PROCEDURE — 83036 HEMOGLOBIN GLYCOSYLATED A1C: CPT | Performed by: FAMILY MEDICINE

## 2023-02-13 PROCEDURE — 1170F FXNL STATUS ASSESSED: CPT | Performed by: FAMILY MEDICINE

## 2023-02-13 PROCEDURE — 36415 COLL VENOUS BLD VENIPUNCTURE: CPT | Performed by: FAMILY MEDICINE

## 2023-02-13 PROCEDURE — 1159F MED LIST DOCD IN RCRD: CPT | Performed by: FAMILY MEDICINE

## 2023-02-13 PROCEDURE — G0439 PPPS, SUBSEQ VISIT: HCPCS | Performed by: FAMILY MEDICINE

## 2023-02-13 PROCEDURE — 80053 COMPREHEN METABOLIC PANEL: CPT | Performed by: FAMILY MEDICINE

## 2023-02-13 RX ORDER — ALBUTEROL SULFATE 90 UG/1
2 AEROSOL, METERED RESPIRATORY (INHALATION) EVERY 4 HOURS PRN
Qty: 18 G | Refills: 5 | Status: SHIPPED | OUTPATIENT
Start: 2023-02-13

## 2023-02-13 RX ORDER — ENALAPRIL MALEATE 5 MG/1
5 TABLET ORAL DAILY
Qty: 90 TABLET | Refills: 0 | Status: SHIPPED | OUTPATIENT
Start: 2023-02-13

## 2023-02-13 RX ORDER — BUMETANIDE 2 MG/1
2 TABLET ORAL 2 TIMES DAILY
Qty: 180 TABLET | Refills: 0 | Status: SHIPPED | OUTPATIENT
Start: 2023-02-13

## 2023-02-13 RX ORDER — IPRATROPIUM BROMIDE AND ALBUTEROL SULFATE 2.5; .5 MG/3ML; MG/3ML
3 SOLUTION RESPIRATORY (INHALATION) EVERY 4 HOURS PRN
Qty: 360 ML | Refills: 0 | Status: SHIPPED | OUTPATIENT
Start: 2023-02-13

## 2023-02-13 RX ORDER — DULAGLUTIDE 3 MG/.5ML
3 INJECTION, SOLUTION SUBCUTANEOUS WEEKLY
Qty: 9 ML | Refills: 0 | Status: SHIPPED | OUTPATIENT
Start: 2023-02-13 | End: 2023-02-15

## 2023-02-13 RX ORDER — ALBUTEROL SULFATE 90 UG/1
AEROSOL, METERED RESPIRATORY (INHALATION)
Qty: 8.5 G | Refills: 3 | OUTPATIENT
Start: 2023-02-13

## 2023-02-13 RX ORDER — PRUCALOPRIDE 2 MG/1
1 TABLET, FILM COATED ORAL EVERY MORNING
COMMUNITY
Start: 2023-02-09

## 2023-02-13 NOTE — PROGRESS NOTES
QUICK REFERENCE INFORMATION:  The ABCs of the Annual Wellness Visit    Subsequent Medicare Wellness Visit    HEALTH RISK ASSESSMENT    1970    Recent Hospitalizations:  No hospitalization(s) within the last year..        Current Medical Providers:  Patient Care Team:  Walt Tyler DO as PCP - General (Family Medicine)        Smoking Status:  Social History     Tobacco Use   Smoking Status Every Day   • Packs/day: 1.00   • Years: 10.00   • Pack years: 10.00   • Types: Cigarettes   Smokeless Tobacco Never       Alcohol Consumption:  Social History     Substance and Sexual Activity   Alcohol Use No       Depression Screen:   PHQ-2/PHQ-9 Depression Screening 2/13/2023   Retired PHQ-9 Total Score -   Retired Total Score -   Little Interest or Pleasure in Doing Things 0-->not at all   Feeling Down, Depressed or Hopeless 0-->not at all   PHQ-9: Brief Depression Severity Measure Score 0       Health Habits and Functional and Cognitive Screening:  Functional & Cognitive Status 2/13/2023   Do you have difficulty preparing food and eating? Yes   Do you have difficulty bathing yourself, getting dressed or grooming yourself? Yes   Do you have difficulty using the toilet? No   Do you have difficulty moving around from place to place? Yes   Do you have trouble with steps or getting out of a bed or a chair? Yes   Current Diet Unhealthy Diet   Dental Exam Not up to date   Eye Exam Not up to date   Exercise (times per week) 0 times per week   Current Exercises Include No Regular Exercise   Current Exercise Activities Include -   Do you need help using the phone?  No   Are you deaf or do you have serious difficulty hearing?  Yes   Do you need help with transportation? Yes   Do you need help shopping? Yes   Do you need help preparing meals?  Yes   Do you need help with housework?  Yes   Do you need help with laundry? Yes   Do you need help taking your medications? No   Do you need help managing money? No   Do you ever  drive or ride in a car without wearing a seat belt? No   Have you felt unusual stress, anger or loneliness in the last month? -   Who do you live with? -   If you need help, do you have trouble finding someone available to you? -   Have you been bothered in the last four weeks by sexual problems? -   Do you have difficulty concentrating, remembering or making decisions? -           Does the patient have evidence of cognitive impairment? No    Aspirin use counseling: Taking ASA appropriately as indicated      Recent Lab Results:  CMP:  Lab Results   Component Value Date    BUN 11 12/12/2022    CREATININE 0.73 12/12/2022    EGFRIFNONA 106 10/14/2021    BCR 15.1 12/12/2022     12/12/2022    K 4.5 12/12/2022    CO2 26.9 12/12/2022    CALCIUM 9.8 12/12/2022    ALBUMIN 4.00 12/12/2022    BILITOT 0.3 12/12/2022    ALKPHOS 71 12/12/2022    AST 12 12/12/2022    ALT 15 12/12/2022     Lipid Panel:  Lab Results   Component Value Date    CHOL 117 11/08/2022    TRIG 228 (H) 11/08/2022    HDL 43 11/08/2022    VLDL 36 11/08/2022    LDLHDL 0.66 11/08/2022     HbA1c:  Lab Results   Component Value Date    HGBA1C 8.30 (H) 11/08/2022       Visual Acuity:  No results found.    Age-appropriate Screening Schedule:  Refer to the list below for future screening recommendations based on patient's age, sex and/or medical conditions. Orders for these recommended tests are listed in the plan section. The patient has been provided with a written plan.    Health Maintenance   Topic Date Due   • PAP SMEAR  Never done   • ZOSTER VACCINE (1 of 2) Never done   • DIABETIC FOOT EXAM  10/19/2021   • DIABETIC EYE EXAM  01/27/2022   • URINE MICROALBUMIN  04/04/2023   • HEMOGLOBIN A1C  05/08/2023   • LIPID PANEL  11/08/2023   • MAMMOGRAM  04/04/2024   • TDAP/TD VACCINES (2 - Td or Tdap) 03/06/2027   • INFLUENZA VACCINE  Completed        Subjective   History of Present Illness    Soheila Brewster is a 52 y.o. female who presents for an Subsequent  "Wellness Visit.    The following portions of the patient's history were reviewed and updated as appropriate: allergies, current medications, past family history, past medical history, past social history, past surgical history and problem list.    Outpatient Medications Prior to Visit   Medication Sig Dispense Refill   • albuterol sulfate HFA (Ventolin HFA) 108 (90 Base) MCG/ACT inhaler Inhale 2 puffs Every 4 (Four) Hours As Needed for Wheezing. 18 g 5   • azelastine (ASTELIN) 0.1 % nasal spray SPRAY 2 SPRAYS IN EACH NOSTRIL TWICE DAILY AS NEEDED 30 mL 5   • bumetanide (BUMEX) 2 MG tablet Take 1 tablet by mouth 2 (Two) Times a Day. 180 tablet 0   • Continuous Blood Gluc  (Dexcom G6 ) device 1 Device Daily. 1 each 0   • Continuous Blood Gluc Sensor (Dexcom G6 Sensor) Every 10 (Ten) Days. 9 each 3   • Dulaglutide (Trulicity) 3 MG/0.5ML solution pen-injector Inject 0.5 mL under the skin into the appropriate area as directed 1 (One) Time Per Week. 9 mL 0   • enalapril (Vasotec) 5 MG tablet Take 1 tablet by mouth Daily. 90 tablet 0   • ezetimibe (ZETIA) 10 MG tablet Take 1 tablet by mouth Daily. 90 tablet 0   • famotidine (PEPCID) 40 MG tablet Take 1 tablet by mouth Every Night. 90 tablet 0   • Fluticasone-Umeclidin-Vilant (Trelegy Ellipta) 100-62.5-25 MCG/ACT inhaler Inhale 1 puff Daily. 60 each 3   • gabapentin (NEURONTIN) 800 MG tablet Take 1 tablet by mouth 4 (Four) Times a Day. 120 tablet 2   • Insulin Disposable Pump (Omnipod DASH Pods, Gen 4,) misc 1 Device by Subcutaneous Infusion route Every Other Day. 45 each 3   • Insulin Disposable Pump (OmniPod Dash System) kit 1 Device Daily. 1 kit 0   • insulin lispro (HumaLOG) 100 UNIT/ML injection Maximum daily dose of 100 units per insulin pump 30 mL 3   • Insulin Pen Needle 30G X 8 MM misc 1 Device 4 (Four) Times a Day. 120 each 12   • Insulin Syringe 31G X 5/16\" 1 ML misc 1 each 3 (Three) Times a Day As Needed (elevated blood sugars). 100 each 5   • " ipratropium-albuterol (DUO-NEB) 0.5-2.5 mg/3 ml nebulizer Take 3 mL by nebulization Every 4 (Four) Hours As Needed for Wheezing. 360 mL 0   • levocetirizine (XYZAL) 5 MG tablet Take 1 tablet by mouth Every Evening. 30 tablet 5   • levothyroxine (SYNTHROID, LEVOTHROID) 100 MCG tablet Take 1 tablet by mouth Daily. 90 tablet 0   • medroxyPROGESTERone (DEPO-PROVERA) 150 MG/ML injection INJECT 1 ML INTO THE APPROPRIATE MUSCLE AS DIRECTED BY PRESCRIBER EVERY 3 MONTHS. 1 mL 3   • metFORMIN ER (GLUCOPHAGE-XR) 500 MG 24 hr tablet Take 1 tablet by mouth Daily With Breakfast. 90 tablet 0   • methocarbamol (ROBAXIN) 500 MG tablet Take 2 tablets by mouth 3 (Three) Times a Day As Needed for Muscle Spasms. 120 tablet 1   • montelukast (SINGULAIR) 10 MG tablet Take 1 tablet by mouth Every Night. 90 tablet 0   • Motegrity 2 MG tablet Take 1 tablet by mouth Every Morning.     • oxyCODONE-acetaminophen (PERCOCET) 7.5-325 MG per tablet      • potassium chloride 10 MEQ CR tablet Take 1 tablet by mouth 2 (Two) Times a Day. 180 tablet 0   • Relistor 150 MG tablet TAKE THREE TABLETS BY MOUTH EVERY MORNING     • rosuvastatin (CRESTOR) 40 MG tablet Take 1 tablet by mouth Daily. 90 tablet 0   • vitamin B-12 (CYANOCOBALAMIN) 1000 MCG tablet Take 1 tablet by mouth Daily. 30 tablet 5   • vitamin D (ERGOCALCIFEROL) 1.25 MG (35328 UT) capsule capsule TAKE 1 CAPSULE BY MOUTH ONCE WEEKLY 4 capsule 5   • Blood Glucose Monitoring Suppl kit 1 Device Daily. 1 each 0   • promethazine-dextromethorphan (PROMETHAZINE-DM) 6.25-15 MG/5ML syrup Take 5 mL by mouth 4 (Four) Times a Day As Needed for Cough. 240 mL 0     No facility-administered medications prior to visit.       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 Patient does not have an advance directive, information provided.    Identification of Risk Factors:  Risk factors include: Advance Directive Discussion.    Review of Systems   Constitutional: Negative for chills, fever and unexpected weight loss.   HENT: Negative for congestion and sore throat.    Eyes: Negative for blurred vision and visual disturbance.   Respiratory: Negative for cough and wheezing.    Cardiovascular: Negative for chest pain and palpitations.   Gastrointestinal: Negative for abdominal pain and diarrhea.   Endocrine: Negative for cold intolerance and heat intolerance.   Genitourinary: Negative for dysuria.   Musculoskeletal: Negative for arthralgias and neck stiffness.   Neurological: Negative for dizziness, seizures and syncope.   Psychiatric/Behavioral: Negative for self-injury, suicidal ideas and depressed mood.       Compared to one year ago, the patient feels her physical health is the same.  Compared to one year ago, the patient feels her mental health is the same.    Objective     Physical Exam  Vitals and nursing note reviewed.   Constitutional:        "Appearance: She is well-developed.   HENT:      Head: Normocephalic and atraumatic.      Right Ear: External ear normal.      Left Ear: External ear normal.      Nose: Nose normal.   Eyes:      Conjunctiva/sclera: Conjunctivae normal.      Pupils: Pupils are equal, round, and reactive to light.   Cardiovascular:      Rate and Rhythm: Normal rate and regular rhythm.      Heart sounds: Normal heart sounds.   Pulmonary:      Effort: Pulmonary effort is normal.      Breath sounds: Normal breath sounds.   Abdominal:      General: Bowel sounds are normal.      Palpations: Abdomen is soft.   Musculoskeletal:      Cervical back: Normal range of motion and neck supple.      Comments: No tenderness to palpation, scalenes on the ipsilateral side on the left, she has pain with abduction greater than about 70 degrees in all directions.   Skin:     General: Skin is warm and dry.   Neurological:      Mental Status: She is alert and oriented to person, place, and time.   Psychiatric:         Behavior: Behavior normal.         Vitals:    02/13/23 1346   BP: 130/74   BP Location: Left arm   Patient Position: Sitting   Pulse: 110   Temp: 98 °F (36.7 °C)   SpO2: 98%   Weight: 124 kg (272 lb 6.4 oz)   Height: 180.3 cm (70.98\")       Class 2 Severe Obesity (BMI >=35 and <=39.9). Obesity-related health conditions include the following: hypertension and diabetes mellitus. Obesity is unchanged. BMI is is above average; BMI management plan is completed. We discussed portion control and increasing exercise.      Assessment & Plan   Patient Self-Management and Personalized Health Advice  The patient has been provided with information about: diet and exercise and preventive services including:   · Annual Wellness Visit (AWV).    Visit Diagnoses:    ICD-10-CM ICD-9-CM   1. Medicare annual wellness visit, subsequent  Z00.00 V70.0   2. Chronic neck pain  M54.2 723.1    G89.29 338.29   3. Chronic left shoulder pain  M25.512 719.41    G89.29 338.29 "   4. Chronic obstructive pulmonary disease, unspecified COPD type ([facility])  J44.9 496   5. Generalized edema  R60.1 782.3   6. Essential hypertension  I10 401.9   7. Current every day smoker  F17.200 305.1   8. Type 2 diabetes mellitus with hyperglycemia, with long-term current use of insulin ([facility])  E11.65 250.00    Z79.4 790.29     V58.67   #1 she is here for Medicare wellness.  Chronic pain continues to be her primary problem.  It is disabling her in all aspects of life.  She follows with pain management and takes large frequent doses of narcotics with only partial benefit.  Today she complains of neck and shoulder pain again.  She has not had recent evaluation of this.  We will get x-ray of her neck and shoulder and have her follow-up next month.         Orders Placed This Encounter   Procedures   • XR Spine Cervical 2 View     Order Specific Question:   Reason for Exam:     Answer:   chronic neck pain   • XR Shoulder 2+ View Left     Order Specific Question:   Reason for Exam:     Answer:   left shoulder pain   • Comprehensive metabolic panel     Standing Status:   Future     Standing Expiration Date:   2/13/2024     Order Specific Question:   Release to patient     Answer:   Routine Release   • Hemoglobin A1c     Standing Status:   Future     Standing Expiration Date:   2/13/2024     Order Specific Question:   Release to patient     Answer:   Routine Release   • CBC No Differential     Standing Status:   Future     Standing Expiration Date:   2/13/2024     Order Specific Question:   Release to patient     Answer:   Routine Release       Outpatient Encounter Medications as of 2/13/2023   Medication Sig Dispense Refill   • albuterol sulfate HFA (Ventolin HFA) 108 (90 Base) MCG/ACT inhaler Inhale 2 puffs Every 4 (Four) Hours As Needed for Wheezing. 18 g 5   • azelastine (ASTELIN) 0.1 % nasal spray SPRAY 2 SPRAYS IN EACH NOSTRIL TWICE DAILY AS NEEDED 30 mL 5   • bumetanide (BUMEX) 2 MG tablet Take 1 tablet by mouth 2  "(Two) Times a Day. 180 tablet 0   • Continuous Blood Gluc  (Dexcom G6 ) device 1 Device Daily. 1 each 0   • Continuous Blood Gluc Sensor (Dexcom G6 Sensor) Every 10 (Ten) Days. 9 each 3   • Dulaglutide (Trulicity) 3 MG/0.5ML solution pen-injector Inject 0.5 mL under the skin into the appropriate area as directed 1 (One) Time Per Week. 9 mL 0   • enalapril (Vasotec) 5 MG tablet Take 1 tablet by mouth Daily. 90 tablet 0   • ezetimibe (ZETIA) 10 MG tablet Take 1 tablet by mouth Daily. 90 tablet 0   • famotidine (PEPCID) 40 MG tablet Take 1 tablet by mouth Every Night. 90 tablet 0   • Fluticasone-Umeclidin-Vilant (Trelegy Ellipta) 100-62.5-25 MCG/ACT inhaler Inhale 1 puff Daily. 60 each 3   • gabapentin (NEURONTIN) 800 MG tablet Take 1 tablet by mouth 4 (Four) Times a Day. 120 tablet 2   • Insulin Disposable Pump (Omnipod DASH Pods, Gen 4,) misc 1 Device by Subcutaneous Infusion route Every Other Day. 45 each 3   • Insulin Disposable Pump (OmniPod Dash System) kit 1 Device Daily. 1 kit 0   • insulin lispro (HumaLOG) 100 UNIT/ML injection Maximum daily dose of 100 units per insulin pump 30 mL 3   • Insulin Pen Needle 30G X 8 MM misc 1 Device 4 (Four) Times a Day. 120 each 12   • Insulin Syringe 31G X 5/16\" 1 ML misc 1 each 3 (Three) Times a Day As Needed (elevated blood sugars). 100 each 5   • ipratropium-albuterol (DUO-NEB) 0.5-2.5 mg/3 ml nebulizer Take 3 mL by nebulization Every 4 (Four) Hours As Needed for Wheezing. 360 mL 0   • levocetirizine (XYZAL) 5 MG tablet Take 1 tablet by mouth Every Evening. 30 tablet 5   • levothyroxine (SYNTHROID, LEVOTHROID) 100 MCG tablet Take 1 tablet by mouth Daily. 90 tablet 0   • medroxyPROGESTERone (DEPO-PROVERA) 150 MG/ML injection INJECT 1 ML INTO THE APPROPRIATE MUSCLE AS DIRECTED BY PRESCRIBER EVERY 3 MONTHS. 1 mL 3   • metFORMIN ER (GLUCOPHAGE-XR) 500 MG 24 hr tablet Take 1 tablet by mouth Daily With Breakfast. 90 tablet 0   • methocarbamol (ROBAXIN) 500 MG " tablet Take 2 tablets by mouth 3 (Three) Times a Day As Needed for Muscle Spasms. 120 tablet 1   • montelukast (SINGULAIR) 10 MG tablet Take 1 tablet by mouth Every Night. 90 tablet 0   • Motegrity 2 MG tablet Take 1 tablet by mouth Every Morning.     • oxyCODONE-acetaminophen (PERCOCET) 7.5-325 MG per tablet      • potassium chloride 10 MEQ CR tablet Take 1 tablet by mouth 2 (Two) Times a Day. 180 tablet 0   • Relistor 150 MG tablet TAKE THREE TABLETS BY MOUTH EVERY MORNING     • rosuvastatin (CRESTOR) 40 MG tablet Take 1 tablet by mouth Daily. 90 tablet 0   • vitamin B-12 (CYANOCOBALAMIN) 1000 MCG tablet Take 1 tablet by mouth Daily. 30 tablet 5   • vitamin D (ERGOCALCIFEROL) 1.25 MG (12829 UT) capsule capsule TAKE 1 CAPSULE BY MOUTH ONCE WEEKLY 4 capsule 5   • Blood Glucose Monitoring Suppl kit 1 Device Daily. 1 each 0   • promethazine-dextromethorphan (PROMETHAZINE-DM) 6.25-15 MG/5ML syrup Take 5 mL by mouth 4 (Four) Times a Day As Needed for Cough. 240 mL 0     No facility-administered encounter medications on file as of 2/13/2023.       Reviewed use of high risk medication in the elderly: yes  Reviewed for potential of harmful drug interactions in the elderly: yes    Follow Up:  Return in about 4 weeks (around 3/13/2023) for shoulder pain.     An After Visit Summary and PPPS with all of these plans were given to the patient.               This document has been electronically signed by Walt Tyler DO  February 13, 2023 14:19 EST    Dictated Utilizing Dragon Dictation: Part of this note may be an electronic transcription/translation of spoken language to printed text using the Dragon Dictation System.

## 2023-02-14 ENCOUNTER — TELEPHONE (OUTPATIENT)
Dept: FAMILY MEDICINE CLINIC | Facility: CLINIC | Age: 53
End: 2023-02-14
Payer: MEDICARE

## 2023-02-14 LAB
ALBUMIN SERPL-MCNC: 4.5 G/DL (ref 3.5–5.2)
ALBUMIN/GLOB SERPL: 1.5 G/DL
ALP SERPL-CCNC: 74 U/L (ref 39–117)
ALT SERPL W P-5'-P-CCNC: 26 U/L (ref 1–33)
ANION GAP SERPL CALCULATED.3IONS-SCNC: 10.4 MMOL/L (ref 5–15)
AST SERPL-CCNC: 25 U/L (ref 1–32)
BILIRUB SERPL-MCNC: 0.4 MG/DL (ref 0–1.2)
BUN SERPL-MCNC: 12 MG/DL (ref 6–20)
BUN/CREAT SERPL: 14.8 (ref 7–25)
CALCIUM SPEC-SCNC: 10.3 MG/DL (ref 8.6–10.5)
CHLORIDE SERPL-SCNC: 98 MMOL/L (ref 98–107)
CO2 SERPL-SCNC: 28.6 MMOL/L (ref 22–29)
CREAT SERPL-MCNC: 0.81 MG/DL (ref 0.57–1)
DEPRECATED RDW RBC AUTO: 46.7 FL (ref 37–54)
EGFRCR SERPLBLD CKD-EPI 2021: 88 ML/MIN/1.73
ERYTHROCYTE [DISTWIDTH] IN BLOOD BY AUTOMATED COUNT: 15.3 % (ref 12.3–15.4)
GLOBULIN UR ELPH-MCNC: 3 GM/DL
GLUCOSE SERPL-MCNC: 187 MG/DL (ref 65–99)
HBA1C MFR BLD: 8.6 % (ref 4.8–5.6)
HCT VFR BLD AUTO: 40.3 % (ref 34–46.6)
HGB BLD-MCNC: 13.6 G/DL (ref 12–15.9)
MCH RBC QN AUTO: 28.6 PG (ref 26.6–33)
MCHC RBC AUTO-ENTMCNC: 33.7 G/DL (ref 31.5–35.7)
MCV RBC AUTO: 84.8 FL (ref 79–97)
PLATELET # BLD AUTO: 222 10*3/MM3 (ref 140–450)
PMV BLD AUTO: 12.2 FL (ref 6–12)
POTASSIUM SERPL-SCNC: 4.2 MMOL/L (ref 3.5–5.2)
PROT SERPL-MCNC: 7.5 G/DL (ref 6–8.5)
RBC # BLD AUTO: 4.75 10*6/MM3 (ref 3.77–5.28)
SODIUM SERPL-SCNC: 137 MMOL/L (ref 136–145)
WBC NRBC COR # BLD: 15.85 10*3/MM3 (ref 3.4–10.8)

## 2023-02-14 NOTE — TELEPHONE ENCOUNTER
Patient called to check on status of order for CPAP that was to be faxed to [facility].  It appears it was faxed to [facility] in January in error, so I have re faxed it to [facility] per patients request.  Confirmation received.  Also, patient called in regards to results of x-rays done yesterday.  Please advise.

## 2023-02-15 ENCOUNTER — OFFICE VISIT (OUTPATIENT)
Dept: ENDOCRINOLOGY | Facility: CLINIC | Age: 53
End: 2023-02-15
Payer: MEDICARE

## 2023-02-15 ENCOUNTER — TELEPHONE (OUTPATIENT)
Dept: ENDOCRINOLOGY | Facility: CLINIC | Age: 53
End: 2023-02-15

## 2023-02-15 ENCOUNTER — SPECIALTY PHARMACY (OUTPATIENT)
Dept: PHARMACY | Facility: HOSPITAL | Age: 53
End: 2023-02-15
Payer: MEDICARE

## 2023-02-15 VITALS
BODY MASS INDEX: 38.75 KG/M2 | HEART RATE: 87 BPM | WEIGHT: 276.8 LBS | OXYGEN SATURATION: 96 % | DIASTOLIC BLOOD PRESSURE: 90 MMHG | HEIGHT: 71 IN | SYSTOLIC BLOOD PRESSURE: 154 MMHG

## 2023-02-15 DIAGNOSIS — Z79.899 LONG-TERM USE OF HIGH-RISK MEDICATION: ICD-10-CM

## 2023-02-15 DIAGNOSIS — E11.65 TYPE 2 DIABETES MELLITUS WITH HYPERGLYCEMIA, UNSPECIFIED WHETHER LONG TERM INSULIN USE: Primary | ICD-10-CM

## 2023-02-15 DIAGNOSIS — E11.65 TYPE 2 DIABETES MELLITUS WITH HYPERGLYCEMIA, WITH LONG-TERM CURRENT USE OF INSULIN: ICD-10-CM

## 2023-02-15 DIAGNOSIS — Z79.4 TYPE 2 DIABETES MELLITUS WITH HYPERGLYCEMIA, WITH LONG-TERM CURRENT USE OF INSULIN: ICD-10-CM

## 2023-02-15 LAB — GLUCOSE BLDC GLUCOMTR-MCNC: 197 MG/DL (ref 70–130)

## 2023-02-15 PROCEDURE — 99213 OFFICE O/P EST LOW 20 MIN: CPT | Performed by: NURSE PRACTITIONER

## 2023-02-15 PROCEDURE — 82962 GLUCOSE BLOOD TEST: CPT | Performed by: NURSE PRACTITIONER

## 2023-02-15 RX ORDER — INSULIN LISPRO 100 [IU]/ML
INJECTION, SOLUTION INTRAVENOUS; SUBCUTANEOUS
Qty: 30 ML | Refills: 5 | Status: SHIPPED | OUTPATIENT
Start: 2023-02-15

## 2023-02-15 RX ORDER — DULAGLUTIDE 3 MG/.5ML
3 INJECTION, SOLUTION SUBCUTANEOUS WEEKLY
Qty: 2 ML | Refills: 5 | Status: SHIPPED | OUTPATIENT
Start: 2023-02-15

## 2023-02-15 RX ORDER — PROCHLORPERAZINE 25 MG/1
SUPPOSITORY RECTAL
Qty: 3 EACH | Refills: 5 | Status: SHIPPED | OUTPATIENT
Start: 2023-02-15

## 2023-02-15 RX ORDER — INSULIN PMP CART,AUT,G6/7,CNTR
1 EACH SUBCUTANEOUS EVERY OTHER DAY
Qty: 45 EACH | Refills: 1 | Status: SHIPPED | OUTPATIENT
Start: 2023-02-15

## 2023-02-15 RX ORDER — METFORMIN HYDROCHLORIDE 500 MG/1
500 TABLET, EXTENDED RELEASE ORAL
Qty: 30 TABLET | Refills: 5 | Status: SHIPPED | OUTPATIENT
Start: 2023-02-15

## 2023-02-15 RX ORDER — PROCHLORPERAZINE 25 MG/1
1 SUPPOSITORY RECTAL DAILY
Qty: 1 EACH | Refills: 0 | Status: SHIPPED | OUTPATIENT
Start: 2023-02-15 | End: 2023-02-15 | Stop reason: SDUPTHER

## 2023-02-15 RX ORDER — PROCHLORPERAZINE 25 MG/1
1 SUPPOSITORY RECTAL
Qty: 1 EACH | Refills: 1 | Status: SHIPPED | OUTPATIENT
Start: 2023-02-15

## 2023-02-15 RX ORDER — PROCHLORPERAZINE 25 MG/1
1 SUPPOSITORY RECTAL DAILY
Qty: 1 EACH | Refills: 0 | Status: SHIPPED | OUTPATIENT
Start: 2023-02-15

## 2023-02-15 RX ORDER — DULAGLUTIDE 1.5 MG/.5ML
1.5 INJECTION, SOLUTION SUBCUTANEOUS WEEKLY
Qty: 2 ML | Refills: 0 | Status: SHIPPED | OUTPATIENT
Start: 2023-02-15

## 2023-02-15 NOTE — PROGRESS NOTES
Specialty Pharmacy Patient Management Program  Endocrinology Initial Assessment       Soheila Brewster is a 52 y.o. female with Type 2 Diabetes seen by an Endocrinology provider and enrolled in the Endocrinology Patient Management program offered by [facility].  An initial outreach was conducted, including assessment of therapy appropriateness and specialty medication education for Metformin, Trulicity, OmniPod Dash, and Humalog. The patient was introduced to services offered by [facility], including: regular assessments, refill coordination, curbside pick-up or mail order delivery options, prior authorization maintenance, and financial assistance programs as applicable. The patient was also provided with contact information for the pharmacy team.     Patient is currently taking metformin 500 mg daily and using Humalog in her OmniPod Dash insulin pump. She is prescribed Trulicity 3 mg weekly, but has not taken in 5 weeks due to manufacture shortage issues. She uses Dexcom to monitor BG with readings between 170-350s. Patient denies low BG <70.     She has an OmniPod 5 insulin pump at home. She needs to be trained and switched over to start using.     Patient denies personal/family history of thyroid cancer, denies history of pancreatitis, and denies issues with recurrent UTI/yeast infections.     Insurance Coverage & Financial Support  [facility]     Relevant Past Medical History and Comorbidities  Relevant medical history and concomitant health conditions were discussed with the patient. The patient's chart has been reviewed for relevant past medical history and comorbid health conditions and updated as necessary.   Past Medical History:   Diagnosis Date   • Arthritis    • Asthma    • Back pain    • Disease of thyroid gland    • History of transfusion    • Liver disease     fatty   • Sleep apnea    • Umbilical hernia      Social History     Socioeconomic History   • Marital  status:    • Number of children: 0   • Highest education level: GED or equivalent   Tobacco Use   • Smoking status: Every Day     Packs/day: 1.00     Years: 10.00     Pack years: 10.00     Types: Cigarettes   • Smokeless tobacco: Never   Vaping Use   • Vaping Use: Never used   Substance and Sexual Activity   • Alcohol use: No   • Drug use: No   • Sexual activity: Defer       Allergies  Known allergies and reactions were discussed with the patient. The patient's chart has been reviewed for  allergy information and updated as necessary.   Allergies   Allergen Reactions   • Morphine And Related Shortness Of Breath     Throat edema   • Penicillins      Also anything in the penicillin family         Allergies reviewed by Mable Méndez RPH on 2/15/2023 at  1:36 PM    Current Medication List  This medication list has been reviewed with the patient and evaluated for any interactions or necessary modifications/recommendations, and updated to include all prescription medications, OTC medications, and supplements the patient is currently taking.  This list reflects what is contained in the patient's profile, which has also been marked as reviewed to communicate to other providers it is the most up to date version of the patient's current medication therapy.     Current Outpatient Medications:   •  albuterol sulfate HFA (Ventolin HFA) 108 (90 Base) MCG/ACT inhaler, Inhale 2 puffs Every 4 (Four) Hours As Needed for Wheezing., Disp: 18 g, Rfl: 5  •  azelastine (ASTELIN) 0.1 % nasal spray, SPRAY 2 SPRAYS IN EACH NOSTRIL TWICE DAILY AS NEEDED, Disp: 30 mL, Rfl: 5  •  bumetanide (BUMEX) 2 MG tablet, Take 1 tablet by mouth 2 (Two) Times a Day., Disp: 180 tablet, Rfl: 0  •  Continuous Blood Gluc  (Dexcom G6 ) device, 1 Device Daily., Disp: 1 each, Rfl: 0  •  Continuous Blood Gluc Sensor (Dexcom G6 Sensor), Every 10 (Ten) Days., Disp: 3 each, Rfl: 5  •  enalapril (Vasotec) 5 MG tablet, Take 1 tablet by mouth  "Daily., Disp: 90 tablet, Rfl: 0  •  ezetimibe (ZETIA) 10 MG tablet, Take 1 tablet by mouth Daily., Disp: 90 tablet, Rfl: 0  •  famotidine (PEPCID) 40 MG tablet, Take 1 tablet by mouth Every Night., Disp: 90 tablet, Rfl: 0  •  Fluticasone-Umeclidin-Vilant (Trelegy Ellipta) 100-62.5-25 MCG/ACT inhaler, Inhale 1 puff Daily., Disp: 60 each, Rfl: 3  •  gabapentin (NEURONTIN) 800 MG tablet, Take 1 tablet by mouth 4 (Four) Times a Day., Disp: 120 tablet, Rfl: 2  •  Insulin Disposable Pump (Omnipod DASH Pods, Gen 4,) misc, 1 Device by Subcutaneous Infusion route Every Other Day., Disp: 45 each, Rfl: 3  •  Insulin Disposable Pump (OmniPod Dash System) kit, 1 Device Daily., Disp: 1 kit, Rfl: 0  •  insulin lispro (HumaLOG) 100 UNIT/ML injection, Use a maximum daily dose of 100 units per insulin pump, Disp: 30 mL, Rfl: 5  •  Insulin Pen Needle 30G X 8 MM misc, 1 Device 4 (Four) Times a Day., Disp: 120 each, Rfl: 12  •  Insulin Syringe 31G X 5/16\" 1 ML misc, 1 each 3 (Three) Times a Day As Needed (elevated blood sugars)., Disp: 100 each, Rfl: 5  •  ipratropium-albuterol (DUO-NEB) 0.5-2.5 mg/3 ml nebulizer, Take 3 mL by nebulization Every 4 (Four) Hours As Needed for Wheezing., Disp: 360 mL, Rfl: 0  •  levocetirizine (XYZAL) 5 MG tablet, Take 1 tablet by mouth Every Evening., Disp: 30 tablet, Rfl: 5  •  levothyroxine (SYNTHROID, LEVOTHROID) 100 MCG tablet, Take 1 tablet by mouth Daily., Disp: 90 tablet, Rfl: 0  •  medroxyPROGESTERone (DEPO-PROVERA) 150 MG/ML injection, INJECT 1 ML INTO THE APPROPRIATE MUSCLE AS DIRECTED BY PRESCRIBER EVERY 3 MONTHS., Disp: 1 mL, Rfl: 3  •  metFORMIN ER (GLUCOPHAGE-XR) 500 MG 24 hr tablet, Take 1 tablet by mouth Daily With Breakfast., Disp: 30 tablet, Rfl: 5  •  methocarbamol (ROBAXIN) 500 MG tablet, Take 2 tablets by mouth 3 (Three) Times a Day As Needed for Muscle Spasms., Disp: 120 tablet, Rfl: 1  •  montelukast (SINGULAIR) 10 MG tablet, Take 1 tablet by mouth Every Night., Disp: 90 tablet, " Rfl: 0  •  Motegrity 2 MG tablet, Take 1 tablet by mouth Every Morning., Disp: , Rfl:   •  oxyCODONE-acetaminophen (PERCOCET) 7.5-325 MG per tablet, Take 1 tablet by mouth 4 (Four) Times a Day., Disp: , Rfl:   •  potassium chloride 10 MEQ CR tablet, Take 1 tablet by mouth 2 (Two) Times a Day., Disp: 180 tablet, Rfl: 0  •  Relistor 150 MG tablet, TAKE THREE TABLETS BY MOUTH EVERY MORNING, Disp: , Rfl:   •  rosuvastatin (CRESTOR) 40 MG tablet, Take 1 tablet by mouth Daily., Disp: 90 tablet, Rfl: 0  •  vitamin B-12 (CYANOCOBALAMIN) 1000 MCG tablet, Take 1 tablet by mouth Daily., Disp: 30 tablet, Rfl: 5  •  vitamin D (ERGOCALCIFEROL) 1.25 MG (04580 UT) capsule capsule, TAKE 1 CAPSULE BY MOUTH ONCE WEEKLY, Disp: 4 capsule, Rfl: 5  •  Continuous Blood Gluc Transmit (Dexcom G6 Transmitter) misc, 1 each Every 3 (Three) Months. Change every 3 months, Disp: 1 each, Rfl: 1  •  Dulaglutide (Trulicity) 1.5 MG/0.5ML solution pen-injector, Inject 1.5 mg under the skin into the appropriate area as directed 1 (One) Time Per Week., Disp: 2 mL, Rfl: 0  •  Dulaglutide (Trulicity) 3 MG/0.5ML solution pen-injector, Inject 0.5 mL under the skin into the appropriate area as directed 1 (One) Time Per Week., Disp: 2 mL, Rfl: 5    Drug Interactions  ACE inhibitors may enhance the adverse/toxic effect of metformin. This includes both a risk for hypoglycemia and for lactic acidosis.    Recommended Medications Assessment  • Aspirin - Indicated, not currently taking  • Statin - Currently Taking  • ACEi/ARB - Currently Taking    Current 10-Year ASCVD Risk: cannot calculate due to LDL <70    Relevant Laboratory Values  A1C Last 3 Results    HGBA1C Last 3 Results 9/1/22 11/8/22 2/13/23   Hemoglobin A1C 8.00 (A) 8.30 (A) 8.60 (A)   (A) Abnormal value            Lab Results   Component Value Date    HGBA1C 8.60 (H) 02/13/2023     Lab Results   Component Value Date    GLUCOSE 187 (H) 02/13/2023    CALCIUM 10.3 02/13/2023     02/13/2023    K 4.2  02/13/2023    CO2 28.6 02/13/2023    CL 98 02/13/2023    BUN 12 02/13/2023    CREATININE 0.81 02/13/2023    EGFRIFNONA 106 10/14/2021    BCR 14.8 02/13/2023    ANIONGAP 10.4 02/13/2023     Lab Results   Component Value Date    CHOL 117 11/08/2022    TRIG 228 (H) 11/08/2022    HDL 43 11/08/2022    LDL 38 11/08/2022     Microalbumin    Microalbumin 4/4/22   Microalbumin, Urine 10.5             Initial Education Provided for Specialty Medication  None    Adherence and Self-Administration  • Barriers to Patient Adherence and/or Self-Administration: Medication access  • Methods for Supporting Patient Adherence and/or Self-Administration: Enrolling patient in specialty pharmacy program and shipping services    Vaccination Status:   COVID 19: UTD  Influenza: UTD  Pneumococcal: UTD  Hep B: needs    Smoker?  • Current - Interested in cessation    Goals of Therapy  • Patient Goals of Therapy: Take medication everyday   • Clinical Goals or Therapeutic Targets, If Applicable: A1C reduction <7%    Quality of Life Assessment   The patient's current (pre-therapy) quality of life was discussed with the patient. The QOL segment of this outreach has been reviewed and updated.   • Quality of Life Score: 5    Reassessment Plan & Follow-Up  1. Patient's diabetes is not at goal with A1C of 8.6%.  2. Recent Provider Changes:    · Re-start Trulicity 1.5 mg weekly for one month, then increase to 3 mg weekly dose  3. Additional Plans, Therapy Recommendations or Therapy Problems to Be Addressed:   · Patient would like to being using [employer] services for diabetes medications. Will send updated RX to pharmacy.   · Get patient in contact with insulin pump  to switch to OmniPod 5 insulin pump.   · Patient is interested in smoking cessation. Provider to refer to [facility] clinic for smoking cessation class and treatment.   4. Pharmacist to perform regular reassessments no more than (6) months from the previous  assessment.  5. Welcome information and patient satisfaction survey to be sent by retail team with patient's initial fill.  6. Care Coordinator to set up future refill outreaches, coordinate prescription delivery, and escalate clinical questions to pharmacist.     Attestation  I attest that the initiated specialty medication(s) are appropriate for the patient based on my assessment.  If the prescribed therapy is at any point deemed not appropriate based on the current or future assessments, a consultation will be initiated with the patient's specialty care provider to determine the best course of action. The revised plan of therapy will be documented along with any additional patient education provided.     Mable Méndez, PharmD  Clinical Specialty Pharmacist, Endocrinology  2/15/2023  15:00 EST

## 2023-02-15 NOTE — ASSESSMENT & PLAN NOTE
-Diabetes is above goal with A1c 8.6.  -Discussed dietary and exercise guidelines with patient and family.  -Discussed the importance of yearly eye exams.  -Discussed the importance of checking BG's regularly. Continue using Dexcom CGM.   -Continue using insulin via Omnipod Dash.  -Patient is to call once she is home and able to tell us which new upgraded pump that she has so that we can get her trained on this pump.  -Start back on Trulicity 1.5 mg weekly.  Patient has no personal history of pancreatitis, no family history of MEN syndrome or medullary thyroid cancer. Possible side effects including nausea, bloating, other GI upset and rarely pancreatitis were discussed. She was advised to call the office with any symptoms or concerns.   -Continue Metformin 500 mg QD.  -S/S hypoglycemia reviewed with Rule of 15's advised.  -Follow-up in 4 months.

## 2023-02-15 NOTE — PROGRESS NOTES
"Chief Complaint   Patient presents with   • Diabetes        Referring Provider  Walt Tyler, DO     HPI   Soheila Brewster is a 52 y.o. female had concerns including Diabetes.    Seen as a new patient.  T2DM.    Diabetes was diagnosed  2003.  Complications include neuropathy.  Last ophtho exam was due.  Current medications for diabetes include insulin in an Omnipod Dash, Trulicity 3 mg weekly-has not had any for 5 weeks d/t shortage, Metformin 500 mg QD.  Past meds: Janumet-stopped   She checks her blood sugar dexcom CGM times per day. She has been having issues with her  charging appropriately.  Hypos: occassionally    ACE/ARB:yes, Statin: yes    She reports that she has an \"upgraded insulin pump\" that she has had for over a year in her closet, that she was able to get, but was never able to get in with someone to get trained on.  She would like to start using this system, but is not sure which pump she has.    The following portions of the patient's history were reviewed and updated as appropriate: allergies, current medications, past family history, past medical history, past social history, past surgical history and problem list.    Diet: she doesn't have a well diet    Past Medical History:   Diagnosis Date   • Arthritis    • Asthma    • Back pain    • Disease of thyroid gland    • History of transfusion    • Liver disease     fatty   • Sleep apnea    • Umbilical hernia      Past Surgical History:   Procedure Laterality Date   • BREAST SURGERY      bilat abcess   • CHOLECYSTECTOMY     • DILATATION AND CURETTAGE     • THYROID BIOPSY     • THYROIDECTOMY Right 4/1/2019    Procedure: RIGHT THYROIDECTOMY LOBECTOMY;  Surgeon: Anam Rodriguez MD;  Location: [facility];  Service: General   • US GUIDED FINE NEEDLE ASPIRATION  1/16/2020      Family History   Problem Relation Age of Onset   • Arthritis Mother    • Asthma Mother    • Cancer Mother    • COPD Mother    • Diabetes Mother    • Heart disease Mother "    • Hypertension Mother    • Hyperlipidemia Mother    • Kidney disease Mother    • Arthritis Father    • COPD Father    • Diabetes Father    • Hypertension Father    • Hyperlipidemia Father    • Arthritis Sister    • COPD Sister    • Diabetes Sister       Social History     Socioeconomic History   • Marital status:    • Number of children: 0   • Highest education level: GED or equivalent   Tobacco Use   • Smoking status: Every Day     Packs/day: 1.00     Years: 10.00     Pack years: 10.00     Types: Cigarettes   • Smokeless tobacco: Never   Vaping Use   • Vaping Use: Never used   Substance and Sexual Activity   • Alcohol use: No   • Drug use: No   • Sexual activity: Defer      Allergies   Allergen Reactions   • Morphine And Related Shortness Of Breath     Throat edema   • Penicillins      Also anything in the penicillin family        Current Outpatient Medications on File Prior to Visit   Medication Sig Dispense Refill   • albuterol sulfate HFA (Ventolin HFA) 108 (90 Base) MCG/ACT inhaler Inhale 2 puffs Every 4 (Four) Hours As Needed for Wheezing. 18 g 5   • azelastine (ASTELIN) 0.1 % nasal spray SPRAY 2 SPRAYS IN EACH NOSTRIL TWICE DAILY AS NEEDED 30 mL 5   • bumetanide (BUMEX) 2 MG tablet Take 1 tablet by mouth 2 (Two) Times a Day. 180 tablet 0   • enalapril (Vasotec) 5 MG tablet Take 1 tablet by mouth Daily. 90 tablet 0   • ezetimibe (ZETIA) 10 MG tablet Take 1 tablet by mouth Daily. 90 tablet 0   • famotidine (PEPCID) 40 MG tablet Take 1 tablet by mouth Every Night. 90 tablet 0   • Fluticasone-Umeclidin-Vilant (Trelegy Ellipta) 100-62.5-25 MCG/ACT inhaler Inhale 1 puff Daily. 60 each 3   • gabapentin (NEURONTIN) 800 MG tablet Take 1 tablet by mouth 4 (Four) Times a Day. 120 tablet 2   • Insulin Disposable Pump (Omnipod DASH Pods, Gen 4,) misc 1 Device by Subcutaneous Infusion route Every Other Day. 45 each 3   • Insulin Disposable Pump (OmniPod Dash System) kit 1 Device Daily. 1 kit 0   • Insulin Pen  "Needle 30G X 8 MM misc 1 Device 4 (Four) Times a Day. 120 each 12   • Insulin Syringe 31G X 5/16\" 1 ML misc 1 each 3 (Three) Times a Day As Needed (elevated blood sugars). 100 each 5   • ipratropium-albuterol (DUO-NEB) 0.5-2.5 mg/3 ml nebulizer Take 3 mL by nebulization Every 4 (Four) Hours As Needed for Wheezing. 360 mL 0   • levocetirizine (XYZAL) 5 MG tablet Take 1 tablet by mouth Every Evening. 30 tablet 5   • levothyroxine (SYNTHROID, LEVOTHROID) 100 MCG tablet Take 1 tablet by mouth Daily. 90 tablet 0   • medroxyPROGESTERone (DEPO-PROVERA) 150 MG/ML injection INJECT 1 ML INTO THE APPROPRIATE MUSCLE AS DIRECTED BY PRESCRIBER EVERY 3 MONTHS. 1 mL 3   • methocarbamol (ROBAXIN) 500 MG tablet Take 2 tablets by mouth 3 (Three) Times a Day As Needed for Muscle Spasms. 120 tablet 1   • montelukast (SINGULAIR) 10 MG tablet Take 1 tablet by mouth Every Night. 90 tablet 0   • Motegrity 2 MG tablet Take 1 tablet by mouth Every Morning.     • oxyCODONE-acetaminophen (PERCOCET) 7.5-325 MG per tablet Take 1 tablet by mouth 4 (Four) Times a Day.     • potassium chloride 10 MEQ CR tablet Take 1 tablet by mouth 2 (Two) Times a Day. 180 tablet 0   • Relistor 150 MG tablet TAKE THREE TABLETS BY MOUTH EVERY MORNING     • rosuvastatin (CRESTOR) 40 MG tablet Take 1 tablet by mouth Daily. 90 tablet 0   • vitamin B-12 (CYANOCOBALAMIN) 1000 MCG tablet Take 1 tablet by mouth Daily. 30 tablet 5   • vitamin D (ERGOCALCIFEROL) 1.25 MG (15586 UT) capsule capsule TAKE 1 CAPSULE BY MOUTH ONCE WEEKLY 4 capsule 5   • [DISCONTINUED] Continuous Blood Gluc  (Dexcom G6 ) device 1 Device Daily. 1 each 0   • [DISCONTINUED] Continuous Blood Gluc Sensor (Dexcom G6 Sensor) Every 10 (Ten) Days. 9 each 3   • [DISCONTINUED] Dulaglutide (Trulicity) 3 MG/0.5ML solution pen-injector Inject 0.5 mL under the skin into the appropriate area as directed 1 (One) Time Per Week. 9 mL 0   • [DISCONTINUED] insulin lispro (HumaLOG) 100 UNIT/ML injection " "Maximum daily dose of 100 units per insulin pump 30 mL 3   • [DISCONTINUED] metFORMIN ER (GLUCOPHAGE-XR) 500 MG 24 hr tablet Take 1 tablet by mouth Daily With Breakfast. 90 tablet 0     No current facility-administered medications on file prior to visit.        Review of Systems   Constitutional: Positive for fatigue. Negative for unexpected weight gain and unexpected weight loss.   Eyes: Positive for blurred vision and itching.   Endocrine: Positive for polydipsia. Negative for polyphagia and polyuria.   Psychiatric/Behavioral: Positive for sleep disturbance.   All other systems reviewed and are negative.     /90 (BP Location: Left arm, Patient Position: Sitting, Cuff Size: Adult)   Pulse 87   Ht 180.3 cm (71\")   Wt 126 kg (276 lb 12.8 oz)   SpO2 96%   BMI 38.61 kg/m²      Physical Exam  Vitals reviewed.   Constitutional:       Appearance: Normal appearance.   Eyes:      Extraocular Movements: Extraocular movements intact.   Cardiovascular:      Rate and Rhythm: Normal rate.   Pulmonary:      Effort: Pulmonary effort is normal.   Skin:     General: Skin is warm.   Neurological:      General: No focal deficit present.      Mental Status: She is alert and oriented to person, place, and time.   Psychiatric:         Mood and Affect: Mood normal.         Behavior: Behavior normal.         Thought Content: Thought content normal.         Judgment: Judgment normal.       CMP:  Lab Results   Component Value Date    BUN 12 02/13/2023    CREATININE 0.81 02/13/2023    EGFRIFNONA 106 10/14/2021    BCR 14.8 02/13/2023     02/13/2023    K 4.2 02/13/2023    CO2 28.6 02/13/2023    CALCIUM 10.3 02/13/2023    ALBUMIN 4.5 02/13/2023    BILITOT 0.4 02/13/2023    ALKPHOS 74 02/13/2023    AST 25 02/13/2023    ALT 26 02/13/2023     Lipid Panel:  Lab Results   Component Value Date    CHOL 117 11/08/2022    TRIG 228 (H) 11/08/2022    HDL 43 11/08/2022    VLDL 36 11/08/2022    LDL 38 11/08/2022     HbA1c:  Lab Results "   Component Value Date    HGBA1C 8.60 (H) 02/13/2023    HGBA1C 8.30 (H) 11/08/2022     Glucose:  Lab Results   Component Value Date    POCGLU 197 (A) 02/15/2023     Microalbumin:  No results found for: ELIZA  TSH:  Lab Results   Component Value Date    TSH 1.450 11/08/2022       Assessment and Plan    Diagnoses and all orders for this visit:    1. Type 2 diabetes mellitus with hyperglycemia, unspecified whether long term insulin use (HCC) (Primary)  Assessment & Plan:  -Diabetes is above goal with A1c 8.6.  -Discussed dietary and exercise guidelines with patient and family.  -Discussed the importance of yearly eye exams.  -Discussed the importance of checking BG's regularly. Continue using Dexcom CGM.   -Continue using insulin via Omnipod Dash.  -Patient is to call once she is home and able to tell us which new upgraded pump that she has so that we can get her trained on this pump.  -Start back on Trulicity 1.5 mg weekly.  Patient has no personal history of pancreatitis, no family history of MEN syndrome or medullary thyroid cancer. Possible side effects including nausea, bloating, other GI upset and rarely pancreatitis were discussed. She was advised to call the office with any symptoms or concerns.   -Continue Metformin 500 mg QD.  -S/S hypoglycemia reviewed with Rule of 15's advised.  -Follow-up in 4 months.      Orders:  -     POC Glucose Fingerstick  -     Ambulatory Referral to Disease State Management       Return in about 4 months (around 6/15/2023) for Follow-up appointment, A1C. The patient was instructed to contact the clinic with any interval questions or concerns.        This document has been electronically signed by JOAQUÍN Garcia  February 15, 2023 16:43 EST   Endocrinology

## 2023-02-15 NOTE — TELEPHONE ENCOUNTER
Patient called to inform that she has an Omnipod 5 upgraded pump.  Will send information to the rep to get trained on and start using this new pump.

## 2023-02-16 ENCOUNTER — TELEPHONE (OUTPATIENT)
Dept: FAMILY MEDICINE CLINIC | Facility: CLINIC | Age: 53
End: 2023-02-16
Payer: MEDICARE

## 2023-02-16 NOTE — TELEPHONE ENCOUNTER
----- Message from Walt Tyler, DO sent at 2/16/2023 12:23 PM EST -----  I reviewed your blood work.  Your hemoglobin A1c is still poorly controlled at 8.6, I see that Mechelle Reyes has been working with you on this.  Your white blood cell count also was a little more elevated than normal.  15.85.  This is probably from diabetes and smoking, recommend recheck at follow-up.  We will discuss further at your follow-up next month.      Attempted to contact pt, no voicemail available to leave message.

## 2023-02-16 NOTE — TELEPHONE ENCOUNTER
----- Message from Walt Tyler DO sent at 2/16/2023 12:21 PM EST -----  X-rays of your neck and shoulder were normal.      Attempted to reach by phone, no answer and no voicemail.

## 2023-02-17 ENCOUNTER — TELEPHONE (OUTPATIENT)
Dept: FAMILY MEDICINE CLINIC | Facility: CLINIC | Age: 53
End: 2023-02-17
Payer: MEDICARE

## 2023-02-17 NOTE — TELEPHONE ENCOUNTER
----- Message from Walt Tyler DO sent at 2/16/2023 12:21 PM EST -----  X-rays of your neck and shoulder were normal.      Patient notified and expressed understanding.

## 2023-02-17 NOTE — TELEPHONE ENCOUNTER
----- Message from Walt Tyler, DO sent at 2/16/2023 12:23 PM EST -----  I reviewed your blood work.  Your hemoglobin A1c is still poorly controlled at 8.6, I see that Mechelle Reyes has been working with you on this.  Your white blood cell count also was a little more elevated than normal.  15.85.  This is probably from diabetes and smoking, recommend recheck at follow-up.  We will discuss further at your follow-up next month.      Patient notified and expressed understanding.

## 2023-03-01 ENCOUNTER — DISEASE STATE MANAGEMENT VISIT (OUTPATIENT)
Dept: PHARMACY | Facility: HOSPITAL | Age: 53
End: 2023-03-01
Payer: MEDICARE

## 2023-03-01 RX ORDER — NICOTINE 21 MG/24HR
1 PATCH, TRANSDERMAL 24 HOURS TRANSDERMAL EVERY 24 HOURS
Qty: 28 PATCH | Refills: 0 | Status: SHIPPED | OUTPATIENT
Start: 2023-03-01

## 2023-03-01 RX ORDER — POLYETHYLENE GLYCOL 3350 17 G
4 POWDER IN PACKET (EA) ORAL AS NEEDED
Qty: 100 EACH | Refills: 0 | Status: SHIPPED | OUTPATIENT
Start: 2023-03-01

## 2023-03-01 NOTE — PROGRESS NOTES
Tobacco Cessation Pharmacy Note    Years of tobacco use: Smoked age [age] then quit.  Restarted in 2005 (19 years)  Average number of cigarettes or other tobacco/nicotine products per day: 1.5 ppd  Number of Previous Quit Attempts: One, quit cold turkey  Previous tobacco cessation medication attempts, failures, intolerances: Quit cold turkey for 13 years, restarted after she had some traumatic life experiences.   Other recreational substance use: N/A  1st cigarette of the day smoked: Immediately after waking    Past Medical History:   Diagnosis Date   • Arthritis    • Asthma    • Back pain    • Disease of thyroid gland    • History of transfusion    • Liver disease     fatty   • Sleep apnea    • Umbilical hernia      Allergies   Allergen Reactions   • Morphine And Related Shortness Of Breath     Throat edema   • Penicillins      Also anything in the penicillin family       Current Outpatient Medications   Medication Sig Dispense Refill   • albuterol sulfate HFA (Ventolin HFA) 108 (90 Base) MCG/ACT inhaler Inhale 2 puffs Every 4 (Four) Hours As Needed for Wheezing. 18 g 5   • azelastine (ASTELIN) 0.1 % nasal spray SPRAY 2 SPRAYS IN EACH NOSTRIL TWICE DAILY AS NEEDED 30 mL 5   • bumetanide (BUMEX) 2 MG tablet Take 1 tablet by mouth 2 (Two) Times a Day. 180 tablet 0   • Continuous Blood Gluc  (Dexcom G6 ) device Use to monitor blood glucose 1 each 0   • Continuous Blood Gluc Sensor (Dexcom G6 Sensor) Every 10 (Ten) Days. 3 each 5   • Continuous Blood Gluc Transmit (Dexcom G6 Transmitter) misc 1 each Every 3 (Three) Months. Change every 3 months 1 each 1   • Dulaglutide (Trulicity) 1.5 MG/0.5ML solution pen-injector Inject 1.5 mg under the skin into the appropriate area as directed 1 (One) Time Per Week. 2 mL 0   • enalapril (Vasotec) 5 MG tablet Take 1 tablet by mouth Daily. 90 tablet 0   • ezetimibe (ZETIA) 10 MG tablet Take 1 tablet by mouth Daily. 90 tablet 0   • famotidine (PEPCID) 40 MG tablet Take  "1 tablet by mouth Every Night. 90 tablet 0   • Fluticasone-Umeclidin-Vilant (Trelegy Ellipta) 100-62.5-25 MCG/ACT inhaler Inhale 1 puff Daily. 60 each 3   • gabapentin (NEURONTIN) 800 MG tablet Take 1 tablet by mouth 4 (Four) Times a Day. 120 tablet 2   • Insulin Disposable Pump (Omnipod 5 G6 Pod, Gen 5,) misc Use 1  Every Other Day. 45 each 1   • Insulin Disposable Pump (Omnipod DASH Pods, Gen 4,) misc 1 Device by Subcutaneous Infusion route Every Other Day. 45 each 3   • Insulin Disposable Pump (OmniPod Dash System) kit 1 Device Daily. 1 kit 0   • Insulin Lispro (humaLOG) 100 UNIT/ML injection Use a maximum daily dose of 100 units per insulin pump 30 mL 5   • Insulin Pen Needle 30G X 8 MM misc 1 Device 4 (Four) Times a Day. 120 each 12   • Insulin Syringe 31G X 5/16\" 1 ML misc 1 each 3 (Three) Times a Day As Needed (elevated blood sugars). 100 each 5   • ipratropium-albuterol (DUO-NEB) 0.5-2.5 mg/3 ml nebulizer Take 3 mL by nebulization Every 4 (Four) Hours As Needed for Wheezing. 360 mL 0   • levocetirizine (XYZAL) 5 MG tablet Take 1 tablet by mouth Every Evening. 30 tablet 5   • levothyroxine (SYNTHROID, LEVOTHROID) 100 MCG tablet Take 1 tablet by mouth Daily. 90 tablet 0   • medroxyPROGESTERone (DEPO-PROVERA) 150 MG/ML injection INJECT 1 ML INTO THE APPROPRIATE MUSCLE AS DIRECTED BY PRESCRIBER EVERY 3 MONTHS. 1 mL 3   • metFORMIN ER (GLUCOPHAGE-XR) 500 MG 24 hr tablet Take 1 tablet by mouth Daily With Breakfast. 30 tablet 5   • methocarbamol (ROBAXIN) 500 MG tablet Take 2 tablets by mouth 3 (Three) Times a Day As Needed for Muscle Spasms. 120 tablet 1   • montelukast (SINGULAIR) 10 MG tablet Take 1 tablet by mouth Every Night. 90 tablet 0   • Motegrity 2 MG tablet Take 1 tablet by mouth Every Morning.     • oxyCODONE-acetaminophen (PERCOCET) 7.5-325 MG per tablet Take 1 tablet by mouth 4 (Four) Times a Day.     • potassium chloride 10 MEQ CR tablet Take 1 tablet by mouth 2 (Two) Times a Day. 180 tablet 0   • " Relistor 150 MG tablet TAKE THREE TABLETS BY MOUTH EVERY MORNING     • rosuvastatin (CRESTOR) 40 MG tablet Take 1 tablet by mouth Daily. 90 tablet 0   • vitamin B-12 (CYANOCOBALAMIN) 1000 MCG tablet Take 1 tablet by mouth Daily. 30 tablet 5   • vitamin D (ERGOCALCIFEROL) 1.25 MG (67312 UT) capsule capsule TAKE 1 CAPSULE BY MOUTH ONCE WEEKLY 4 capsule 5   • Dulaglutide (Trulicity) 3 MG/0.5ML solution pen-injector Inject 0.5 mL under the skin into the appropriate area as directed 1 (One) Time Per Week. 2 mL 5     No current facility-administered medications for this visit.       Assessment:     Readiness to quit: Stage 2: Ready to quit in the next month    Plan:     1. Quit Date Planned: March 8th, 2023  2. Behavioral options for quitting reviewed  3. Provided additional resources to help with tobacco cessation. Provided patient with benefits of quitting handout.    4. Medication options reviewed.  Risks, benefits, and side effects discussed and questions answered.   5. Education was provided regarding: motivation to cease tobacco use, drug information on the medication dispensed (including directions for use and adverse effects), nicotine withdrawal symptoms, lifestyle modifications and techniques to prevent relapse.   6. Pursuant to the [place] Board of Pharmacy Approved Protocol,  will order: Nicotine Patch 21mg Daily and Lozenge 4mg every 1-2 hours prn.   7. Will notify the patient's PCP, Dr. Tyler.  8. Will follow up with patient in 4 weeks      Jenelle Scott, PharmD  03/01/23  15:10 EST

## 2023-03-09 ENCOUNTER — SPECIALTY PHARMACY (OUTPATIENT)
Dept: PHARMACY | Facility: HOSPITAL | Age: 53
End: 2023-03-09
Payer: MEDICARE

## 2023-03-13 ENCOUNTER — OFFICE VISIT (OUTPATIENT)
Dept: FAMILY MEDICINE CLINIC | Facility: CLINIC | Age: 53
End: 2023-03-13
Payer: MEDICARE

## 2023-03-13 VITALS
WEIGHT: 270.4 LBS | HEIGHT: 71 IN | TEMPERATURE: 97.8 F | DIASTOLIC BLOOD PRESSURE: 76 MMHG | BODY MASS INDEX: 37.85 KG/M2 | HEART RATE: 101 BPM | OXYGEN SATURATION: 99 % | SYSTOLIC BLOOD PRESSURE: 130 MMHG

## 2023-03-13 DIAGNOSIS — M25.552 CHRONIC LEFT HIP PAIN: Primary | ICD-10-CM

## 2023-03-13 DIAGNOSIS — D72.829 LEUKOCYTOSIS, UNSPECIFIED TYPE: ICD-10-CM

## 2023-03-13 DIAGNOSIS — G89.29 CHRONIC LEFT HIP PAIN: Primary | ICD-10-CM

## 2023-03-13 PROCEDURE — 3052F HG A1C>EQUAL 8.0%<EQUAL 9.0%: CPT | Performed by: FAMILY MEDICINE

## 2023-03-13 PROCEDURE — 3075F SYST BP GE 130 - 139MM HG: CPT | Performed by: FAMILY MEDICINE

## 2023-03-13 PROCEDURE — 36415 COLL VENOUS BLD VENIPUNCTURE: CPT | Performed by: FAMILY MEDICINE

## 2023-03-13 PROCEDURE — 85025 COMPLETE CBC W/AUTO DIFF WBC: CPT | Performed by: FAMILY MEDICINE

## 2023-03-13 PROCEDURE — 99213 OFFICE O/P EST LOW 20 MIN: CPT | Performed by: FAMILY MEDICINE

## 2023-03-13 PROCEDURE — 3078F DIAST BP <80 MM HG: CPT | Performed by: FAMILY MEDICINE

## 2023-03-13 NOTE — PROGRESS NOTES
Subjective   Soheila Brewster is a 52 y.o. female.   Pt presents today with CC of Hip Pain (Left hip)      History of Present Illness   History of Present Illness  1.  Patient is a 52-year-old female with persistent leukocytosis, WBC around 12 at baseline.  Was most recently at 15.  She says she feels all right.  She is agreeable to recheck.  She is a current everyday smoker and has uncontrolled diabetes  #2 she is following up on left hip pain.  MRI showed what may be tendinitis.  Her symptoms are constant, she reports a burning sensation over her anterior thigh, also has pain with movement of her hip.  She has chronic widespread pain.  She is on high-dose narcotics and high-dose gabapentin daily       The following portions of the patient's history were reviewed and updated as appropriate: allergies, current medications, past family history, past medical history, past social history, past surgical history and problem list.    Review of Systems   Constitutional: Negative for chills, fever and unexpected weight loss.   HENT: Negative for congestion and sore throat.    Eyes: Negative for blurred vision and visual disturbance.   Respiratory: Negative for cough and wheezing.    Cardiovascular: Negative for chest pain and palpitations.   Gastrointestinal: Negative for abdominal pain and diarrhea.   Endocrine: Negative for cold intolerance and heat intolerance.   Genitourinary: Negative for dysuria.   Musculoskeletal: Positive for arthralgias. Negative for neck stiffness.   Neurological: Negative for dizziness, seizures and syncope.   Psychiatric/Behavioral: Negative for self-injury, suicidal ideas and depressed mood.       Objective   Physical Exam  Vitals and nursing note reviewed.   Constitutional:       Appearance: She is well-developed.   HENT:      Head: Normocephalic and atraumatic.      Right Ear: External ear normal.      Left Ear: External ear normal.      Nose: Nose normal.   Eyes:      Conjunctiva/sclera:  Conjunctivae normal.      Pupils: Pupils are equal, round, and reactive to light.   Cardiovascular:      Rate and Rhythm: Normal rate and regular rhythm.      Heart sounds: Normal heart sounds.   Pulmonary:      Effort: Pulmonary effort is normal.      Breath sounds: Normal breath sounds.   Abdominal:      General: Bowel sounds are normal.      Palpations: Abdomen is soft.   Musculoskeletal:      Cervical back: Normal range of motion and neck supple.      Comments: Normal gait   Skin:     General: Skin is warm and dry.   Neurological:      Mental Status: She is alert and oriented to person, place, and time.   Psychiatric:         Behavior: Behavior normal.           Assessment & Plan   Diagnoses and all orders for this visit:    1. Chronic left hip pain (Primary)  -     Ambulatory Referral to Orthopedic Surgery  Unclear etiology of her left hip pain.  Meralgia paresthetica is most likely, though the pain with movement of her hip suggest that tendinitis may be contributing.  As she is already on high-dose narcotics and gabapentin, she already sees a pain specialist who has tried injections, will refer to orthopedist or their opinion.  I reviewed her MRI with her.  2. Leukocytosis, unspecified type  -     CBC w AUTO Differential; Future               Soheila Brewster  reports that she has been smoking cigarettes. She has a 15.00 pack-year smoking history. She has never used smokeless tobacco.. I have educated her on the risk of diseases from using tobacco products such as cancer, COPD and heart disease.     I advised her to quit and she is not willing to quit.    I spent 3  minutes counseling the patient.         Class 2 Severe Obesity (BMI >=35 and <=39.9). Obesity-related health conditions include the following: diabetes mellitus. Obesity is unchanged. BMI is is above average; BMI management plan is completed. We discussed portion control and increasing exercise.          This document has been electronically signed by  Diana Saint Thomas  March 13, 2023 14:01 EDT    Dictated Utilizing Dragon Dictation: Part of this note may be an electronic transcription/translation of spoken language to printed text using the Dragon Dictation System.

## 2023-03-14 ENCOUNTER — TELEPHONE (OUTPATIENT)
Dept: FAMILY MEDICINE CLINIC | Facility: CLINIC | Age: 53
End: 2023-03-14
Payer: MEDICARE

## 2023-03-14 LAB
BASOPHILS # BLD AUTO: 0.08 10*3/MM3 (ref 0–0.2)
BASOPHILS NFR BLD AUTO: 0.6 % (ref 0–1.5)
DEPRECATED RDW RBC AUTO: 43.7 FL (ref 37–54)
EOSINOPHIL # BLD AUTO: 0.46 10*3/MM3 (ref 0–0.4)
EOSINOPHIL NFR BLD AUTO: 3.7 % (ref 0.3–6.2)
ERYTHROCYTE [DISTWIDTH] IN BLOOD BY AUTOMATED COUNT: 14.4 % (ref 12.3–15.4)
HCT VFR BLD AUTO: 38.7 % (ref 34–46.6)
HGB BLD-MCNC: 13 G/DL (ref 12–15.9)
IMM GRANULOCYTES # BLD AUTO: 0.03 10*3/MM3 (ref 0–0.05)
IMM GRANULOCYTES NFR BLD AUTO: 0.2 % (ref 0–0.5)
LYMPHOCYTES # BLD AUTO: 3.88 10*3/MM3 (ref 0.7–3.1)
LYMPHOCYTES NFR BLD AUTO: 31 % (ref 19.6–45.3)
MCH RBC QN AUTO: 28.6 PG (ref 26.6–33)
MCHC RBC AUTO-ENTMCNC: 33.6 G/DL (ref 31.5–35.7)
MCV RBC AUTO: 85.1 FL (ref 79–97)
MONOCYTES # BLD AUTO: 0.8 10*3/MM3 (ref 0.1–0.9)
MONOCYTES NFR BLD AUTO: 6.4 % (ref 5–12)
NEUTROPHILS NFR BLD AUTO: 58.1 % (ref 42.7–76)
NEUTROPHILS NFR BLD AUTO: 7.28 10*3/MM3 (ref 1.7–7)
NRBC BLD AUTO-RTO: 0.1 /100 WBC (ref 0–0.2)
PLATELET # BLD AUTO: 179 10*3/MM3 (ref 140–450)
PMV BLD AUTO: 12.1 FL (ref 6–12)
RBC # BLD AUTO: 4.55 10*6/MM3 (ref 3.77–5.28)
WBC NRBC COR # BLD: 12.53 10*3/MM3 (ref 3.4–10.8)

## 2023-03-14 NOTE — TELEPHONE ENCOUNTER
----- Message from Walt Tyler DO sent at 3/14/2023  8:49 AM EDT -----  White blood cell count returned back to your baseline.  No concerns at this time.  You should be getting a call about your appointment with orthopedic specialist.    Patient notified.

## 2023-03-16 DIAGNOSIS — M25.552 LEFT HIP PAIN: Primary | ICD-10-CM

## 2023-03-20 ENCOUNTER — OFFICE VISIT (OUTPATIENT)
Dept: ORTHOPEDIC SURGERY | Facility: CLINIC | Age: 53
End: 2023-03-20
Payer: MEDICARE

## 2023-03-20 ENCOUNTER — HOSPITAL ENCOUNTER (OUTPATIENT)
Dept: GENERAL RADIOLOGY | Facility: HOSPITAL | Age: 53
Discharge: HOME | End: 2023-03-20
Admitting: ORTHOPAEDIC SURGERY
Payer: MEDICARE

## 2023-03-20 VITALS
BODY MASS INDEX: 37.8 KG/M2 | DIASTOLIC BLOOD PRESSURE: 80 MMHG | WEIGHT: 270 LBS | HEIGHT: 71 IN | SYSTOLIC BLOOD PRESSURE: 127 MMHG | HEART RATE: 71 BPM

## 2023-03-20 DIAGNOSIS — G89.29 OTHER CHRONIC PAIN: ICD-10-CM

## 2023-03-20 DIAGNOSIS — M25.552 LEFT HIP PAIN: ICD-10-CM

## 2023-03-20 DIAGNOSIS — M70.62 GREATER TROCHANTERIC BURSITIS OF LEFT HIP: Primary | ICD-10-CM

## 2023-03-20 PROCEDURE — 99203 OFFICE O/P NEW LOW 30 MIN: CPT | Performed by: ORTHOPAEDIC SURGERY

## 2023-03-20 PROCEDURE — 20550 NJX 1 TENDON SHEATH/LIGAMENT: CPT | Performed by: ORTHOPAEDIC SURGERY

## 2023-03-20 PROCEDURE — 73502 X-RAY EXAM HIP UNI 2-3 VIEWS: CPT

## 2023-03-20 PROCEDURE — 73502 X-RAY EXAM HIP UNI 2-3 VIEWS: CPT | Performed by: RADIOLOGY

## 2023-03-20 RX ORDER — METHOCARBAMOL 500 MG/1
TABLET, FILM COATED ORAL
Qty: 120 TABLET | Refills: 4 | OUTPATIENT
Start: 2023-03-20

## 2023-03-20 RX ADMIN — METHYLPREDNISOLONE ACETATE 40 MG: 40 INJECTION, SUSPENSION INTRA-ARTICULAR; INTRALESIONAL; INTRAMUSCULAR; SOFT TISSUE at 23:46

## 2023-03-20 RX ADMIN — LIDOCAINE HYDROCHLORIDE 5 ML: 20 INJECTION, SOLUTION INFILTRATION; PERINEURAL at 23:46

## 2023-03-20 NOTE — PROGRESS NOTES
New Patient Visit      Patient: Soheila Brewster  YOB: 1970  Date of Encounter: 03/20/2023        Chief Complaint:   Chief Complaint   Patient presents with   • Left Hip - Initial Evaluation, Pain           HPI:   Soheila Brewster, 52 y.o. female, referred by Walt Tyler DO presents for evaluation of swelling lateral posterior aspect left hip and pain which began about 6 to 8 months ago.  She recalls no trauma.  She was seen in pain management and was provided steroid injection left hip without relief.  She recalls no trauma.  She does have pain with ambulation also pain and difficulty sleeping on her left side denies weakness or numbness to her left leg.  Past medical history is remarkable for type 2 diabetes generative disc disease lumbar thyroid disease status post thyroidectomy and lobectomy social history is positive for tobacco only she currently receives gabapentin 4 times daily        Active Problem List:  Patient Active Problem List   Diagnosis   • Tobacco abuse disorder   • Type 2 diabetes mellitus with diabetic neuropathy, with long-term current use of insulin ([facility])   • Essential hypertension   • Arthritis   • Chronic allergic rhinitis   • Chronic midline thoracic back pain   • Vitamin D deficiency   • Left breast abscess   • Protrusion of intervertebral disc of lumbosacral region   • Class 2 severe obesity due to excess calories with serious comorbidity and body mass index (BMI) of 39.0 to 39.9 in adult ([facility])   • Lymphadenopathy   • Hepatomegaly   • Left thyroid nodule   • Degeneration of intervertebral disc of thoracic region with osteophyte   • Gastroesophageal reflux disease without esophagitis   • Status post thyroid surgery   • Multilevel degenerative disc disease   • Seasonal affective disorder (HCC)   • Dyslipidemia   • Insulin pump titration   • Counseling for insulin pump   • Asthma   • Type 2 diabetes mellitus with hyperglycemia ([facility])   • Therapeutic opioid induced  constipation           Past Medical History:  Past Medical History:   Diagnosis Date   • Arthritis    • Asthma    • Back pain    • Disease of thyroid gland    • History of transfusion    • Liver disease     fatty   • Sleep apnea    • Umbilical hernia            Past Surgical History:  Past Surgical History:   Procedure Laterality Date   • BREAST SURGERY      bilat abcess   • CHOLECYSTECTOMY     • DILATATION AND CURETTAGE     • THYROID BIOPSY     • THYROIDECTOMY Right 4/1/2019    Procedure: RIGHT THYROIDECTOMY LOBECTOMY;  Surgeon: Anam Rodriguez MD;  Location: [facility];  Service: General   • US GUIDED FINE NEEDLE ASPIRATION  1/16/2020           Family History:  Family History   Problem Relation Age of Onset   • Arthritis Mother    • Asthma Mother    • Cancer Mother    • COPD Mother    • Diabetes Mother    • Heart disease Mother    • Hypertension Mother    • Hyperlipidemia Mother    • Kidney disease Mother    • Arthritis Father    • COPD Father    • Diabetes Father    • Hypertension Father    • Hyperlipidemia Father    • Arthritis Sister    • COPD Sister    • Diabetes Sister    • Arrhythmia Sister          Social History:  Social History     Socioeconomic History   • Marital status:    • Number of children: 0   • Highest education level: GED or equivalent   Tobacco Use   • Smoking status: Every Day     Packs/day: 1.00     Years: 10.00     Pack years: 10.00     Types: Cigarettes   • Smokeless tobacco: Never   • Tobacco comments:     Patient is working on quitting    Vaping Use   • Vaping Use: Never used   Substance and Sexual Activity   • Alcohol use: No   • Drug use: No   • Sexual activity: Defer     Body mass index is 37.66 kg/m².      Medications:  Current Outpatient Medications   Medication Sig Dispense Refill   • albuterol sulfate HFA (Ventolin HFA) 108 (90 Base) MCG/ACT inhaler Inhale 2 puffs Every 4 (Four) Hours As Needed for Wheezing. 18 g 5   • azelastine (ASTELIN) 0.1 % nasal spray SPRAY 2 SPRAYS IN  EACH NOSTRIL TWICE DAILY AS NEEDED 30 mL 5   • bumetanide (BUMEX) 2 MG tablet Take 1 tablet by mouth 2 (Two) Times a Day. 180 tablet 0   • Dulaglutide (Trulicity) 3 MG/0.5ML solution pen-injector Inject 0.5 mL under the skin into the appropriate area as directed 1 (One) Time Per Week. 2 mL 5   • enalapril (Vasotec) 5 MG tablet Take 1 tablet by mouth Daily. 90 tablet 0   • ezetimibe (ZETIA) 10 MG tablet Take 1 tablet by mouth Daily. 90 tablet 0   • famotidine (PEPCID) 40 MG tablet Take 1 tablet by mouth Every Night. 90 tablet 0   • Fluticasone-Umeclidin-Vilant (Trelegy Ellipta) 100-62.5-25 MCG/ACT inhaler Inhale 1 puff Daily. 60 each 3   • gabapentin (NEURONTIN) 800 MG tablet Take 1 tablet by mouth 4 (Four) Times a Day. 120 tablet 2   • Insulin Lispro (humaLOG) 100 UNIT/ML injection Use a maximum daily dose of 100 units per insulin pump 30 mL 5   • ipratropium-albuterol (DUO-NEB) 0.5-2.5 mg/3 ml nebulizer Take 3 mL by nebulization Every 4 (Four) Hours As Needed for Wheezing. 360 mL 0   • levocetirizine (XYZAL) 5 MG tablet Take 1 tablet by mouth Every Evening. 30 tablet 5   • levothyroxine (SYNTHROID, LEVOTHROID) 100 MCG tablet Take 1 tablet by mouth Daily. 90 tablet 0   • medroxyPROGESTERone (DEPO-PROVERA) 150 MG/ML injection INJECT 1 ML INTO THE APPROPRIATE MUSCLE AS DIRECTED BY PRESCRIBER EVERY 3 MONTHS. 1 mL 3   • metFORMIN ER (GLUCOPHAGE-XR) 500 MG 24 hr tablet Take 1 tablet by mouth Daily With Breakfast. 30 tablet 5   • methocarbamol (ROBAXIN) 500 MG tablet Take 2 tablets by mouth 3 (Three) Times a Day As Needed for Muscle Spasms. 120 tablet 1   • montelukast (SINGULAIR) 10 MG tablet Take 1 tablet by mouth Every Night. 90 tablet 0   • Motegrity 2 MG tablet Take 1 tablet by mouth Every Morning.     • nicotine (NICODERM CQ) 21 MG/24HR patch Place 1 patch on the skin as directed by provider Daily. 28 patch 0   • nicotine polacrilex (Nicotine Mini) 4 MG lozenge Dissolve 1 lozenge in the mouth As Needed for Smoking  "Cessation (Every 1-2 hours as needed.  No more than 20/day). 100 each 0   • oxyCODONE-acetaminophen (PERCOCET) 7.5-325 MG per tablet Take 1 tablet by mouth 4 (Four) Times a Day.     • potassium chloride 10 MEQ CR tablet Take 1 tablet by mouth 2 (Two) Times a Day. 180 tablet 0   • Relistor 150 MG tablet TAKE THREE TABLETS BY MOUTH EVERY MORNING     • rosuvastatin (CRESTOR) 40 MG tablet Take 1 tablet by mouth Daily. 90 tablet 0   • vitamin B-12 (CYANOCOBALAMIN) 1000 MCG tablet Take 1 tablet by mouth Daily. 30 tablet 5   • vitamin D (ERGOCALCIFEROL) 1.25 MG (87961 UT) capsule capsule TAKE 1 CAPSULE BY MOUTH ONCE WEEKLY 4 capsule 5   • Continuous Blood Gluc  (Dexcom G6 ) device Use to monitor blood glucose (Patient not taking: Reported on 3/20/2023) 1 each 0   • Continuous Blood Gluc Sensor (Dexcom G6 Sensor) Every 10 (Ten) Days. (Patient not taking: Reported on 3/20/2023) 3 each 5   • Continuous Blood Gluc Transmit (Dexcom G6 Transmitter) misc 1 each Every 3 (Three) Months. Change every 3 months (Patient not taking: Reported on 3/20/2023) 1 each 1   • Dulaglutide (Trulicity) 1.5 MG/0.5ML solution pen-injector Inject 1.5 mg under the skin into the appropriate area as directed 1 (One) Time Per Week. (Patient not taking: Reported on 3/20/2023) 2 mL 0   • Insulin Disposable Pump (Omnipod 5 G6 Pod, Gen 5,) misc Use 1  Every Other Day. (Patient not taking: Reported on 3/20/2023) 45 each 1   • Insulin Disposable Pump (Omnipod DASH Pods, Gen 4,) misc 1 Device by Subcutaneous Infusion route Every Other Day. (Patient not taking: Reported on 3/20/2023) 45 each 3   • Insulin Disposable Pump (OmniPod Dash System) kit 1 Device Daily. (Patient not taking: Reported on 3/20/2023) 1 kit 0   • Insulin Pen Needle 30G X 8 MM misc 1 Device 4 (Four) Times a Day. (Patient not taking: Reported on 3/20/2023) 120 each 12   • Insulin Syringe 31G X 5/16\" 1 ML misc 1 each 3 (Three) Times a Day As Needed (elevated blood sugars). " (Patient not taking: Reported on 3/20/2023) 100 each 5     No current facility-administered medications for this visit.         Allergies:  Allergies   Allergen Reactions   • Morphine And Related Shortness Of Breath     Throat edema   • Penicillins Shortness Of Breath     Also anything in the penicillin family           Review of Systems:   Review of Systems   Constitutional: Negative for chills, fatigue and fever.   HENT: Positive for sinus pain. Negative for congestion, ear pain, facial swelling, sore throat, trouble swallowing and voice change.    Eyes: Positive for pain and discharge. Negative for redness and visual disturbance.   Respiratory: Positive for apnea, cough, shortness of breath and wheezing. Negative for choking, chest tightness and stridor.    Cardiovascular: Positive for leg swelling. Negative for chest pain and palpitations.   Gastrointestinal: Positive for abdominal pain, constipation and nausea. Negative for abdominal distention, blood in stool and vomiting.   Endocrine: Negative.  Negative for cold intolerance, heat intolerance, polydipsia and polyphagia.   Genitourinary: Negative.  Negative for difficulty urinating, dysuria, flank pain, frequency and hematuria.   Musculoskeletal: Positive for arthralgias, back pain, joint swelling and neck pain.   Skin: Negative.  Negative for color change, pallor, rash and wound.   Allergic/Immunologic: Negative.  Negative for environmental allergies, food allergies and immunocompromised state.   Neurological: Positive for weakness and numbness. Negative for dizziness, tremors, seizures, syncope, speech difficulty, light-headedness and headaches.   Hematological: Negative.  Negative for adenopathy. Does not bruise/bleed easily.   Psychiatric/Behavioral: Negative.  Negative for behavioral problems, confusion, dysphoric mood, self-injury, sleep disturbance and suicidal ideas. The patient is not nervous/anxious.          Physical Exam:   Physical Exam  GENERAL:  "52 y.o. female, alert and oriented X 3 in no acute distress.   Visit Vitals  /80   Pulse 71   Ht 180.3 cm (71\")   Wt 122 kg (270 lb)   BMI 37.66 kg/m²       GENERAL APPEARANCE: Awake, alert & oriented, in no acute distress and well developed, well nourished.   PSYCH: Normal mood and affect  LUNGS: Breathing nonlabored, no wheezing  EYES: Sclera anicteric, pupils equal  CARDIOVASCULAR: Palpable pulses. Capillary refill less than 2 seconds  INTEGUMENTARY: Skin intact, co clubbing, cyanosis  NEUROLOGIC: Normal Sensation  MUSCULOSKELETAL:  Orthopedic Examination: Hip reveals equal leg lengths with full flexion full internal and external rotation compared to the right soft tissue mass posterior lateral aspect region of the proximal greater trochanter.  Underlying area is tender to palpation.  She does have moderate pain with hip abduction.          Radiology/Labs:     XR Hip With or Without Pelvis 2 - 3 View Left    Result Date: 3/20/2023    Mild osteoarthritis left hip.  This report was finalized on 3/20/2023 10:12 AM by Dr. Cameron Jain MD.        Radiographs left hip are unremarkable by report and by my review.  MRI completed of the left hip describes gluteus minimus moderate tendinopathy and peritendinitis with bursitis.    Assessment & Plan:   52 y.o. female presents with posterior lateral left hip pain region of the gluteus minimus and medius insertion with soft tissue swelling and associated tenderness her findings are consistent with gluteus minimus tendinitis and greater trochanteric bursitis.  We discussed options this was followed by steroid injection gluteus minimus insertion in the central portion of her soft tissue swelling.  We will assess her response in 2 weeks.        ICD-10-CM ICD-9-CM   1. Greater trochanteric bursitis of left hip  M70.62 726.5         - Injection Tendon or Ligament    Date/Time: 3/20/2023 11:46 PM  Performed by: Dc Pantoja MD  Authorized by: Dc Pantoja " "MD Chi   Consent: Verbal consent obtained. Written consent obtained.  Risks and benefits: risks, benefits and alternatives were discussed  Consent given by: patient  Patient understanding: patient states understanding of the procedure being performed  Patient consent: the patient's understanding of the procedure matches consent given  Procedure consent: procedure consent matches procedure scheduled  Test results: test results available and properly labeled  Site marked: the operative site was marked  Imaging studies: imaging studies available  Patient identity confirmed: verbally with patient  Time out: Immediately prior to procedure a \"time out\" was called to verify the correct patient, procedure, equipment, support staff and site/side marked as required.  Preparation: Patient was prepped and draped in the usual sterile fashion.  Local anesthesia used: yes  Anesthesia: local infiltration    Anesthesia:  Local anesthesia used: yes  Anesthetic total: 5 mL    Sedation:  Patient sedated: no    Patient tolerance: Patient tolerated the procedure well with no immediate complications  Medications administered: 5 mL lidocaine 2%; 40 mg methylPREDNISolone acetate 40 MG/ML            Cc:   Walt Tyler, DO                This document has been electronically signed by Dc Pantoja MD   March 20, 2023 23:44 EDT      "

## 2023-03-31 RX ORDER — LIDOCAINE HYDROCHLORIDE 20 MG/ML
5 INJECTION, SOLUTION INFILTRATION; PERINEURAL
Status: COMPLETED | OUTPATIENT
Start: 2023-03-20 | End: 2023-03-20

## 2023-03-31 RX ORDER — METHYLPREDNISOLONE ACETATE 40 MG/ML
40 INJECTION, SUSPENSION INTRA-ARTICULAR; INTRALESIONAL; INTRAMUSCULAR; SOFT TISSUE
Status: COMPLETED | OUTPATIENT
Start: 2023-03-20 | End: 2023-03-20

## 2023-04-03 ENCOUNTER — OFFICE VISIT (OUTPATIENT)
Dept: ORTHOPEDIC SURGERY | Facility: CLINIC | Age: 53
End: 2023-04-03
Payer: MEDICARE

## 2023-04-03 VITALS — HEIGHT: 71 IN | WEIGHT: 268.96 LBS | BODY MASS INDEX: 37.65 KG/M2

## 2023-04-03 DIAGNOSIS — M70.62 GREATER TROCHANTERIC BURSITIS OF LEFT HIP: ICD-10-CM

## 2023-04-03 DIAGNOSIS — S76.012A TEAR OF LEFT GLUTEUS MINIMUS TENDON, INITIAL ENCOUNTER: Primary | ICD-10-CM

## 2023-04-03 PROCEDURE — 99213 OFFICE O/P EST LOW 20 MIN: CPT | Performed by: ORTHOPAEDIC SURGERY

## 2023-04-03 NOTE — PROGRESS NOTES
Follow-up Visit         Patient: Soheila Brewster  YOB: 1970  Date of Encounter: 04/03/2023      Chief  Complaint:   Chief Complaint   Patient presents with   • Left Hip - Follow-up, Pain         HPI:  Soheila Brewster, 52 y.o. female presents in follow-up lateral left hip pain 6 to 8 months in duration without trauma.  She has had 2 steroid injections left hip with minimal improvement.  She was last seen March 20, 2023 and provided steroid injection posterior aspect of left greater trochanter.  She reports relief of about 1 day.  Has pain at rest unable to sleep on her left side also has pain with walking.  Past medical history includes type 2 diabetes and degenerative disc disease of the lumbar spine hip.        Medical History:  Patient Active Problem List   Diagnosis   • Tobacco abuse disorder   • Type 2 diabetes mellitus with diabetic neuropathy, with long-term current use of insulin   • Essential hypertension   • Arthritis   • Chronic allergic rhinitis   • Chronic midline thoracic back pain   • Vitamin D deficiency   • Left breast abscess   • Protrusion of intervertebral disc of lumbosacral region   • Class 2 severe obesity due to excess calories with serious comorbidity and body mass index (BMI) of 39.0 to 39.9 in adult (HCC)   • Lymphadenopathy   • Hepatomegaly   • Left thyroid nodule   • Degeneration of intervertebral disc of thoracic region with osteophyte   • Gastroesophageal reflux disease without esophagitis   • Status post thyroid surgery   • Multilevel degenerative disc disease   • Seasonal affective disorder   • Dyslipidemia   • Insulin pump titration   • Counseling for insulin pump   • Asthma   • Type 2 diabetes mellitus with hyperglycemia   • Therapeutic opioid induced constipation     Past Medical History:   Diagnosis Date   • Arthritis    • Asthma    • Back pain    • Disease of thyroid gland    • History of transfusion    • Liver disease     fatty   • Sleep apnea    • Umbilical  hernia          Social History:  Social History     Socioeconomic History   • Marital status:    • Number of children: 0   • Highest education level: GED or equivalent   Tobacco Use   • Smoking status: Every Day     Packs/day: 1.00     Years: 10.00     Pack years: 10.00     Types: Cigarettes   • Smokeless tobacco: Never   • Tobacco comments:     Patient is working on quitting    Vaping Use   • Vaping Use: Never used   Substance and Sexual Activity   • Alcohol use: No   • Drug use: No   • Sexual activity: Defer         Current Medications:    Current Outpatient Medications:   •  albuterol sulfate HFA (Ventolin HFA) 108 (90 Base) MCG/ACT inhaler, Inhale 2 puffs Every 4 (Four) Hours As Needed for Wheezing., Disp: 18 g, Rfl: 5  •  azelastine (ASTELIN) 0.1 % nasal spray, SPRAY 2 SPRAYS IN EACH NOSTRIL TWICE DAILY AS NEEDED, Disp: 30 mL, Rfl: 5  •  bumetanide (BUMEX) 2 MG tablet, Take 1 tablet by mouth 2 (Two) Times a Day., Disp: 180 tablet, Rfl: 0  •  Continuous Blood Gluc  (Dexcom G6 ) device, Use to monitor blood glucose, Disp: 1 each, Rfl: 0  •  Continuous Blood Gluc Sensor (Dexcom G6 Sensor), Every 10 (Ten) Days., Disp: 3 each, Rfl: 5  •  Continuous Blood Gluc Transmit (Dexcom G6 Transmitter) misc, 1 each Every 3 (Three) Months. Change every 3 months, Disp: 1 each, Rfl: 1  •  Dulaglutide (Trulicity) 1.5 MG/0.5ML solution pen-injector, Inject 1.5 mg under the skin into the appropriate area as directed 1 (One) Time Per Week., Disp: 2 mL, Rfl: 0  •  Dulaglutide (Trulicity) 3 MG/0.5ML solution pen-injector, Inject 0.5 mL under the skin into the appropriate area as directed 1 (One) Time Per Week., Disp: 2 mL, Rfl: 5  •  enalapril (Vasotec) 5 MG tablet, Take 1 tablet by mouth Daily., Disp: 90 tablet, Rfl: 0  •  ezetimibe (ZETIA) 10 MG tablet, Take 1 tablet by mouth Daily., Disp: 90 tablet, Rfl: 0  •  famotidine (PEPCID) 40 MG tablet, Take 1 tablet by mouth Every Night., Disp: 90 tablet, Rfl: 0  •   "Fluticasone-Umeclidin-Vilant (Trelegy Ellipta) 100-62.5-25 MCG/ACT inhaler, Inhale 1 puff Daily., Disp: 60 each, Rfl: 3  •  gabapentin (NEURONTIN) 800 MG tablet, Take 1 tablet by mouth 4 (Four) Times a Day., Disp: 120 tablet, Rfl: 2  •  Insulin Disposable Pump (Omnipod 5 G6 Pod, Gen 5,) misc, Use 1  Every Other Day., Disp: 45 each, Rfl: 1  •  Insulin Disposable Pump (Omnipod DASH Pods, Gen 4,) misc, 1 Device by Subcutaneous Infusion route Every Other Day., Disp: 45 each, Rfl: 3  •  Insulin Disposable Pump (OmniPod Dash System) kit, 1 Device Daily., Disp: 1 kit, Rfl: 0  •  Insulin Lispro (humaLOG) 100 UNIT/ML injection, Use a maximum daily dose of 100 units per insulin pump, Disp: 30 mL, Rfl: 5  •  Insulin Pen Needle 30G X 8 MM misc, 1 Device 4 (Four) Times a Day., Disp: 120 each, Rfl: 12  •  Insulin Syringe 31G X 5/16\" 1 ML misc, 1 each 3 (Three) Times a Day As Needed (elevated blood sugars)., Disp: 100 each, Rfl: 5  •  ipratropium-albuterol (DUO-NEB) 0.5-2.5 mg/3 ml nebulizer, Take 3 mL by nebulization Every 4 (Four) Hours As Needed for Wheezing., Disp: 360 mL, Rfl: 0  •  levocetirizine (XYZAL) 5 MG tablet, Take 1 tablet by mouth Every Evening., Disp: 30 tablet, Rfl: 5  •  levothyroxine (SYNTHROID, LEVOTHROID) 100 MCG tablet, Take 1 tablet by mouth Daily., Disp: 90 tablet, Rfl: 0  •  medroxyPROGESTERone (DEPO-PROVERA) 150 MG/ML injection, INJECT 1 ML INTO THE APPROPRIATE MUSCLE AS DIRECTED BY PRESCRIBER EVERY 3 MONTHS., Disp: 1 mL, Rfl: 3  •  metFORMIN ER (GLUCOPHAGE-XR) 500 MG 24 hr tablet, Take 1 tablet by mouth Daily With Breakfast., Disp: 30 tablet, Rfl: 5  •  methocarbamol (ROBAXIN) 500 MG tablet, Take 2 tablets by mouth 3 (Three) Times a Day As Needed for Muscle Spasms., Disp: 120 tablet, Rfl: 1  •  montelukast (SINGULAIR) 10 MG tablet, Take 1 tablet by mouth Every Night., Disp: 90 tablet, Rfl: 0  •  Motegrity 2 MG tablet, Take 1 tablet by mouth Every Morning., Disp: , Rfl:   •  nicotine (NICODERM CQ) 21 " MG/24HR patch, Place 1 patch on the skin as directed by provider Daily., Disp: 28 patch, Rfl: 0  •  nicotine polacrilex (Nicotine Mini) 4 MG lozenge, Dissolve 1 lozenge in the mouth As Needed for Smoking Cessation (Every 1-2 hours as needed.  No more than 20/day)., Disp: 100 each, Rfl: 0  •  oxyCODONE-acetaminophen (PERCOCET) 7.5-325 MG per tablet, Take 1 tablet by mouth 4 (Four) Times a Day., Disp: , Rfl:   •  potassium chloride 10 MEQ CR tablet, Take 1 tablet by mouth 2 (Two) Times a Day., Disp: 180 tablet, Rfl: 0  •  Relistor 150 MG tablet, TAKE THREE TABLETS BY MOUTH EVERY MORNING, Disp: , Rfl:   •  rosuvastatin (CRESTOR) 40 MG tablet, Take 1 tablet by mouth Daily., Disp: 90 tablet, Rfl: 0  •  vitamin B-12 (CYANOCOBALAMIN) 1000 MCG tablet, Take 1 tablet by mouth Daily., Disp: 30 tablet, Rfl: 5  •  vitamin D (ERGOCALCIFEROL) 1.25 MG (81874 UT) capsule capsule, TAKE 1 CAPSULE BY MOUTH ONCE WEEKLY, Disp: 4 capsule, Rfl: 5      Allergies:  Allergies   Allergen Reactions   • Morphine And Related Shortness Of Breath     Throat edema   • Penicillins Shortness Of Breath     Also anything in the penicillin family           Family History:  Family History   Problem Relation Age of Onset   • Arthritis Mother    • Asthma Mother    • Cancer Mother    • COPD Mother    • Diabetes Mother    • Heart disease Mother    • Hypertension Mother    • Hyperlipidemia Mother    • Kidney disease Mother    • Arthritis Father    • COPD Father    • Diabetes Father    • Hypertension Father    • Hyperlipidemia Father    • Arthritis Sister    • COPD Sister    • Diabetes Sister    • Arrhythmia Sister          Surgical History:  Past Surgical History:   Procedure Laterality Date   • BREAST SURGERY      bilat abcess   • CHOLECYSTECTOMY     • DILATATION AND CURETTAGE     • THYROID BIOPSY     • THYROIDECTOMY Right 4/1/2019    Procedure: RIGHT THYROIDECTOMY LOBECTOMY;  Surgeon: Anam Rodriguez MD;  Location: [facility];  Service: General   • US GUIDED  FINE NEEDLE ASPIRATION  1/16/2020         Radiology:   XR Hip With or Without Pelvis 2 - 3 View Left    Result Date: 3/20/2023    Mild osteoarthritis left hip.  This report was finalized on 3/20/2023 10:12 AM by Dr. Cameron Jain MD.          Radiographs left hip reviewed are negative.  RI of left femur including left hip shows he is minimus moderate tendinopathy and peritendinitis by description.      Orthopedic Examination: Left hip straits full mobility.  There is soft tissue mass of approximately 10 cm in diameter lateral aspect left hip slightly posterior.  It is not mobile moderately tender without increased warmth or erythema.        Assessment & Plan:   52 y.o. female presents with 6 to 8-month history of lateral left hip pain moderate soft tissue swelling region of the greater trochanter with minimal response to your point injection in the region of swelling.  We discussed her options I think she is best served with consultation at the [facility] she wishes to proceed consultation is placed today.  She will return to this office as needed.         Diagnosis Plan   1. Tear of left gluteus minimus tendon, initial encounter  Ambulatory Referral to Orthopedic Surgery      2. Greater trochanteric bursitis of left hip              Cc:  Watl Tyler, DO              This document has been electronically signed by Dc Pantoja MD   April 5, 2023 07:31 EDT

## 2023-04-04 ENCOUNTER — SPECIALTY PHARMACY (OUTPATIENT)
Dept: PHARMACY | Facility: HOSPITAL | Age: 53
End: 2023-04-04
Payer: MEDICARE

## 2023-04-04 ENCOUNTER — TELEPHONE (OUTPATIENT)
Dept: ORTHOPEDIC SURGERY | Facility: CLINIC | Age: 53
End: 2023-04-04
Payer: MEDICARE

## 2023-04-04 NOTE — TELEPHONE ENCOUNTER
Notified patient of her appointment on 04/17/2023 arrival time 12:40 with Dr. Maximiliano Calhoun phone #[phone]. Faxed demographics, reports and office notes to [phone]. Instructed patient to take her disc with her to the appointment.

## 2023-04-17 ENCOUNTER — SPECIALTY PHARMACY (OUTPATIENT)
Dept: PHARMACY | Facility: HOSPITAL | Age: 53
End: 2023-04-17
Payer: MEDICARE

## 2023-04-21 DIAGNOSIS — G89.29 OTHER CHRONIC PAIN: ICD-10-CM

## 2023-04-24 RX ORDER — METHOCARBAMOL 500 MG/1
TABLET, FILM COATED ORAL
Qty: 120 TABLET | Refills: 0 | Status: SHIPPED | OUTPATIENT
Start: 2023-04-24

## 2023-04-27 DIAGNOSIS — Z79.4 TYPE 2 DIABETES MELLITUS WITH HYPERGLYCEMIA, WITH LONG-TERM CURRENT USE OF INSULIN: ICD-10-CM

## 2023-04-27 DIAGNOSIS — E11.65 TYPE 2 DIABETES MELLITUS WITH HYPERGLYCEMIA, WITH LONG-TERM CURRENT USE OF INSULIN: ICD-10-CM

## 2023-04-29 DIAGNOSIS — E55.9 VITAMIN D DEFICIENCY: ICD-10-CM

## 2023-05-01 DIAGNOSIS — J44.9 CHRONIC OBSTRUCTIVE PULMONARY DISEASE, UNSPECIFIED COPD TYPE: ICD-10-CM

## 2023-05-01 RX ORDER — METFORMIN HYDROCHLORIDE 500 MG/1
TABLET, EXTENDED RELEASE ORAL
Qty: 90 TABLET | Refills: 0 | Status: SHIPPED | OUTPATIENT
Start: 2023-05-01

## 2023-05-01 RX ORDER — ERGOCALCIFEROL 1.25 MG/1
CAPSULE ORAL
Qty: 4 CAPSULE | Refills: 5 | OUTPATIENT
Start: 2023-05-01

## 2023-05-01 RX ORDER — POTASSIUM CHLORIDE 750 MG/1
TABLET, EXTENDED RELEASE ORAL
Qty: 60 TABLET | Refills: 5 | OUTPATIENT
Start: 2023-05-01

## 2023-05-01 RX ORDER — FLUTICASONE FUROATE, UMECLIDINIUM BROMIDE AND VILANTEROL TRIFENATATE 100; 62.5; 25 UG/1; UG/1; UG/1
POWDER RESPIRATORY (INHALATION)
Qty: 60 EACH | Refills: 3 | Status: SHIPPED | OUTPATIENT
Start: 2023-05-01

## 2023-05-10 DIAGNOSIS — E55.9 VITAMIN D DEFICIENCY: ICD-10-CM

## 2023-05-10 RX ORDER — ERGOCALCIFEROL 1.25 MG/1
CAPSULE ORAL
Qty: 4 CAPSULE | Refills: 5 | OUTPATIENT
Start: 2023-05-10

## 2023-05-11 ENCOUNTER — SPECIALTY PHARMACY (OUTPATIENT)
Dept: PHARMACY | Facility: HOSPITAL | Age: 53
End: 2023-05-11
Payer: MEDICARE

## 2023-05-11 DIAGNOSIS — E11.65 TYPE 2 DIABETES MELLITUS WITH HYPERGLYCEMIA, WITH LONG-TERM CURRENT USE OF INSULIN: ICD-10-CM

## 2023-05-11 DIAGNOSIS — Z79.4 TYPE 2 DIABETES MELLITUS WITH HYPERGLYCEMIA, WITH LONG-TERM CURRENT USE OF INSULIN: ICD-10-CM

## 2023-05-11 RX ORDER — DULAGLUTIDE 3 MG/.5ML
3 INJECTION, SOLUTION SUBCUTANEOUS WEEKLY
Qty: 2 ML | Refills: 5 | Status: SHIPPED | OUTPATIENT
Start: 2023-05-11

## 2023-05-11 RX ORDER — PROCHLORPERAZINE 25 MG/1
SUPPOSITORY RECTAL
Qty: 3 EACH | Refills: 11 | Status: SHIPPED | OUTPATIENT
Start: 2023-05-11

## 2023-05-11 RX ORDER — INSULIN LISPRO 100 [IU]/ML
INJECTION, SOLUTION INTRAVENOUS; SUBCUTANEOUS
Qty: 30 ML | Refills: 5 | Status: SHIPPED | OUTPATIENT
Start: 2023-05-11

## 2023-05-11 RX ORDER — PROCHLORPERAZINE 25 MG/1
1 SUPPOSITORY RECTAL
Qty: 1 EACH | Refills: 3 | Status: SHIPPED | OUTPATIENT
Start: 2023-05-11

## 2023-05-12 NOTE — PROGRESS NOTES
Specialty Pharmacy Patient Management Program  Program Disenrollment      Soheila Brewster is a 52 y.o. female seen by an Endocrinology provider for Type 2 Diabetes. Patient was previously enrolled in the Endocrinology Patient Management program offered by [facility].      Disenrolling patient today as her insurance will no longer allow us to ship medications to her home. She doesn't have transportation to [place] often and would like her prescriptions to be sent to local pharmacy. Sent request to provider.     Mable Méndez, PharmD, [facility]   5/12/2023  13:03 EDT

## 2023-05-15 DIAGNOSIS — E03.9 ACQUIRED HYPOTHYROIDISM: ICD-10-CM

## 2023-05-16 ENCOUNTER — OFFICE VISIT (OUTPATIENT)
Dept: FAMILY MEDICINE CLINIC | Facility: CLINIC | Age: 53
End: 2023-05-16
Payer: MEDICARE

## 2023-05-16 VITALS
DIASTOLIC BLOOD PRESSURE: 68 MMHG | TEMPERATURE: 96.7 F | SYSTOLIC BLOOD PRESSURE: 122 MMHG | OXYGEN SATURATION: 99 % | WEIGHT: 272.2 LBS | HEIGHT: 71 IN | BODY MASS INDEX: 38.11 KG/M2 | HEART RATE: 89 BPM

## 2023-05-16 DIAGNOSIS — R11.14 BILIOUS VOMITING WITH NAUSEA: ICD-10-CM

## 2023-05-16 DIAGNOSIS — F17.200 CURRENT EVERY DAY SMOKER: ICD-10-CM

## 2023-05-16 DIAGNOSIS — E78.2 MIXED HYPERLIPIDEMIA: ICD-10-CM

## 2023-05-16 DIAGNOSIS — D72.829 LEUKOCYTOSIS, UNSPECIFIED TYPE: Primary | ICD-10-CM

## 2023-05-16 DIAGNOSIS — E03.9 ACQUIRED HYPOTHYROIDISM: ICD-10-CM

## 2023-05-16 DIAGNOSIS — E11.65 TYPE 2 DIABETES MELLITUS WITH HYPERGLYCEMIA, WITH LONG-TERM CURRENT USE OF INSULIN: ICD-10-CM

## 2023-05-16 DIAGNOSIS — R10.9 FLANK PAIN: ICD-10-CM

## 2023-05-16 DIAGNOSIS — K21.9 GASTROESOPHAGEAL REFLUX DISEASE WITHOUT ESOPHAGITIS: ICD-10-CM

## 2023-05-16 DIAGNOSIS — R60.1 GENERALIZED EDEMA: ICD-10-CM

## 2023-05-16 DIAGNOSIS — J44.9 CHRONIC OBSTRUCTIVE PULMONARY DISEASE, UNSPECIFIED COPD TYPE: ICD-10-CM

## 2023-05-16 DIAGNOSIS — J30.1 CHRONIC SEASONAL ALLERGIC RHINITIS DUE TO POLLEN: ICD-10-CM

## 2023-05-16 DIAGNOSIS — R60.9 TRACE EDEMA: ICD-10-CM

## 2023-05-16 DIAGNOSIS — R06.02 SHORTNESS OF BREATH: ICD-10-CM

## 2023-05-16 DIAGNOSIS — Z79.4 TYPE 2 DIABETES MELLITUS WITH HYPERGLYCEMIA, WITH LONG-TERM CURRENT USE OF INSULIN: ICD-10-CM

## 2023-05-16 DIAGNOSIS — I10 ESSENTIAL HYPERTENSION: ICD-10-CM

## 2023-05-16 PROCEDURE — 1159F MED LIST DOCD IN RCRD: CPT | Performed by: FAMILY MEDICINE

## 2023-05-16 PROCEDURE — 80053 COMPREHEN METABOLIC PANEL: CPT | Performed by: FAMILY MEDICINE

## 2023-05-16 PROCEDURE — 3052F HG A1C>EQUAL 8.0%<EQUAL 9.0%: CPT | Performed by: FAMILY MEDICINE

## 2023-05-16 PROCEDURE — 84443 ASSAY THYROID STIM HORMONE: CPT | Performed by: FAMILY MEDICINE

## 2023-05-16 PROCEDURE — 99214 OFFICE O/P EST MOD 30 MIN: CPT | Performed by: FAMILY MEDICINE

## 2023-05-16 PROCEDURE — 80061 LIPID PANEL: CPT | Performed by: FAMILY MEDICINE

## 2023-05-16 PROCEDURE — 83880 ASSAY OF NATRIURETIC PEPTIDE: CPT | Performed by: FAMILY MEDICINE

## 2023-05-16 PROCEDURE — 1160F RVW MEDS BY RX/DR IN RCRD: CPT | Performed by: FAMILY MEDICINE

## 2023-05-16 PROCEDURE — 3078F DIAST BP <80 MM HG: CPT | Performed by: FAMILY MEDICINE

## 2023-05-16 PROCEDURE — 83036 HEMOGLOBIN GLYCOSYLATED A1C: CPT | Performed by: FAMILY MEDICINE

## 2023-05-16 PROCEDURE — 3074F SYST BP LT 130 MM HG: CPT | Performed by: FAMILY MEDICINE

## 2023-05-16 PROCEDURE — 85027 COMPLETE CBC AUTOMATED: CPT | Performed by: FAMILY MEDICINE

## 2023-05-16 PROCEDURE — 81001 URINALYSIS AUTO W/SCOPE: CPT | Performed by: FAMILY MEDICINE

## 2023-05-16 RX ORDER — METFORMIN HYDROCHLORIDE 500 MG/1
500 TABLET, EXTENDED RELEASE ORAL
Qty: 90 TABLET | Refills: 0 | Status: SHIPPED | OUTPATIENT
Start: 2023-05-16

## 2023-05-16 RX ORDER — IPRATROPIUM BROMIDE AND ALBUTEROL SULFATE 2.5; .5 MG/3ML; MG/3ML
3 SOLUTION RESPIRATORY (INHALATION) EVERY 4 HOURS PRN
Qty: 360 ML | Refills: 0 | Status: SHIPPED | OUTPATIENT
Start: 2023-05-16

## 2023-05-16 RX ORDER — ROSUVASTATIN CALCIUM 40 MG/1
40 TABLET, COATED ORAL DAILY
Qty: 90 TABLET | Refills: 0 | Status: SHIPPED | OUTPATIENT
Start: 2023-05-16

## 2023-05-16 RX ORDER — ENALAPRIL MALEATE 5 MG/1
5 TABLET ORAL DAILY
Qty: 90 TABLET | Refills: 0 | Status: SHIPPED | OUTPATIENT
Start: 2023-05-16

## 2023-05-16 RX ORDER — LEVOTHYROXINE SODIUM 0.1 MG/1
TABLET ORAL
Qty: 90 TABLET | Refills: 0 | Status: SHIPPED | OUTPATIENT
Start: 2023-05-16 | End: 2023-05-16 | Stop reason: SDUPTHER

## 2023-05-16 RX ORDER — POTASSIUM CHLORIDE 750 MG/1
10 TABLET, FILM COATED, EXTENDED RELEASE ORAL 2 TIMES DAILY
Qty: 180 TABLET | Refills: 0 | Status: SHIPPED | OUTPATIENT
Start: 2023-05-16

## 2023-05-16 RX ORDER — MONTELUKAST SODIUM 10 MG/1
10 TABLET ORAL NIGHTLY
Qty: 90 TABLET | Refills: 0 | Status: SHIPPED | OUTPATIENT
Start: 2023-05-16

## 2023-05-16 RX ORDER — BUMETANIDE 2 MG/1
2 TABLET ORAL 2 TIMES DAILY
Qty: 180 TABLET | Refills: 0 | Status: SHIPPED | OUTPATIENT
Start: 2023-05-16

## 2023-05-16 RX ORDER — LEVOCETIRIZINE DIHYDROCHLORIDE 5 MG/1
5 TABLET, FILM COATED ORAL EVERY EVENING
Qty: 90 TABLET | Refills: 0 | Status: SHIPPED | OUTPATIENT
Start: 2023-05-16

## 2023-05-16 RX ORDER — LEVOTHYROXINE SODIUM 0.1 MG/1
100 TABLET ORAL DAILY
Qty: 90 TABLET | Refills: 0 | Status: SHIPPED | OUTPATIENT
Start: 2023-05-16

## 2023-05-16 RX ORDER — ALBUTEROL SULFATE 90 UG/1
2 AEROSOL, METERED RESPIRATORY (INHALATION) EVERY 4 HOURS PRN
Qty: 18 G | Refills: 5 | Status: SHIPPED | OUTPATIENT
Start: 2023-05-16

## 2023-05-16 RX ORDER — FAMOTIDINE 40 MG/1
40 TABLET, FILM COATED ORAL NIGHTLY
Qty: 90 TABLET | Refills: 0 | Status: SHIPPED | OUTPATIENT
Start: 2023-05-16

## 2023-05-16 RX ORDER — EZETIMIBE 10 MG/1
10 TABLET ORAL DAILY
Qty: 90 TABLET | Refills: 0 | Status: SHIPPED | OUTPATIENT
Start: 2023-05-16

## 2023-05-16 NOTE — PROGRESS NOTES
Subjective   Soheila Brewster is a 53 y.o. female.   Pt presents today with CC of Hypertension      History of Present Illness   History of Present Illness  1.  Patient is a 53-year-old female here complaining of 2 weeks of nausea and vomiting, no diarrhea.  She reports that her blood pressure has been labile.  She reports low blood pressures that have resulted in dizziness, she has not taken enalapril in 5 days because of this.  She denies abdominal pain or fevers  #2 she has generalized edema with current use of Bumex 2 mg twice a day, she has not held any of these doses and takes potassium chloride 10 mill equivalents twice daily.  She has no known history of CHF reported  #3 she has COPD, she is current everyday smoker and has no interest in quitting.  #4 she has type 2 diabetes, she follows with endocrinology.  She is due for hemoglobin A1c.  #5 she has hyperlipidemia for which she takes Zetia, Crestor.  #6 she has GERD, no worsening of symptoms recently.  She has famotidine 40 mg nightly for this.  #7 she has hypothyroidism for which she takes levothyroxine 100 mcg daily.  #8 she complains of right flank pain associated with nausea.  She is agreeable to urinalysis.  #9 she complains of shortness of breath that is worse than her chronic shortness of breath.  Denies cough or congestion, though with exertion she becomes fatigued quickly.           The following portions of the patient's history were reviewed and updated as appropriate: allergies, current medications, past family history, past medical history, past social history, past surgical history and problem list.    Review of Systems   Constitutional: Positive for fatigue. Negative for chills, fever and unexpected weight loss.   HENT: Negative for congestion and sore throat.    Eyes: Negative for blurred vision and visual disturbance.   Respiratory: Positive for shortness of breath. Negative for cough and wheezing.    Cardiovascular: Negative for chest pain  and palpitations.   Gastrointestinal: Positive for nausea and vomiting. Negative for abdominal pain and diarrhea.   Endocrine: Negative for cold intolerance and heat intolerance.   Genitourinary: Negative for dysuria.   Musculoskeletal: Negative for arthralgias and neck stiffness.   Neurological: Negative for dizziness, seizures and syncope.   Psychiatric/Behavioral: Negative for self-injury, suicidal ideas and depressed mood.       Objective   Physical Exam  Vitals and nursing note reviewed.   Constitutional:       Appearance: She is well-developed.   HENT:      Head: Normocephalic and atraumatic.      Right Ear: External ear normal.      Left Ear: External ear normal.      Nose: Nose normal.   Eyes:      Conjunctiva/sclera: Conjunctivae normal.      Pupils: Pupils are equal, round, and reactive to light.   Cardiovascular:      Rate and Rhythm: Normal rate and regular rhythm.      Heart sounds: Normal heart sounds.   Pulmonary:      Effort: Pulmonary effort is normal. No respiratory distress.      Breath sounds: Normal breath sounds. No rhonchi.   Abdominal:      General: Bowel sounds are normal.      Palpations: Abdomen is soft.   Musculoskeletal:      Cervical back: Normal range of motion and neck supple.   Skin:     General: Skin is warm and dry.   Neurological:      Mental Status: She is alert and oriented to person, place, and time.   Psychiatric:         Behavior: Behavior normal.           Assessment & Plan   Diagnoses and all orders for this visit:    1. Leukocytosis, unspecified type (Primary)  -     CBC No Differential; Future    2. Chronic obstructive pulmonary disease, unspecified COPD type  -     albuterol sulfate HFA (Ventolin HFA) 108 (90 Base) MCG/ACT inhaler; Inhale 2 puffs Every 4 (Four) Hours As Needed for Wheezing.  Dispense: 18 g; Refill: 5    3. Generalized edema  -     bumetanide (BUMEX) 2 MG tablet; Take 1 tablet by mouth 2 (Two) Times a Day.  Dispense: 180 tablet; Refill: 0  -     potassium  chloride 10 MEQ CR tablet; Take 1 tablet by mouth 2 (Two) Times a Day.  Dispense: 180 tablet; Refill: 0  -     proBNP; Future    4. Type 2 diabetes mellitus with hyperglycemia, with long-term current use of insulin  -     Hemoglobin A1c; Future  -     Comprehensive metabolic panel; Future  -     metFORMIN ER (GLUCOPHAGE-XR) 500 MG 24 hr tablet; Take 1 tablet by mouth Daily With Breakfast.  Dispense: 90 tablet; Refill: 0    5. Essential hypertension  -     enalapril (Vasotec) 5 MG tablet; Take 1 tablet by mouth Daily.  Dispense: 90 tablet; Refill: 0  Recommend holding enalapril if her blood pressure is less than 100/60.  Otherwise continue the medication.  May consider holding Bumex as well.  Getting blood work.  If she is dehydrated, will hold Bumex, though she reports that she has been swelling also.  6. Mixed hyperlipidemia  -     ezetimibe (ZETIA) 10 MG tablet; Take 1 tablet by mouth Daily.  Dispense: 90 tablet; Refill: 0  -     rosuvastatin (CRESTOR) 40 MG tablet; Take 1 tablet by mouth Daily.  Dispense: 90 tablet; Refill: 0  -     Lipid panel; Future    7. Gastroesophageal reflux disease without esophagitis  -     famotidine (PEPCID) 40 MG tablet; Take 1 tablet by mouth Every Night.  Dispense: 90 tablet; Refill: 0    8. Chronic seasonal allergic rhinitis due to pollen  -     levocetirizine (XYZAL) 5 MG tablet; Take 1 tablet by mouth Every Evening.  Dispense: 90 tablet; Refill: 0  -     montelukast (SINGULAIR) 10 MG tablet; Take 1 tablet by mouth Every Night.  Dispense: 90 tablet; Refill: 0    9. Acquired hypothyroidism  -     levothyroxine (SYNTHROID, LEVOTHROID) 100 MCG tablet; Take 1 tablet by mouth Daily.  Dispense: 90 tablet; Refill: 0  -     TSH; Future    10. Bilious vomiting with nausea    11. Flank pain  -     Urinalysis With Culture If Indicated -; Future  -     proBNP; Future    12. Trace edema  -     proBNP; Future    13. Shortness of breath  -     proBNP; Future  Pulmonary exam is baseline, no rales  or rhonchi.  Vital signs are normal today  14. Current every day smoker    Other orders  -     ipratropium-albuterol (DUO-NEB) 0.5-2.5 mg/3 ml nebulizer; Take 3 mL by nebulization Every 4 (Four) Hours As Needed for Wheezing.  Dispense: 360 mL; Refill: 0               Soheila Brewster  reports that she has been smoking cigarettes. She has a 10.00 pack-year smoking history. She has never used smokeless tobacco.. I have educated her on the risk of diseases from using tobacco products such as cancer, COPD and heart disease.     I advised her to quit and she is not willing to quit.    I spent 3  minutes counseling the patient.         Class 2 Severe Obesity (BMI >=35 and <=39.9). Obesity-related health conditions include the following: hypertension, diabetes mellitus and dyslipidemias. Obesity is unchanged. BMI is is above average; BMI management plan is completed. We discussed portion control and increasing exercise.          This document has been electronically signed by Diana Erickson  May 16, 2023 09:44 EDT    Dictated Utilizing Dragon Dictation: Part of this note may be an electronic transcription/translation of spoken language to printed text using the Dragon Dictation System.

## 2023-05-17 LAB
ALBUMIN SERPL-MCNC: 4 G/DL (ref 3.5–5.2)
ALBUMIN/GLOB SERPL: 1.5 G/DL
ALP SERPL-CCNC: 65 U/L (ref 39–117)
ALT SERPL W P-5'-P-CCNC: 43 U/L (ref 1–33)
AMORPH URATE CRY URNS QL MICRO: NORMAL /HPF
ANION GAP SERPL CALCULATED.3IONS-SCNC: 13.9 MMOL/L (ref 5–15)
AST SERPL-CCNC: 30 U/L (ref 1–32)
BACTERIA UR QL AUTO: NORMAL /HPF
BILIRUB SERPL-MCNC: 0.3 MG/DL (ref 0–1.2)
BILIRUB UR QL STRIP: NEGATIVE
BUN SERPL-MCNC: 12 MG/DL (ref 6–20)
BUN/CREAT SERPL: 18.2 (ref 7–25)
CALCIUM SPEC-SCNC: 10.2 MG/DL (ref 8.6–10.5)
CHLORIDE SERPL-SCNC: 103 MMOL/L (ref 98–107)
CHOLEST SERPL-MCNC: 83 MG/DL (ref 0–200)
CLARITY UR: ABNORMAL
CO2 SERPL-SCNC: 26.1 MMOL/L (ref 22–29)
COLOR UR: ABNORMAL
CREAT SERPL-MCNC: 0.66 MG/DL (ref 0.57–1)
DEPRECATED RDW RBC AUTO: 41.2 FL (ref 37–54)
EGFRCR SERPLBLD CKD-EPI 2021: 105.7 ML/MIN/1.73
ERYTHROCYTE [DISTWIDTH] IN BLOOD BY AUTOMATED COUNT: 13.3 % (ref 12.3–15.4)
GLOBULIN UR ELPH-MCNC: 2.7 GM/DL
GLUCOSE SERPL-MCNC: 142 MG/DL (ref 65–99)
GLUCOSE UR STRIP-MCNC: ABNORMAL MG/DL
HBA1C MFR BLD: 8.4 % (ref 4.8–5.6)
HCT VFR BLD AUTO: 36.8 % (ref 34–46.6)
HDLC SERPL-MCNC: 37 MG/DL (ref 40–60)
HGB BLD-MCNC: 12.5 G/DL (ref 12–15.9)
HGB UR QL STRIP.AUTO: NEGATIVE
HYALINE CASTS UR QL AUTO: NORMAL /LPF
KETONES UR QL STRIP: ABNORMAL
LDLC SERPL CALC-MCNC: 23 MG/DL (ref 0–100)
LDLC/HDLC SERPL: 0.54 {RATIO}
LEUKOCYTE ESTERASE UR QL STRIP.AUTO: NEGATIVE
MCH RBC QN AUTO: 28.7 PG (ref 26.6–33)
MCHC RBC AUTO-ENTMCNC: 34 G/DL (ref 31.5–35.7)
MCV RBC AUTO: 84.6 FL (ref 79–97)
NITRITE UR QL STRIP: NEGATIVE
NT-PROBNP SERPL-MCNC: 25.9 PG/ML (ref 0–900)
PH UR STRIP.AUTO: 6 [PH] (ref 5–8)
PLATELET # BLD AUTO: 191 10*3/MM3 (ref 140–450)
PMV BLD AUTO: 11.6 FL (ref 6–12)
POTASSIUM SERPL-SCNC: 3.6 MMOL/L (ref 3.5–5.2)
PROT SERPL-MCNC: 6.7 G/DL (ref 6–8.5)
PROT UR QL STRIP: ABNORMAL
RBC # BLD AUTO: 4.35 10*6/MM3 (ref 3.77–5.28)
RBC # UR STRIP: NORMAL /HPF
REF LAB TEST METHOD: NORMAL
SODIUM SERPL-SCNC: 143 MMOL/L (ref 136–145)
SP GR UR STRIP: >=1.03 (ref 1–1.03)
SQUAMOUS #/AREA URNS HPF: NORMAL /HPF
TRIGL SERPL-MCNC: 131 MG/DL (ref 0–150)
TSH SERPL DL<=0.05 MIU/L-ACNC: 1.13 UIU/ML (ref 0.27–4.2)
UROBILINOGEN UR QL STRIP: ABNORMAL
VLDLC SERPL-MCNC: 23 MG/DL (ref 5–40)
WBC # UR STRIP: NORMAL /HPF
WBC NRBC COR # BLD: 12.64 10*3/MM3 (ref 3.4–10.8)

## 2023-05-19 ENCOUNTER — TELEPHONE (OUTPATIENT)
Dept: FAMILY MEDICINE CLINIC | Facility: CLINIC | Age: 53
End: 2023-05-19
Payer: MEDICARE

## 2023-05-19 NOTE — TELEPHONE ENCOUNTER
Caller: Soheila Brewster    Relationship: Self    Best call back number:  [phone]     What test was performed:  BLOOD WORK AND URINE TEST     When was the test performed: 5-16-23    Where was the test performed:  IN OFFICE       Additional notes: PLEASE CALL WITH TEST RESULTS   ALSO PATIENT SAID HER BLOOD PRESSURE IS REALLY LOW AND SHE IS DIZZY, SICK TO HER STOMACH, RIGHT SIDE STOMACH PAIN  AND HAS DIARRHEA       We will discuss her labs when she comes back in next week.  No major concerns on blood work.  Urinalysis showed 2+ proteinuria with no infection suspected    Spoke with patient & notified her of labs ,she reported her BP as 52/48 had her check it again while on phone 94/65,reports she is having more abdominal pain,dizziness & diarrhea,please advise.    Her blood work was stable.  Everything looked good.  Vital signs were normal when you were here as well.  If you continue to have drops in your blood pressure that are causing dizziness, I recommend going to the emergency room for evaluation.     Patient notified & verbalized understanding.

## 2023-05-19 NOTE — TELEPHONE ENCOUNTER
Caller: Soheila Brewster    Relationship: Self    Best call back number:  [phone]     What test was performed:  BLOOD WORK AND URINE TEST     When was the test performed: 5-16-23    Where was the test performed:  IN OFFICE       Additional notes: PLEASE CALL WITH TEST RESULTS   ALSO PATIENT SAID HER BLOOD PRESSURE IS REALLY LOW AND SHE IS DIZZY, SICK TO HER STOMACH, RIGHT SIDE STOMACH PAIN  AND HAS DIARRHEA

## 2023-05-21 DIAGNOSIS — G89.29 OTHER CHRONIC PAIN: ICD-10-CM

## 2023-05-22 RX ORDER — METHOCARBAMOL 500 MG/1
TABLET, FILM COATED ORAL
Qty: 120 TABLET | Refills: 1 | OUTPATIENT
Start: 2023-05-22

## 2023-05-23 DIAGNOSIS — E55.9 VITAMIN D DEFICIENCY: ICD-10-CM

## 2023-05-23 RX ORDER — ERGOCALCIFEROL 1.25 MG/1
CAPSULE ORAL
Qty: 4 CAPSULE | Refills: 5 | OUTPATIENT
Start: 2023-05-23

## 2023-05-26 ENCOUNTER — TELEPHONE (OUTPATIENT)
Dept: FAMILY MEDICINE CLINIC | Facility: CLINIC | Age: 53
End: 2023-05-26
Payer: MEDICARE

## 2023-06-05 ENCOUNTER — OFFICE VISIT (OUTPATIENT)
Dept: FAMILY MEDICINE CLINIC | Facility: CLINIC | Age: 53
End: 2023-06-05
Payer: MEDICARE

## 2023-06-05 VITALS
SYSTOLIC BLOOD PRESSURE: 136 MMHG | DIASTOLIC BLOOD PRESSURE: 80 MMHG | HEART RATE: 88 BPM | HEIGHT: 71 IN | OXYGEN SATURATION: 99 % | WEIGHT: 274.6 LBS | TEMPERATURE: 97.5 F | BODY MASS INDEX: 38.44 KG/M2

## 2023-06-05 DIAGNOSIS — E11.65 TYPE 2 DIABETES MELLITUS WITH HYPERGLYCEMIA, WITH LONG-TERM CURRENT USE OF INSULIN: Primary | ICD-10-CM

## 2023-06-05 DIAGNOSIS — I10 ESSENTIAL HYPERTENSION: ICD-10-CM

## 2023-06-05 DIAGNOSIS — Z79.4 TYPE 2 DIABETES MELLITUS WITH HYPERGLYCEMIA, WITH LONG-TERM CURRENT USE OF INSULIN: Primary | ICD-10-CM

## 2023-06-05 DIAGNOSIS — R42 DIZZINESS: ICD-10-CM

## 2023-06-05 DIAGNOSIS — F17.200 CURRENT EVERY DAY SMOKER: ICD-10-CM

## 2023-06-05 DIAGNOSIS — J01.00 ACUTE NON-RECURRENT MAXILLARY SINUSITIS: ICD-10-CM

## 2023-06-05 DIAGNOSIS — Z12.11 COLON CANCER SCREENING: ICD-10-CM

## 2023-06-05 DIAGNOSIS — I10 PRIMARY HYPERTENSION: ICD-10-CM

## 2023-06-05 PROCEDURE — 3075F SYST BP GE 130 - 139MM HG: CPT | Performed by: FAMILY MEDICINE

## 2023-06-05 PROCEDURE — 99214 OFFICE O/P EST MOD 30 MIN: CPT | Performed by: FAMILY MEDICINE

## 2023-06-05 PROCEDURE — 3079F DIAST BP 80-89 MM HG: CPT | Performed by: FAMILY MEDICINE

## 2023-06-05 PROCEDURE — 3052F HG A1C>EQUAL 8.0%<EQUAL 9.0%: CPT | Performed by: FAMILY MEDICINE

## 2023-06-05 RX ORDER — MECLIZINE HCL 12.5 MG/1
12.5 TABLET ORAL 3 TIMES DAILY PRN
Qty: 30 TABLET | Refills: 0 | Status: SHIPPED | OUTPATIENT
Start: 2023-06-05

## 2023-06-05 RX ORDER — DOXYCYCLINE 100 MG/1
100 CAPSULE ORAL 2 TIMES DAILY
Qty: 20 CAPSULE | Refills: 0 | Status: SHIPPED | OUTPATIENT
Start: 2023-06-05

## 2023-06-05 RX ORDER — ENALAPRIL MALEATE 5 MG/1
5 TABLET ORAL DAILY
Qty: 90 TABLET | Refills: 0 | Status: SHIPPED | OUTPATIENT
Start: 2023-06-05

## 2023-06-05 NOTE — PROGRESS NOTES
Subjective   Soheila Brewster is a 53 y.o. female.   Pt presents today with CC of Dizziness (Follow up)      History of Present Illness   History of Present Illness    #1 patient is a 53-year-old female here to follow-up on dizziness.  Her blood pressure medication had been held including enalapril and at times Bumex.  This had no effect on her dizziness.  It is remained mild and relatively constant.  Risk factors include years of poorly controlled diabetes, long smoking history, use of both narcotics and gabapentin, and hypertension.  She reports associated bilateral ear pain, worse on the left, and she reports that over the past 2 weeks she has went from a common cold, to a sinus infection per report.  She request antibiotic.  #2 she is agreeable to colonoscopy.     The following portions of the patient's history were reviewed and updated as appropriate: allergies, current medications, past family history, past medical history, past social history, past surgical history, and problem list.    Review of Systems   Constitutional:  Negative for chills, fever and unexpected weight loss.   HENT:  Negative for congestion and sore throat.    Eyes:  Negative for blurred vision and visual disturbance.   Respiratory:  Negative for cough and wheezing.    Cardiovascular:  Negative for chest pain and palpitations.   Gastrointestinal:  Negative for abdominal pain and diarrhea.   Endocrine: Negative for cold intolerance and heat intolerance.   Genitourinary:  Negative for dysuria.   Musculoskeletal:  Negative for arthralgias and neck stiffness.   Neurological:  Negative for dizziness, seizures and syncope.   Psychiatric/Behavioral:  Negative for self-injury, suicidal ideas and depressed mood.      Objective   Physical Exam  Vitals and nursing note reviewed.   Constitutional:       Appearance: She is well-developed.   HENT:      Head: Normocephalic and atraumatic.      Right Ear: Tympanic membrane and external ear normal. There is no  impacted cerumen.      Left Ear: Tympanic membrane and external ear normal. There is no impacted cerumen.      Ears:      Comments: She complains of left ear pain.  There is no localized lymphadenopathy, the ears look fine.  She does have facial tenderness on palpation.  Sinusitis evident.     Nose: Nose normal.   Eyes:      Conjunctiva/sclera: Conjunctivae normal.      Pupils: Pupils are equal, round, and reactive to light.   Cardiovascular:      Rate and Rhythm: Normal rate and regular rhythm.      Heart sounds: Normal heart sounds.   Pulmonary:      Effort: Pulmonary effort is normal.      Breath sounds: Normal breath sounds.   Abdominal:      General: Bowel sounds are normal.      Palpations: Abdomen is soft.   Musculoskeletal:      Cervical back: Normal range of motion and neck supple.   Skin:     General: Skin is warm and dry.   Neurological:      Mental Status: She is alert and oriented to person, place, and time.   Psychiatric:         Behavior: Behavior normal.         Assessment & Plan   Diagnoses and all orders for this visit:    1. Type 2 diabetes mellitus with hyperglycemia, with long-term current use of insulin (Primary)  Still under poor control.  She is still working on her diet.  She gained 2 more pounds since her last visit.  2. Essential hypertension  -     enalapril (Vasotec) 5 MG tablet; Take 1 tablet by mouth Daily.  Dispense: 90 tablet; Refill    3. Current every day smoker    4. Primary hypertension  -     enalapril (Vasotec) 5 MG tablet; Take 1 tablet by mouth Daily.  Dispense: 90 tablet; Refill: 0    5. Acute non-recurrent maxillary sinusitis  -     doxycycline (MONODOX) 100 MG capsule; Take 1 capsule by mouth 2 (Two) Times a Day.  Dispense: 20 capsule; Refill: 0  Ear exam was normal.  We will trial doxycycline for 10 days.  Follow-up in 3 weeks, sooner if needed.  6. Colon cancer screening  -     Ambulatory Referral For Screening Colonoscopy    7. Dizziness  -     meclizine (ANTIVERT) 12.5  MG tablet; Take 1 tablet by mouth 3 (Three) Times a Day As Needed for Dizziness.  Dispense: 30 tablet; Refill: 0  If doxycycline is not helpful, recommend trial of as needed meclizine.  If her dizziness is still present in 3 weeks, will recommend orthostatic testing, consideration of tilt table, and consideration of advanced imaging of her brain because of multiple risk factors.             Soheila Brewster  reports that she has been smoking cigarettes. She has a 10.00 pack-year smoking history. She has never used smokeless tobacco.. I have educated her on the risk of diseases from using tobacco products such as cancer, COPD, and heart disease.     I advised her to quit and she is not willing to quit.    I spent 3  minutes counseling the patient.         Class 2 Severe Obesity (BMI >=35 and <=39.9). Obesity-related health conditions include the following: hypertension, diabetes mellitus, and dyslipidemias. Obesity is unchanged. BMI is is above average; BMI management plan is completed. We discussed portion control and increasing exercise.          This document has been electronically signed by Diana Erickson  June 5, 2023 13:59 EDT    Dictated Utilizing Dragon Dictation: Part of this note may be an electronic transcription/translation of spoken language to printed text using the Dragon Dictation System.

## 2023-06-13 NOTE — TELEPHONE ENCOUNTER
06/13/2023 05/26/2023 PA for Ventolin  (90 Base)MCG/ACT aerosol  was denied. Patient's insurance stated: Ventolin HFA is denied because it is not on your plan's Drug List (formulary). Medication  authorization requires the following: (1) You need to try this covered drug: Levalbuterol HFA; OR  (2) your doctor needs to give us specific medical reasons why the covered drug is not  appropriate for you.    Please advise

## 2023-06-29 NOTE — ASSESSMENT & PLAN NOTE
Stop Nexium due to concerns of efficacy.   Will start Protonix 40 mg.   Ambulatory BP monitoring either at home or random community checks.  Patient to report continued elevations >140/90.  Patient may come by office for checks if needed.      no

## 2023-07-24 ENCOUNTER — OFFICE VISIT (OUTPATIENT)
Dept: FAMILY MEDICINE CLINIC | Facility: CLINIC | Age: 53
End: 2023-07-24
Payer: MEDICARE

## 2023-07-24 VITALS
OXYGEN SATURATION: 99 % | BODY MASS INDEX: 37.69 KG/M2 | WEIGHT: 269.2 LBS | HEIGHT: 71 IN | SYSTOLIC BLOOD PRESSURE: 118 MMHG | HEART RATE: 92 BPM | TEMPERATURE: 98.2 F | DIASTOLIC BLOOD PRESSURE: 68 MMHG

## 2023-07-24 DIAGNOSIS — G89.29 OTHER CHRONIC PAIN: ICD-10-CM

## 2023-07-24 DIAGNOSIS — R42 DIZZINESS: Primary | ICD-10-CM

## 2023-07-24 PROCEDURE — 3052F HG A1C>EQUAL 8.0%<EQUAL 9.0%: CPT | Performed by: FAMILY MEDICINE

## 2023-07-24 PROCEDURE — 99213 OFFICE O/P EST LOW 20 MIN: CPT | Performed by: FAMILY MEDICINE

## 2023-07-24 PROCEDURE — 3078F DIAST BP <80 MM HG: CPT | Performed by: FAMILY MEDICINE

## 2023-07-24 PROCEDURE — 3074F SYST BP LT 130 MM HG: CPT | Performed by: FAMILY MEDICINE

## 2023-07-24 NOTE — PROGRESS NOTES
Subjective   Soheila Brewster is a 53 y.o. female.   Pt presents today with CC of Dizziness (Follow up)      History of Present Illness   History of Present Illness  1.  She is here following up on chronic pain in her joints, particularly her hip.  She had been referred to orthopedics, they referred her to a specialist.  She is concerned about transportation to [place], though she is willing to go if she has to.  She is still having dizziness at times, particularly when she looks up, occasionally the upper moving credits at the end of movies has caused this problem.  She was ordered a CT scan of her head and sinuses.  She has not called and scheduled this yet.  Her problem has not gotten better, or worse.       The following portions of the patient's history were reviewed and updated as appropriate: allergies, current medications, past family history, past medical history, past social history, past surgical history, and problem list.    Review of Systems   Constitutional:  Negative for chills, fever and unexpected weight loss.   HENT:  Negative for congestion and sore throat.    Eyes:  Negative for blurred vision and visual disturbance.   Respiratory:  Negative for cough and wheezing.    Cardiovascular:  Negative for chest pain and palpitations.   Gastrointestinal:  Negative for abdominal pain and diarrhea.   Endocrine: Negative for cold intolerance and heat intolerance.   Genitourinary:  Negative for dysuria.   Musculoskeletal:  Negative for arthralgias and neck stiffness.   Neurological:  Positive for dizziness. Negative for seizures and syncope.   Psychiatric/Behavioral:  Negative for self-injury, suicidal ideas and depressed mood.      Objective   Physical Exam  Vitals and nursing note reviewed.   Constitutional:       Appearance: She is well-developed.   HENT:      Head: Normocephalic and atraumatic.      Right Ear: External ear normal.      Left Ear: External ear normal.      Nose: Nose normal.   Eyes:       Conjunctiva/sclera: Conjunctivae normal.      Pupils: Pupils are equal, round, and reactive to light.   Cardiovascular:      Rate and Rhythm: Normal rate and regular rhythm.      Heart sounds: Normal heart sounds.   Pulmonary:      Effort: Pulmonary effort is normal.      Breath sounds: Normal breath sounds.   Abdominal:      General: Bowel sounds are normal.      Palpations: Abdomen is soft.   Musculoskeletal:      Cervical back: Normal range of motion and neck supple.   Skin:     General: Skin is warm and dry.   Neurological:      Mental Status: She is alert and oriented to person, place, and time.   Psychiatric:         Behavior: Behavior normal.         Assessment & Plan   Diagnoses and all orders for this visit:    1. Dizziness (Primary)  Recommend getting her CT scans done, once those are back I will decide whether referral to physical therapy, versus ENT, versus neurology would be needed.  2. Other chronic pain  I recommend she keep the appointment, as referred, with the specialist in [place].             Soheila Brewster  reports that she has been smoking cigarettes. She has a 10.00 pack-year smoking history. She has never used smokeless tobacco.. I have educated her on the risk of diseases from using tobacco products such as cancer, COPD, and heart disease.     I advised her to quit and she is not willing to quit.    I spent 3  minutes counseling the patient.                    This document has been electronically signed by Diana Erickson  July 24, 2023 14:46 EDT    Dictated Utilizing Dragon Dictation: Part of this note may be an electronic transcription/translation of spoken language to printed text using the Dragon Dictation System.

## 2023-07-27 DIAGNOSIS — J30.1 CHRONIC SEASONAL ALLERGIC RHINITIS DUE TO POLLEN: ICD-10-CM

## 2023-07-27 DIAGNOSIS — E53.8 VITAMIN B12 DEFICIENCY: ICD-10-CM

## 2023-07-28 RX ORDER — CYANOCOBALAMIN (VITAMIN B-12) 1000 MCG
TABLET ORAL
Qty: 30 TABLET | Refills: 5 | Status: SHIPPED | OUTPATIENT
Start: 2023-07-28

## 2023-07-28 RX ORDER — LEVOCETIRIZINE DIHYDROCHLORIDE 5 MG/1
TABLET, FILM COATED ORAL
Qty: 30 TABLET | Refills: 5 | Status: SHIPPED | OUTPATIENT
Start: 2023-07-28

## 2023-08-09 ENCOUNTER — HOSPITAL ENCOUNTER (OUTPATIENT)
Dept: CT IMAGING | Facility: HOSPITAL | Age: 53
Discharge: HOME | End: 2023-08-09
Payer: MEDICARE

## 2023-08-09 DIAGNOSIS — R42 DIZZINESS: ICD-10-CM

## 2023-08-09 DIAGNOSIS — J32.1 CHRONIC FRONTAL SINUSITIS: ICD-10-CM

## 2023-08-09 PROCEDURE — 70450 CT HEAD/BRAIN W/O DYE: CPT | Performed by: RADIOLOGY

## 2023-08-09 PROCEDURE — 70450 CT HEAD/BRAIN W/O DYE: CPT

## 2023-08-09 PROCEDURE — 70486 CT MAXILLOFACIAL W/O DYE: CPT | Performed by: RADIOLOGY

## 2023-08-09 PROCEDURE — 70486 CT MAXILLOFACIAL W/O DYE: CPT

## 2023-08-10 ENCOUNTER — TELEPHONE (OUTPATIENT)
Dept: FAMILY MEDICINE CLINIC | Facility: CLINIC | Age: 53
End: 2023-08-10
Payer: MEDICARE

## 2023-08-10 DIAGNOSIS — R42 DIZZINESS: Primary | ICD-10-CM

## 2023-08-10 NOTE — TELEPHONE ENCOUNTER
Referral to neurology sent.  It may be several weeks, or even months before your neurology appointment.  I recommend follow-up with me if needed for further evaluation.

## 2023-08-10 NOTE — TELEPHONE ENCOUNTER
----- Message from Walt Tyler DO sent at 8/9/2023  6:38 PM EDT -----  CT scan was normal.  No explanation for your dizziness.  How are you feeling?

## 2023-08-10 NOTE — TELEPHONE ENCOUNTER
----- Message from Walt Tyler DO sent at 8/9/2023  6:38 PM EDT -----  CT scan was normal.  No explanation for your dizziness.  How are you feeling?    Spoke with patient & she verbalized understanding,reports she is still Very Dizzy today?

## 2023-08-11 DIAGNOSIS — G89.29 OTHER CHRONIC PAIN: ICD-10-CM

## 2023-08-11 DIAGNOSIS — E03.9 ACQUIRED HYPOTHYROIDISM: ICD-10-CM

## 2023-08-11 DIAGNOSIS — E53.8 VITAMIN B12 DEFICIENCY: ICD-10-CM

## 2023-08-11 NOTE — TELEPHONE ENCOUNTER
Incoming Refill Request      Medication requested (name and dose):  methocarbamol (ROBAXIN) 500 MG tablet, Cyanocobalamin (B-12) 1000 MCG tablet, levothyroxine (SYNTHROID, LEVOTHROID) 100 MCG tablet     Pharmacy where request should be sent: [facility]    Additional details provided by patient: PATIENT HAS THREE DAYS LEFT    Best call back number: [phone]     Does the patient have less than a 3 day supply:  [] Yes  [x] No    Carine Fields Rep  08/11/23, 08:53 EDT

## 2023-08-14 RX ORDER — CYANOCOBALAMIN (VITAMIN B-12) 1000 MCG
1 TABLET ORAL DAILY
Qty: 30 TABLET | Refills: 5 | Status: SHIPPED | OUTPATIENT
Start: 2023-08-14

## 2023-08-14 RX ORDER — METHOCARBAMOL 500 MG/1
500 TABLET, FILM COATED ORAL 2 TIMES DAILY PRN
Qty: 120 TABLET | Refills: 0 | Status: SHIPPED | OUTPATIENT
Start: 2023-08-14

## 2023-08-14 RX ORDER — LEVOTHYROXINE SODIUM 0.1 MG/1
100 TABLET ORAL DAILY
Qty: 90 TABLET | Refills: 0 | Status: SHIPPED | OUTPATIENT
Start: 2023-08-14

## 2023-08-23 DIAGNOSIS — J30.1 CHRONIC SEASONAL ALLERGIC RHINITIS DUE TO POLLEN: ICD-10-CM

## 2023-08-23 RX ORDER — MONTELUKAST SODIUM 10 MG/1
TABLET ORAL
Qty: 90 TABLET | Refills: 0 | Status: SHIPPED | OUTPATIENT
Start: 2023-08-23

## 2023-08-28 DIAGNOSIS — J44.9 CHRONIC OBSTRUCTIVE PULMONARY DISEASE, UNSPECIFIED COPD TYPE: ICD-10-CM

## 2023-08-28 DIAGNOSIS — J30.89 CHRONIC NONSEASONAL ALLERGIC RHINITIS DUE TO POLLEN: ICD-10-CM

## 2023-08-28 DIAGNOSIS — E11.65 TYPE 2 DIABETES MELLITUS WITH HYPERGLYCEMIA, WITH LONG-TERM CURRENT USE OF INSULIN: ICD-10-CM

## 2023-08-28 DIAGNOSIS — Z79.4 TYPE 2 DIABETES MELLITUS WITH HYPERGLYCEMIA, WITH LONG-TERM CURRENT USE OF INSULIN: ICD-10-CM

## 2023-08-28 RX ORDER — AZELASTINE 1 MG/ML
SPRAY, METERED NASAL
Qty: 30 ML | Refills: 4 | OUTPATIENT
Start: 2023-08-28

## 2023-08-29 RX ORDER — FLUTICASONE FUROATE, UMECLIDINIUM BROMIDE AND VILANTEROL TRIFENATATE 100; 62.5; 25 UG/1; UG/1; UG/1
POWDER RESPIRATORY (INHALATION)
Qty: 60 EACH | Refills: 3 | OUTPATIENT
Start: 2023-08-29

## 2023-08-29 RX ORDER — INSULIN LISPRO 100 [IU]/ML
INJECTION, SOLUTION INTRAVENOUS; SUBCUTANEOUS
Qty: 30 ML | Refills: 3 | OUTPATIENT
Start: 2023-08-29

## 2023-08-30 ENCOUNTER — EXTERNAL PBMM DATA (OUTPATIENT)
Dept: PHARMACY | Facility: OTHER | Age: 53
End: 2023-08-30
Payer: MEDICARE

## 2023-09-08 DIAGNOSIS — J30.89 CHRONIC NONSEASONAL ALLERGIC RHINITIS DUE TO POLLEN: ICD-10-CM

## 2023-09-08 RX ORDER — AZELASTINE 1 MG/ML
SPRAY, METERED NASAL
Qty: 30 ML | Refills: 4 | OUTPATIENT
Start: 2023-09-08

## 2023-09-14 NOTE — TELEPHONE ENCOUNTER
Pt is aware of this information.  ----- Message from JOAQUÍN Ross sent at 10/7/2019  2:34 PM EDT -----  Sent Bactrim and ear drops  ----- Message -----  From: Radha Estrada MA  Sent: 10/7/2019   2:29 PM  To: JOAQUÍN Ross    Patient called in and wanted to know if you could send her in an antibiotic. She said you gave her bactrim when you lanced the last abscess she had and it helped her better than anything. She said she has bleeding out of her right ear. Her other ear is bothering her, and she has a sore throat.       
172.7

## 2023-09-19 DIAGNOSIS — J30.89 CHRONIC NONSEASONAL ALLERGIC RHINITIS DUE TO POLLEN: ICD-10-CM

## 2023-09-19 RX ORDER — AZELASTINE 1 MG/ML
SPRAY, METERED NASAL
Qty: 30 ML | Refills: 4 | OUTPATIENT
Start: 2023-09-19

## 2023-09-26 ENCOUNTER — OFFICE VISIT (OUTPATIENT)
Dept: ENDOCRINOLOGY | Facility: CLINIC | Age: 53
End: 2023-09-26
Payer: MEDICARE

## 2023-09-26 VITALS
HEART RATE: 86 BPM | WEIGHT: 265 LBS | BODY MASS INDEX: 37.1 KG/M2 | DIASTOLIC BLOOD PRESSURE: 66 MMHG | SYSTOLIC BLOOD PRESSURE: 142 MMHG | HEIGHT: 71 IN | OXYGEN SATURATION: 98 %

## 2023-09-26 DIAGNOSIS — E11.65 TYPE 2 DIABETES MELLITUS WITH HYPERGLYCEMIA, WITH LONG-TERM CURRENT USE OF INSULIN: Primary | ICD-10-CM

## 2023-09-26 DIAGNOSIS — Z79.4 TYPE 2 DIABETES MELLITUS WITH HYPERGLYCEMIA, WITH LONG-TERM CURRENT USE OF INSULIN: Primary | ICD-10-CM

## 2023-09-26 LAB
EXPIRATION DATE: NORMAL
GLUCOSE BLDC GLUCOMTR-MCNC: 254 MG/DL (ref 70–130)
HBA1C MFR BLD: 8.8 %
Lab: NORMAL

## 2023-09-26 PROCEDURE — 82043 UR ALBUMIN QUANTITATIVE: CPT | Performed by: NURSE PRACTITIONER

## 2023-09-26 PROCEDURE — 83036 HEMOGLOBIN GLYCOSYLATED A1C: CPT | Performed by: NURSE PRACTITIONER

## 2023-09-26 PROCEDURE — 3052F HG A1C>EQUAL 8.0%<EQUAL 9.0%: CPT | Performed by: NURSE PRACTITIONER

## 2023-09-26 PROCEDURE — 82570 ASSAY OF URINE CREATININE: CPT | Performed by: NURSE PRACTITIONER

## 2023-09-26 PROCEDURE — 3078F DIAST BP <80 MM HG: CPT | Performed by: NURSE PRACTITIONER

## 2023-09-26 PROCEDURE — 82947 ASSAY GLUCOSE BLOOD QUANT: CPT | Performed by: NURSE PRACTITIONER

## 2023-09-26 PROCEDURE — 1159F MED LIST DOCD IN RCRD: CPT | Performed by: NURSE PRACTITIONER

## 2023-09-26 PROCEDURE — 1160F RVW MEDS BY RX/DR IN RCRD: CPT | Performed by: NURSE PRACTITIONER

## 2023-09-26 PROCEDURE — 3077F SYST BP >= 140 MM HG: CPT | Performed by: NURSE PRACTITIONER

## 2023-09-26 PROCEDURE — 99214 OFFICE O/P EST MOD 30 MIN: CPT | Performed by: NURSE PRACTITIONER

## 2023-09-26 RX ORDER — INSULIN PMP CART,AUT,G6/7,CNTR
1 EACH SUBCUTANEOUS TAKE AS DIRECTED
Qty: 1 KIT | Refills: 0 | Status: SHIPPED | OUTPATIENT
Start: 2023-09-26 | End: 2023-09-26 | Stop reason: SDUPTHER

## 2023-09-26 RX ORDER — INSULIN PMP CART,AUT,G6/7,CNTR
1 EACH SUBCUTANEOUS TAKE AS DIRECTED
Qty: 1 KIT | Refills: 0 | Status: SHIPPED | OUTPATIENT
Start: 2023-09-26

## 2023-09-26 NOTE — TELEPHONE ENCOUNTER
Pharmacy called and they cannot order Omnipod Kit. They will be able to do the pods, but not the kit. S/W patient is she is ok to send omnipod kit into our pharmacy to get filled.

## 2023-09-26 NOTE — ASSESSMENT & PLAN NOTE
-Diabetes is above goal with A1c 8.8.  -Discussed dietary and exercise guidelines with patient.  -Discussed the importance of yearly eye exams.  -Discussed the importance of checking BG's regularly. Continue using Dexcom CGM.    We are unable to download in office today.  -Continue using insulin via Omnipod Dash with basal increased from 2.1 to 2.3 u/hr over 24 hours.  -Continue Trulicity 3 mg weekly.  Patient has no personal history of pancreatitis, no family history of MEN syndrome or medullary thyroid cancer. Possible side effects including nausea, bloating, other GI upset and rarely pancreatitis were discussed. She was advised to call the office with any symptoms or concerns.   -She would like to start using the Omnipod 5 as she now has a phone that is compatible with that and the Dexcom G6. Will send in RX for this and information to [employer] for training.  -S/S hypoglycemia reviewed with Rule of 15's advised.  -Follow-up in 3 months.

## 2023-09-26 NOTE — PROGRESS NOTES
Chief Complaint   Patient presents with    Diabetes        Referring Provider  No ref. provider found     HPI   Soheila Brewster is a 53 y.o. female had concerns including Diabetes.    T2DM.    Since her last visit she has noted increasing constipation and abd discomfort.  She thought that it was her Metformin, so that was stopped.  This has continued.  She has multiple factors contributing to this as she is on GLP-1 and narcotic pain medication as well.  The pain clinic has started her on some medication help resolve this.      Diabetes:  Diabetes was diagnosed  2003.  Complications include neuropathy.  Last ophtho exam was due.  Current medications for diabetes include insulin in an Omnipod Dash, Trulicity 3 mg weekly.    Past meds: Janumet-stopped   She checks her blood sugar dexcom CGM, 288 times per day.   Hypos: rarely    ACE/ARB:yes, Statin: yes    The following portions of the patient's history were reviewed and updated as appropriate: allergies, current medications, past family history, past medical history, past social history, past surgical history and problem list.    Diet: she doesn't have a well diet    Past Medical History:   Diagnosis Date    Arthritis     Asthma     Back pain     Disease of thyroid gland     History of transfusion     Liver disease     fatty    Sleep apnea     Umbilical hernia      Past Surgical History:   Procedure Laterality Date    BREAST SURGERY      bilat abcess    CHOLECYSTECTOMY      DILATATION AND CURETTAGE      THYROID BIOPSY      THYROIDECTOMY Right 4/1/2019    Procedure: RIGHT THYROIDECTOMY LOBECTOMY;  Surgeon: Anam Rodriguez MD;  [facility];  Service: General    US GUIDED FINE NEEDLE ASPIRATION  1/16/2020      Family History   Problem Relation Age of Onset    Arthritis Mother     Asthma Mother     Cancer Mother     COPD Mother     Diabetes Mother     Heart disease Mother     Hypertension Mother     Hyperlipidemia Mother     Kidney disease Mother     Arthritis  Father     COPD Father     Diabetes Father     Hypertension Father     Hyperlipidemia Father     Arthritis Sister     COPD Sister     Diabetes Sister     Arrhythmia Sister       Social History     Socioeconomic History    Marital status:     Number of children: 0    Highest education level: GED or equivalent   Tobacco Use    Smoking status: Every Day     Packs/day: 1.00     Years: 10.00     Pack years: 10.00     Types: Cigarettes    Smokeless tobacco: Never    Tobacco comments:     Patient is working on quitting    Vaping Use    Vaping Use: Never used   Substance and Sexual Activity    Alcohol use: No    Drug use: No    Sexual activity: Defer      Allergies   Allergen Reactions    Morphine And Related Shortness Of Breath     Throat edema    Penicillins Shortness Of Breath     Also anything in the penicillin family        Current Outpatient Medications on File Prior to Visit   Medication Sig Dispense Refill    albuterol sulfate HFA (Ventolin HFA) 108 (90 Base) MCG/ACT inhaler Inhale 2 puffs Every 4 (Four) Hours As Needed for Wheezing. 18 g 5    azelastine (ASTELIN) 0.1 % nasal spray SPRAY 2 SPRAYS IN EACH NOSTRIL TWICE DAILY AS NEEDED 30 mL 5    bumetanide (BUMEX) 2 MG tablet Take 1 tablet by mouth 2 (Two) Times a Day. 180 tablet 0    Continuous Blood Gluc  (Dexcom G6 ) device Use to monitor blood glucose 1 each 0    Continuous Blood Gluc Sensor (Dexcom G6 Sensor) Every 10 (Ten) Days. 3 each 11    Continuous Blood Gluc Transmit (Dexcom G6 Transmitter) misc 1 each Every 3 (Three) Months. Change every 3 months 1 each 3    Cyanocobalamin (B-12) 1000 MCG tablet Take 1 tablet by mouth Daily. 30 tablet 5    Dulaglutide (Trulicity) 3 MG/0.5ML solution pen-injector Inject 0.5 mL under the skin into the appropriate area as directed 1 (One) Time Per Week. 2 mL 5    enalapril (Vasotec) 2.5 MG tablet Take 1 tablet by mouth Daily. 90 tablet 0    ezetimibe (ZETIA) 10 MG tablet Take 1 tablet by mouth Daily.  90 tablet 0    famotidine (PEPCID) 40 MG tablet Take 1 tablet by mouth Every Night. 90 tablet 0    gabapentin (NEURONTIN) 800 MG tablet Take 1 tablet by mouth 4 (Four) Times a Day. 120 tablet 2    Insulin Lispro (humaLOG) 100 UNIT/ML injection Use a maximum daily dose of 100 units per insulin pump 30 mL 5    ipratropium-albuterol (DUO-NEB) 0.5-2.5 mg/3 ml nebulizer Take 3 mL by nebulization Every 4 (Four) Hours As Needed for Wheezing. 360 mL 0    lactulose (CHRONULAC) 10 GM/15ML solution Take 15 mL by mouth 2 (Two) Times a Day. Pt takes PRN      levocetirizine (XYZAL) 5 MG tablet TAKE ONE TABLET BY MOUTH EVERY EVENING 30 tablet 5    levothyroxine (SYNTHROID, LEVOTHROID) 100 MCG tablet Take 1 tablet by mouth Daily. 90 tablet 0    methocarbamol (ROBAXIN) 500 MG tablet Take 1 tablet by mouth 2 (Two) Times a Day As Needed for Muscle Spasms. 120 tablet 0    montelukast (SINGULAIR) 10 MG tablet TAKE ONE TABLET BY MOUTH AT BEDTIME 90 tablet 0    Motegrity 2 MG tablet Take 1 tablet by mouth Every Morning.      oxyCODONE-acetaminophen (PERCOCET) 7.5-325 MG per tablet Take 1 tablet by mouth 4 (Four) Times a Day.      potassium chloride 10 MEQ CR tablet Take 1 tablet by mouth 2 (Two) Times a Day. 180 tablet 0    Relistor 150 MG tablet TAKE THREE TABLETS BY MOUTH EVERY MORNING      rosuvastatin (CRESTOR) 40 MG tablet Take 1 tablet by mouth Daily. 90 tablet 0    Trelegy Ellipta 100-62.5-25 MCG/ACT inhaler INHALE ONE PUFF BY MOUTH ONCE DAILY 60 each 3    vitamin D (ERGOCALCIFEROL) 1.25 MG (10825 UT) capsule capsule Take 1 capsule by mouth 1 (One) Time Per Week. 12 capsule 0    [DISCONTINUED] Insulin Disposable Pump (Omnipod 5 G6 Pod, Gen 5,) misc Use 1  Every Other Day. 45 each 1    [DISCONTINUED] Insulin Disposable Pump (Omnipod DASH Pods, Gen 4,) misc 1 Device by Subcutaneous Infusion route Every Other Day. 45 each 3    [DISCONTINUED] Insulin Disposable Pump (OmniPod Dash System) kit 1 Device Daily. 1 kit 0    [DISCONTINUED]  "medroxyPROGESTERone (DEPO-PROVERA) 150 MG/ML injection INJECT 1 ML INTO THE APPROPRIATE MUSCLE AS DIRECTED BY PRESCRIBER EVERY 3 MONTHS. 1 mL 3     No current facility-administered medications on file prior to visit.        Review of Systems   Constitutional:  Positive for fatigue. Negative for unexpected weight gain and unexpected weight loss.   Eyes:  Positive for blurred vision and itching.   Endocrine: Positive for polydipsia. Negative for polyphagia and polyuria.   Psychiatric/Behavioral:  Positive for sleep disturbance.    All other systems reviewed and are negative.     /66 (BP Location: Left arm, Patient Position: Sitting, Cuff Size: Adult)   Pulse 86   Ht 180.3 cm (71\")   Wt 120 kg (265 lb)   SpO2 98%   BMI 36.96 kg/m²      Physical Exam  Vitals reviewed.   Constitutional:       Appearance: Normal appearance.   Eyes:      Extraocular Movements: Extraocular movements intact.   Cardiovascular:      Rate and Rhythm: Normal rate.      Pulses:           Dorsalis pedis pulses are 2+ on the right side and 2+ on the left side.        Posterior tibial pulses are 2+ on the right side and 2+ on the left side.   Pulmonary:      Effort: Pulmonary effort is normal.   Feet:      Right foot:      Protective Sensation: 10 sites tested.  7 sites sensed.      Skin integrity: Callus and dry skin present.      Toenail Condition: Right toenails are normal.      Left foot:      Protective Sensation: 10 sites tested.  7 sites sensed.      Skin integrity: Callus and dry skin present.      Toenail Condition: Left toenails are normal.      Comments: Diabetic Foot Exam Performed and Monofilament Test Performed     Skin:     General: Skin is warm.   Neurological:      General: No focal deficit present.      Mental Status: She is alert and oriented to person, place, and time.   Psychiatric:         Mood and Affect: Mood normal.         Behavior: Behavior normal.         Thought Content: Thought content normal.         " Judgment: Judgment normal.     CMP:  Lab Results   Component Value Date    BUN 12 05/16/2023    CREATININE 0.66 05/16/2023    EGFRIFNONA 106 10/14/2021    BCR 18.2 05/16/2023     05/16/2023    K 3.6 05/16/2023    CO2 26.1 05/16/2023    CALCIUM 10.2 05/16/2023    ALBUMIN 4.0 05/16/2023    BILITOT 0.3 05/16/2023    ALKPHOS 65 05/16/2023    AST 30 05/16/2023    ALT 43 (H) 05/16/2023     Lipid Panel:  Lab Results   Component Value Date    CHOL 83 05/16/2023    TRIG 131 05/16/2023    HDL 37 (L) 05/16/2023    VLDL 23 05/16/2023    LDL 23 05/16/2023     HbA1c:  Lab Results   Component Value Date    HGBA1C 8.8 09/26/2023    HGBA1C 8.40 (H) 05/16/2023     Glucose:  Lab Results   Component Value Date    POCGLU 254 (A) 09/26/2023     Microalbumin:  No results found for: MALBCRERATIO  TSH:  Lab Results   Component Value Date    TSH 1.130 05/16/2023       Assessment and Plan    Diagnoses and all orders for this visit:    1. Type 2 diabetes mellitus with hyperglycemia, with long-term current use of insulin (Primary)  Assessment & Plan:  -Diabetes is above goal with A1c 8.8.  -Discussed dietary and exercise guidelines with patient.  -Discussed the importance of yearly eye exams.  -Discussed the importance of checking BG's regularly. Continue using Dexcom CGM.    We are unable to download in office today.  -Continue using insulin via Omnipod Dash with basal increased from 2.1 to 2.3 u/hr over 24 hours.  -Continue Trulicity 3 mg weekly.  Patient has no personal history of pancreatitis, no family history of MEN syndrome or medullary thyroid cancer. Possible side effects including nausea, bloating, other GI upset and rarely pancreatitis were discussed. She was advised to call the office with any symptoms or concerns.   -She would like to start using the Omnipod 5 as she now has a phone that is compatible with that and the Dexcom G6. Will send in RX for this and information to [employer] for training.  -S/S hypoglycemia reviewed  with Rule of 15's advised.  -Follow-up in 3 months.    Orders:  -     POC Glycosylated Hemoglobin (Hb A1C)  -     POC Glucose, Blood  -     Microalbumin / Creatinine Urine Ratio - Urine, Clean Catch    Other orders  -     Insulin Disposable Pump (Omnipod 5 G6 Intro, Gen 5,) kit; Use 1 kit Take As Directed.  Dispense: 1 kit; Refill: 0           Return in about 3 months (around 12/26/2023) for Follow-up appointment, A1C. The patient was instructed to contact the clinic with any interval questions or concerns.        This document has been electronically signed by JOAQUÍN Garcia  September 26, 2023 15:14 EDT   Endocrinology

## 2023-09-27 LAB
ALBUMIN UR-MCNC: 14.3 MG/DL
CREAT UR-MCNC: 66.5 MG/DL
MICROALBUMIN/CREAT UR: 215 MG/G

## 2023-09-28 ENCOUNTER — EXTERNAL PBMM DATA (OUTPATIENT)
Dept: PHARMACY | Facility: OTHER | Age: 53
End: 2023-09-28
Payer: MEDICARE

## 2023-10-06 RX ORDER — INSULIN PMP CART,AUT,G6/7,CNTR
1 EACH SUBCUTANEOUS
Qty: 10 EACH | Refills: 11 | Status: SHIPPED | OUTPATIENT
Start: 2023-10-06

## 2023-10-09 DIAGNOSIS — E55.9 VITAMIN D DEFICIENCY: ICD-10-CM

## 2023-10-10 RX ORDER — ERGOCALCIFEROL 1.25 MG/1
50000 CAPSULE ORAL WEEKLY
Qty: 12 CAPSULE | Refills: 0 | Status: SHIPPED | OUTPATIENT
Start: 2023-10-10

## 2023-11-13 DIAGNOSIS — J44.9 CHRONIC OBSTRUCTIVE PULMONARY DISEASE, UNSPECIFIED COPD TYPE: ICD-10-CM

## 2023-11-13 RX ORDER — ALBUTEROL SULFATE 90 UG/1
2 AEROSOL, METERED RESPIRATORY (INHALATION) EVERY 4 HOURS PRN
Qty: 8.5 G | Refills: 0 | Status: SHIPPED | OUTPATIENT
Start: 2023-11-13

## 2023-11-20 DIAGNOSIS — K21.9 GASTROESOPHAGEAL REFLUX DISEASE WITHOUT ESOPHAGITIS: ICD-10-CM

## 2023-11-20 DIAGNOSIS — E78.2 MIXED HYPERLIPIDEMIA: ICD-10-CM

## 2023-11-20 DIAGNOSIS — G89.29 OTHER CHRONIC PAIN: ICD-10-CM

## 2023-11-20 RX ORDER — FAMOTIDINE 40 MG/1
40 TABLET, FILM COATED ORAL
Qty: 90 TABLET | Refills: 0 | Status: SHIPPED | OUTPATIENT
Start: 2023-11-20

## 2023-11-20 RX ORDER — EZETIMIBE 10 MG/1
10 TABLET ORAL DAILY
Qty: 90 TABLET | Refills: 0 | Status: SHIPPED | OUTPATIENT
Start: 2023-11-20

## 2023-11-20 RX ORDER — METHOCARBAMOL 500 MG/1
500 TABLET, FILM COATED ORAL 2 TIMES DAILY PRN
Qty: 120 TABLET | Refills: 0 | Status: SHIPPED | OUTPATIENT
Start: 2023-11-20

## 2023-12-06 ENCOUNTER — OFFICE VISIT (OUTPATIENT)
Dept: FAMILY MEDICINE CLINIC | Facility: CLINIC | Age: 53
End: 2023-12-06
Payer: MEDICARE

## 2023-12-06 VITALS
OXYGEN SATURATION: 99 % | BODY MASS INDEX: 38.33 KG/M2 | HEIGHT: 71 IN | TEMPERATURE: 97.3 F | DIASTOLIC BLOOD PRESSURE: 68 MMHG | SYSTOLIC BLOOD PRESSURE: 136 MMHG | HEART RATE: 94 BPM | WEIGHT: 273.8 LBS

## 2023-12-06 DIAGNOSIS — R60.1 GENERALIZED EDEMA: ICD-10-CM

## 2023-12-06 DIAGNOSIS — K21.9 GASTROESOPHAGEAL REFLUX DISEASE WITHOUT ESOPHAGITIS: ICD-10-CM

## 2023-12-06 DIAGNOSIS — E78.2 MIXED HYPERLIPIDEMIA: ICD-10-CM

## 2023-12-06 DIAGNOSIS — Z79.4 TYPE 2 DIABETES MELLITUS WITH HYPERGLYCEMIA, WITH LONG-TERM CURRENT USE OF INSULIN: Primary | ICD-10-CM

## 2023-12-06 DIAGNOSIS — I10 PRIMARY HYPERTENSION: ICD-10-CM

## 2023-12-06 DIAGNOSIS — L02.211 ABSCESS OF ABDOMINAL WALL: ICD-10-CM

## 2023-12-06 DIAGNOSIS — I10 ESSENTIAL HYPERTENSION: ICD-10-CM

## 2023-12-06 DIAGNOSIS — E55.9 VITAMIN D DEFICIENCY: ICD-10-CM

## 2023-12-06 DIAGNOSIS — E03.9 ACQUIRED HYPOTHYROIDISM: ICD-10-CM

## 2023-12-06 DIAGNOSIS — J44.9 CHRONIC OBSTRUCTIVE PULMONARY DISEASE, UNSPECIFIED COPD TYPE: ICD-10-CM

## 2023-12-06 DIAGNOSIS — J30.1 CHRONIC SEASONAL ALLERGIC RHINITIS DUE TO POLLEN: ICD-10-CM

## 2023-12-06 DIAGNOSIS — G89.29 OTHER CHRONIC PAIN: ICD-10-CM

## 2023-12-06 DIAGNOSIS — E11.65 TYPE 2 DIABETES MELLITUS WITH HYPERGLYCEMIA, WITH LONG-TERM CURRENT USE OF INSULIN: Primary | ICD-10-CM

## 2023-12-06 LAB
25(OH)D3 SERPL-MCNC: 46.8 NG/ML (ref 30–100)
ALBUMIN SERPL-MCNC: 3.9 G/DL (ref 3.5–5.2)
ALBUMIN/GLOB SERPL: 1.3 G/DL
ALP SERPL-CCNC: 73 U/L (ref 39–117)
ALT SERPL W P-5'-P-CCNC: 16 U/L (ref 1–33)
ANION GAP SERPL CALCULATED.3IONS-SCNC: 9 MMOL/L (ref 5–15)
AST SERPL-CCNC: 14 U/L (ref 1–32)
BILIRUB SERPL-MCNC: 0.2 MG/DL (ref 0–1.2)
BUN SERPL-MCNC: 11 MG/DL (ref 6–20)
BUN/CREAT SERPL: 13.1 (ref 7–25)
CALCIUM SPEC-SCNC: 9.9 MG/DL (ref 8.6–10.5)
CHLORIDE SERPL-SCNC: 104 MMOL/L (ref 98–107)
CO2 SERPL-SCNC: 26 MMOL/L (ref 22–29)
CREAT SERPL-MCNC: 0.84 MG/DL (ref 0.57–1)
DEPRECATED RDW RBC AUTO: 40.3 FL (ref 37–54)
EGFRCR SERPLBLD CKD-EPI 2021: 83.7 ML/MIN/1.73
ERYTHROCYTE [DISTWIDTH] IN BLOOD BY AUTOMATED COUNT: 13 % (ref 12.3–15.4)
GLOBULIN UR ELPH-MCNC: 2.9 GM/DL
GLUCOSE SERPL-MCNC: 226 MG/DL (ref 65–99)
HBA1C MFR BLD: 8.4 % (ref 4.8–5.6)
HCT VFR BLD AUTO: 36.8 % (ref 34–46.6)
HGB BLD-MCNC: 12.7 G/DL (ref 12–15.9)
MCH RBC QN AUTO: 30 PG (ref 26.6–33)
MCHC RBC AUTO-ENTMCNC: 34.5 G/DL (ref 31.5–35.7)
MCV RBC AUTO: 87 FL (ref 79–97)
PLATELET # BLD AUTO: 173 10*3/MM3 (ref 140–450)
PMV BLD AUTO: 11.9 FL (ref 6–12)
POTASSIUM SERPL-SCNC: 4.6 MMOL/L (ref 3.5–5.2)
PROT SERPL-MCNC: 6.8 G/DL (ref 6–8.5)
RBC # BLD AUTO: 4.23 10*6/MM3 (ref 3.77–5.28)
SODIUM SERPL-SCNC: 139 MMOL/L (ref 136–145)
TSH SERPL DL<=0.05 MIU/L-ACNC: 1.44 UIU/ML (ref 0.27–4.2)
WBC NRBC COR # BLD AUTO: 9.78 10*3/MM3 (ref 3.4–10.8)

## 2023-12-06 PROCEDURE — 83036 HEMOGLOBIN GLYCOSYLATED A1C: CPT | Performed by: FAMILY MEDICINE

## 2023-12-06 PROCEDURE — 82306 VITAMIN D 25 HYDROXY: CPT | Performed by: FAMILY MEDICINE

## 2023-12-06 PROCEDURE — 84443 ASSAY THYROID STIM HORMONE: CPT | Performed by: FAMILY MEDICINE

## 2023-12-06 PROCEDURE — 80053 COMPREHEN METABOLIC PANEL: CPT | Performed by: FAMILY MEDICINE

## 2023-12-06 PROCEDURE — 85027 COMPLETE CBC AUTOMATED: CPT | Performed by: FAMILY MEDICINE

## 2023-12-06 PROCEDURE — 36415 COLL VENOUS BLD VENIPUNCTURE: CPT | Performed by: FAMILY MEDICINE

## 2023-12-06 RX ORDER — EZETIMIBE 10 MG/1
10 TABLET ORAL DAILY
Qty: 90 TABLET | Refills: 0 | Status: SHIPPED | OUTPATIENT
Start: 2023-12-06

## 2023-12-06 RX ORDER — ERGOCALCIFEROL 1.25 MG/1
50000 CAPSULE ORAL WEEKLY
Qty: 12 CAPSULE | Refills: 0 | Status: SHIPPED | OUTPATIENT
Start: 2023-12-06

## 2023-12-06 RX ORDER — LEVOCETIRIZINE DIHYDROCHLORIDE 5 MG/1
5 TABLET, FILM COATED ORAL EVERY EVENING
Qty: 90 TABLET | Refills: 1 | Status: SHIPPED | OUTPATIENT
Start: 2023-12-06

## 2023-12-06 RX ORDER — ALBUTEROL SULFATE 90 UG/1
2 AEROSOL, METERED RESPIRATORY (INHALATION) EVERY 4 HOURS PRN
Qty: 18 G | Refills: 5 | Status: SHIPPED | OUTPATIENT
Start: 2023-12-06

## 2023-12-06 RX ORDER — LEVOTHYROXINE SODIUM 0.1 MG/1
100 TABLET ORAL DAILY
Qty: 90 TABLET | Refills: 0 | Status: SHIPPED | OUTPATIENT
Start: 2023-12-06

## 2023-12-06 RX ORDER — SULFAMETHOXAZOLE AND TRIMETHOPRIM 800; 160 MG/1; MG/1
1 TABLET ORAL 2 TIMES DAILY
Qty: 20 TABLET | Refills: 0 | Status: SHIPPED | OUTPATIENT
Start: 2023-12-06

## 2023-12-06 RX ORDER — BUMETANIDE 2 MG/1
2 TABLET ORAL 2 TIMES DAILY
Qty: 180 TABLET | Refills: 0 | Status: SHIPPED | OUTPATIENT
Start: 2023-12-06

## 2023-12-06 RX ORDER — ROSUVASTATIN CALCIUM 40 MG/1
40 TABLET, COATED ORAL DAILY
Qty: 90 TABLET | Refills: 0 | Status: SHIPPED | OUTPATIENT
Start: 2023-12-06

## 2023-12-06 RX ORDER — IPRATROPIUM BROMIDE AND ALBUTEROL SULFATE 2.5; .5 MG/3ML; MG/3ML
3 SOLUTION RESPIRATORY (INHALATION) EVERY 4 HOURS PRN
Qty: 360 ML | Refills: 0 | Status: SHIPPED | OUTPATIENT
Start: 2023-12-06

## 2023-12-06 RX ORDER — MONTELUKAST SODIUM 10 MG/1
10 TABLET ORAL NIGHTLY
Qty: 90 TABLET | Refills: 1 | Status: SHIPPED | OUTPATIENT
Start: 2023-12-06

## 2023-12-06 RX ORDER — METHOCARBAMOL 500 MG/1
500 TABLET, FILM COATED ORAL 2 TIMES DAILY PRN
Qty: 120 TABLET | Refills: 0 | Status: SHIPPED | OUTPATIENT
Start: 2023-12-06

## 2023-12-06 RX ORDER — POTASSIUM CHLORIDE 750 MG/1
10 TABLET, FILM COATED, EXTENDED RELEASE ORAL 2 TIMES DAILY
Qty: 180 TABLET | Refills: 0 | Status: SHIPPED | OUTPATIENT
Start: 2023-12-06

## 2023-12-06 RX ORDER — FAMOTIDINE 40 MG/1
40 TABLET, FILM COATED ORAL
Qty: 90 TABLET | Refills: 0 | Status: SHIPPED | OUTPATIENT
Start: 2023-12-06

## 2023-12-06 RX ORDER — FLUTICASONE FUROATE, UMECLIDINIUM BROMIDE AND VILANTEROL TRIFENATATE 100; 62.5; 25 UG/1; UG/1; UG/1
1 POWDER RESPIRATORY (INHALATION) DAILY
Qty: 180 EACH | Refills: 1 | Status: SHIPPED | OUTPATIENT
Start: 2023-12-06

## 2023-12-06 RX ORDER — ENALAPRIL MALEATE 2.5 MG/1
2.5 TABLET ORAL DAILY
Qty: 90 TABLET | Refills: 0 | Status: SHIPPED | OUTPATIENT
Start: 2023-12-06

## 2023-12-06 NOTE — PROGRESS NOTES
Subjective   Soheila Brewster is a 53 y.o. female.   Pt presents today with CC of Diabetes      History of Present Illness   History of Present Illness  Patient is a type II diabetic.  She follows with endocrinology.  She is agreeable to blood work today.  #2 she has a history of abscess around her chest and abdomen.  She has a tender fluctuant mass in her right upper quadrant, approximately above the right anterior rib directly below her right nipple.  Abscess does not include any breast tissue.  #3 she has generalized edema for which she takes Bumex and potassium chloride to help with potassium replacement.  #4 she has hypertension associated with diabetes.  She takes enalapril.  #5 she has GERD for which she takes Pepcid 40 mg nightly.  #6 she would like to have her flu and pneumococcal updated injections today.       The following portions of the patient's history were reviewed and updated as appropriate: allergies, current medications, past family history, past medical history, past social history, past surgical history, and problem list.    Review of Systems   Constitutional:  Negative for chills, fever and unexpected weight loss.   HENT:  Negative for congestion and sore throat.    Eyes:  Negative for blurred vision and visual disturbance.   Respiratory:  Negative for cough and wheezing.    Cardiovascular:  Negative for chest pain and palpitations.   Gastrointestinal:  Negative for abdominal pain and diarrhea.   Endocrine: Negative for cold intolerance and heat intolerance.   Genitourinary:  Negative for dysuria.   Musculoskeletal:  Negative for arthralgias and neck stiffness.   Neurological:  Negative for dizziness, seizures and syncope.   Psychiatric/Behavioral:  Negative for self-injury, suicidal ideas and depressed mood.        Objective   Physical Exam  Vitals and nursing note reviewed.   Constitutional:       Appearance: She is well-developed.   HENT:      Head: Normocephalic and atraumatic.      Right  Ear: External ear normal.      Left Ear: External ear normal.      Nose: Nose normal.   Eyes:      Conjunctiva/sclera: Conjunctivae normal.      Pupils: Pupils are equal, round, and reactive to light.   Cardiovascular:      Rate and Rhythm: Normal rate and regular rhythm.      Heart sounds: Normal heart sounds.   Pulmonary:      Effort: Pulmonary effort is normal.      Breath sounds: Normal breath sounds.   Abdominal:      General: Bowel sounds are normal.      Palpations: Abdomen is soft.      Comments: Tender fluctuant mass in right upper quadrant approximately over the anterior rib cage, directly below right nipple.    Musculoskeletal:      Cervical back: Normal range of motion and neck supple.   Skin:     General: Skin is warm and dry.   Neurological:      Mental Status: She is alert and oriented to person, place, and time.   Psychiatric:         Behavior: Behavior normal.         Incision & Drainage    Date/Time: 12/6/2023 12:45 PM    Performed by: Walt Tyler DO  Authorized by: Walt Tyler DO  Consent: Written consent obtained.  Risks and benefits: risks, benefits and alternatives were discussed  Consent given by: patient  Patient understanding: patient states understanding of the procedure being performed  Patient consent: the patient's understanding of the procedure matches consent given  Procedure consent: procedure consent matches procedure scheduled  Type: abscess  Body area: trunk  Location details: abdomen  Anesthesia: local infiltration    Anesthesia:  Local Anesthetic: lidocaine 1% with epinephrine  Anesthetic total: 1 mL  Scalpel size: 11  Needle gauge: 22  Incision type: single straight  Drainage: purulent  Drainage amount: moderate  Wound treatment: wound left open  Packing material: none  Patient tolerance: patient tolerated the procedure well with no immediate complications  Comments: Approximately 1 ounce of purulent fluid drained and expressed from the abscess.  It was left  open.  Pain was resolved by 90%.  She will be starting antibiotic in about an hour.           Assessment & Plan   Diagnoses and all orders for this visit:    1. Type 2 diabetes mellitus with hyperglycemia, with long-term current use of insulin (Primary)  -     Comprehensive metabolic panel; Future  -     Lipid panel; Future  -     CBC No Differential; Future  -     Hemoglobin A1c; Future  -     Comprehensive metabolic panel  -     CBC No Differential  -     Hemoglobin A1c  -     Incision & Drainage    2. Chronic obstructive pulmonary disease, unspecified COPD type  -     albuterol sulfate  (90 Base) MCG/ACT inhaler; Inhale 2 puffs Every 4 (Four) Hours As Needed for Wheezing.  Dispense: 18 g; Refill: 5  -     Fluticasone-Umeclidin-Vilant (Trelegy Ellipta) 100-62.5-25 MCG/ACT inhaler; Inhale 1 puff Daily.  Dispense: 180 each; Refill: 1    3. Generalized edema  -     bumetanide (BUMEX) 2 MG tablet; Take 1 tablet by mouth 2 (Two) Times a Day.  Dispense: 180 tablet; Refill: 0  -     potassium chloride 10 MEQ CR tablet; Take 1 tablet by mouth 2 (Two) Times a Day.  Dispense: 180 tablet; Refill: 0    4. Primary hypertension  -     enalapril (Vasotec) 2.5 MG tablet; Take 1 tablet by mouth Daily.  Dispense: 90 tablet; Refill: 0    5. Essential hypertension  -     enalapril (Vasotec) 2.5 MG tablet; Take 1 tablet by mouth Daily.  Dispense: 90 tablet; Refill: 0    6. Mixed hyperlipidemia  -     ezetimibe (ZETIA) 10 MG tablet; Take 1 tablet by mouth Daily.  Dispense: 90 tablet; Refill: 0  -     rosuvastatin (CRESTOR) 40 MG tablet; Take 1 tablet by mouth Daily.  Dispense: 90 tablet; Refill: 0    7. Gastroesophageal reflux disease without esophagitis  -     famotidine (PEPCID) 40 MG tablet; Take 1 tablet by mouth every night at bedtime.  Dispense: 90 tablet; Refill: 0    8. Chronic seasonal allergic rhinitis due to pollen  -     levocetirizine (XYZAL) 5 MG tablet; Take 1 tablet by mouth Every Evening.  Dispense: 90 tablet;  Refill: 1  -     montelukast (SINGULAIR) 10 MG tablet; Take 1 tablet by mouth Every Night.  Dispense: 90 tablet; Refill: 1    9. Acquired hypothyroidism  -     levothyroxine (SYNTHROID, LEVOTHROID) 100 MCG tablet; Take 1 tablet by mouth Daily.  Dispense: 90 tablet; Refill: 0  -     TSH; Future  -     TSH    10. Other chronic pain  -     methocarbamol (ROBAXIN) 500 MG tablet; Take 1 tablet by mouth 2 (Two) Times a Day As Needed for Muscle Spasms.  Dispense: 120 tablet; Refill: 0    11. Vitamin D deficiency  -     vitamin D (ERGOCALCIFEROL) 1.25 MG (57508 UT) capsule capsule; Take 1 capsule by mouth 1 (One) Time Per Week.  Dispense: 12 capsule; Refill: 0  -     Vitamin D 25 hydroxy; Future  -     Vitamin D 25 hydroxy    12. Abscess of abdominal wall  -     Incision & Drainage       sulfamethoxazole-trimethoprim (Bactrim DS) 800-160 MG per tablet; Take 1 tablet by mouth 2 (Two) Times a Day.  Dispense: 20 tablet; Refill: 0  Incision and drainage done today.  Procedure as above.  As she has not showing any signs of systemic illness, I see no problem with vaccinations today.  She is can start Bactrim immediately.  No culture done as it is unlikely to  at this time.  If her condition returns, she is going to call the office.  Consideration to add another antibiotic versus referral to general surgery will be considered.      Need for vaccination  -     ipratropium-albuterol (DUO-NEB) 0.5-2.5 mg/3 ml nebulizer; Take 3 mL by nebulization Every 4 (Four) Hours As Needed for Wheezing.  Dispense: 360 mL; Refill: 0  --     Fluzone >6 Months ([id number])  -     Pneumococcal Conjugate Vaccine 20-Valent (PCV20)               Soheila Brewster  reports that she has been smoking cigarettes. She has a 10.00 pack-year smoking history. She has never used smokeless tobacco.. I have educated her on the risk of diseases from using tobacco products such as cancer, COPD, and heart disease.     I advised her to quit and she is not  willing to quit.    I spent 3  minutes counseling the patient.                    This document has been electronically signed by Diana Erickson  December 6, 2023 11:06 EST    Dictated Utilizing Dragon Dictation: Part of this note may be an electronic transcription/translation of spoken language to printed text using the Dragon Dictation System.

## 2023-12-11 ENCOUNTER — TELEPHONE (OUTPATIENT)
Dept: FAMILY MEDICINE CLINIC | Facility: CLINIC | Age: 53
End: 2023-12-11
Payer: MEDICARE

## 2023-12-11 NOTE — TELEPHONE ENCOUNTER
----- Message from Walt Tyler, DO sent at 12/11/2023 11:04 AM EST -----  I reviewed your blood work.  Your hemoglobin A1c is unchanged.  How is your abdomen?  You had an incision and drainage last week, were you able to get your antibiotic?      Spoke with patient & she verbalized understanding,reports her incision is still red surrounding & still draining some but a lot less painful,did get her antibiotic & is still taking it.

## 2023-12-11 NOTE — TELEPHONE ENCOUNTER
Noted.  If this is still not looking significantly better by Thursday, I would like to refer you to a general surgeon for their opinion.      Spoke with patient & she verbalized understanding,will call Thursday if not significant improvement.

## 2023-12-11 NOTE — TELEPHONE ENCOUNTER
Noted.  If this is still not looking significantly better by Thursday, I would like to refer you to a general surgeon for their opinion.

## 2023-12-11 NOTE — TELEPHONE ENCOUNTER
----- Message from Walt Tyler DO sent at 12/11/2023 11:04 AM EST -----  I reviewed your blood work.  Your hemoglobin A1c is unchanged.  How is your abdomen?  You had an incision and drainage last week, were you able to get your antibiotic?

## 2023-12-28 ENCOUNTER — OFFICE VISIT (OUTPATIENT)
Dept: ENDOCRINOLOGY | Facility: CLINIC | Age: 53
End: 2023-12-28
Payer: MEDICARE

## 2023-12-28 VITALS
OXYGEN SATURATION: 98 % | HEIGHT: 71 IN | SYSTOLIC BLOOD PRESSURE: 132 MMHG | HEART RATE: 85 BPM | WEIGHT: 271.4 LBS | BODY MASS INDEX: 37.99 KG/M2 | DIASTOLIC BLOOD PRESSURE: 76 MMHG

## 2023-12-28 DIAGNOSIS — Z79.4 TYPE 2 DIABETES MELLITUS WITH HYPERGLYCEMIA, WITH LONG-TERM CURRENT USE OF INSULIN: Primary | ICD-10-CM

## 2023-12-28 DIAGNOSIS — E11.65 TYPE 2 DIABETES MELLITUS WITH HYPERGLYCEMIA, WITH LONG-TERM CURRENT USE OF INSULIN: Primary | ICD-10-CM

## 2023-12-28 LAB — GLUCOSE BLDC GLUCOMTR-MCNC: 138 MG/DL (ref 70–130)

## 2023-12-28 NOTE — PROGRESS NOTES
No chief complaint on file.       Referring Provider  No ref. provider found     HPI   Soheila Brewster is a 53 y.o. female had no chief complaint listed for this encounter.    T2DM.    Since her last visit she has been doing well.  She has been doing well other than noting increased BG's.  She is starting on an Omnipod 5 later this evening after training.    Diabetes:  Diabetes was diagnosed  2003.  Complications include neuropathy.  Last ophtho exam was due.  Current medications for diabetes include insulin in an Omnipod Dash, Trulicity 3 mg weekly.    Past meds: Janumet-stopped   She checks her blood sugar dexcom CGM, 288 times per day.   Hypos: rarely    ACE/ARB:yes, Statin: yes    The following portions of the patient's history were reviewed and updated as appropriate: allergies, current medications, past family history, past medical history, past social history, past surgical history and problem list.    Diet: she doesn't have a well diet    Past Medical History:   Diagnosis Date    Arthritis     Asthma     Back pain     Disease of thyroid gland     History of transfusion     Liver disease     fatty    Sleep apnea     Umbilical hernia      Past Surgical History:   Procedure Laterality Date    BREAST SURGERY      bilat abcess    CHOLECYSTECTOMY      DILATATION AND CURETTAGE      THYROID BIOPSY      THYROIDECTOMY Right 4/1/2019    Procedure: RIGHT THYROIDECTOMY LOBECTOMY;  Surgeon: Anam Rodriguez MD;  Location: [facility] OR;  Service: General    US GUIDED FINE NEEDLE ASPIRATION  1/16/2020      Family History   Problem Relation Age of Onset    Arthritis Mother     Asthma Mother     Cancer Mother     COPD Mother     Diabetes Mother     Heart disease Mother     Hypertension Mother     Hyperlipidemia Mother     Kidney disease Mother     Arthritis Father     COPD Father     Diabetes Father     Hypertension Father     Hyperlipidemia Father     Arthritis Sister     COPD Sister     Diabetes Sister     Arrhythmia Sister        Social History     Socioeconomic History    Marital status:     Number of children: 0    Highest education level: GED or equivalent   Tobacco Use    Smoking status: Every Day     Packs/day: 1.00     Years: 10.00     Additional pack years: 0.00     Total pack years: 10.00     Types: Cigarettes    Smokeless tobacco: Never    Tobacco comments:     Patient is working on quitting    Vaping Use    Vaping Use: Never used   Substance and Sexual Activity    Alcohol use: No    Drug use: No    Sexual activity: Defer      Allergies   Allergen Reactions    Morphine And Related Shortness Of Breath     Throat edema    Penicillins Shortness Of Breath     Also anything in the penicillin family        Current Outpatient Medications on File Prior to Visit   Medication Sig Dispense Refill    albuterol sulfate  (90 Base) MCG/ACT inhaler Inhale 2 puffs Every 4 (Four) Hours As Needed for Wheezing. 18 g 5    azelastine (ASTELIN) 0.1 % nasal spray SPRAY 2 SPRAYS IN EACH NOSTRIL TWICE DAILY AS NEEDED 30 mL 5    bumetanide (BUMEX) 2 MG tablet Take 1 tablet by mouth 2 (Two) Times a Day. 180 tablet 0    Continuous Blood Gluc  (Dexcom G6 ) device Use to monitor blood glucose 1 each 0    Continuous Blood Gluc Sensor (Dexcom G6 Sensor) Every 10 (Ten) Days. 3 each 11    Continuous Blood Gluc Transmit (Dexcom G6 Transmitter) misc 1 each Every 3 (Three) Months. Change every 3 months 1 each 3    Cyanocobalamin (B-12) 1000 MCG tablet Take 1 tablet by mouth Daily. 30 tablet 5    enalapril (Vasotec) 2.5 MG tablet Take 1 tablet by mouth Daily. 90 tablet 0    ezetimibe (ZETIA) 10 MG tablet Take 1 tablet by mouth Daily. 90 tablet 0    famotidine (PEPCID) 40 MG tablet Take 1 tablet by mouth every night at bedtime. 90 tablet 0    Fluticasone-Umeclidin-Vilant (Trelegy Ellipta) 100-62.5-25 MCG/ACT inhaler Inhale 1 puff Daily. 180 each 1    gabapentin (NEURONTIN) 800 MG tablet Take 1 tablet by mouth 4 (Four) Times a Day. 120  tablet 2    Insulin Disposable Pump (Omnipod 5 G6 Pod, Gen 5,) misc Use 1 each Every 3 (Three) Days. 10 each 11    Insulin Lispro (humaLOG) 100 UNIT/ML injection Use a maximum daily dose of 100 units per insulin pump 30 mL 5    ipratropium-albuterol (DUO-NEB) 0.5-2.5 mg/3 ml nebulizer Take 3 mL by nebulization Every 4 (Four) Hours As Needed for Wheezing. 360 mL 0    lactulose (CHRONULAC) 10 GM/15ML solution Take 15 mL by mouth 2 (Two) Times a Day. Pt takes PRN      levocetirizine (XYZAL) 5 MG tablet Take 1 tablet by mouth Every Evening. 90 tablet 1    levothyroxine (SYNTHROID, LEVOTHROID) 100 MCG tablet Take 1 tablet by mouth Daily. 90 tablet 0    methocarbamol (ROBAXIN) 500 MG tablet Take 1 tablet by mouth 2 (Two) Times a Day As Needed for Muscle Spasms. 120 tablet 0    montelukast (SINGULAIR) 10 MG tablet Take 1 tablet by mouth Every Night. 90 tablet 1    Motegrity 2 MG tablet Take 1 tablet by mouth Every Morning.      oxyCODONE-acetaminophen (PERCOCET) 7.5-325 MG per tablet Take 1 tablet by mouth 4 (Four) Times a Day.      potassium chloride 10 MEQ CR tablet Take 1 tablet by mouth 2 (Two) Times a Day. 180 tablet 0    rosuvastatin (CRESTOR) 40 MG tablet Take 1 tablet by mouth Daily. 90 tablet 0    sulfamethoxazole-trimethoprim (Bactrim DS) 800-160 MG per tablet Take 1 tablet by mouth 2 (Two) Times a Day. 20 tablet 0    vitamin D (ERGOCALCIFEROL) 1.25 MG (71928 UT) capsule capsule Take 1 capsule by mouth 1 (One) Time Per Week. 12 capsule 0    [DISCONTINUED] Dulaglutide (Trulicity) 3 MG/0.5ML solution pen-injector Inject 0.5 mL under the skin into the appropriate area as directed 1 (One) Time Per Week. 2 mL 5     No current facility-administered medications on file prior to visit.        Review of Systems   Constitutional:  Positive for fatigue. Negative for unexpected weight gain and unexpected weight loss.   Eyes:  Positive for blurred vision and itching.   Endocrine: Positive for polydipsia. Negative for  "polyphagia and polyuria.   Psychiatric/Behavioral:  Positive for sleep disturbance.    All other systems reviewed and are negative.     /76 (BP Location: Left arm, Patient Position: Sitting, Cuff Size: Large Adult)   Pulse 85   Ht 180.3 cm (70.98\")   Wt 123 kg (271 lb 6.4 oz)   SpO2 98%   BMI 37.87 kg/m²      Physical Exam  Vitals reviewed.   Constitutional:       Appearance: Normal appearance.   Eyes:      Extraocular Movements: Extraocular movements intact.   Cardiovascular:      Rate and Rhythm: Normal rate.   Pulmonary:      Effort: Pulmonary effort is normal.   Skin:     General: Skin is warm.   Neurological:      General: No focal deficit present.      Mental Status: She is alert and oriented to person, place, and time.   Psychiatric:         Mood and Affect: Mood normal.         Behavior: Behavior normal.         Thought Content: Thought content normal.         Judgment: Judgment normal.       CMP:  Lab Results   Component Value Date    BUN 11 12/06/2023    CREATININE 0.84 12/06/2023    EGFRIFNONA 106 10/14/2021    BCR 13.1 12/06/2023     12/06/2023    K 4.6 12/06/2023    CO2 26.0 12/06/2023    CALCIUM 9.9 12/06/2023    ALBUMIN 3.9 12/06/2023    BILITOT 0.2 12/06/2023    ALKPHOS 73 12/06/2023    AST 14 12/06/2023    ALT 16 12/06/2023     Lipid Panel:  Lab Results   Component Value Date    CHOL 83 05/16/2023    TRIG 131 05/16/2023    HDL 37 (L) 05/16/2023    VLDL 23 05/16/2023    LDL 23 05/16/2023     HbA1c:  Lab Results   Component Value Date    HGBA1C 8.40 (H) 12/06/2023    HGBA1C 8.8 09/26/2023     Glucose:  Lab Results   Component Value Date    POCGLU 138 (A) 12/28/2023     Microalbumin:  Lab Results   Component Value Date    MALBCRERATIO 215.0 09/26/2023     TSH:  Lab Results   Component Value Date    TSH 1.440 12/06/2023       Assessment and Plan    Diagnoses and all orders for this visit:    1. Type 2 diabetes mellitus with hyperglycemia, with long-term current use of insulin " (Primary)  Assessment & Plan:  -Diabetes is above goal with A1c 8.4.  -Discussed dietary and exercise guidelines with patient.  -Discussed the importance of yearly eye exams.  -Discussed the importance of checking BG's regularly. Continue using Dexcom CGM.    We are unable to download in office today.  -Continue using insulin via Omnipod Dash at current settings.  -Switch from Trulicity to Mounjaro 5 mg weekly.  Patient has no personal history of pancreatitis, no family history of MEN syndrome or medullary thyroid cancer. Possible side effects including nausea, bloating, other GI upset and rarely pancreatitis were discussed. She was advised to call the office with any symptoms or concerns.   -She would like to start using the Omnipod 5 as she now has a phone that is compatible with that and the Dexcom G6. She is starting on this system this evening.  -S/S hypoglycemia reviewed with Rule of 15's advised.  -Follow-up in 2 weeks.    Orders:  -     POC Glucose Fingerstick    Other orders  -     Tirzepatide (Mounjaro) 5 MG/0.5ML solution pen-injector pen; Inject 0.5 mL under the skin into the appropriate area as directed 1 (One) Time Per Week.  Dispense: 2 mL; Refill: 0           Return in about 2 weeks (around 1/11/2024) for Follow-up appointment. The patient was instructed to contact the clinic with any interval questions or concerns.        This document has been electronically signed by JOAQUÍN Garcia  December 28, 2023 15:04 EST   Endocrinology

## 2023-12-28 NOTE — ASSESSMENT & PLAN NOTE
-Diabetes is above goal with A1c 8.4.  -Discussed dietary and exercise guidelines with patient.  -Discussed the importance of yearly eye exams.  -Discussed the importance of checking BG's regularly. Continue using Dexcom CGM.    We are unable to download in office today.  -Continue using insulin via Omnipod Dash at current settings.  -Switch from Trulicity to Mounjaro 5 mg weekly.  Patient has no personal history of pancreatitis, no family history of MEN syndrome or medullary thyroid cancer. Possible side effects including nausea, bloating, other GI upset and rarely pancreatitis were discussed. She was advised to call the office with any symptoms or concerns.   -She would like to start using the Omnipod 5 as she now has a phone that is compatible with that and the Dexcom G6. She is starting on this system this evening.  -S/S hypoglycemia reviewed with Rule of 15's advised.  -Follow-up in 2 weeks.

## 2024-01-25 DIAGNOSIS — E53.8 VITAMIN B12 DEFICIENCY: ICD-10-CM

## 2024-01-25 RX ORDER — LANOLIN ALCOHOL/MO/W.PET/CERES
1000 CREAM (GRAM) TOPICAL DAILY
Qty: 90 TABLET | Refills: 1 | Status: SHIPPED | OUTPATIENT
Start: 2024-01-25

## 2024-02-12 ENCOUNTER — EDUCATION (OUTPATIENT)
Dept: DIABETES SERVICES | Facility: HOSPITAL | Age: 54
End: 2024-02-12
Payer: MEDICARE

## 2024-03-05 RX ORDER — DULAGLUTIDE 3 MG/.5ML
INJECTION, SOLUTION SUBCUTANEOUS
Qty: 8 ML | Refills: 0 | OUTPATIENT
Start: 2024-03-05

## 2024-03-28 DIAGNOSIS — Z79.4 TYPE 2 DIABETES MELLITUS WITH HYPERGLYCEMIA, WITH LONG-TERM CURRENT USE OF INSULIN: ICD-10-CM

## 2024-03-28 DIAGNOSIS — E11.65 TYPE 2 DIABETES MELLITUS WITH HYPERGLYCEMIA, WITH LONG-TERM CURRENT USE OF INSULIN: ICD-10-CM

## 2024-03-28 RX ORDER — INSULIN LISPRO 100 [IU]/ML
INJECTION, SOLUTION INTRAVENOUS; SUBCUTANEOUS
Qty: 30 ML | Refills: 5 | Status: SHIPPED | OUTPATIENT
Start: 2024-03-28

## 2024-04-30 DIAGNOSIS — E03.9 ACQUIRED HYPOTHYROIDISM: ICD-10-CM

## 2024-04-30 RX ORDER — LEVOTHYROXINE SODIUM 0.1 MG/1
100 TABLET ORAL DAILY
Qty: 90 TABLET | Refills: 0 | Status: SHIPPED | OUTPATIENT
Start: 2024-04-30

## 2024-05-07 DIAGNOSIS — J44.9 CHRONIC OBSTRUCTIVE PULMONARY DISEASE, UNSPECIFIED COPD TYPE: ICD-10-CM

## 2024-05-07 RX ORDER — ALBUTEROL SULFATE 90 UG/1
AEROSOL, METERED RESPIRATORY (INHALATION)
Qty: 8.5 G | Refills: 0 | Status: SHIPPED | OUTPATIENT
Start: 2024-05-07

## 2024-05-28 DIAGNOSIS — R60.1 GENERALIZED EDEMA: ICD-10-CM

## 2024-05-28 RX ORDER — POTASSIUM CHLORIDE 750 MG/1
10 TABLET, FILM COATED, EXTENDED RELEASE ORAL 2 TIMES DAILY
Qty: 180 TABLET | Refills: 0 | OUTPATIENT
Start: 2024-05-28

## 2024-05-30 DIAGNOSIS — J30.1 CHRONIC SEASONAL ALLERGIC RHINITIS DUE TO POLLEN: ICD-10-CM

## 2024-05-30 RX ORDER — MONTELUKAST SODIUM 10 MG/1
10 TABLET ORAL NIGHTLY
Qty: 90 TABLET | Refills: 0 | Status: SHIPPED | OUTPATIENT
Start: 2024-05-30

## 2024-05-30 NOTE — TELEPHONE ENCOUNTER
Walt Tyler,    to [facility]         5/30/24 11:05 AM  Schedule follow-up appointment in the next couple months.

## 2024-05-31 NOTE — TELEPHONE ENCOUNTER
Spoke with patient she reports she changed providers first of the year seconadry to her insurance,she will notify the pharmacy.

## 2024-07-23 RX ORDER — INSULIN PMP CART,AUT,G6/7,CNTR
EACH SUBCUTANEOUS
Qty: 10 EACH | Refills: 11 | Status: SHIPPED | OUTPATIENT
Start: 2024-07-23

## 2024-11-06 DIAGNOSIS — J44.9 CHRONIC OBSTRUCTIVE PULMONARY DISEASE, UNSPECIFIED COPD TYPE: ICD-10-CM

## 2024-11-06 RX ORDER — FLUTICASONE FUROATE, UMECLIDINIUM BROMIDE AND VILANTEROL TRIFENATATE 100; 62.5; 25 UG/1; UG/1; UG/1
1 POWDER RESPIRATORY (INHALATION) DAILY
Qty: 180 EACH | Refills: 1 | OUTPATIENT
Start: 2024-11-06

## (undated) DEVICE — ROSEBUD DISSECTORS: Brand: DEROYAL

## (undated) DEVICE — SUT VIC 2/0 TIES 18IN J111T

## (undated) DEVICE — SUT VIC 3/0 TIES J104T

## (undated) DEVICE — SUT SILK 2/0 TIES 30IN SA85H

## (undated) DEVICE — PK HD AND NK 70

## (undated) DEVICE — ANTIBACTERIAL UNDYED BRAIDED (POLYGLACTIN 910), SYNTHETIC ABSORBABLE SUTURE: Brand: COATED VICRYL

## (undated) DEVICE — 3M™ STERI-STRIP™ REINFORCED ADHESIVE SKIN CLOSURES, R1547, 1/2 IN X 4 IN (12 MM X 100 MM), 6 STRIPS/ENVELOPE: Brand: 3M™ STERI-STRIP™

## (undated) DEVICE — AMD ANTIMICROBIAL NON-ADHERENT ISLAND DRESSING,0.2% POLYHEXAMETHYLENE BIGUANIDE HCI (PHMB): Brand: TELFA

## (undated) DEVICE — ENCORE® LATEX MICRO SIZE 7.5, STERILE LATEX POWDER-FREE SURGICAL GLOVE: Brand: ENCORE

## (undated) DEVICE — HOLDER: Brand: DEROYAL